# Patient Record
Sex: MALE | Race: OTHER | HISPANIC OR LATINO | URBAN - METROPOLITAN AREA
[De-identification: names, ages, dates, MRNs, and addresses within clinical notes are randomized per-mention and may not be internally consistent; named-entity substitution may affect disease eponyms.]

---

## 2020-01-01 ENCOUNTER — INPATIENT (INPATIENT)
Facility: HOSPITAL | Age: 75
LOS: 3 days | Discharge: HOME | DRG: 871 | End: 2020-04-01
Attending: INTERNAL MEDICINE | Admitting: INTERNAL MEDICINE
Payer: COMMERCIAL

## 2020-01-01 ENCOUNTER — INPATIENT (INPATIENT)
Facility: HOSPITAL | Age: 75
LOS: 43 days | DRG: 4 | End: 2020-05-15
Attending: INTERNAL MEDICINE | Admitting: STUDENT IN AN ORGANIZED HEALTH CARE EDUCATION/TRAINING PROGRAM
Payer: COMMERCIAL

## 2020-01-01 ENCOUNTER — EMERGENCY (EMERGENCY)
Facility: HOSPITAL | Age: 75
LOS: 1 days | Discharge: HOME | End: 2020-01-01
Attending: STUDENT IN AN ORGANIZED HEALTH CARE EDUCATION/TRAINING PROGRAM
Payer: COMMERCIAL

## 2020-01-01 VITALS
OXYGEN SATURATION: 96 % | HEIGHT: 65 IN | SYSTOLIC BLOOD PRESSURE: 114 MMHG | RESPIRATION RATE: 18 BRPM | HEART RATE: 76 BPM | WEIGHT: 149.91 LBS | DIASTOLIC BLOOD PRESSURE: 74 MMHG | TEMPERATURE: 100 F

## 2020-01-01 VITALS — HEART RATE: 79 BPM | SYSTOLIC BLOOD PRESSURE: 107 MMHG | DIASTOLIC BLOOD PRESSURE: 55 MMHG | TEMPERATURE: 99 F

## 2020-01-01 VITALS
WEIGHT: 143.96 LBS | OXYGEN SATURATION: 60 % | RESPIRATION RATE: 24 BRPM | HEART RATE: 112 BPM | DIASTOLIC BLOOD PRESSURE: 59 MMHG | HEIGHT: 65 IN | SYSTOLIC BLOOD PRESSURE: 105 MMHG

## 2020-01-01 VITALS — TEMPERATURE: 98 F

## 2020-01-01 DIAGNOSIS — I82.403 ACUTE EMBOLISM AND THROMBOSIS OF UNSPECIFIED DEEP VEINS OF LOWER EXTREMITY, BILATERAL: ICD-10-CM

## 2020-01-01 DIAGNOSIS — Z22.322 CARRIER OR SUSPECTED CARRIER OF METHICILLIN RESISTANT STAPHYLOCOCCUS AUREUS: ICD-10-CM

## 2020-01-01 DIAGNOSIS — D63.8 ANEMIA IN OTHER CHRONIC DISEASES CLASSIFIED ELSEWHERE: ICD-10-CM

## 2020-01-01 DIAGNOSIS — I82.409 ACUTE EMBOLISM AND THROMBOSIS OF UNSPECIFIED DEEP VEINS OF UNSPECIFIED LOWER EXTREMITY: ICD-10-CM

## 2020-01-01 DIAGNOSIS — J93.9 PNEUMOTHORAX, UNSPECIFIED: ICD-10-CM

## 2020-01-01 DIAGNOSIS — U07.1 COVID-19: ICD-10-CM

## 2020-01-01 DIAGNOSIS — I80.229 PHLEBITIS AND THROMBOPHLEBITIS OF UNSPECIFIED POPLITEAL VEIN: ICD-10-CM

## 2020-01-01 DIAGNOSIS — R53.2 FUNCTIONAL QUADRIPLEGIA: ICD-10-CM

## 2020-01-01 DIAGNOSIS — B97.21 SARS-ASSOCIATED CORONAVIRUS AS THE CAUSE OF DISEASES CLASSIFIED ELSEWHERE: ICD-10-CM

## 2020-01-01 DIAGNOSIS — N17.9 ACUTE KIDNEY FAILURE, UNSPECIFIED: ICD-10-CM

## 2020-01-01 DIAGNOSIS — J18.9 PNEUMONIA, UNSPECIFIED ORGANISM: ICD-10-CM

## 2020-01-01 DIAGNOSIS — J96.21 ACUTE AND CHRONIC RESPIRATORY FAILURE WITH HYPOXIA: ICD-10-CM

## 2020-01-01 DIAGNOSIS — T79.7XXA TRAUMATIC SUBCUTANEOUS EMPHYSEMA, INITIAL ENCOUNTER: ICD-10-CM

## 2020-01-01 DIAGNOSIS — Z96.89 PRESENCE OF OTHER SPECIFIED FUNCTIONAL IMPLANTS: ICD-10-CM

## 2020-01-01 DIAGNOSIS — J96.01 ACUTE RESPIRATORY FAILURE WITH HYPOXIA: ICD-10-CM

## 2020-01-01 DIAGNOSIS — E87.6 HYPOKALEMIA: ICD-10-CM

## 2020-01-01 DIAGNOSIS — E43 UNSPECIFIED SEVERE PROTEIN-CALORIE MALNUTRITION: ICD-10-CM

## 2020-01-01 DIAGNOSIS — Z29.9 ENCOUNTER FOR PROPHYLACTIC MEASURES, UNSPECIFIED: ICD-10-CM

## 2020-01-01 DIAGNOSIS — J12.9 VIRAL PNEUMONIA, UNSPECIFIED: ICD-10-CM

## 2020-01-01 LAB
-  AMIKACIN: SIGNIFICANT CHANGE UP
-  AMOXICILLIN/CLAVULANIC ACID: SIGNIFICANT CHANGE UP
-  AMPICILLIN/SULBACTAM: SIGNIFICANT CHANGE UP
-  AMPICILLIN/SULBACTAM: SIGNIFICANT CHANGE UP
-  AMPICILLIN: SIGNIFICANT CHANGE UP
-  AZTREONAM: SIGNIFICANT CHANGE UP
-  CEFAZOLIN: SIGNIFICANT CHANGE UP
-  CEFAZOLIN: SIGNIFICANT CHANGE UP
-  CEFEPIME: SIGNIFICANT CHANGE UP
-  CEFOXITIN: SIGNIFICANT CHANGE UP
-  CEFTRIAXONE: SIGNIFICANT CHANGE UP
-  CEFTRIAXONE: SIGNIFICANT CHANGE UP
-  CIPROFLOXACIN: SIGNIFICANT CHANGE UP
-  CLINDAMYCIN: SIGNIFICANT CHANGE UP
-  CLINDAMYCIN: SIGNIFICANT CHANGE UP
-  ERTAPENEM: SIGNIFICANT CHANGE UP
-  ERYTHROMYCIN: SIGNIFICANT CHANGE UP
-  ERYTHROMYCIN: SIGNIFICANT CHANGE UP
-  GENTAMICIN: SIGNIFICANT CHANGE UP
-  GENTAMICIN: SIGNIFICANT CHANGE UP
-  LEVOFLOXACIN: SIGNIFICANT CHANGE UP
-  LEVOFLOXACIN: SIGNIFICANT CHANGE UP
-  LINEZOLID: SIGNIFICANT CHANGE UP
-  MEROPENEM: SIGNIFICANT CHANGE UP
-  OXACILLIN: SIGNIFICANT CHANGE UP
-  PENICILLIN: SIGNIFICANT CHANGE UP
-  PENICILLIN: SIGNIFICANT CHANGE UP
-  PIPERACILLIN/TAZOBACTAM: SIGNIFICANT CHANGE UP
-  RIFAMPIN: SIGNIFICANT CHANGE UP
-  TETRACYCLINE: SIGNIFICANT CHANGE UP
-  TOBRAMYCIN: SIGNIFICANT CHANGE UP
-  TRIMETHOPRIM/SULFAMETHOXAZOLE: SIGNIFICANT CHANGE UP
-  VANCOMYCIN: SIGNIFICANT CHANGE UP
-  VANCOMYCIN: SIGNIFICANT CHANGE UP
4/8 RATIO: 0.25 RATIO — LOW (ref 0.86–4.14)
4/8 RATIO: 0.33 RATIO — LOW (ref 0.86–4.14)
4/8 RATIO: 0.43 RATIO — LOW (ref 0.86–4.14)
ABS CD8: 156 /UL — SIGNIFICANT CHANGE UP (ref 90–775)
ABS CD8: 177 /UL — SIGNIFICANT CHANGE UP (ref 90–775)
ABS CD8: 227 /UL — SIGNIFICANT CHANGE UP (ref 90–775)
ADD ON TEST-SPECIMEN IN LAB: SIGNIFICANT CHANGE UP
ALBUMIN SERPL ELPH-MCNC: 1 G/DL — LOW (ref 3.3–5)
ALBUMIN SERPL ELPH-MCNC: 1.1 G/DL — LOW (ref 3.3–5)
ALBUMIN SERPL ELPH-MCNC: 1.2 G/DL — LOW (ref 3.3–5)
ALBUMIN SERPL ELPH-MCNC: 1.3 G/DL — LOW (ref 3.3–5)
ALBUMIN SERPL ELPH-MCNC: 1.4 G/DL — LOW (ref 3.3–5)
ALBUMIN SERPL ELPH-MCNC: 1.4 G/DL — LOW (ref 3.3–5)
ALBUMIN SERPL ELPH-MCNC: 1.4 G/DL — LOW (ref 3.5–5)
ALBUMIN SERPL ELPH-MCNC: 1.5 G/DL — LOW (ref 3.3–5)
ALBUMIN SERPL ELPH-MCNC: 1.7 G/DL — LOW (ref 3.5–5)
ALBUMIN SERPL ELPH-MCNC: 2.1 G/DL — LOW (ref 3.5–5)
ALP SERPL-CCNC: 110 U/L — SIGNIFICANT CHANGE UP (ref 40–120)
ALP SERPL-CCNC: 122 U/L — HIGH (ref 40–120)
ALP SERPL-CCNC: 127 U/L — HIGH (ref 40–120)
ALP SERPL-CCNC: 130 U/L — HIGH (ref 40–120)
ALP SERPL-CCNC: 136 U/L — HIGH (ref 40–120)
ALP SERPL-CCNC: 141 U/L — HIGH (ref 40–120)
ALP SERPL-CCNC: 155 U/L — HIGH (ref 40–120)
ALP SERPL-CCNC: 156 U/L — HIGH (ref 40–120)
ALP SERPL-CCNC: 157 U/L — HIGH (ref 40–120)
ALP SERPL-CCNC: 160 U/L — HIGH (ref 40–120)
ALP SERPL-CCNC: 161 U/L — HIGH (ref 40–120)
ALP SERPL-CCNC: 162 U/L — HIGH (ref 40–120)
ALP SERPL-CCNC: 164 U/L — HIGH (ref 40–120)
ALP SERPL-CCNC: 180 U/L — HIGH (ref 40–120)
ALP SERPL-CCNC: 184 U/L — HIGH (ref 40–120)
ALP SERPL-CCNC: 197 U/L — HIGH (ref 40–120)
ALP SERPL-CCNC: 224 U/L — HIGH (ref 40–120)
ALP SERPL-CCNC: 77 U/L — SIGNIFICANT CHANGE UP (ref 40–120)
ALP SERPL-CCNC: 86 U/L — SIGNIFICANT CHANGE UP (ref 40–120)
ALP SERPL-CCNC: 89 U/L — SIGNIFICANT CHANGE UP (ref 40–120)
ALP SERPL-CCNC: 89 U/L — SIGNIFICANT CHANGE UP (ref 40–120)
ALP SERPL-CCNC: 96 U/L — SIGNIFICANT CHANGE UP (ref 40–120)
ALT FLD-CCNC: 14 U/L — SIGNIFICANT CHANGE UP (ref 12–78)
ALT FLD-CCNC: 14 U/L — SIGNIFICANT CHANGE UP (ref 12–78)
ALT FLD-CCNC: 15 U/L — SIGNIFICANT CHANGE UP (ref 12–78)
ALT FLD-CCNC: 16 U/L — SIGNIFICANT CHANGE UP (ref 12–78)
ALT FLD-CCNC: 18 U/L — SIGNIFICANT CHANGE UP (ref 12–78)
ALT FLD-CCNC: 18 U/L — SIGNIFICANT CHANGE UP (ref 12–78)
ALT FLD-CCNC: 19 U/L — SIGNIFICANT CHANGE UP (ref 12–78)
ALT FLD-CCNC: 20 U/L — SIGNIFICANT CHANGE UP (ref 12–78)
ALT FLD-CCNC: 20 U/L — SIGNIFICANT CHANGE UP (ref 12–78)
ALT FLD-CCNC: 21 U/L — SIGNIFICANT CHANGE UP (ref 12–78)
ALT FLD-CCNC: 23 U/L — SIGNIFICANT CHANGE UP (ref 12–78)
ALT FLD-CCNC: 24 U/L DA — SIGNIFICANT CHANGE UP (ref 10–60)
ALT FLD-CCNC: 24 U/L — SIGNIFICANT CHANGE UP (ref 12–78)
ALT FLD-CCNC: 26 U/L — SIGNIFICANT CHANGE UP (ref 12–78)
ALT FLD-CCNC: 27 U/L — SIGNIFICANT CHANGE UP (ref 12–78)
ALT FLD-CCNC: 27 U/L — SIGNIFICANT CHANGE UP (ref 12–78)
ALT FLD-CCNC: 29 U/L — SIGNIFICANT CHANGE UP (ref 12–78)
ALT FLD-CCNC: 29 U/L — SIGNIFICANT CHANGE UP (ref 12–78)
ALT FLD-CCNC: 30 U/L — SIGNIFICANT CHANGE UP (ref 12–78)
ALT FLD-CCNC: 31 U/L DA — SIGNIFICANT CHANGE UP (ref 10–60)
ALT FLD-CCNC: 32 U/L — SIGNIFICANT CHANGE UP (ref 12–78)
ALT FLD-CCNC: 38 U/L DA — SIGNIFICANT CHANGE UP (ref 10–60)
ANION GAP SERPL CALC-SCNC: -1 MMOL/L — LOW (ref 5–17)
ANION GAP SERPL CALC-SCNC: -2 MMOL/L — LOW (ref 5–17)
ANION GAP SERPL CALC-SCNC: 0 MMOL/L — LOW (ref 5–17)
ANION GAP SERPL CALC-SCNC: 1 MMOL/L — LOW (ref 5–17)
ANION GAP SERPL CALC-SCNC: 2 MMOL/L — LOW (ref 5–17)
ANION GAP SERPL CALC-SCNC: 3 MMOL/L — LOW (ref 5–17)
ANION GAP SERPL CALC-SCNC: 4 MMOL/L — LOW (ref 5–17)
ANION GAP SERPL CALC-SCNC: 5 MMOL/L — SIGNIFICANT CHANGE UP (ref 5–17)
ANION GAP SERPL CALC-SCNC: 7 MMOL/L — SIGNIFICANT CHANGE UP (ref 5–17)
ANION GAP SERPL CALC-SCNC: 8 MMOL/L — SIGNIFICANT CHANGE UP (ref 5–17)
ANION GAP SERPL CALC-SCNC: 8 MMOL/L — SIGNIFICANT CHANGE UP (ref 5–17)
ANION GAP SERPL CALC-SCNC: <-1 MMOL/L — LOW (ref 5–17)
ANION GAP SERPL CALC-SCNC: <0 MMOL/L — LOW (ref 5–17)
ANION GAP SERPL CALC-SCNC: <2 MMOL/L — LOW (ref 5–17)
APPEARANCE UR: CLEAR — SIGNIFICANT CHANGE UP
APTT BLD: 111.7 SEC — HIGH (ref 27.5–36.3)
APTT BLD: 115.5 SEC — HIGH (ref 27.5–36.3)
APTT BLD: 127.7 SEC — CRITICAL HIGH (ref 27.5–36.3)
APTT BLD: 178.4 SEC — CRITICAL HIGH (ref 27.5–36.3)
APTT BLD: 39.1 SEC — HIGH (ref 27.5–36.3)
APTT BLD: 41.5 SEC — HIGH (ref 27.5–36.3)
APTT BLD: 41.6 SEC — HIGH (ref 27.5–36.3)
APTT BLD: 46.1 SEC — HIGH (ref 27.5–36.3)
APTT BLD: 46.8 SEC — HIGH (ref 27.5–36.3)
APTT BLD: 47 SEC — HIGH (ref 27.5–36.3)
APTT BLD: 47.5 SEC — HIGH (ref 27.5–36.3)
APTT BLD: 65 SEC — HIGH (ref 27.5–36.3)
APTT BLD: 65.1 SEC — HIGH (ref 27.5–36.3)
APTT BLD: 69.6 SEC — HIGH (ref 27.5–36.3)
APTT BLD: 84.4 SEC — HIGH (ref 27.5–36.3)
APTT BLD: >200 SEC — CRITICAL HIGH (ref 27.5–36.3)
AST SERPL-CCNC: 26 U/L — SIGNIFICANT CHANGE UP (ref 15–37)
AST SERPL-CCNC: 26 U/L — SIGNIFICANT CHANGE UP (ref 15–37)
AST SERPL-CCNC: 29 U/L — SIGNIFICANT CHANGE UP (ref 15–37)
AST SERPL-CCNC: 32 U/L — SIGNIFICANT CHANGE UP (ref 15–37)
AST SERPL-CCNC: 33 U/L — SIGNIFICANT CHANGE UP (ref 15–37)
AST SERPL-CCNC: 34 U/L — SIGNIFICANT CHANGE UP (ref 15–37)
AST SERPL-CCNC: 36 U/L — SIGNIFICANT CHANGE UP (ref 15–37)
AST SERPL-CCNC: 36 U/L — SIGNIFICANT CHANGE UP (ref 15–37)
AST SERPL-CCNC: 38 U/L — SIGNIFICANT CHANGE UP (ref 10–40)
AST SERPL-CCNC: 39 U/L — HIGH (ref 15–37)
AST SERPL-CCNC: 45 U/L — HIGH (ref 10–40)
AST SERPL-CCNC: 45 U/L — HIGH (ref 15–37)
AST SERPL-CCNC: 46 U/L — HIGH (ref 15–37)
AST SERPL-CCNC: 48 U/L — HIGH (ref 15–37)
AST SERPL-CCNC: 50 U/L — HIGH (ref 15–37)
AST SERPL-CCNC: 51 U/L — HIGH (ref 15–37)
AST SERPL-CCNC: 52 U/L — HIGH (ref 15–37)
AST SERPL-CCNC: 53 U/L — HIGH (ref 15–37)
AST SERPL-CCNC: 53 U/L — HIGH (ref 15–37)
AST SERPL-CCNC: 54 U/L — HIGH (ref 15–37)
AST SERPL-CCNC: 57 U/L — HIGH (ref 15–37)
AST SERPL-CCNC: 58 U/L — HIGH (ref 15–37)
AST SERPL-CCNC: 62 U/L — HIGH (ref 15–37)
AST SERPL-CCNC: 65 U/L — HIGH (ref 10–40)
BASE EXCESS BLDA CALC-SCNC: -2.5 MMOL/L — LOW (ref -2–2)
BASE EXCESS BLDA CALC-SCNC: -3.5 MMOL/L — LOW (ref -2–2)
BASE EXCESS BLDA CALC-SCNC: -4.1 MMOL/L — LOW (ref -2–2)
BASE EXCESS BLDA CALC-SCNC: -5.1 MMOL/L — LOW (ref -2–2)
BASE EXCESS BLDA CALC-SCNC: -5.4 MMOL/L — LOW (ref -2–2)
BASE EXCESS BLDA CALC-SCNC: -5.9 MMOL/L — LOW (ref -2–2)
BASE EXCESS BLDA CALC-SCNC: -6.5 MMOL/L — LOW (ref -2–2)
BASE EXCESS BLDA CALC-SCNC: -6.9 MMOL/L — LOW (ref -2–2)
BASE EXCESS BLDA CALC-SCNC: -7 MMOL/L — LOW (ref -2–2)
BASE EXCESS BLDA CALC-SCNC: -9.6 MMOL/L — LOW (ref -2–2)
BASE EXCESS BLDA CALC-SCNC: 1 MMOL/L — SIGNIFICANT CHANGE UP (ref -2–2)
BASE EXCESS BLDA CALC-SCNC: 1.4 MMOL/L — SIGNIFICANT CHANGE UP (ref -2–2)
BASE EXCESS BLDA CALC-SCNC: 1.7 MMOL/L — SIGNIFICANT CHANGE UP (ref -2–2)
BASE EXCESS BLDA CALC-SCNC: 10.1 MMOL/L — HIGH (ref -2–2)
BASE EXCESS BLDA CALC-SCNC: 10.2 MMOL/L — HIGH (ref -2–2)
BASE EXCESS BLDA CALC-SCNC: 10.2 MMOL/L — HIGH (ref -2–2)
BASE EXCESS BLDA CALC-SCNC: 10.5 MMOL/L — HIGH (ref -2–2)
BASE EXCESS BLDA CALC-SCNC: 10.5 MMOL/L — HIGH (ref -2–2)
BASE EXCESS BLDA CALC-SCNC: 10.7 MMOL/L — HIGH (ref -2–2)
BASE EXCESS BLDA CALC-SCNC: 10.9 MMOL/L — HIGH (ref -2–2)
BASE EXCESS BLDA CALC-SCNC: 11 MMOL/L — HIGH (ref -2–2)
BASE EXCESS BLDA CALC-SCNC: 11.2 MMOL/L — HIGH (ref -2–2)
BASE EXCESS BLDA CALC-SCNC: 11.5 MMOL/L — HIGH (ref -2–2)
BASE EXCESS BLDA CALC-SCNC: 11.6 MMOL/L — HIGH (ref -2–2)
BASE EXCESS BLDA CALC-SCNC: 12.2 MMOL/L — HIGH (ref -2–2)
BASE EXCESS BLDA CALC-SCNC: 14.3 MMOL/L — HIGH (ref -2–2)
BASE EXCESS BLDA CALC-SCNC: 14.6 MMOL/L — HIGH (ref -2–2)
BASE EXCESS BLDA CALC-SCNC: 15.1 MMOL/L — HIGH (ref -2–2)
BASE EXCESS BLDA CALC-SCNC: 15.3 MMOL/L — HIGH (ref -2–2)
BASE EXCESS BLDA CALC-SCNC: 15.3 MMOL/L — HIGH (ref -2–2)
BASE EXCESS BLDA CALC-SCNC: 16 MMOL/L — HIGH (ref -2–2)
BASE EXCESS BLDA CALC-SCNC: 16.9 MMOL/L — HIGH (ref -2–2)
BASE EXCESS BLDA CALC-SCNC: 2.2 MMOL/L — HIGH (ref -2–2)
BASE EXCESS BLDA CALC-SCNC: 2.4 MMOL/L — HIGH (ref -2–2)
BASE EXCESS BLDA CALC-SCNC: 2.8 MMOL/L — HIGH (ref -2–2)
BASE EXCESS BLDA CALC-SCNC: 3.7 MMOL/L — HIGH (ref -2–2)
BASE EXCESS BLDA CALC-SCNC: 3.9 MMOL/L — HIGH (ref -2–2)
BASE EXCESS BLDA CALC-SCNC: 4.5 MMOL/L — HIGH (ref -2–2)
BASE EXCESS BLDA CALC-SCNC: 4.8 MMOL/L — HIGH (ref -2–2)
BASE EXCESS BLDA CALC-SCNC: 5.4 MMOL/L — HIGH (ref -2–2)
BASE EXCESS BLDA CALC-SCNC: 5.5 MMOL/L — HIGH (ref -2–2)
BASE EXCESS BLDA CALC-SCNC: 5.7 MMOL/L — HIGH (ref -2–2)
BASE EXCESS BLDA CALC-SCNC: 5.9 MMOL/L — HIGH (ref -2–2)
BASE EXCESS BLDA CALC-SCNC: 6.7 MMOL/L — HIGH (ref -2–2)
BASE EXCESS BLDA CALC-SCNC: 7 MMOL/L — HIGH (ref -2–2)
BASE EXCESS BLDA CALC-SCNC: 7.1 MMOL/L — HIGH (ref -2–2)
BASE EXCESS BLDA CALC-SCNC: 7.2 MMOL/L — HIGH (ref -2–2)
BASE EXCESS BLDA CALC-SCNC: 7.3 MMOL/L — HIGH (ref -2–2)
BASE EXCESS BLDA CALC-SCNC: 7.3 MMOL/L — HIGH (ref -2–2)
BASE EXCESS BLDA CALC-SCNC: 7.4 MMOL/L — HIGH (ref -2–2)
BASE EXCESS BLDA CALC-SCNC: 7.7 MMOL/L — HIGH (ref -2–2)
BASE EXCESS BLDA CALC-SCNC: 7.9 MMOL/L — HIGH (ref -2–2)
BASE EXCESS BLDA CALC-SCNC: 8.2 MMOL/L — HIGH (ref -2–2)
BASE EXCESS BLDA CALC-SCNC: 8.2 MMOL/L — HIGH (ref -2–2)
BASE EXCESS BLDA CALC-SCNC: 8.7 MMOL/L — HIGH (ref -2–2)
BASE EXCESS BLDA CALC-SCNC: 8.9 MMOL/L — HIGH (ref -2–2)
BASE EXCESS BLDA CALC-SCNC: 9.1 MMOL/L — HIGH (ref -2–2)
BASE EXCESS BLDA CALC-SCNC: 9.2 MMOL/L — HIGH (ref -2–2)
BASE EXCESS BLDA CALC-SCNC: 9.4 MMOL/L — HIGH (ref -2–2)
BASE EXCESS BLDA CALC-SCNC: 9.5 MMOL/L — HIGH (ref -2–2)
BASE EXCESS BLDA CALC-SCNC: 9.9 MMOL/L — HIGH (ref -2–2)
BASOPHILS # BLD AUTO: 0 K/UL — SIGNIFICANT CHANGE UP (ref 0–0.2)
BASOPHILS # BLD AUTO: 0 K/UL — SIGNIFICANT CHANGE UP (ref 0–0.2)
BASOPHILS # BLD AUTO: 0.01 K/UL — SIGNIFICANT CHANGE UP (ref 0–0.2)
BASOPHILS # BLD AUTO: 0.02 K/UL — SIGNIFICANT CHANGE UP (ref 0–0.2)
BASOPHILS # BLD AUTO: 0.03 K/UL — SIGNIFICANT CHANGE UP (ref 0–0.2)
BASOPHILS # BLD AUTO: 0.04 K/UL — SIGNIFICANT CHANGE UP (ref 0–0.2)
BASOPHILS # BLD AUTO: 0.05 K/UL — SIGNIFICANT CHANGE UP (ref 0–0.2)
BASOPHILS # BLD AUTO: 0.06 K/UL — SIGNIFICANT CHANGE UP (ref 0–0.2)
BASOPHILS # BLD AUTO: 0.08 K/UL — SIGNIFICANT CHANGE UP (ref 0–0.2)
BASOPHILS # BLD AUTO: 0.08 K/UL — SIGNIFICANT CHANGE UP (ref 0–0.2)
BASOPHILS NFR BLD AUTO: 0 % — SIGNIFICANT CHANGE UP (ref 0–2)
BASOPHILS NFR BLD AUTO: 0 % — SIGNIFICANT CHANGE UP (ref 0–2)
BASOPHILS NFR BLD AUTO: 0.1 % — SIGNIFICANT CHANGE UP (ref 0–2)
BASOPHILS NFR BLD AUTO: 0.2 % — SIGNIFICANT CHANGE UP (ref 0–2)
BASOPHILS NFR BLD AUTO: 0.3 % — SIGNIFICANT CHANGE UP (ref 0–2)
BASOPHILS NFR BLD AUTO: 0.4 % — SIGNIFICANT CHANGE UP (ref 0–2)
BASOPHILS NFR BLD AUTO: 0.5 % — SIGNIFICANT CHANGE UP (ref 0–2)
BASOPHILS NFR BLD AUTO: 0.6 % — SIGNIFICANT CHANGE UP (ref 0–2)
BILIRUB SERPL-MCNC: 0.3 MG/DL — SIGNIFICANT CHANGE UP (ref 0.2–1.2)
BILIRUB SERPL-MCNC: 0.3 MG/DL — SIGNIFICANT CHANGE UP (ref 0.2–1.2)
BILIRUB SERPL-MCNC: 0.4 MG/DL — SIGNIFICANT CHANGE UP (ref 0.2–1.2)
BILIRUB SERPL-MCNC: 0.5 MG/DL — SIGNIFICANT CHANGE UP (ref 0.2–1.2)
BILIRUB SERPL-MCNC: 0.6 MG/DL — SIGNIFICANT CHANGE UP (ref 0.2–1.2)
BILIRUB SERPL-MCNC: 0.7 MG/DL — SIGNIFICANT CHANGE UP (ref 0.2–1.2)
BILIRUB SERPL-MCNC: 0.8 MG/DL — SIGNIFICANT CHANGE UP (ref 0.2–1.2)
BILIRUB UR-MCNC: NEGATIVE — SIGNIFICANT CHANGE UP
BLOOD GAS COMMENTS ARTERIAL: 50 — SIGNIFICANT CHANGE UP
BLOOD GAS COMMENTS ARTERIAL: SIGNIFICANT CHANGE UP
BUN SERPL-MCNC: 11 MG/DL — SIGNIFICANT CHANGE UP (ref 7–23)
BUN SERPL-MCNC: 12 MG/DL — SIGNIFICANT CHANGE UP (ref 7–23)
BUN SERPL-MCNC: 12 MG/DL — SIGNIFICANT CHANGE UP (ref 7–23)
BUN SERPL-MCNC: 13 MG/DL — SIGNIFICANT CHANGE UP (ref 7–23)
BUN SERPL-MCNC: 13 MG/DL — SIGNIFICANT CHANGE UP (ref 7–23)
BUN SERPL-MCNC: 14 MG/DL — SIGNIFICANT CHANGE UP (ref 7–23)
BUN SERPL-MCNC: 14 MG/DL — SIGNIFICANT CHANGE UP (ref 7–23)
BUN SERPL-MCNC: 15 MG/DL — SIGNIFICANT CHANGE UP (ref 7–23)
BUN SERPL-MCNC: 16 MG/DL — SIGNIFICANT CHANGE UP (ref 7–23)
BUN SERPL-MCNC: 16 MG/DL — SIGNIFICANT CHANGE UP (ref 7–23)
BUN SERPL-MCNC: 17 MG/DL — SIGNIFICANT CHANGE UP (ref 7–23)
BUN SERPL-MCNC: 17 MG/DL — SIGNIFICANT CHANGE UP (ref 7–23)
BUN SERPL-MCNC: 18 MG/DL — SIGNIFICANT CHANGE UP (ref 7–23)
BUN SERPL-MCNC: 19 MG/DL — SIGNIFICANT CHANGE UP (ref 7–23)
BUN SERPL-MCNC: 20 MG/DL — SIGNIFICANT CHANGE UP (ref 7–23)
BUN SERPL-MCNC: 21 MG/DL — SIGNIFICANT CHANGE UP (ref 7–23)
BUN SERPL-MCNC: 22 MG/DL — SIGNIFICANT CHANGE UP (ref 7–23)
BUN SERPL-MCNC: 23 MG/DL — SIGNIFICANT CHANGE UP (ref 7–23)
BUN SERPL-MCNC: 24 MG/DL — HIGH (ref 7–23)
BUN SERPL-MCNC: 25 MG/DL — HIGH (ref 7–23)
BUN SERPL-MCNC: 27 MG/DL — HIGH (ref 7–23)
BUN SERPL-MCNC: 27 MG/DL — HIGH (ref 7–23)
BUN SERPL-MCNC: 29 MG/DL — HIGH (ref 7–23)
BUN SERPL-MCNC: 30 MG/DL — HIGH (ref 7–18)
BUN SERPL-MCNC: 30 MG/DL — HIGH (ref 7–23)
BUN SERPL-MCNC: 32 MG/DL — HIGH (ref 7–18)
BUN SERPL-MCNC: 32 MG/DL — HIGH (ref 7–23)
BUN SERPL-MCNC: 35 MG/DL — HIGH (ref 7–23)
BUN SERPL-MCNC: 44 MG/DL — HIGH (ref 7–23)
BUN SERPL-MCNC: 45 MG/DL — HIGH (ref 7–23)
BUN SERPL-MCNC: 46 MG/DL — HIGH (ref 7–23)
BUN SERPL-MCNC: 60 MG/DL — HIGH (ref 7–18)
BUN SERPL-MCNC: 71 MG/DL — HIGH (ref 7–23)
BUN SERPL-MCNC: 72 MG/DL — HIGH (ref 7–23)
BUN SERPL-MCNC: 88 MG/DL — HIGH (ref 7–23)
CALCIUM SERPL-MCNC: 6.7 MG/DL — LOW (ref 8.5–10.1)
CALCIUM SERPL-MCNC: 6.8 MG/DL — LOW (ref 8.5–10.1)
CALCIUM SERPL-MCNC: 7 MG/DL — LOW (ref 8.5–10.1)
CALCIUM SERPL-MCNC: 7.4 MG/DL — LOW (ref 8.5–10.1)
CALCIUM SERPL-MCNC: 7.5 MG/DL — LOW (ref 8.5–10.1)
CALCIUM SERPL-MCNC: 7.6 MG/DL — LOW (ref 8.5–10.1)
CALCIUM SERPL-MCNC: 7.7 MG/DL — LOW (ref 8.5–10.1)
CALCIUM SERPL-MCNC: 7.8 MG/DL — LOW (ref 8.5–10.1)
CALCIUM SERPL-MCNC: 7.9 MG/DL — LOW (ref 8.5–10.1)
CALCIUM SERPL-MCNC: 8 MG/DL — LOW (ref 8.5–10.1)
CALCIUM SERPL-MCNC: 8 MG/DL — LOW (ref 8.5–10.1)
CALCIUM SERPL-MCNC: 8.1 MG/DL — LOW (ref 8.4–10.5)
CALCIUM SERPL-MCNC: 8.2 MG/DL — LOW (ref 8.4–10.5)
CALCIUM SERPL-MCNC: 8.2 MG/DL — LOW (ref 8.5–10.1)
CALCIUM SERPL-MCNC: 8.3 MG/DL — LOW (ref 8.5–10.1)
CALCIUM SERPL-MCNC: 8.4 MG/DL — SIGNIFICANT CHANGE UP (ref 8.4–10.5)
CD3 BLASTS SPEC-ACNC: 203 /UL — LOW (ref 396–2024)
CD3 BLASTS SPEC-ACNC: 260 /UL — LOW (ref 396–2024)
CD3 BLASTS SPEC-ACNC: 316 /UL — LOW (ref 396–2024)
CD3 BLASTS SPEC-ACNC: 58 % — SIGNIFICANT CHANGE UP (ref 58–84)
CD3 BLASTS SPEC-ACNC: 71 % — SIGNIFICANT CHANGE UP (ref 58–84)
CD3 BLASTS SPEC-ACNC: 75 % — SIGNIFICANT CHANGE UP (ref 58–84)
CD4 %: 14 % — LOW (ref 30–56)
CD4 %: 17 % — LOW (ref 30–56)
CD4 %: 18 % — LOW (ref 30–56)
CD8 %: 40 % — SIGNIFICANT CHANGE UP (ref 11–43)
CD8 %: 53 % — HIGH (ref 11–43)
CD8 %: 55 % — HIGH (ref 11–43)
CHLORIDE SERPL-SCNC: 100 MMOL/L — SIGNIFICANT CHANGE UP (ref 96–108)
CHLORIDE SERPL-SCNC: 101 MMOL/L — SIGNIFICANT CHANGE UP (ref 96–108)
CHLORIDE SERPL-SCNC: 101 MMOL/L — SIGNIFICANT CHANGE UP (ref 96–108)
CHLORIDE SERPL-SCNC: 102 MMOL/L — SIGNIFICANT CHANGE UP (ref 96–108)
CHLORIDE SERPL-SCNC: 103 MMOL/L — SIGNIFICANT CHANGE UP (ref 96–108)
CHLORIDE SERPL-SCNC: 103 MMOL/L — SIGNIFICANT CHANGE UP (ref 96–108)
CHLORIDE SERPL-SCNC: 104 MMOL/L — SIGNIFICANT CHANGE UP (ref 96–108)
CHLORIDE SERPL-SCNC: 105 MMOL/L — SIGNIFICANT CHANGE UP (ref 96–108)
CHLORIDE SERPL-SCNC: 106 MMOL/L — SIGNIFICANT CHANGE UP (ref 96–108)
CHLORIDE SERPL-SCNC: 107 MMOL/L — SIGNIFICANT CHANGE UP (ref 96–108)
CHLORIDE SERPL-SCNC: 108 MMOL/L — SIGNIFICANT CHANGE UP (ref 96–108)
CHLORIDE SERPL-SCNC: 108 MMOL/L — SIGNIFICANT CHANGE UP (ref 96–108)
CHLORIDE SERPL-SCNC: 110 MMOL/L — HIGH (ref 96–108)
CHLORIDE SERPL-SCNC: 111 MMOL/L — HIGH (ref 96–108)
CHLORIDE SERPL-SCNC: 112 MMOL/L — HIGH (ref 96–108)
CHLORIDE SERPL-SCNC: 113 MMOL/L — HIGH (ref 96–108)
CHLORIDE SERPL-SCNC: 113 MMOL/L — HIGH (ref 96–108)
CHLORIDE SERPL-SCNC: 114 MMOL/L — HIGH (ref 96–108)
CHLORIDE SERPL-SCNC: 114 MMOL/L — HIGH (ref 96–108)
CHLORIDE SERPL-SCNC: 115 MMOL/L — HIGH (ref 96–108)
CHLORIDE SERPL-SCNC: 116 MMOL/L — HIGH (ref 96–108)
CHLORIDE SERPL-SCNC: 121 MMOL/L — HIGH (ref 96–108)
CHLORIDE SERPL-SCNC: 99 MMOL/L — SIGNIFICANT CHANGE UP (ref 96–108)
CK MB BLD-MCNC: 1.8 % — SIGNIFICANT CHANGE UP (ref 0–3.5)
CK MB BLD-MCNC: <1.4 % — SIGNIFICANT CHANGE UP (ref 0–3.5)
CK MB CFR SERPL CALC: 1.2 NG/ML — SIGNIFICANT CHANGE UP (ref 0–3.6)
CK MB CFR SERPL CALC: <1 NG/ML — SIGNIFICANT CHANGE UP (ref 0–3.6)
CK SERPL-CCNC: 68 U/L — SIGNIFICANT CHANGE UP (ref 35–232)
CK SERPL-CCNC: 74 U/L — SIGNIFICANT CHANGE UP (ref 35–232)
CO2 SERPL-SCNC: 22 MMOL/L — SIGNIFICANT CHANGE UP (ref 22–31)
CO2 SERPL-SCNC: 23 MMOL/L — SIGNIFICANT CHANGE UP (ref 22–31)
CO2 SERPL-SCNC: 25 MMOL/L — SIGNIFICANT CHANGE UP (ref 22–31)
CO2 SERPL-SCNC: 27 MMOL/L — SIGNIFICANT CHANGE UP (ref 22–31)
CO2 SERPL-SCNC: 28 MMOL/L — SIGNIFICANT CHANGE UP (ref 22–31)
CO2 SERPL-SCNC: 30 MMOL/L — SIGNIFICANT CHANGE UP (ref 22–31)
CO2 SERPL-SCNC: 31 MMOL/L — SIGNIFICANT CHANGE UP (ref 22–31)
CO2 SERPL-SCNC: 31 MMOL/L — SIGNIFICANT CHANGE UP (ref 22–31)
CO2 SERPL-SCNC: 32 MMOL/L — HIGH (ref 22–31)
CO2 SERPL-SCNC: 33 MMOL/L — HIGH (ref 22–31)
CO2 SERPL-SCNC: 34 MMOL/L — HIGH (ref 22–31)
CO2 SERPL-SCNC: 35 MMOL/L — HIGH (ref 22–31)
CO2 SERPL-SCNC: 36 MMOL/L — HIGH (ref 22–31)
CO2 SERPL-SCNC: 37 MMOL/L — HIGH (ref 22–31)
CO2 SERPL-SCNC: 38 MMOL/L — HIGH (ref 22–31)
CO2 SERPL-SCNC: 40 MMOL/L — HIGH (ref 22–31)
CO2 SERPL-SCNC: 42 MMOL/L — HIGH (ref 22–31)
CO2 SERPL-SCNC: 42 MMOL/L — HIGH (ref 22–31)
CO2 SERPL-SCNC: 43 MMOL/L — HIGH (ref 22–31)
CO2 SERPL-SCNC: 44 MMOL/L — HIGH (ref 22–31)
CO2 SERPL-SCNC: >45 MMOL/L — CRITICAL HIGH (ref 22–31)
COLOR SPEC: YELLOW — SIGNIFICANT CHANGE UP
CREAT SERPL-MCNC: 0.17 MG/DL — LOW (ref 0.5–1.3)
CREAT SERPL-MCNC: 0.18 MG/DL — LOW (ref 0.5–1.3)
CREAT SERPL-MCNC: 0.19 MG/DL — LOW (ref 0.5–1.3)
CREAT SERPL-MCNC: 0.2 MG/DL — LOW (ref 0.5–1.3)
CREAT SERPL-MCNC: 0.21 MG/DL — LOW (ref 0.5–1.3)
CREAT SERPL-MCNC: 0.23 MG/DL — LOW (ref 0.5–1.3)
CREAT SERPL-MCNC: 0.23 MG/DL — LOW (ref 0.5–1.3)
CREAT SERPL-MCNC: 0.24 MG/DL — LOW (ref 0.5–1.3)
CREAT SERPL-MCNC: 0.24 MG/DL — LOW (ref 0.5–1.3)
CREAT SERPL-MCNC: 0.25 MG/DL — LOW (ref 0.5–1.3)
CREAT SERPL-MCNC: 0.25 MG/DL — LOW (ref 0.5–1.3)
CREAT SERPL-MCNC: 0.26 MG/DL — LOW (ref 0.5–1.3)
CREAT SERPL-MCNC: 0.27 MG/DL — LOW (ref 0.5–1.3)
CREAT SERPL-MCNC: 0.27 MG/DL — LOW (ref 0.5–1.3)
CREAT SERPL-MCNC: 0.28 MG/DL — LOW (ref 0.5–1.3)
CREAT SERPL-MCNC: 0.29 MG/DL — LOW (ref 0.5–1.3)
CREAT SERPL-MCNC: 0.3 MG/DL — LOW (ref 0.5–1.3)
CREAT SERPL-MCNC: 0.3 MG/DL — LOW (ref 0.5–1.3)
CREAT SERPL-MCNC: 0.31 MG/DL — LOW (ref 0.5–1.3)
CREAT SERPL-MCNC: 0.31 MG/DL — LOW (ref 0.5–1.3)
CREAT SERPL-MCNC: 0.33 MG/DL — LOW (ref 0.5–1.3)
CREAT SERPL-MCNC: 0.33 MG/DL — LOW (ref 0.5–1.3)
CREAT SERPL-MCNC: 0.35 MG/DL — LOW (ref 0.5–1.3)
CREAT SERPL-MCNC: 0.37 MG/DL — LOW (ref 0.5–1.3)
CREAT SERPL-MCNC: 0.38 MG/DL — LOW (ref 0.5–1.3)
CREAT SERPL-MCNC: 0.39 MG/DL — LOW (ref 0.5–1.3)
CREAT SERPL-MCNC: 0.4 MG/DL — LOW (ref 0.5–1.3)
CREAT SERPL-MCNC: 0.4 MG/DL — LOW (ref 0.5–1.3)
CREAT SERPL-MCNC: 0.41 MG/DL — LOW (ref 0.5–1.3)
CREAT SERPL-MCNC: 0.44 MG/DL — LOW (ref 0.5–1.3)
CREAT SERPL-MCNC: 0.48 MG/DL — LOW (ref 0.5–1.3)
CREAT SERPL-MCNC: 0.52 MG/DL — SIGNIFICANT CHANGE UP (ref 0.5–1.3)
CREAT SERPL-MCNC: 0.63 MG/DL — SIGNIFICANT CHANGE UP (ref 0.5–1.3)
CREAT SERPL-MCNC: 0.76 MG/DL — SIGNIFICANT CHANGE UP (ref 0.5–1.3)
CREAT SERPL-MCNC: 0.81 MG/DL — SIGNIFICANT CHANGE UP (ref 0.5–1.3)
CREAT SERPL-MCNC: 1.49 MG/DL — HIGH (ref 0.5–1.3)
CREAT SERPL-MCNC: <0.15 MG/DL — LOW (ref 0.5–1.3)
CRP SERPL-MCNC: 10.59 MG/DL — HIGH (ref 0–0.4)
CRP SERPL-MCNC: 10.78 MG/DL — HIGH (ref 0–0.4)
CRP SERPL-MCNC: 11.96 MG/DL — HIGH (ref 0–0.4)
CRP SERPL-MCNC: 17.29 MG/DL — HIGH (ref 0–0.4)
CRP SERPL-MCNC: 18.1 MG/DL — HIGH (ref 0–0.4)
CRP SERPL-MCNC: 25.59 MG/DL — HIGH (ref 0–0.4)
CRP SERPL-MCNC: 34.78 MG/DL — HIGH (ref 0–0.4)
CRP SERPL-MCNC: 37.09 MG/DL — HIGH (ref 0–0.4)
CULTURE RESULTS: NO GROWTH — SIGNIFICANT CHANGE UP
CULTURE RESULTS: SIGNIFICANT CHANGE UP
D DIMER BLD IA.RAPID-MCNC: 2104 NG/ML DDU — HIGH
D DIMER BLD IA.RAPID-MCNC: ABNORMAL NG/ML DDU
D DIMER BLD IA.RAPID-MCNC: ABNORMAL NG/ML DDU
DIFF PNL FLD: ABNORMAL
EOSINOPHIL # BLD AUTO: 0.01 K/UL — SIGNIFICANT CHANGE UP (ref 0–0.5)
EOSINOPHIL # BLD AUTO: 0.01 K/UL — SIGNIFICANT CHANGE UP (ref 0–0.5)
EOSINOPHIL # BLD AUTO: 0.17 K/UL — SIGNIFICANT CHANGE UP (ref 0–0.5)
EOSINOPHIL # BLD AUTO: 0.18 K/UL — SIGNIFICANT CHANGE UP (ref 0–0.5)
EOSINOPHIL # BLD AUTO: 0.19 K/UL — SIGNIFICANT CHANGE UP (ref 0–0.5)
EOSINOPHIL # BLD AUTO: 0.2 K/UL — SIGNIFICANT CHANGE UP (ref 0–0.5)
EOSINOPHIL # BLD AUTO: 0.22 K/UL — SIGNIFICANT CHANGE UP (ref 0–0.5)
EOSINOPHIL # BLD AUTO: 0.25 K/UL — SIGNIFICANT CHANGE UP (ref 0–0.5)
EOSINOPHIL # BLD AUTO: 0.3 K/UL — SIGNIFICANT CHANGE UP (ref 0–0.5)
EOSINOPHIL # BLD AUTO: 0.35 K/UL — SIGNIFICANT CHANGE UP (ref 0–0.5)
EOSINOPHIL # BLD AUTO: 0.36 K/UL — SIGNIFICANT CHANGE UP (ref 0–0.5)
EOSINOPHIL # BLD AUTO: 0.63 K/UL — HIGH (ref 0–0.5)
EOSINOPHIL # BLD AUTO: 0.72 K/UL — HIGH (ref 0–0.5)
EOSINOPHIL # BLD AUTO: 0.75 K/UL — HIGH (ref 0–0.5)
EOSINOPHIL # BLD AUTO: 0.81 K/UL — HIGH (ref 0–0.5)
EOSINOPHIL # BLD AUTO: 0.89 K/UL — HIGH (ref 0–0.5)
EOSINOPHIL # BLD AUTO: 0.91 K/UL — HIGH (ref 0–0.5)
EOSINOPHIL # BLD AUTO: 0.95 K/UL — HIGH (ref 0–0.5)
EOSINOPHIL # BLD AUTO: 1.38 K/UL — HIGH (ref 0–0.5)
EOSINOPHIL # BLD AUTO: 1.51 K/UL — HIGH (ref 0–0.5)
EOSINOPHIL # BLD AUTO: 1.63 K/UL — HIGH (ref 0–0.5)
EOSINOPHIL # BLD AUTO: 1.8 K/UL — HIGH (ref 0–0.5)
EOSINOPHIL # BLD AUTO: 1.81 K/UL — HIGH (ref 0–0.5)
EOSINOPHIL NFR BLD AUTO: 0.1 % — SIGNIFICANT CHANGE UP (ref 0–6)
EOSINOPHIL NFR BLD AUTO: 0.1 % — SIGNIFICANT CHANGE UP (ref 0–6)
EOSINOPHIL NFR BLD AUTO: 1.5 % — SIGNIFICANT CHANGE UP (ref 0–6)
EOSINOPHIL NFR BLD AUTO: 1.8 % — SIGNIFICANT CHANGE UP (ref 0–6)
EOSINOPHIL NFR BLD AUTO: 1.9 % — SIGNIFICANT CHANGE UP (ref 0–6)
EOSINOPHIL NFR BLD AUTO: 10.4 % — HIGH (ref 0–6)
EOSINOPHIL NFR BLD AUTO: 13 % — HIGH (ref 0–6)
EOSINOPHIL NFR BLD AUTO: 13.9 % — HIGH (ref 0–6)
EOSINOPHIL NFR BLD AUTO: 16.3 % — HIGH (ref 0–6)
EOSINOPHIL NFR BLD AUTO: 2 % — SIGNIFICANT CHANGE UP (ref 0–6)
EOSINOPHIL NFR BLD AUTO: 2 % — SIGNIFICANT CHANGE UP (ref 0–6)
EOSINOPHIL NFR BLD AUTO: 2.8 % — SIGNIFICANT CHANGE UP (ref 0–6)
EOSINOPHIL NFR BLD AUTO: 3.4 % — SIGNIFICANT CHANGE UP (ref 0–6)
EOSINOPHIL NFR BLD AUTO: 3.6 % — SIGNIFICANT CHANGE UP (ref 0–6)
EOSINOPHIL NFR BLD AUTO: 3.8 % — SIGNIFICANT CHANGE UP (ref 0–6)
EOSINOPHIL NFR BLD AUTO: 5 % — SIGNIFICANT CHANGE UP (ref 0–6)
EOSINOPHIL NFR BLD AUTO: 5.1 % — SIGNIFICANT CHANGE UP (ref 0–6)
EOSINOPHIL NFR BLD AUTO: 5.2 % — SIGNIFICANT CHANGE UP (ref 0–6)
EOSINOPHIL NFR BLD AUTO: 7.9 % — HIGH (ref 0–6)
EOSINOPHIL NFR BLD AUTO: 8.1 % — HIGH (ref 0–6)
EOSINOPHIL NFR BLD AUTO: 8.2 % — HIGH (ref 0–6)
EOSINOPHIL NFR BLD AUTO: 9.7 % — HIGH (ref 0–6)
EOSINOPHIL NFR BLD AUTO: 9.9 % — HIGH (ref 0–6)
ERYTHROCYTE [SEDIMENTATION RATE] IN BLOOD: 106 MM/HR — HIGH (ref 0–20)
ERYTHROCYTE [SEDIMENTATION RATE] IN BLOOD: 67 MM/HR — HIGH (ref 0–20)
ERYTHROCYTE [SEDIMENTATION RATE] IN BLOOD: 93 MM/HR — HIGH (ref 0–20)
FERRITIN SERPL-MCNC: 460 NG/ML — HIGH (ref 30–400)
FERRITIN SERPL-MCNC: 595 NG/ML — HIGH (ref 30–400)
FERRITIN SERPL-MCNC: 682 NG/ML — HIGH (ref 30–400)
FERRITIN SERPL-MCNC: 709 NG/ML — HIGH (ref 30–400)
FERRITIN SERPL-MCNC: 842 NG/ML — HIGH (ref 30–400)
FERRITIN SERPL-MCNC: 857 NG/ML — HIGH (ref 30–400)
FERRITIN SERPL-MCNC: 896 NG/ML — HIGH (ref 30–400)
FLU A RESULT: SIGNIFICANT CHANGE UP
FLU A RESULT: SIGNIFICANT CHANGE UP
FLUAV AG NPH QL: SIGNIFICANT CHANGE UP
FLUBV AG NPH QL: SIGNIFICANT CHANGE UP
GAS PNL BLDA: SIGNIFICANT CHANGE UP
GLUCOSE BLDC GLUCOMTR-MCNC: 103 MG/DL — HIGH (ref 70–99)
GLUCOSE BLDC GLUCOMTR-MCNC: 103 MG/DL — HIGH (ref 70–99)
GLUCOSE BLDC GLUCOMTR-MCNC: 104 MG/DL — HIGH (ref 70–99)
GLUCOSE BLDC GLUCOMTR-MCNC: 106 MG/DL — HIGH (ref 70–99)
GLUCOSE BLDC GLUCOMTR-MCNC: 111 MG/DL — HIGH (ref 70–99)
GLUCOSE BLDC GLUCOMTR-MCNC: 120 MG/DL — HIGH (ref 70–99)
GLUCOSE BLDC GLUCOMTR-MCNC: 122 MG/DL — HIGH (ref 70–99)
GLUCOSE BLDC GLUCOMTR-MCNC: 132 MG/DL — HIGH (ref 70–99)
GLUCOSE BLDC GLUCOMTR-MCNC: 138 MG/DL — HIGH (ref 70–99)
GLUCOSE BLDC GLUCOMTR-MCNC: 143 MG/DL — HIGH (ref 70–99)
GLUCOSE BLDC GLUCOMTR-MCNC: 165 MG/DL — HIGH (ref 70–99)
GLUCOSE SERPL-MCNC: 105 MG/DL — HIGH (ref 70–99)
GLUCOSE SERPL-MCNC: 108 MG/DL — HIGH (ref 70–99)
GLUCOSE SERPL-MCNC: 109 MG/DL — HIGH (ref 70–99)
GLUCOSE SERPL-MCNC: 110 MG/DL — HIGH (ref 70–99)
GLUCOSE SERPL-MCNC: 112 MG/DL — HIGH (ref 70–99)
GLUCOSE SERPL-MCNC: 112 MG/DL — HIGH (ref 70–99)
GLUCOSE SERPL-MCNC: 114 MG/DL — HIGH (ref 70–99)
GLUCOSE SERPL-MCNC: 115 MG/DL — HIGH (ref 70–99)
GLUCOSE SERPL-MCNC: 121 MG/DL — HIGH (ref 70–99)
GLUCOSE SERPL-MCNC: 122 MG/DL — HIGH (ref 70–99)
GLUCOSE SERPL-MCNC: 123 MG/DL — HIGH (ref 70–99)
GLUCOSE SERPL-MCNC: 133 MG/DL — HIGH (ref 70–99)
GLUCOSE SERPL-MCNC: 135 MG/DL — HIGH (ref 70–99)
GLUCOSE SERPL-MCNC: 135 MG/DL — HIGH (ref 70–99)
GLUCOSE SERPL-MCNC: 138 MG/DL — HIGH (ref 70–99)
GLUCOSE SERPL-MCNC: 140 MG/DL — HIGH (ref 70–99)
GLUCOSE SERPL-MCNC: 141 MG/DL — HIGH (ref 70–99)
GLUCOSE SERPL-MCNC: 144 MG/DL — HIGH (ref 70–99)
GLUCOSE SERPL-MCNC: 144 MG/DL — HIGH (ref 70–99)
GLUCOSE SERPL-MCNC: 145 MG/DL — HIGH (ref 70–99)
GLUCOSE SERPL-MCNC: 146 MG/DL — HIGH (ref 70–99)
GLUCOSE SERPL-MCNC: 146 MG/DL — HIGH (ref 70–99)
GLUCOSE SERPL-MCNC: 154 MG/DL — HIGH (ref 70–99)
GLUCOSE SERPL-MCNC: 154 MG/DL — HIGH (ref 70–99)
GLUCOSE SERPL-MCNC: 155 MG/DL — HIGH (ref 70–99)
GLUCOSE SERPL-MCNC: 157 MG/DL — HIGH (ref 70–99)
GLUCOSE SERPL-MCNC: 158 MG/DL — HIGH (ref 70–99)
GLUCOSE SERPL-MCNC: 163 MG/DL — HIGH (ref 70–99)
GLUCOSE SERPL-MCNC: 168 MG/DL — HIGH (ref 70–99)
GLUCOSE SERPL-MCNC: 169 MG/DL — HIGH (ref 70–99)
GLUCOSE SERPL-MCNC: 181 MG/DL — HIGH (ref 70–99)
GLUCOSE SERPL-MCNC: 183 MG/DL — HIGH (ref 70–99)
GLUCOSE SERPL-MCNC: 185 MG/DL — HIGH (ref 70–99)
GLUCOSE SERPL-MCNC: 187 MG/DL — HIGH (ref 70–99)
GLUCOSE SERPL-MCNC: 188 MG/DL — HIGH (ref 70–99)
GLUCOSE SERPL-MCNC: 190 MG/DL — HIGH (ref 70–99)
GLUCOSE SERPL-MCNC: 196 MG/DL — HIGH (ref 70–99)
GLUCOSE SERPL-MCNC: 201 MG/DL — HIGH (ref 70–99)
GLUCOSE SERPL-MCNC: 204 MG/DL — HIGH (ref 70–99)
GLUCOSE SERPL-MCNC: 208 MG/DL — HIGH (ref 70–99)
GLUCOSE UR QL: NEGATIVE MG/DL — SIGNIFICANT CHANGE UP
GRAM STN FLD: SIGNIFICANT CHANGE UP
GRAM STN FLD: SIGNIFICANT CHANGE UP
HCO3 BLDA-SCNC: 16 MMOL/L — LOW (ref 21–29)
HCO3 BLDA-SCNC: 20 MMOL/L — LOW (ref 21–29)
HCO3 BLDA-SCNC: 20 MMOL/L — LOW (ref 23–27)
HCO3 BLDA-SCNC: 20 MMOL/L — LOW (ref 23–27)
HCO3 BLDA-SCNC: 21 MMOL/L — SIGNIFICANT CHANGE UP (ref 21–29)
HCO3 BLDA-SCNC: 22 MMOL/L — SIGNIFICANT CHANGE UP (ref 21–29)
HCO3 BLDA-SCNC: 23 MMOL/L — SIGNIFICANT CHANGE UP (ref 23–27)
HCO3 BLDA-SCNC: 23 MMOL/L — SIGNIFICANT CHANGE UP (ref 23–27)
HCO3 BLDA-SCNC: 24 MMOL/L — SIGNIFICANT CHANGE UP (ref 23–27)
HCO3 BLDA-SCNC: 24 MMOL/L — SIGNIFICANT CHANGE UP (ref 23–27)
HCO3 BLDA-SCNC: 25 MMOL/L — SIGNIFICANT CHANGE UP (ref 21–29)
HCO3 BLDA-SCNC: 25 MMOL/L — SIGNIFICANT CHANGE UP (ref 21–29)
HCO3 BLDA-SCNC: 26 MMOL/L — SIGNIFICANT CHANGE UP (ref 21–29)
HCO3 BLDA-SCNC: 26 MMOL/L — SIGNIFICANT CHANGE UP (ref 21–29)
HCO3 BLDA-SCNC: 28 MMOL/L — SIGNIFICANT CHANGE UP (ref 21–29)
HCO3 BLDA-SCNC: 29 MMOL/L — SIGNIFICANT CHANGE UP (ref 21–29)
HCO3 BLDA-SCNC: 30 MMOL/L — HIGH (ref 21–29)
HCO3 BLDA-SCNC: 30 MMOL/L — HIGH (ref 21–29)
HCO3 BLDA-SCNC: 31 MMOL/L — HIGH (ref 21–29)
HCO3 BLDA-SCNC: 32 MMOL/L — HIGH (ref 21–29)
HCO3 BLDA-SCNC: 33 MMOL/L — HIGH (ref 21–29)
HCO3 BLDA-SCNC: 34 MMOL/L — HIGH (ref 21–29)
HCO3 BLDA-SCNC: 35 MMOL/L — HIGH (ref 21–29)
HCO3 BLDA-SCNC: 36 MMOL/L — HIGH (ref 21–29)
HCO3 BLDA-SCNC: 37 MMOL/L — HIGH (ref 21–29)
HCO3 BLDA-SCNC: 38 MMOL/L — HIGH (ref 21–29)
HCO3 BLDA-SCNC: 38 MMOL/L — HIGH (ref 21–29)
HCO3 BLDA-SCNC: 39 MMOL/L — HIGH (ref 21–29)
HCO3 BLDA-SCNC: 39 MMOL/L — HIGH (ref 21–29)
HCO3 BLDA-SCNC: 42 MMOL/L — HIGH (ref 21–29)
HCO3 BLDA-SCNC: 43 MMOL/L — HIGH (ref 21–29)
HCO3 BLDA-SCNC: 45 MMOL/L — HIGH (ref 21–29)
HCO3 BLDA-SCNC: 46 MMOL/L — HIGH (ref 21–29)
HCO3 BLDA-SCNC: 47 MMOL/L — HIGH (ref 21–29)
HCT VFR BLD CALC: 17.2 % — CRITICAL LOW (ref 39–50)
HCT VFR BLD CALC: 18.3 % — CRITICAL LOW (ref 39–50)
HCT VFR BLD CALC: 18.6 % — CRITICAL LOW (ref 39–50)
HCT VFR BLD CALC: 20.1 % — CRITICAL LOW (ref 39–50)
HCT VFR BLD CALC: 21 % — CRITICAL LOW (ref 39–50)
HCT VFR BLD CALC: 21.3 % — LOW (ref 39–50)
HCT VFR BLD CALC: 21.8 % — LOW (ref 39–50)
HCT VFR BLD CALC: 22.4 % — LOW (ref 39–50)
HCT VFR BLD CALC: 22.6 % — LOW (ref 39–50)
HCT VFR BLD CALC: 23.3 % — LOW (ref 39–50)
HCT VFR BLD CALC: 23.4 % — LOW (ref 39–50)
HCT VFR BLD CALC: 24.5 % — LOW (ref 39–50)
HCT VFR BLD CALC: 25.1 % — LOW (ref 39–50)
HCT VFR BLD CALC: 25.6 % — LOW (ref 39–50)
HCT VFR BLD CALC: 25.7 % — LOW (ref 39–50)
HCT VFR BLD CALC: 26.3 % — LOW (ref 39–50)
HCT VFR BLD CALC: 26.7 % — LOW (ref 39–50)
HCT VFR BLD CALC: 26.7 % — LOW (ref 39–50)
HCT VFR BLD CALC: 27.1 % — LOW (ref 39–50)
HCT VFR BLD CALC: 27.2 % — LOW (ref 39–50)
HCT VFR BLD CALC: 27.3 % — LOW (ref 39–50)
HCT VFR BLD CALC: 27.5 % — LOW (ref 39–50)
HCT VFR BLD CALC: 27.5 % — LOW (ref 39–50)
HCT VFR BLD CALC: 27.7 % — LOW (ref 39–50)
HCT VFR BLD CALC: 27.8 % — LOW (ref 39–50)
HCT VFR BLD CALC: 28.1 % — LOW (ref 39–50)
HCT VFR BLD CALC: 28.2 % — LOW (ref 39–50)
HCT VFR BLD CALC: 28.3 % — LOW (ref 39–50)
HCT VFR BLD CALC: 28.6 % — LOW (ref 39–50)
HCT VFR BLD CALC: 28.7 % — LOW (ref 39–50)
HCT VFR BLD CALC: 28.8 % — LOW (ref 39–50)
HCT VFR BLD CALC: 29 % — LOW (ref 39–50)
HCT VFR BLD CALC: 29 % — LOW (ref 39–50)
HCT VFR BLD CALC: 29.3 % — LOW (ref 39–50)
HCT VFR BLD CALC: 29.3 % — LOW (ref 39–50)
HCT VFR BLD CALC: 29.4 % — LOW (ref 39–50)
HCT VFR BLD CALC: 29.8 % — LOW (ref 39–50)
HCT VFR BLD CALC: 30.3 % — LOW (ref 39–50)
HCT VFR BLD CALC: 30.4 % — LOW (ref 39–50)
HCT VFR BLD CALC: 30.5 % — LOW (ref 39–50)
HCT VFR BLD CALC: 31.1 % — LOW (ref 39–50)
HCT VFR BLD CALC: 31.1 % — LOW (ref 39–50)
HCT VFR BLD CALC: 35.1 % — LOW (ref 39–50)
HCT VFR BLD CALC: 35.2 % — LOW (ref 39–50)
HCT VFR BLD CALC: 36.2 % — LOW (ref 39–50)
HCT VFR BLD CALC: 37.3 % — LOW (ref 39–50)
HCT VFR BLD CALC: 42.3 % — SIGNIFICANT CHANGE UP (ref 39–50)
HCV AB S/CO SERPL IA: 0.37 S/CO — SIGNIFICANT CHANGE UP (ref 0–0.99)
HCV AB SERPL-IMP: SIGNIFICANT CHANGE UP
HGB BLD-MCNC: 10.9 G/DL — LOW (ref 13–17)
HGB BLD-MCNC: 11.1 G/DL — LOW (ref 13–17)
HGB BLD-MCNC: 11.3 G/DL — LOW (ref 13–17)
HGB BLD-MCNC: 12.8 G/DL — LOW (ref 13–17)
HGB BLD-MCNC: 14.1 G/DL — SIGNIFICANT CHANGE UP (ref 13–17)
HGB BLD-MCNC: 5.6 G/DL — CRITICAL LOW (ref 13–17)
HGB BLD-MCNC: 5.8 G/DL — CRITICAL LOW (ref 13–17)
HGB BLD-MCNC: 6 G/DL — CRITICAL LOW (ref 13–17)
HGB BLD-MCNC: 6.2 G/DL — CRITICAL LOW (ref 13–17)
HGB BLD-MCNC: 6.3 G/DL — CRITICAL LOW (ref 13–17)
HGB BLD-MCNC: 6.3 G/DL — CRITICAL LOW (ref 13–17)
HGB BLD-MCNC: 6.4 G/DL — CRITICAL LOW (ref 13–17)
HGB BLD-MCNC: 6.6 G/DL — CRITICAL LOW (ref 13–17)
HGB BLD-MCNC: 6.6 G/DL — CRITICAL LOW (ref 13–17)
HGB BLD-MCNC: 7 G/DL — CRITICAL LOW (ref 13–17)
HGB BLD-MCNC: 7.1 G/DL — LOW (ref 13–17)
HGB BLD-MCNC: 7.4 G/DL — LOW (ref 13–17)
HGB BLD-MCNC: 7.5 G/DL — LOW (ref 13–17)
HGB BLD-MCNC: 8 G/DL — LOW (ref 13–17)
HGB BLD-MCNC: 8.1 G/DL — LOW (ref 13–17)
HGB BLD-MCNC: 8.3 G/DL — LOW (ref 13–17)
HGB BLD-MCNC: 8.6 G/DL — LOW (ref 13–17)
HGB BLD-MCNC: 8.7 G/DL — LOW (ref 13–17)
HGB BLD-MCNC: 8.8 G/DL — LOW (ref 13–17)
HGB BLD-MCNC: 8.9 G/DL — LOW (ref 13–17)
HGB BLD-MCNC: 9 G/DL — LOW (ref 13–17)
HGB BLD-MCNC: 9 G/DL — LOW (ref 13–17)
HGB BLD-MCNC: 9.1 G/DL — LOW (ref 13–17)
HGB BLD-MCNC: 9.1 G/DL — LOW (ref 13–17)
HGB BLD-MCNC: 9.2 G/DL — LOW (ref 13–17)
HGB BLD-MCNC: 9.3 G/DL — LOW (ref 13–17)
HGB BLD-MCNC: 9.4 G/DL — LOW (ref 13–17)
HGB BLD-MCNC: 9.5 G/DL — LOW (ref 13–17)
HGB BLD-MCNC: 9.5 G/DL — LOW (ref 13–17)
HGB BLD-MCNC: 9.6 G/DL — LOW (ref 13–17)
HGB BLD-MCNC: 9.7 G/DL — LOW (ref 13–17)
HGB BLD-MCNC: 9.9 G/DL — LOW (ref 13–17)
HIV 1+2 AB+HIV1 P24 AG SERPL QL IA: SIGNIFICANT CHANGE UP
HOROWITZ INDEX BLDA+IHG-RTO: 100 — SIGNIFICANT CHANGE UP
HOROWITZ INDEX BLDA+IHG-RTO: 100 — SIGNIFICANT CHANGE UP
HOROWITZ INDEX BLDA+IHG-RTO: 21 — SIGNIFICANT CHANGE UP
HOROWITZ INDEX BLDA+IHG-RTO: 40 — SIGNIFICANT CHANGE UP
HOROWITZ INDEX BLDA+IHG-RTO: 60 — SIGNIFICANT CHANGE UP
HOROWITZ INDEX BLDA+IHG-RTO: 60 — SIGNIFICANT CHANGE UP
HOROWITZ INDEX BLDA+IHG-RTO: 70 — SIGNIFICANT CHANGE UP
IMM GRANULOCYTES NFR BLD AUTO: 0.4 % — SIGNIFICANT CHANGE UP (ref 0–1.5)
IMM GRANULOCYTES NFR BLD AUTO: 0.6 % — SIGNIFICANT CHANGE UP (ref 0–1.5)
IMM GRANULOCYTES NFR BLD AUTO: 0.7 % — SIGNIFICANT CHANGE UP (ref 0–1.5)
IMM GRANULOCYTES NFR BLD AUTO: 0.8 % — SIGNIFICANT CHANGE UP (ref 0–1.5)
IMM GRANULOCYTES NFR BLD AUTO: 0.9 % — SIGNIFICANT CHANGE UP (ref 0–1.5)
IMM GRANULOCYTES NFR BLD AUTO: 1 % — SIGNIFICANT CHANGE UP (ref 0–1.5)
IMM GRANULOCYTES NFR BLD AUTO: 1.1 % — SIGNIFICANT CHANGE UP (ref 0–1.5)
IMM GRANULOCYTES NFR BLD AUTO: 1.1 % — SIGNIFICANT CHANGE UP (ref 0–1.5)
IMM GRANULOCYTES NFR BLD AUTO: 1.3 % — SIGNIFICANT CHANGE UP (ref 0–1.5)
IMM GRANULOCYTES NFR BLD AUTO: 1.3 % — SIGNIFICANT CHANGE UP (ref 0–1.5)
IMM GRANULOCYTES NFR BLD AUTO: 1.7 % — HIGH (ref 0–1.5)
IMM GRANULOCYTES NFR BLD AUTO: 1.9 % — HIGH (ref 0–1.5)
IMM GRANULOCYTES NFR BLD AUTO: 2.9 % — HIGH (ref 0–1.5)
INR BLD: 1.32 RATIO — HIGH (ref 0.88–1.16)
INR BLD: 1.42 RATIO — HIGH (ref 0.88–1.16)
INR BLD: 1.43 RATIO — HIGH (ref 0.88–1.16)
KETONES UR-MCNC: NEGATIVE — SIGNIFICANT CHANGE UP
LDH SERPL L TO P-CCNC: 303 U/L — HIGH (ref 84–241)
LDH SERPL L TO P-CCNC: 326 U/L — HIGH (ref 84–241)
LDH SERPL L TO P-CCNC: 364 U/L — HIGH (ref 84–241)
LDH SERPL L TO P-CCNC: 456 U/L — HIGH (ref 120–225)
LEUKOCYTE ESTERASE UR-ACNC: NEGATIVE — SIGNIFICANT CHANGE UP
LYMPHOCYTES # BLD AUTO: 0.34 K/UL — LOW (ref 1–3.3)
LYMPHOCYTES # BLD AUTO: 0.45 K/UL — LOW (ref 1–3.3)
LYMPHOCYTES # BLD AUTO: 0.53 K/UL — LOW (ref 1–3.3)
LYMPHOCYTES # BLD AUTO: 0.55 K/UL — LOW (ref 1–3.3)
LYMPHOCYTES # BLD AUTO: 0.55 K/UL — LOW (ref 1–3.3)
LYMPHOCYTES # BLD AUTO: 0.58 K/UL — LOW (ref 1–3.3)
LYMPHOCYTES # BLD AUTO: 0.63 K/UL — LOW (ref 1–3.3)
LYMPHOCYTES # BLD AUTO: 0.93 K/UL — LOW (ref 1–3.3)
LYMPHOCYTES # BLD AUTO: 1 K/UL — SIGNIFICANT CHANGE UP (ref 1–3.3)
LYMPHOCYTES # BLD AUTO: 1.02 K/UL — SIGNIFICANT CHANGE UP (ref 1–3.3)
LYMPHOCYTES # BLD AUTO: 1.03 K/UL — SIGNIFICANT CHANGE UP (ref 1–3.3)
LYMPHOCYTES # BLD AUTO: 1.07 K/UL — SIGNIFICANT CHANGE UP (ref 1–3.3)
LYMPHOCYTES # BLD AUTO: 1.13 K/UL — SIGNIFICANT CHANGE UP (ref 1–3.3)
LYMPHOCYTES # BLD AUTO: 1.21 K/UL — SIGNIFICANT CHANGE UP (ref 1–3.3)
LYMPHOCYTES # BLD AUTO: 1.25 K/UL — SIGNIFICANT CHANGE UP (ref 1–3.3)
LYMPHOCYTES # BLD AUTO: 1.28 K/UL — SIGNIFICANT CHANGE UP (ref 1–3.3)
LYMPHOCYTES # BLD AUTO: 1.5 K/UL — SIGNIFICANT CHANGE UP (ref 1–3.3)
LYMPHOCYTES # BLD AUTO: 1.52 K/UL — SIGNIFICANT CHANGE UP (ref 1–3.3)
LYMPHOCYTES # BLD AUTO: 1.67 K/UL — SIGNIFICANT CHANGE UP (ref 1–3.3)
LYMPHOCYTES # BLD AUTO: 1.73 K/UL — SIGNIFICANT CHANGE UP (ref 1–3.3)
LYMPHOCYTES # BLD AUTO: 1.78 K/UL — SIGNIFICANT CHANGE UP (ref 1–3.3)
LYMPHOCYTES # BLD AUTO: 1.82 K/UL — SIGNIFICANT CHANGE UP (ref 1–3.3)
LYMPHOCYTES # BLD AUTO: 1.89 K/UL — SIGNIFICANT CHANGE UP (ref 1–3.3)
LYMPHOCYTES # BLD AUTO: 10 % — LOW (ref 13–44)
LYMPHOCYTES # BLD AUTO: 10.4 % — LOW (ref 13–44)
LYMPHOCYTES # BLD AUTO: 10.4 % — LOW (ref 13–44)
LYMPHOCYTES # BLD AUTO: 11.9 % — LOW (ref 13–44)
LYMPHOCYTES # BLD AUTO: 12.2 % — LOW (ref 13–44)
LYMPHOCYTES # BLD AUTO: 12.5 % — LOW (ref 13–44)
LYMPHOCYTES # BLD AUTO: 13.3 % — SIGNIFICANT CHANGE UP (ref 13–44)
LYMPHOCYTES # BLD AUTO: 13.7 % — SIGNIFICANT CHANGE UP (ref 13–44)
LYMPHOCYTES # BLD AUTO: 13.7 % — SIGNIFICANT CHANGE UP (ref 13–44)
LYMPHOCYTES # BLD AUTO: 13.9 % — SIGNIFICANT CHANGE UP (ref 13–44)
LYMPHOCYTES # BLD AUTO: 14.8 % — SIGNIFICANT CHANGE UP (ref 13–44)
LYMPHOCYTES # BLD AUTO: 15.2 % — SIGNIFICANT CHANGE UP (ref 13–44)
LYMPHOCYTES # BLD AUTO: 3 % — LOW (ref 13–44)
LYMPHOCYTES # BLD AUTO: 3.6 % — LOW (ref 13–44)
LYMPHOCYTES # BLD AUTO: 4 % — LOW (ref 13–44)
LYMPHOCYTES # BLD AUTO: 5.1 % — LOW (ref 13–44)
LYMPHOCYTES # BLD AUTO: 5.3 % — LOW (ref 13–44)
LYMPHOCYTES # BLD AUTO: 6.1 % — LOW (ref 13–44)
LYMPHOCYTES # BLD AUTO: 7.5 % — LOW (ref 13–44)
LYMPHOCYTES # BLD AUTO: 8.5 % — LOW (ref 13–44)
LYMPHOCYTES # BLD AUTO: 8.6 % — LOW (ref 13–44)
LYMPHOCYTES # BLD AUTO: 8.8 % — LOW (ref 13–44)
LYMPHOCYTES # BLD AUTO: 9.3 % — LOW (ref 13–44)
MAGNESIUM SERPL-MCNC: 1.6 MG/DL — SIGNIFICANT CHANGE UP (ref 1.6–2.6)
MAGNESIUM SERPL-MCNC: 1.7 MG/DL — SIGNIFICANT CHANGE UP (ref 1.6–2.6)
MAGNESIUM SERPL-MCNC: 1.8 MG/DL — SIGNIFICANT CHANGE UP (ref 1.6–2.6)
MAGNESIUM SERPL-MCNC: 1.9 MG/DL — SIGNIFICANT CHANGE UP (ref 1.6–2.6)
MAGNESIUM SERPL-MCNC: 1.9 MG/DL — SIGNIFICANT CHANGE UP (ref 1.6–2.6)
MAGNESIUM SERPL-MCNC: 2 MG/DL — SIGNIFICANT CHANGE UP (ref 1.6–2.6)
MAGNESIUM SERPL-MCNC: 2 MG/DL — SIGNIFICANT CHANGE UP (ref 1.6–2.6)
MAGNESIUM SERPL-MCNC: 2.1 MG/DL — SIGNIFICANT CHANGE UP (ref 1.6–2.6)
MAGNESIUM SERPL-MCNC: 2.2 MG/DL — SIGNIFICANT CHANGE UP (ref 1.6–2.6)
MAGNESIUM SERPL-MCNC: 2.3 MG/DL — SIGNIFICANT CHANGE UP (ref 1.6–2.6)
MAGNESIUM SERPL-MCNC: 2.4 MG/DL — SIGNIFICANT CHANGE UP (ref 1.6–2.6)
MAGNESIUM SERPL-MCNC: 2.5 MG/DL — SIGNIFICANT CHANGE UP (ref 1.6–2.6)
MAGNESIUM SERPL-MCNC: 2.7 MG/DL — HIGH (ref 1.6–2.6)
MAGNESIUM SERPL-MCNC: 2.9 MG/DL — HIGH (ref 1.6–2.6)
MAGNESIUM SERPL-MCNC: 3.3 MG/DL — HIGH (ref 1.6–2.6)
MCHC RBC-ENTMCNC: 27.6 GM/DL — LOW (ref 32–36)
MCHC RBC-ENTMCNC: 28.3 GM/DL — LOW (ref 32–36)
MCHC RBC-ENTMCNC: 29 GM/DL — LOW (ref 32–36)
MCHC RBC-ENTMCNC: 29.2 GM/DL — LOW (ref 32–36)
MCHC RBC-ENTMCNC: 29.2 GM/DL — LOW (ref 32–36)
MCHC RBC-ENTMCNC: 29.4 GM/DL — LOW (ref 32–36)
MCHC RBC-ENTMCNC: 29.4 GM/DL — LOW (ref 32–36)
MCHC RBC-ENTMCNC: 29.5 GM/DL — LOW (ref 32–36)
MCHC RBC-ENTMCNC: 29.6 GM/DL — LOW (ref 32–36)
MCHC RBC-ENTMCNC: 29.7 PG — SIGNIFICANT CHANGE UP (ref 27–34)
MCHC RBC-ENTMCNC: 29.8 PG — SIGNIFICANT CHANGE UP (ref 27–34)
MCHC RBC-ENTMCNC: 29.9 GM/DL — LOW (ref 32–36)
MCHC RBC-ENTMCNC: 29.9 PG — SIGNIFICANT CHANGE UP (ref 27–34)
MCHC RBC-ENTMCNC: 30.1 PG — SIGNIFICANT CHANGE UP (ref 27–34)
MCHC RBC-ENTMCNC: 30.2 PG — SIGNIFICANT CHANGE UP (ref 27–34)
MCHC RBC-ENTMCNC: 30.3 GM/DL — LOW (ref 32–36)
MCHC RBC-ENTMCNC: 30.3 GM/DL — LOW (ref 32–36)
MCHC RBC-ENTMCNC: 30.3 PG — SIGNIFICANT CHANGE UP (ref 27–34)
MCHC RBC-ENTMCNC: 30.3 PG — SIGNIFICANT CHANGE UP (ref 27–34)
MCHC RBC-ENTMCNC: 30.6 GM/DL — LOW (ref 32–36)
MCHC RBC-ENTMCNC: 30.7 GM/DL — LOW (ref 32–36)
MCHC RBC-ENTMCNC: 30.7 GM/DL — LOW (ref 32–36)
MCHC RBC-ENTMCNC: 30.7 PG — SIGNIFICANT CHANGE UP (ref 27–34)
MCHC RBC-ENTMCNC: 30.8 GM/DL — LOW (ref 32–36)
MCHC RBC-ENTMCNC: 30.8 PG — SIGNIFICANT CHANGE UP (ref 27–34)
MCHC RBC-ENTMCNC: 30.8 PG — SIGNIFICANT CHANGE UP (ref 27–34)
MCHC RBC-ENTMCNC: 30.9 GM/DL — LOW (ref 32–36)
MCHC RBC-ENTMCNC: 30.9 PG — SIGNIFICANT CHANGE UP (ref 27–34)
MCHC RBC-ENTMCNC: 30.9 PG — SIGNIFICANT CHANGE UP (ref 27–34)
MCHC RBC-ENTMCNC: 31 GM/DL — LOW (ref 32–36)
MCHC RBC-ENTMCNC: 31 GM/DL — LOW (ref 32–36)
MCHC RBC-ENTMCNC: 31 PG — SIGNIFICANT CHANGE UP (ref 27–34)
MCHC RBC-ENTMCNC: 31 PG — SIGNIFICANT CHANGE UP (ref 27–34)
MCHC RBC-ENTMCNC: 31.1 PG — SIGNIFICANT CHANGE UP (ref 27–34)
MCHC RBC-ENTMCNC: 31.2 GM/DL — LOW (ref 32–36)
MCHC RBC-ENTMCNC: 31.2 PG — SIGNIFICANT CHANGE UP (ref 27–34)
MCHC RBC-ENTMCNC: 31.3 GM/DL — LOW (ref 32–36)
MCHC RBC-ENTMCNC: 31.3 PG — SIGNIFICANT CHANGE UP (ref 27–34)
MCHC RBC-ENTMCNC: 31.4 GM/DL — LOW (ref 32–36)
MCHC RBC-ENTMCNC: 31.4 PG — SIGNIFICANT CHANGE UP (ref 27–34)
MCHC RBC-ENTMCNC: 31.5 GM/DL — LOW (ref 32–36)
MCHC RBC-ENTMCNC: 31.6 GM/DL — LOW (ref 32–36)
MCHC RBC-ENTMCNC: 31.6 GM/DL — LOW (ref 32–36)
MCHC RBC-ENTMCNC: 31.6 PG — SIGNIFICANT CHANGE UP (ref 27–34)
MCHC RBC-ENTMCNC: 31.7 GM/DL — LOW (ref 32–36)
MCHC RBC-ENTMCNC: 31.7 GM/DL — LOW (ref 32–36)
MCHC RBC-ENTMCNC: 31.7 PG — SIGNIFICANT CHANGE UP (ref 27–34)
MCHC RBC-ENTMCNC: 32.1 GM/DL — SIGNIFICANT CHANGE UP (ref 32–36)
MCHC RBC-ENTMCNC: 32.1 PG — SIGNIFICANT CHANGE UP (ref 27–34)
MCHC RBC-ENTMCNC: 32.2 PG — SIGNIFICANT CHANGE UP (ref 27–34)
MCHC RBC-ENTMCNC: 32.3 GM/DL — SIGNIFICANT CHANGE UP (ref 32–36)
MCHC RBC-ENTMCNC: 32.3 GM/DL — SIGNIFICANT CHANGE UP (ref 32–36)
MCHC RBC-ENTMCNC: 32.4 GM/DL — SIGNIFICANT CHANGE UP (ref 32–36)
MCHC RBC-ENTMCNC: 32.4 PG — SIGNIFICANT CHANGE UP (ref 27–34)
MCHC RBC-ENTMCNC: 32.5 GM/DL — SIGNIFICANT CHANGE UP (ref 32–36)
MCHC RBC-ENTMCNC: 32.5 PG — SIGNIFICANT CHANGE UP (ref 27–34)
MCHC RBC-ENTMCNC: 32.5 PG — SIGNIFICANT CHANGE UP (ref 27–34)
MCHC RBC-ENTMCNC: 33 PG — SIGNIFICANT CHANGE UP (ref 27–34)
MCHC RBC-ENTMCNC: 33.1 GM/DL — SIGNIFICANT CHANGE UP (ref 32–36)
MCHC RBC-ENTMCNC: 33.1 PG — SIGNIFICANT CHANGE UP (ref 27–34)
MCHC RBC-ENTMCNC: 33.1 PG — SIGNIFICANT CHANGE UP (ref 27–34)
MCHC RBC-ENTMCNC: 33.2 PG — SIGNIFICANT CHANGE UP (ref 27–34)
MCHC RBC-ENTMCNC: 33.2 PG — SIGNIFICANT CHANGE UP (ref 27–34)
MCHC RBC-ENTMCNC: 33.3 GM/DL — SIGNIFICANT CHANGE UP (ref 32–36)
MCHC RBC-ENTMCNC: 33.3 PG — SIGNIFICANT CHANGE UP (ref 27–34)
MCHC RBC-ENTMCNC: 33.7 PG — SIGNIFICANT CHANGE UP (ref 27–34)
MCHC RBC-ENTMCNC: 33.8 PG — SIGNIFICANT CHANGE UP (ref 27–34)
MCHC RBC-ENTMCNC: 33.9 PG — SIGNIFICANT CHANGE UP (ref 27–34)
MCHC RBC-ENTMCNC: 34 GM/DL — SIGNIFICANT CHANGE UP (ref 32–36)
MCHC RBC-ENTMCNC: 34.6 GM/DL — SIGNIFICANT CHANGE UP (ref 32–36)
MCHC RBC-ENTMCNC: 34.9 GM/DL — SIGNIFICANT CHANGE UP (ref 32–36)
MCHC RBC-ENTMCNC: 34.9 PG — HIGH (ref 27–34)
MCHC RBC-ENTMCNC: 35.1 GM/DL — SIGNIFICANT CHANGE UP (ref 32–36)
MCHC RBC-ENTMCNC: 35.3 PG — HIGH (ref 27–34)
MCHC RBC-ENTMCNC: 35.6 PG — HIGH (ref 27–34)
MCHC RBC-ENTMCNC: 35.9 GM/DL — SIGNIFICANT CHANGE UP (ref 32–36)
MCHC RBC-ENTMCNC: 36 GM/DL — SIGNIFICANT CHANGE UP (ref 32–36)
MCHC RBC-ENTMCNC: 36.1 PG — HIGH (ref 27–34)
MCHC RBC-ENTMCNC: 36.3 GM/DL — HIGH (ref 32–36)
MCHC RBC-ENTMCNC: 36.3 PG — HIGH (ref 27–34)
MCHC RBC-ENTMCNC: 36.5 GM/DL — HIGH (ref 32–36)
MCHC RBC-ENTMCNC: 36.6 GM/DL — HIGH (ref 32–36)
MCHC RBC-ENTMCNC: 36.6 PG — HIGH (ref 27–34)
MCHC RBC-ENTMCNC: 37.6 PG — HIGH (ref 27–34)
MCHC RBC-ENTMCNC: 37.9 PG — HIGH (ref 27–34)
MCHC RBC-ENTMCNC: 39.4 PG — HIGH (ref 27–34)
MCV RBC AUTO: 100 FL — SIGNIFICANT CHANGE UP (ref 80–100)
MCV RBC AUTO: 100.3 FL — HIGH (ref 80–100)
MCV RBC AUTO: 100.6 FL — HIGH (ref 80–100)
MCV RBC AUTO: 100.7 FL — HIGH (ref 80–100)
MCV RBC AUTO: 100.7 FL — HIGH (ref 80–100)
MCV RBC AUTO: 101 FL — HIGH (ref 80–100)
MCV RBC AUTO: 101 FL — HIGH (ref 80–100)
MCV RBC AUTO: 101.1 FL — HIGH (ref 80–100)
MCV RBC AUTO: 101.1 FL — HIGH (ref 80–100)
MCV RBC AUTO: 101.4 FL — HIGH (ref 80–100)
MCV RBC AUTO: 101.9 FL — HIGH (ref 80–100)
MCV RBC AUTO: 102 FL — HIGH (ref 80–100)
MCV RBC AUTO: 102 FL — HIGH (ref 80–100)
MCV RBC AUTO: 102.2 FL — HIGH (ref 80–100)
MCV RBC AUTO: 102.2 FL — HIGH (ref 80–100)
MCV RBC AUTO: 102.3 FL — HIGH (ref 80–100)
MCV RBC AUTO: 102.9 FL — HIGH (ref 80–100)
MCV RBC AUTO: 103 FL — HIGH (ref 80–100)
MCV RBC AUTO: 103.6 FL — HIGH (ref 80–100)
MCV RBC AUTO: 103.8 FL — HIGH (ref 80–100)
MCV RBC AUTO: 104.1 FL — HIGH (ref 80–100)
MCV RBC AUTO: 104.5 FL — HIGH (ref 80–100)
MCV RBC AUTO: 104.7 FL — HIGH (ref 80–100)
MCV RBC AUTO: 104.7 FL — HIGH (ref 80–100)
MCV RBC AUTO: 105.3 FL — HIGH (ref 80–100)
MCV RBC AUTO: 105.6 FL — HIGH (ref 80–100)
MCV RBC AUTO: 105.8 FL — HIGH (ref 80–100)
MCV RBC AUTO: 105.8 FL — HIGH (ref 80–100)
MCV RBC AUTO: 107 FL — HIGH (ref 80–100)
MCV RBC AUTO: 107.3 FL — HIGH (ref 80–100)
MCV RBC AUTO: 107.5 FL — HIGH (ref 80–100)
MCV RBC AUTO: 108.1 FL — HIGH (ref 80–100)
MCV RBC AUTO: 111 FL — HIGH (ref 80–100)
MCV RBC AUTO: 112 FL — HIGH (ref 80–100)
MCV RBC AUTO: 112.4 FL — HIGH (ref 80–100)
MCV RBC AUTO: 94.2 FL — SIGNIFICANT CHANGE UP (ref 80–100)
MCV RBC AUTO: 96.2 FL — SIGNIFICANT CHANGE UP (ref 80–100)
MCV RBC AUTO: 97 FL — SIGNIFICANT CHANGE UP (ref 80–100)
MCV RBC AUTO: 97.6 FL — SIGNIFICANT CHANGE UP (ref 80–100)
MCV RBC AUTO: 97.6 FL — SIGNIFICANT CHANGE UP (ref 80–100)
MCV RBC AUTO: 98.1 FL — SIGNIFICANT CHANGE UP (ref 80–100)
MCV RBC AUTO: 98.6 FL — SIGNIFICANT CHANGE UP (ref 80–100)
MCV RBC AUTO: 99.3 FL — SIGNIFICANT CHANGE UP (ref 80–100)
MCV RBC AUTO: 99.4 FL — SIGNIFICANT CHANGE UP (ref 80–100)
MCV RBC AUTO: 99.5 FL — SIGNIFICANT CHANGE UP (ref 80–100)
MCV RBC AUTO: 99.7 FL — SIGNIFICANT CHANGE UP (ref 80–100)
METHOD TYPE: SIGNIFICANT CHANGE UP
MONOCYTES # BLD AUTO: 0.18 K/UL — SIGNIFICANT CHANGE UP (ref 0–0.9)
MONOCYTES # BLD AUTO: 0.19 K/UL — SIGNIFICANT CHANGE UP (ref 0–0.9)
MONOCYTES # BLD AUTO: 0.2 K/UL — SIGNIFICANT CHANGE UP (ref 0–0.9)
MONOCYTES # BLD AUTO: 0.2 K/UL — SIGNIFICANT CHANGE UP (ref 0–0.9)
MONOCYTES # BLD AUTO: 0.24 K/UL — SIGNIFICANT CHANGE UP (ref 0–0.9)
MONOCYTES # BLD AUTO: 0.25 K/UL — SIGNIFICANT CHANGE UP (ref 0–0.9)
MONOCYTES # BLD AUTO: 0.26 K/UL — SIGNIFICANT CHANGE UP (ref 0–0.9)
MONOCYTES # BLD AUTO: 0.26 K/UL — SIGNIFICANT CHANGE UP (ref 0–0.9)
MONOCYTES # BLD AUTO: 0.27 K/UL — SIGNIFICANT CHANGE UP (ref 0–0.9)
MONOCYTES # BLD AUTO: 0.3 K/UL — SIGNIFICANT CHANGE UP (ref 0–0.9)
MONOCYTES # BLD AUTO: 0.31 K/UL — SIGNIFICANT CHANGE UP (ref 0–0.9)
MONOCYTES # BLD AUTO: 0.36 K/UL — SIGNIFICANT CHANGE UP (ref 0–0.9)
MONOCYTES # BLD AUTO: 0.36 K/UL — SIGNIFICANT CHANGE UP (ref 0–0.9)
MONOCYTES # BLD AUTO: 0.38 K/UL — SIGNIFICANT CHANGE UP (ref 0–0.9)
MONOCYTES # BLD AUTO: 0.39 K/UL — SIGNIFICANT CHANGE UP (ref 0–0.9)
MONOCYTES # BLD AUTO: 0.4 K/UL — SIGNIFICANT CHANGE UP (ref 0–0.9)
MONOCYTES # BLD AUTO: 0.42 K/UL — SIGNIFICANT CHANGE UP (ref 0–0.9)
MONOCYTES # BLD AUTO: 0.42 K/UL — SIGNIFICANT CHANGE UP (ref 0–0.9)
MONOCYTES # BLD AUTO: 0.45 K/UL — SIGNIFICANT CHANGE UP (ref 0–0.9)
MONOCYTES # BLD AUTO: 0.46 K/UL — SIGNIFICANT CHANGE UP (ref 0–0.9)
MONOCYTES # BLD AUTO: 0.46 K/UL — SIGNIFICANT CHANGE UP (ref 0–0.9)
MONOCYTES # BLD AUTO: 0.51 K/UL — SIGNIFICANT CHANGE UP (ref 0–0.9)
MONOCYTES # BLD AUTO: 0.53 K/UL — SIGNIFICANT CHANGE UP (ref 0–0.9)
MONOCYTES NFR BLD AUTO: 1.8 % — LOW (ref 2–14)
MONOCYTES NFR BLD AUTO: 1.8 % — LOW (ref 2–14)
MONOCYTES NFR BLD AUTO: 2 % — SIGNIFICANT CHANGE UP (ref 2–14)
MONOCYTES NFR BLD AUTO: 2.2 % — SIGNIFICANT CHANGE UP (ref 2–14)
MONOCYTES NFR BLD AUTO: 2.3 % — SIGNIFICANT CHANGE UP (ref 2–14)
MONOCYTES NFR BLD AUTO: 2.5 % — SIGNIFICANT CHANGE UP (ref 2–14)
MONOCYTES NFR BLD AUTO: 2.6 % — SIGNIFICANT CHANGE UP (ref 2–14)
MONOCYTES NFR BLD AUTO: 2.6 % — SIGNIFICANT CHANGE UP (ref 2–14)
MONOCYTES NFR BLD AUTO: 2.9 % — SIGNIFICANT CHANGE UP (ref 2–14)
MONOCYTES NFR BLD AUTO: 2.9 % — SIGNIFICANT CHANGE UP (ref 2–14)
MONOCYTES NFR BLD AUTO: 3 % — SIGNIFICANT CHANGE UP (ref 2–14)
MONOCYTES NFR BLD AUTO: 3 % — SIGNIFICANT CHANGE UP (ref 2–14)
MONOCYTES NFR BLD AUTO: 3.2 % — SIGNIFICANT CHANGE UP (ref 2–14)
MONOCYTES NFR BLD AUTO: 3.5 % — SIGNIFICANT CHANGE UP (ref 2–14)
MONOCYTES NFR BLD AUTO: 3.5 % — SIGNIFICANT CHANGE UP (ref 2–14)
MONOCYTES NFR BLD AUTO: 3.6 % — SIGNIFICANT CHANGE UP (ref 2–14)
MONOCYTES NFR BLD AUTO: 3.8 % — SIGNIFICANT CHANGE UP (ref 2–14)
MONOCYTES NFR BLD AUTO: 3.8 % — SIGNIFICANT CHANGE UP (ref 2–14)
MONOCYTES NFR BLD AUTO: 3.9 % — SIGNIFICANT CHANGE UP (ref 2–14)
MONOCYTES NFR BLD AUTO: 4 % — SIGNIFICANT CHANGE UP (ref 2–14)
MONOCYTES NFR BLD AUTO: 4.7 % — SIGNIFICANT CHANGE UP (ref 2–14)
NEUTROPHILS # BLD AUTO: 10.11 K/UL — HIGH (ref 1.8–7.4)
NEUTROPHILS # BLD AUTO: 11.16 K/UL — HIGH (ref 1.8–7.4)
NEUTROPHILS # BLD AUTO: 11.59 K/UL — HIGH (ref 1.8–7.4)
NEUTROPHILS # BLD AUTO: 11.79 K/UL — HIGH (ref 1.8–7.4)
NEUTROPHILS # BLD AUTO: 12.97 K/UL — HIGH (ref 1.8–7.4)
NEUTROPHILS # BLD AUTO: 14.1 K/UL — HIGH (ref 1.8–7.4)
NEUTROPHILS # BLD AUTO: 5.6 K/UL — SIGNIFICANT CHANGE UP (ref 1.8–7.4)
NEUTROPHILS # BLD AUTO: 6.36 K/UL — SIGNIFICANT CHANGE UP (ref 1.8–7.4)
NEUTROPHILS # BLD AUTO: 6.82 K/UL — SIGNIFICANT CHANGE UP (ref 1.8–7.4)
NEUTROPHILS # BLD AUTO: 7.6 K/UL — HIGH (ref 1.8–7.4)
NEUTROPHILS # BLD AUTO: 7.69 K/UL — HIGH (ref 1.8–7.4)
NEUTROPHILS # BLD AUTO: 7.86 K/UL — HIGH (ref 1.8–7.4)
NEUTROPHILS # BLD AUTO: 7.91 K/UL — HIGH (ref 1.8–7.4)
NEUTROPHILS # BLD AUTO: 8.01 K/UL — HIGH (ref 1.8–7.4)
NEUTROPHILS # BLD AUTO: 8.58 K/UL — HIGH (ref 1.8–7.4)
NEUTROPHILS # BLD AUTO: 8.65 K/UL — HIGH (ref 1.8–7.4)
NEUTROPHILS # BLD AUTO: 8.8 K/UL — HIGH (ref 1.8–7.4)
NEUTROPHILS # BLD AUTO: 8.95 K/UL — HIGH (ref 1.8–7.4)
NEUTROPHILS # BLD AUTO: 9.25 K/UL — HIGH (ref 1.8–7.4)
NEUTROPHILS # BLD AUTO: 9.39 K/UL — HIGH (ref 1.8–7.4)
NEUTROPHILS # BLD AUTO: 9.59 K/UL — HIGH (ref 1.8–7.4)
NEUTROPHILS # BLD AUTO: 9.67 K/UL — HIGH (ref 1.8–7.4)
NEUTROPHILS # BLD AUTO: 9.8 K/UL — HIGH (ref 1.8–7.4)
NEUTROPHILS NFR BLD AUTO: 63.8 % — SIGNIFICANT CHANGE UP (ref 43–77)
NEUTROPHILS NFR BLD AUTO: 67.8 % — SIGNIFICANT CHANGE UP (ref 43–77)
NEUTROPHILS NFR BLD AUTO: 70.6 % — SIGNIFICANT CHANGE UP (ref 43–77)
NEUTROPHILS NFR BLD AUTO: 70.9 % — SIGNIFICANT CHANGE UP (ref 43–77)
NEUTROPHILS NFR BLD AUTO: 71.1 % — SIGNIFICANT CHANGE UP (ref 43–77)
NEUTROPHILS NFR BLD AUTO: 71.3 % — SIGNIFICANT CHANGE UP (ref 43–77)
NEUTROPHILS NFR BLD AUTO: 72.6 % — SIGNIFICANT CHANGE UP (ref 43–77)
NEUTROPHILS NFR BLD AUTO: 74.4 % — SIGNIFICANT CHANGE UP (ref 43–77)
NEUTROPHILS NFR BLD AUTO: 74.9 % — SIGNIFICANT CHANGE UP (ref 43–77)
NEUTROPHILS NFR BLD AUTO: 76 % — SIGNIFICANT CHANGE UP (ref 43–77)
NEUTROPHILS NFR BLD AUTO: 79.7 % — HIGH (ref 43–77)
NEUTROPHILS NFR BLD AUTO: 81.8 % — HIGH (ref 43–77)
NEUTROPHILS NFR BLD AUTO: 82.6 % — HIGH (ref 43–77)
NEUTROPHILS NFR BLD AUTO: 82.8 % — HIGH (ref 43–77)
NEUTROPHILS NFR BLD AUTO: 83.7 % — HIGH (ref 43–77)
NEUTROPHILS NFR BLD AUTO: 85.4 % — HIGH (ref 43–77)
NEUTROPHILS NFR BLD AUTO: 85.6 % — HIGH (ref 43–77)
NEUTROPHILS NFR BLD AUTO: 86.6 % — HIGH (ref 43–77)
NEUTROPHILS NFR BLD AUTO: 87.1 % — HIGH (ref 43–77)
NEUTROPHILS NFR BLD AUTO: 89.2 % — HIGH (ref 43–77)
NEUTROPHILS NFR BLD AUTO: 90 % — HIGH (ref 43–77)
NEUTROPHILS NFR BLD AUTO: 91.7 % — HIGH (ref 43–77)
NEUTROPHILS NFR BLD AUTO: 92.9 % — HIGH (ref 43–77)
NITRITE UR-MCNC: NEGATIVE — SIGNIFICANT CHANGE UP
NRBC # BLD: 0 /100 WBCS — SIGNIFICANT CHANGE UP (ref 0–0)
NRBC # BLD: 1 /100 WBCS — HIGH (ref 0–0)
NRBC # BLD: 1 /100 WBCS — HIGH (ref 0–0)
NRBC # BLD: SIGNIFICANT CHANGE UP /100 WBCS (ref 0–0)
NRBC # BLD: SIGNIFICANT CHANGE UP /100 WBCS (ref 0–0)
ORGANISM # SPEC MICROSCOPIC CNT: SIGNIFICANT CHANGE UP
PCO2 BLDA: 102 MMHG — CRITICAL HIGH (ref 32–46)
PCO2 BLDA: 105 MMHG — CRITICAL HIGH (ref 32–46)
PCO2 BLDA: 109 MMHG — CRITICAL HIGH (ref 32–46)
PCO2 BLDA: 114 MMHG — CRITICAL HIGH (ref 32–46)
PCO2 BLDA: 34 MMHG — SIGNIFICANT CHANGE UP (ref 32–46)
PCO2 BLDA: 39 MMHG — SIGNIFICANT CHANGE UP (ref 32–46)
PCO2 BLDA: 39 MMHG — SIGNIFICANT CHANGE UP (ref 32–46)
PCO2 BLDA: 40 MMHG — SIGNIFICANT CHANGE UP (ref 32–46)
PCO2 BLDA: 41 MMHG — SIGNIFICANT CHANGE UP (ref 32–46)
PCO2 BLDA: 43 MMHG — SIGNIFICANT CHANGE UP (ref 32–46)
PCO2 BLDA: 44 MMHG — SIGNIFICANT CHANGE UP (ref 32–46)
PCO2 BLDA: 45 MMHG — SIGNIFICANT CHANGE UP (ref 32–46)
PCO2 BLDA: 45 MMHG — SIGNIFICANT CHANGE UP (ref 32–46)
PCO2 BLDA: 46 MMHG — SIGNIFICANT CHANGE UP (ref 32–46)
PCO2 BLDA: 47 MMHG — HIGH (ref 32–46)
PCO2 BLDA: 47 MMHG — HIGH (ref 32–46)
PCO2 BLDA: 48 MMHG — HIGH (ref 32–46)
PCO2 BLDA: 49 MMHG — HIGH (ref 32–46)
PCO2 BLDA: 50 MMHG — HIGH (ref 32–46)
PCO2 BLDA: 51 MMHG — HIGH (ref 32–46)
PCO2 BLDA: 52 MMHG — HIGH (ref 32–46)
PCO2 BLDA: 54 MMHG — HIGH (ref 32–46)
PCO2 BLDA: 55 MMHG — HIGH (ref 32–46)
PCO2 BLDA: 56 MMHG — HIGH (ref 32–46)
PCO2 BLDA: 56 MMHG — HIGH (ref 32–46)
PCO2 BLDA: 57 MMHG — HIGH (ref 32–46)
PCO2 BLDA: 57 MMHG — HIGH (ref 32–46)
PCO2 BLDA: 58 MMHG — HIGH (ref 32–46)
PCO2 BLDA: 59 MMHG — HIGH (ref 32–46)
PCO2 BLDA: 60 MMHG — HIGH (ref 32–46)
PCO2 BLDA: 60 MMHG — HIGH (ref 32–46)
PCO2 BLDA: 62 MMHG — HIGH (ref 32–46)
PCO2 BLDA: 62 MMHG — HIGH (ref 32–46)
PCO2 BLDA: 66 MMHG — HIGH (ref 32–46)
PCO2 BLDA: 66 MMHG — HIGH (ref 32–46)
PCO2 BLDA: 67 MMHG — HIGH (ref 32–46)
PCO2 BLDA: 67 MMHG — HIGH (ref 32–46)
PCO2 BLDA: 69 MMHG — HIGH (ref 32–46)
PCO2 BLDA: 69 MMHG — HIGH (ref 32–46)
PCO2 BLDA: 70 MMHG — CRITICAL HIGH (ref 32–46)
PCO2 BLDA: 71 MMHG — CRITICAL HIGH (ref 32–46)
PCO2 BLDA: 73 MMHG — CRITICAL HIGH (ref 32–46)
PCO2 BLDA: 74 MMHG — CRITICAL HIGH (ref 32–46)
PCO2 BLDA: 76 MMHG — CRITICAL HIGH (ref 32–46)
PCO2 BLDA: 76 MMHG — CRITICAL HIGH (ref 32–46)
PCO2 BLDA: 77 MMHG — CRITICAL HIGH (ref 32–46)
PCO2 BLDA: 83 MMHG — CRITICAL HIGH (ref 32–46)
PCO2 BLDA: 84 MMHG — CRITICAL HIGH (ref 32–46)
PCO2 BLDA: 85 MMHG — CRITICAL HIGH (ref 32–46)
PCO2 BLDA: 85 MMHG — CRITICAL HIGH (ref 32–46)
PCO2 BLDA: 88 MMHG — CRITICAL HIGH (ref 32–46)
PH BLDA: 7.2 — CRITICAL LOW (ref 7.35–7.45)
PH BLDA: 7.2 — CRITICAL LOW (ref 7.35–7.45)
PH BLDA: 7.21 — LOW (ref 7.35–7.45)
PH BLDA: 7.22 — LOW (ref 7.35–7.45)
PH BLDA: 7.23 — LOW (ref 7.35–7.45)
PH BLDA: 7.24 — LOW (ref 7.35–7.45)
PH BLDA: 7.25 — LOW (ref 7.35–7.45)
PH BLDA: 7.26 — LOW (ref 7.35–7.45)
PH BLDA: 7.26 — LOW (ref 7.35–7.45)
PH BLDA: 7.27 — LOW (ref 7.35–7.45)
PH BLDA: 7.28 — LOW (ref 7.35–7.45)
PH BLDA: 7.29 — LOW (ref 7.35–7.45)
PH BLDA: 7.29 — LOW (ref 7.35–7.45)
PH BLDA: 7.31 — LOW (ref 7.35–7.45)
PH BLDA: 7.32 — LOW (ref 7.35–7.45)
PH BLDA: 7.33 — LOW (ref 7.35–7.45)
PH BLDA: 7.35 — SIGNIFICANT CHANGE UP (ref 7.35–7.45)
PH BLDA: 7.35 — SIGNIFICANT CHANGE UP (ref 7.35–7.45)
PH BLDA: 7.36 — SIGNIFICANT CHANGE UP (ref 7.35–7.45)
PH BLDA: 7.37 — SIGNIFICANT CHANGE UP (ref 7.35–7.45)
PH BLDA: 7.37 — SIGNIFICANT CHANGE UP (ref 7.35–7.45)
PH BLDA: 7.38 — SIGNIFICANT CHANGE UP (ref 7.35–7.45)
PH BLDA: 7.39 — SIGNIFICANT CHANGE UP (ref 7.35–7.45)
PH BLDA: 7.39 — SIGNIFICANT CHANGE UP (ref 7.35–7.45)
PH BLDA: 7.4 — SIGNIFICANT CHANGE UP (ref 7.35–7.45)
PH BLDA: 7.41 — SIGNIFICANT CHANGE UP (ref 7.35–7.45)
PH BLDA: 7.42 — SIGNIFICANT CHANGE UP (ref 7.35–7.45)
PH BLDA: 7.43 — SIGNIFICANT CHANGE UP (ref 7.35–7.45)
PH BLDA: 7.44 — SIGNIFICANT CHANGE UP (ref 7.35–7.45)
PH BLDA: 7.45 — SIGNIFICANT CHANGE UP (ref 7.35–7.45)
PH BLDA: 7.46 — HIGH (ref 7.35–7.45)
PH BLDA: 7.46 — HIGH (ref 7.35–7.45)
PH BLDA: 7.48 — HIGH (ref 7.35–7.45)
PH BLDA: 7.49 — HIGH (ref 7.35–7.45)
PH BLDA: 7.49 — HIGH (ref 7.35–7.45)
PH UR: 6 — SIGNIFICANT CHANGE UP (ref 5–8)
PHOSPHATE SERPL-MCNC: 1 MG/DL — CRITICAL LOW (ref 2.5–4.5)
PHOSPHATE SERPL-MCNC: 1.4 MG/DL — LOW (ref 2.5–4.5)
PHOSPHATE SERPL-MCNC: 1.5 MG/DL — LOW (ref 2.5–4.5)
PHOSPHATE SERPL-MCNC: 1.5 MG/DL — LOW (ref 2.5–4.5)
PHOSPHATE SERPL-MCNC: 1.7 MG/DL — LOW (ref 2.5–4.5)
PHOSPHATE SERPL-MCNC: 1.8 MG/DL — LOW (ref 2.5–4.5)
PHOSPHATE SERPL-MCNC: 2 MG/DL — LOW (ref 2.5–4.5)
PHOSPHATE SERPL-MCNC: 2.1 MG/DL — LOW (ref 2.5–4.5)
PHOSPHATE SERPL-MCNC: 2.2 MG/DL — LOW (ref 2.5–4.5)
PHOSPHATE SERPL-MCNC: 2.2 MG/DL — LOW (ref 2.5–4.5)
PHOSPHATE SERPL-MCNC: 2.3 MG/DL — LOW (ref 2.5–4.5)
PHOSPHATE SERPL-MCNC: 2.4 MG/DL — LOW (ref 2.5–4.5)
PHOSPHATE SERPL-MCNC: 2.4 MG/DL — LOW (ref 2.5–4.5)
PHOSPHATE SERPL-MCNC: 2.6 MG/DL — SIGNIFICANT CHANGE UP (ref 2.5–4.5)
PHOSPHATE SERPL-MCNC: 2.6 MG/DL — SIGNIFICANT CHANGE UP (ref 2.5–4.5)
PHOSPHATE SERPL-MCNC: 2.8 MG/DL — SIGNIFICANT CHANGE UP (ref 2.5–4.5)
PHOSPHATE SERPL-MCNC: 2.9 MG/DL — SIGNIFICANT CHANGE UP (ref 2.5–4.5)
PHOSPHATE SERPL-MCNC: 3.1 MG/DL — SIGNIFICANT CHANGE UP (ref 2.5–4.5)
PHOSPHATE SERPL-MCNC: 3.2 MG/DL — SIGNIFICANT CHANGE UP (ref 2.5–4.5)
PHOSPHATE SERPL-MCNC: 3.3 MG/DL — SIGNIFICANT CHANGE UP (ref 2.5–4.5)
PHOSPHATE SERPL-MCNC: 3.3 MG/DL — SIGNIFICANT CHANGE UP (ref 2.5–4.5)
PHOSPHATE SERPL-MCNC: 3.5 MG/DL — SIGNIFICANT CHANGE UP (ref 2.5–4.5)
PHOSPHATE SERPL-MCNC: 3.9 MG/DL — SIGNIFICANT CHANGE UP (ref 2.5–4.5)
PHOSPHATE SERPL-MCNC: 3.9 MG/DL — SIGNIFICANT CHANGE UP (ref 2.5–4.5)
PLATELET # BLD AUTO: 171 K/UL — SIGNIFICANT CHANGE UP (ref 150–400)
PLATELET # BLD AUTO: 173 K/UL — SIGNIFICANT CHANGE UP (ref 150–400)
PLATELET # BLD AUTO: 173 K/UL — SIGNIFICANT CHANGE UP (ref 150–400)
PLATELET # BLD AUTO: 177 K/UL — SIGNIFICANT CHANGE UP (ref 150–400)
PLATELET # BLD AUTO: 182 K/UL — SIGNIFICANT CHANGE UP (ref 150–400)
PLATELET # BLD AUTO: 183 K/UL — SIGNIFICANT CHANGE UP (ref 150–400)
PLATELET # BLD AUTO: 188 K/UL — SIGNIFICANT CHANGE UP (ref 150–400)
PLATELET # BLD AUTO: 189 K/UL — SIGNIFICANT CHANGE UP (ref 150–400)
PLATELET # BLD AUTO: 192 K/UL — SIGNIFICANT CHANGE UP (ref 150–400)
PLATELET # BLD AUTO: 192 K/UL — SIGNIFICANT CHANGE UP (ref 150–400)
PLATELET # BLD AUTO: 194 K/UL — SIGNIFICANT CHANGE UP (ref 150–400)
PLATELET # BLD AUTO: 198 K/UL — SIGNIFICANT CHANGE UP (ref 150–400)
PLATELET # BLD AUTO: 203 K/UL — SIGNIFICANT CHANGE UP (ref 150–400)
PLATELET # BLD AUTO: 205 K/UL — SIGNIFICANT CHANGE UP (ref 150–400)
PLATELET # BLD AUTO: 220 K/UL — SIGNIFICANT CHANGE UP (ref 150–400)
PLATELET # BLD AUTO: 222 K/UL — SIGNIFICANT CHANGE UP (ref 150–400)
PLATELET # BLD AUTO: 227 K/UL — SIGNIFICANT CHANGE UP (ref 150–400)
PLATELET # BLD AUTO: 227 K/UL — SIGNIFICANT CHANGE UP (ref 150–400)
PLATELET # BLD AUTO: 230 K/UL — SIGNIFICANT CHANGE UP (ref 150–400)
PLATELET # BLD AUTO: 233 K/UL — SIGNIFICANT CHANGE UP (ref 150–400)
PLATELET # BLD AUTO: 234 K/UL — SIGNIFICANT CHANGE UP (ref 150–400)
PLATELET # BLD AUTO: 240 K/UL — SIGNIFICANT CHANGE UP (ref 150–400)
PLATELET # BLD AUTO: 247 K/UL — SIGNIFICANT CHANGE UP (ref 150–400)
PLATELET # BLD AUTO: 259 K/UL — SIGNIFICANT CHANGE UP (ref 150–400)
PLATELET # BLD AUTO: 264 K/UL — SIGNIFICANT CHANGE UP (ref 150–400)
PLATELET # BLD AUTO: 267 K/UL — SIGNIFICANT CHANGE UP (ref 150–400)
PLATELET # BLD AUTO: 273 K/UL — SIGNIFICANT CHANGE UP (ref 150–400)
PLATELET # BLD AUTO: 299 K/UL — SIGNIFICANT CHANGE UP (ref 150–400)
PLATELET # BLD AUTO: 307 K/UL — SIGNIFICANT CHANGE UP (ref 150–400)
PLATELET # BLD AUTO: 315 K/UL — SIGNIFICANT CHANGE UP (ref 150–400)
PLATELET # BLD AUTO: 327 K/UL — SIGNIFICANT CHANGE UP (ref 150–400)
PLATELET # BLD AUTO: 346 K/UL — SIGNIFICANT CHANGE UP (ref 150–400)
PLATELET # BLD AUTO: 359 K/UL — SIGNIFICANT CHANGE UP (ref 150–400)
PLATELET # BLD AUTO: 368 K/UL — SIGNIFICANT CHANGE UP (ref 150–400)
PLATELET # BLD AUTO: 372 K/UL — SIGNIFICANT CHANGE UP (ref 150–400)
PLATELET # BLD AUTO: 377 K/UL — SIGNIFICANT CHANGE UP (ref 150–400)
PLATELET # BLD AUTO: 409 K/UL — HIGH (ref 150–400)
PLATELET # BLD AUTO: 421 K/UL — HIGH (ref 150–400)
PLATELET # BLD AUTO: 434 K/UL — HIGH (ref 150–400)
PLATELET # BLD AUTO: 444 K/UL — HIGH (ref 150–400)
PLATELET # BLD AUTO: 451 K/UL — HIGH (ref 150–400)
PLATELET # BLD AUTO: 452 K/UL — HIGH (ref 150–400)
PLATELET # BLD AUTO: 453 K/UL — HIGH (ref 150–400)
PLATELET # BLD AUTO: 462 K/UL — HIGH (ref 150–400)
PLATELET # BLD AUTO: 467 K/UL — HIGH (ref 150–400)
PLATELET # BLD AUTO: 487 K/UL — HIGH (ref 150–400)
PLATELET # BLD AUTO: 493 K/UL — HIGH (ref 150–400)
PO2 BLDA: 100 MMHG — SIGNIFICANT CHANGE UP (ref 74–108)
PO2 BLDA: 101 MMHG — SIGNIFICANT CHANGE UP (ref 74–108)
PO2 BLDA: 104 MMHG — SIGNIFICANT CHANGE UP (ref 74–108)
PO2 BLDA: 105 MMHG — SIGNIFICANT CHANGE UP (ref 74–108)
PO2 BLDA: 107 MMHG — SIGNIFICANT CHANGE UP (ref 74–108)
PO2 BLDA: 110 MMHG — HIGH (ref 74–108)
PO2 BLDA: 112 MMHG — HIGH (ref 74–108)
PO2 BLDA: 119 MMHG — HIGH (ref 74–108)
PO2 BLDA: 121 MMHG — HIGH (ref 74–108)
PO2 BLDA: 124 MMHG — HIGH (ref 74–108)
PO2 BLDA: 128 MMHG — HIGH (ref 74–108)
PO2 BLDA: 132 MMHG — HIGH (ref 74–108)
PO2 BLDA: 145 MMHG — HIGH (ref 74–108)
PO2 BLDA: 155 MMHG — HIGH (ref 74–108)
PO2 BLDA: 157 MMHG — HIGH (ref 74–108)
PO2 BLDA: 167 MMHG — HIGH (ref 74–108)
PO2 BLDA: 176 MMHG — HIGH (ref 74–108)
PO2 BLDA: 199 MMHG — HIGH (ref 74–108)
PO2 BLDA: 53 MMHG — LOW (ref 74–108)
PO2 BLDA: 61 MMHG — LOW (ref 74–108)
PO2 BLDA: 62 MMHG — LOW (ref 74–108)
PO2 BLDA: 64 MMHG — LOW (ref 74–108)
PO2 BLDA: 64 MMHG — LOW (ref 74–108)
PO2 BLDA: 65 MMHG — LOW (ref 74–108)
PO2 BLDA: 67 MMHG — LOW (ref 74–108)
PO2 BLDA: 68 MMHG — LOW (ref 74–108)
PO2 BLDA: 70 MMHG — LOW (ref 74–108)
PO2 BLDA: 70 MMHG — LOW (ref 74–108)
PO2 BLDA: 71 MMHG — LOW (ref 74–108)
PO2 BLDA: 72 MMHG — LOW (ref 74–108)
PO2 BLDA: 74 MMHG — SIGNIFICANT CHANGE UP (ref 74–108)
PO2 BLDA: 75 MMHG — SIGNIFICANT CHANGE UP (ref 74–108)
PO2 BLDA: 76 MMHG — SIGNIFICANT CHANGE UP (ref 74–108)
PO2 BLDA: 78 MMHG — SIGNIFICANT CHANGE UP (ref 74–108)
PO2 BLDA: 79 MMHG — SIGNIFICANT CHANGE UP (ref 74–108)
PO2 BLDA: 80 MMHG — SIGNIFICANT CHANGE UP (ref 74–108)
PO2 BLDA: 81 MMHG — SIGNIFICANT CHANGE UP (ref 74–108)
PO2 BLDA: 82 MMHG — SIGNIFICANT CHANGE UP (ref 74–108)
PO2 BLDA: 82 MMHG — SIGNIFICANT CHANGE UP (ref 74–108)
PO2 BLDA: 83 MMHG — SIGNIFICANT CHANGE UP (ref 74–108)
PO2 BLDA: 83 MMHG — SIGNIFICANT CHANGE UP (ref 74–108)
PO2 BLDA: 84 MMHG — SIGNIFICANT CHANGE UP (ref 74–108)
PO2 BLDA: 85 MMHG — SIGNIFICANT CHANGE UP (ref 74–108)
PO2 BLDA: 85 MMHG — SIGNIFICANT CHANGE UP (ref 74–108)
PO2 BLDA: 86 MMHG — SIGNIFICANT CHANGE UP (ref 74–108)
PO2 BLDA: 87 MMHG — SIGNIFICANT CHANGE UP (ref 74–108)
PO2 BLDA: 90 MMHG — SIGNIFICANT CHANGE UP (ref 74–108)
PO2 BLDA: 92 MMHG — SIGNIFICANT CHANGE UP (ref 74–108)
PO2 BLDA: 92 MMHG — SIGNIFICANT CHANGE UP (ref 74–108)
PO2 BLDA: 93 MMHG — SIGNIFICANT CHANGE UP (ref 74–108)
PO2 BLDA: 94 MMHG — SIGNIFICANT CHANGE UP (ref 74–108)
PO2 BLDA: 94 MMHG — SIGNIFICANT CHANGE UP (ref 74–108)
POTASSIUM SERPL-MCNC: 2.6 MMOL/L — CRITICAL LOW (ref 3.5–5.3)
POTASSIUM SERPL-MCNC: 2.6 MMOL/L — CRITICAL LOW (ref 3.5–5.3)
POTASSIUM SERPL-MCNC: 2.8 MMOL/L — CRITICAL LOW (ref 3.5–5.3)
POTASSIUM SERPL-MCNC: 3.1 MMOL/L — LOW (ref 3.5–5.3)
POTASSIUM SERPL-MCNC: 3.2 MMOL/L — LOW (ref 3.5–5.3)
POTASSIUM SERPL-MCNC: 3.3 MMOL/L — LOW (ref 3.5–5.3)
POTASSIUM SERPL-MCNC: 3.4 MMOL/L — LOW (ref 3.5–5.3)
POTASSIUM SERPL-MCNC: 3.5 MMOL/L — SIGNIFICANT CHANGE UP (ref 3.5–5.3)
POTASSIUM SERPL-MCNC: 3.6 MMOL/L — SIGNIFICANT CHANGE UP (ref 3.5–5.3)
POTASSIUM SERPL-MCNC: 3.6 MMOL/L — SIGNIFICANT CHANGE UP (ref 3.5–5.3)
POTASSIUM SERPL-MCNC: 3.7 MMOL/L — SIGNIFICANT CHANGE UP (ref 3.5–5.3)
POTASSIUM SERPL-MCNC: 3.8 MMOL/L — SIGNIFICANT CHANGE UP (ref 3.5–5.3)
POTASSIUM SERPL-MCNC: 3.9 MMOL/L — SIGNIFICANT CHANGE UP (ref 3.5–5.3)
POTASSIUM SERPL-MCNC: 4 MMOL/L — SIGNIFICANT CHANGE UP (ref 3.5–5.3)
POTASSIUM SERPL-MCNC: 4.2 MMOL/L — SIGNIFICANT CHANGE UP (ref 3.5–5.3)
POTASSIUM SERPL-MCNC: 4.3 MMOL/L — SIGNIFICANT CHANGE UP (ref 3.5–5.3)
POTASSIUM SERPL-MCNC: 4.4 MMOL/L — SIGNIFICANT CHANGE UP (ref 3.5–5.3)
POTASSIUM SERPL-MCNC: 4.5 MMOL/L — SIGNIFICANT CHANGE UP (ref 3.5–5.3)
POTASSIUM SERPL-MCNC: 4.6 MMOL/L — SIGNIFICANT CHANGE UP (ref 3.5–5.3)
POTASSIUM SERPL-MCNC: 4.6 MMOL/L — SIGNIFICANT CHANGE UP (ref 3.5–5.3)
POTASSIUM SERPL-MCNC: 5.8 MMOL/L — HIGH (ref 3.5–5.3)
POTASSIUM SERPL-SCNC: 2.6 MMOL/L — CRITICAL LOW (ref 3.5–5.3)
POTASSIUM SERPL-SCNC: 2.6 MMOL/L — CRITICAL LOW (ref 3.5–5.3)
POTASSIUM SERPL-SCNC: 2.8 MMOL/L — CRITICAL LOW (ref 3.5–5.3)
POTASSIUM SERPL-SCNC: 3.1 MMOL/L — LOW (ref 3.5–5.3)
POTASSIUM SERPL-SCNC: 3.2 MMOL/L — LOW (ref 3.5–5.3)
POTASSIUM SERPL-SCNC: 3.3 MMOL/L — LOW (ref 3.5–5.3)
POTASSIUM SERPL-SCNC: 3.4 MMOL/L — LOW (ref 3.5–5.3)
POTASSIUM SERPL-SCNC: 3.5 MMOL/L — SIGNIFICANT CHANGE UP (ref 3.5–5.3)
POTASSIUM SERPL-SCNC: 3.6 MMOL/L — SIGNIFICANT CHANGE UP (ref 3.5–5.3)
POTASSIUM SERPL-SCNC: 3.6 MMOL/L — SIGNIFICANT CHANGE UP (ref 3.5–5.3)
POTASSIUM SERPL-SCNC: 3.7 MMOL/L — SIGNIFICANT CHANGE UP (ref 3.5–5.3)
POTASSIUM SERPL-SCNC: 3.8 MMOL/L — SIGNIFICANT CHANGE UP (ref 3.5–5.3)
POTASSIUM SERPL-SCNC: 3.9 MMOL/L — SIGNIFICANT CHANGE UP (ref 3.5–5.3)
POTASSIUM SERPL-SCNC: 4 MMOL/L — SIGNIFICANT CHANGE UP (ref 3.5–5.3)
POTASSIUM SERPL-SCNC: 4.2 MMOL/L — SIGNIFICANT CHANGE UP (ref 3.5–5.3)
POTASSIUM SERPL-SCNC: 4.3 MMOL/L — SIGNIFICANT CHANGE UP (ref 3.5–5.3)
POTASSIUM SERPL-SCNC: 4.4 MMOL/L — SIGNIFICANT CHANGE UP (ref 3.5–5.3)
POTASSIUM SERPL-SCNC: 4.5 MMOL/L — SIGNIFICANT CHANGE UP (ref 3.5–5.3)
POTASSIUM SERPL-SCNC: 4.6 MMOL/L — SIGNIFICANT CHANGE UP (ref 3.5–5.3)
POTASSIUM SERPL-SCNC: 4.6 MMOL/L — SIGNIFICANT CHANGE UP (ref 3.5–5.3)
POTASSIUM SERPL-SCNC: 5.8 MMOL/L — HIGH (ref 3.5–5.3)
PROCALCITONIN SERPL-MCNC: 0.28 NG/ML — HIGH (ref 0.02–0.1)
PROCALCITONIN SERPL-MCNC: 0.29 NG/ML — HIGH (ref 0.02–0.1)
PROCALCITONIN SERPL-MCNC: 0.36 NG/ML — HIGH (ref 0.02–0.1)
PROCALCITONIN SERPL-MCNC: 0.47 NG/ML — HIGH (ref 0.02–0.1)
PROT SERPL-MCNC: 5.3 GM/DL — LOW (ref 6–8.3)
PROT SERPL-MCNC: 5.5 GM/DL — LOW (ref 6–8.3)
PROT SERPL-MCNC: 5.9 G/DL — LOW (ref 6–8.3)
PROT SERPL-MCNC: 5.9 GM/DL — LOW (ref 6–8.3)
PROT SERPL-MCNC: 6.1 GM/DL — SIGNIFICANT CHANGE UP (ref 6–8.3)
PROT SERPL-MCNC: 6.1 GM/DL — SIGNIFICANT CHANGE UP (ref 6–8.3)
PROT SERPL-MCNC: 6.2 GM/DL — SIGNIFICANT CHANGE UP (ref 6–8.3)
PROT SERPL-MCNC: 6.2 GM/DL — SIGNIFICANT CHANGE UP (ref 6–8.3)
PROT SERPL-MCNC: 6.3 GM/DL — SIGNIFICANT CHANGE UP (ref 6–8.3)
PROT SERPL-MCNC: 6.4 GM/DL — SIGNIFICANT CHANGE UP (ref 6–8.3)
PROT SERPL-MCNC: 6.5 GM/DL — SIGNIFICANT CHANGE UP (ref 6–8.3)
PROT SERPL-MCNC: 6.6 GM/DL — SIGNIFICANT CHANGE UP (ref 6–8.3)
PROT SERPL-MCNC: 6.7 G/DL — SIGNIFICANT CHANGE UP (ref 6–8.3)
PROT SERPL-MCNC: 6.7 GM/DL — SIGNIFICANT CHANGE UP (ref 6–8.3)
PROT SERPL-MCNC: 6.9 GM/DL — SIGNIFICANT CHANGE UP (ref 6–8.3)
PROT SERPL-MCNC: 7 GM/DL — SIGNIFICANT CHANGE UP (ref 6–8.3)
PROT SERPL-MCNC: 7.1 GM/DL — SIGNIFICANT CHANGE UP (ref 6–8.3)
PROT SERPL-MCNC: 7.6 G/DL — SIGNIFICANT CHANGE UP (ref 6–8.3)
PROT UR-MCNC: 30 MG/DL
PROTHROM AB SERPL-ACNC: 14.8 SEC — HIGH (ref 10–12.9)
PROTHROM AB SERPL-ACNC: 16 SEC — HIGH (ref 10–12.9)
PROTHROM AB SERPL-ACNC: 16.2 SEC — HIGH (ref 10–12.9)
RAPID RVP RESULT: SIGNIFICANT CHANGE UP
RBC # BLD: 1.6 M/UL — LOW (ref 4.2–5.8)
RBC # BLD: 1.66 M/UL — LOW (ref 4.2–5.8)
RBC # BLD: 1.73 M/UL — LOW (ref 4.2–5.8)
RBC # BLD: 1.94 M/UL — LOW (ref 4.2–5.8)
RBC # BLD: 1.99 M/UL — LOW (ref 4.2–5.8)
RBC # BLD: 2.02 M/UL — LOW (ref 4.2–5.8)
RBC # BLD: 2.08 M/UL — LOW (ref 4.2–5.8)
RBC # BLD: 2.1 M/UL — LOW (ref 4.2–5.8)
RBC # BLD: 2.14 M/UL — LOW (ref 4.2–5.8)
RBC # BLD: 2.18 M/UL — LOW (ref 4.2–5.8)
RBC # BLD: 2.21 M/UL — LOW (ref 4.2–5.8)
RBC # BLD: 2.34 M/UL — LOW (ref 4.2–5.8)
RBC # BLD: 2.42 M/UL — LOW (ref 4.2–5.8)
RBC # BLD: 2.44 M/UL — LOW (ref 4.2–5.8)
RBC # BLD: 2.46 M/UL — LOW (ref 4.2–5.8)
RBC # BLD: 2.55 M/UL — LOW (ref 4.2–5.8)
RBC # BLD: 2.58 M/UL — LOW (ref 4.2–5.8)
RBC # BLD: 2.63 M/UL — LOW (ref 4.2–5.8)
RBC # BLD: 2.66 M/UL — LOW (ref 4.2–5.8)
RBC # BLD: 2.67 M/UL — LOW (ref 4.2–5.8)
RBC # BLD: 2.7 M/UL — LOW (ref 4.2–5.8)
RBC # BLD: 2.71 M/UL — LOW (ref 4.2–5.8)
RBC # BLD: 2.72 M/UL — LOW (ref 4.2–5.8)
RBC # BLD: 2.74 M/UL — LOW (ref 4.2–5.8)
RBC # BLD: 2.75 M/UL — LOW (ref 4.2–5.8)
RBC # BLD: 2.77 M/UL — LOW (ref 4.2–5.8)
RBC # BLD: 2.77 M/UL — LOW (ref 4.2–5.8)
RBC # BLD: 2.78 M/UL — LOW (ref 4.2–5.8)
RBC # BLD: 2.82 M/UL — LOW (ref 4.2–5.8)
RBC # BLD: 2.86 M/UL — LOW (ref 4.2–5.8)
RBC # BLD: 2.86 M/UL — LOW (ref 4.2–5.8)
RBC # BLD: 2.88 M/UL — LOW (ref 4.2–5.8)
RBC # BLD: 2.89 M/UL — LOW (ref 4.2–5.8)
RBC # BLD: 2.9 M/UL — LOW (ref 4.2–5.8)
RBC # BLD: 2.9 M/UL — LOW (ref 4.2–5.8)
RBC # BLD: 2.97 M/UL — LOW (ref 4.2–5.8)
RBC # BLD: 2.97 M/UL — LOW (ref 4.2–5.8)
RBC # BLD: 3.03 M/UL — LOW (ref 4.2–5.8)
RBC # BLD: 3.04 M/UL — LOW (ref 4.2–5.8)
RBC # BLD: 3.11 M/UL — LOW (ref 4.2–5.8)
RBC # BLD: 3.12 M/UL — LOW (ref 4.2–5.8)
RBC # BLD: 3.13 M/UL — LOW (ref 4.2–5.8)
RBC # BLD: 3.38 M/UL — LOW (ref 4.2–5.8)
RBC # BLD: 3.5 M/UL — LOW (ref 4.2–5.8)
RBC # BLD: 3.55 M/UL — LOW (ref 4.2–5.8)
RBC # BLD: 3.65 M/UL — LOW (ref 4.2–5.8)
RBC # BLD: 4.49 M/UL — SIGNIFICANT CHANGE UP (ref 4.2–5.8)
RBC # FLD: 14.3 % — SIGNIFICANT CHANGE UP (ref 10.3–14.5)
RBC # FLD: 14.6 % — HIGH (ref 10.3–14.5)
RBC # FLD: 14.8 % — HIGH (ref 10.3–14.5)
RBC # FLD: 14.9 % — HIGH (ref 10.3–14.5)
RBC # FLD: 15 % — HIGH (ref 10.3–14.5)
RBC # FLD: 15.1 % — HIGH (ref 10.3–14.5)
RBC # FLD: 15.2 % — HIGH (ref 10.3–14.5)
RBC # FLD: 15.2 % — HIGH (ref 10.3–14.5)
RBC # FLD: 15.3 % — HIGH (ref 10.3–14.5)
RBC # FLD: 15.4 % — HIGH (ref 10.3–14.5)
RBC # FLD: 15.5 % — HIGH (ref 10.3–14.5)
RBC # FLD: 15.5 % — HIGH (ref 10.3–14.5)
RBC # FLD: 15.6 % — HIGH (ref 10.3–14.5)
RBC # FLD: 15.6 % — HIGH (ref 10.3–14.5)
RBC # FLD: 15.8 % — HIGH (ref 10.3–14.5)
RBC # FLD: 15.9 % — HIGH (ref 10.3–14.5)
RBC # FLD: 16.3 % — HIGH (ref 10.3–14.5)
RBC # FLD: 17.2 % — HIGH (ref 10.3–14.5)
RBC # FLD: 17.2 % — HIGH (ref 10.3–14.5)
RBC # FLD: 17.3 % — HIGH (ref 10.3–14.5)
RBC # FLD: 17.4 % — HIGH (ref 10.3–14.5)
RBC # FLD: 17.5 % — HIGH (ref 10.3–14.5)
RBC # FLD: 17.6 % — HIGH (ref 10.3–14.5)
RBC # FLD: 17.7 % — HIGH (ref 10.3–14.5)
RBC # FLD: 17.7 % — HIGH (ref 10.3–14.5)
RBC # FLD: 17.8 % — HIGH (ref 10.3–14.5)
RBC # FLD: 17.8 % — HIGH (ref 10.3–14.5)
RBC # FLD: 17.9 % — HIGH (ref 10.3–14.5)
RBC # FLD: 17.9 % — HIGH (ref 10.3–14.5)
RBC # FLD: 18 % — HIGH (ref 10.3–14.5)
RBC # FLD: 18.1 % — HIGH (ref 10.3–14.5)
RBC # FLD: 18.2 % — HIGH (ref 10.3–14.5)
RBC # FLD: 18.4 % — HIGH (ref 10.3–14.5)
RBC # FLD: 18.6 % — HIGH (ref 10.3–14.5)
RBC # FLD: 18.8 % — HIGH (ref 10.3–14.5)
RBC # FLD: 19.7 % — HIGH (ref 10.3–14.5)
RBC # FLD: 19.8 % — HIGH (ref 10.3–14.5)
RBC # FLD: 21.9 % — HIGH (ref 10.3–14.5)
RSV RESULT: SIGNIFICANT CHANGE UP
RSV RNA RESP QL NAA+PROBE: SIGNIFICANT CHANGE UP
SAO2 % BLDA: 82 % — LOW (ref 92–96)
SAO2 % BLDA: 88 % — LOW (ref 92–96)
SAO2 % BLDA: 89 % — LOW (ref 92–96)
SAO2 % BLDA: 90 % — LOW (ref 92–96)
SAO2 % BLDA: 91 % — LOW (ref 92–96)
SAO2 % BLDA: 91 % — LOW (ref 92–96)
SAO2 % BLDA: 92 % — SIGNIFICANT CHANGE UP (ref 92–96)
SAO2 % BLDA: 92 % — SIGNIFICANT CHANGE UP (ref 92–96)
SAO2 % BLDA: 93 % — SIGNIFICANT CHANGE UP (ref 92–96)
SAO2 % BLDA: 94 % — SIGNIFICANT CHANGE UP (ref 92–96)
SAO2 % BLDA: 95 % — SIGNIFICANT CHANGE UP (ref 92–96)
SAO2 % BLDA: 96 % — SIGNIFICANT CHANGE UP (ref 92–96)
SAO2 % BLDA: 97 % — HIGH (ref 92–96)
SAO2 % BLDA: 98 % — HIGH (ref 92–96)
SAO2 % BLDA: 99 % — HIGH (ref 92–96)
SARS-COV-2 RNA SPEC QL NAA+PROBE: DETECTED
SARS-COV-2 RNA SPEC QL NAA+PROBE: SIGNIFICANT CHANGE UP
SARS-COV-2 RNA SPEC QL NAA+PROBE: SIGNIFICANT CHANGE UP
SODIUM SERPL-SCNC: 135 MMOL/L — SIGNIFICANT CHANGE UP (ref 135–145)
SODIUM SERPL-SCNC: 136 MMOL/L — SIGNIFICANT CHANGE UP (ref 135–145)
SODIUM SERPL-SCNC: 137 MMOL/L — SIGNIFICANT CHANGE UP (ref 135–145)
SODIUM SERPL-SCNC: 138 MMOL/L — SIGNIFICANT CHANGE UP (ref 135–145)
SODIUM SERPL-SCNC: 139 MMOL/L — SIGNIFICANT CHANGE UP (ref 135–145)
SODIUM SERPL-SCNC: 139 MMOL/L — SIGNIFICANT CHANGE UP (ref 135–145)
SODIUM SERPL-SCNC: 140 MMOL/L — SIGNIFICANT CHANGE UP (ref 135–145)
SODIUM SERPL-SCNC: 141 MMOL/L — SIGNIFICANT CHANGE UP (ref 135–145)
SODIUM SERPL-SCNC: 142 MMOL/L — SIGNIFICANT CHANGE UP (ref 135–145)
SODIUM SERPL-SCNC: 142 MMOL/L — SIGNIFICANT CHANGE UP (ref 135–145)
SODIUM SERPL-SCNC: 143 MMOL/L — SIGNIFICANT CHANGE UP (ref 135–145)
SODIUM SERPL-SCNC: 144 MMOL/L — SIGNIFICANT CHANGE UP (ref 135–145)
SODIUM SERPL-SCNC: 144 MMOL/L — SIGNIFICANT CHANGE UP (ref 135–145)
SODIUM SERPL-SCNC: 145 MMOL/L — SIGNIFICANT CHANGE UP (ref 135–145)
SODIUM SERPL-SCNC: 146 MMOL/L — HIGH (ref 135–145)
SODIUM SERPL-SCNC: 147 MMOL/L — HIGH (ref 135–145)
SODIUM SERPL-SCNC: 148 MMOL/L — HIGH (ref 135–145)
SODIUM SERPL-SCNC: 149 MMOL/L — HIGH (ref 135–145)
SODIUM SERPL-SCNC: 150 MMOL/L — HIGH (ref 135–145)
SODIUM SERPL-SCNC: 151 MMOL/L — HIGH (ref 135–145)
SODIUM SERPL-SCNC: 152 MMOL/L — HIGH (ref 135–145)
SODIUM SERPL-SCNC: 155 MMOL/L — HIGH (ref 135–145)
SODIUM SERPL-SCNC: 156 MMOL/L — HIGH (ref 135–145)
SODIUM SERPL-SCNC: 158 MMOL/L — HIGH (ref 135–145)
SODIUM SERPL-SCNC: 159 MMOL/L — HIGH (ref 135–145)
SP GR SPEC: 1.01 — SIGNIFICANT CHANGE UP (ref 1.01–1.02)
SPECIMEN SOURCE: SIGNIFICANT CHANGE UP
T-CELL CD4 SUBSET PNL BLD: 39 /UL — LOW (ref 325–1251)
T-CELL CD4 SUBSET PNL BLD: 75 /UL — LOW (ref 325–1251)
T-CELL CD4 SUBSET PNL BLD: 76 /UL — LOW (ref 325–1251)
TRIGL SERPL-MCNC: 166 MG/DL — HIGH (ref 10–149)
TRIGL SERPL-MCNC: 175 MG/DL — HIGH (ref 10–149)
TRIGL SERPL-MCNC: 191 MG/DL — HIGH (ref 10–149)
TROPONIN I SERPL-MCNC: 0.07 NG/ML — HIGH (ref 0.01–0.04)
TROPONIN I SERPL-MCNC: 0.12 NG/ML — HIGH (ref 0.01–0.04)
TROPONIN I SERPL-MCNC: 0.21 NG/ML — HIGH (ref 0–0.04)
TROPONIN I SERPL-MCNC: 0.39 NG/ML — HIGH (ref 0–0.04)
TROPONIN I SERPL-MCNC: 0.41 NG/ML — HIGH (ref 0–0.04)
TROPONIN I SERPL-MCNC: <0.015 NG/ML — SIGNIFICANT CHANGE UP (ref 0.01–0.04)
TROPONIN I SERPL-MCNC: <0.015 NG/ML — SIGNIFICANT CHANGE UP (ref 0.01–0.04)
TROPONIN I SERPL-MCNC: <0.015 NG/ML — SIGNIFICANT CHANGE UP (ref 0–0.04)
UROBILINOGEN FLD QL: 8 MG/DL
VANCOMYCIN TROUGH SERPL-MCNC: 21.2 UG/ML — HIGH (ref 10–20)
WBC # BLD: 10.02 K/UL — SIGNIFICANT CHANGE UP (ref 3.8–10.5)
WBC # BLD: 10.08 K/UL — SIGNIFICANT CHANGE UP (ref 3.8–10.5)
WBC # BLD: 10.21 K/UL — SIGNIFICANT CHANGE UP (ref 3.8–10.5)
WBC # BLD: 10.28 K/UL — SIGNIFICANT CHANGE UP (ref 3.8–10.5)
WBC # BLD: 10.33 K/UL — SIGNIFICANT CHANGE UP (ref 3.8–10.5)
WBC # BLD: 10.82 K/UL — HIGH (ref 3.8–10.5)
WBC # BLD: 10.83 K/UL — HIGH (ref 3.8–10.5)
WBC # BLD: 11.25 K/UL — HIGH (ref 3.8–10.5)
WBC # BLD: 11.83 K/UL — HIGH (ref 3.8–10.5)
WBC # BLD: 11.84 K/UL — HIGH (ref 3.8–10.5)
WBC # BLD: 12.03 K/UL — HIGH (ref 3.8–10.5)
WBC # BLD: 12.42 K/UL — HIGH (ref 3.8–10.5)
WBC # BLD: 12.88 K/UL — HIGH (ref 3.8–10.5)
WBC # BLD: 12.98 K/UL — HIGH (ref 3.8–10.5)
WBC # BLD: 13.25 K/UL — HIGH (ref 3.8–10.5)
WBC # BLD: 13.88 K/UL — HIGH (ref 3.8–10.5)
WBC # BLD: 14.23 K/UL — HIGH (ref 3.8–10.5)
WBC # BLD: 14.91 K/UL — HIGH (ref 3.8–10.5)
WBC # BLD: 15.01 K/UL — HIGH (ref 3.8–10.5)
WBC # BLD: 15.18 K/UL — HIGH (ref 3.8–10.5)
WBC # BLD: 15.49 K/UL — HIGH (ref 3.8–10.5)
WBC # BLD: 16.6 K/UL — HIGH (ref 3.8–10.5)
WBC # BLD: 17.62 K/UL — HIGH (ref 3.8–10.5)
WBC # BLD: 18.62 K/UL — HIGH (ref 3.8–10.5)
WBC # BLD: 18.67 K/UL — HIGH (ref 3.8–10.5)
WBC # BLD: 20.71 K/UL — HIGH (ref 3.8–10.5)
WBC # BLD: 21.48 K/UL — HIGH (ref 3.8–10.5)
WBC # BLD: 22.64 K/UL — HIGH (ref 3.8–10.5)
WBC # BLD: 22.81 K/UL — HIGH (ref 3.8–10.5)
WBC # BLD: 24.53 K/UL — HIGH (ref 3.8–10.5)
WBC # BLD: 25 K/UL — HIGH (ref 3.8–10.5)
WBC # BLD: 25.83 K/UL — HIGH (ref 3.8–10.5)
WBC # BLD: 5.97 K/UL — SIGNIFICANT CHANGE UP (ref 3.8–10.5)
WBC # BLD: 6.76 K/UL — SIGNIFICANT CHANGE UP (ref 3.8–10.5)
WBC # BLD: 6.85 K/UL — SIGNIFICANT CHANGE UP (ref 3.8–10.5)
WBC # BLD: 6.95 K/UL — SIGNIFICANT CHANGE UP (ref 3.8–10.5)
WBC # BLD: 7.74 K/UL — SIGNIFICANT CHANGE UP (ref 3.8–10.5)
WBC # BLD: 7.82 K/UL — SIGNIFICANT CHANGE UP (ref 3.8–10.5)
WBC # BLD: 8.21 K/UL — SIGNIFICANT CHANGE UP (ref 3.8–10.5)
WBC # BLD: 8.51 K/UL — SIGNIFICANT CHANGE UP (ref 3.8–10.5)
WBC # BLD: 8.54 K/UL — SIGNIFICANT CHANGE UP (ref 3.8–10.5)
WBC # BLD: 8.55 K/UL — SIGNIFICANT CHANGE UP (ref 3.8–10.5)
WBC # BLD: 8.73 K/UL — SIGNIFICANT CHANGE UP (ref 3.8–10.5)
WBC # BLD: 9.21 K/UL — SIGNIFICANT CHANGE UP (ref 3.8–10.5)
WBC # BLD: 9.36 K/UL — SIGNIFICANT CHANGE UP (ref 3.8–10.5)
WBC # BLD: 9.52 K/UL — SIGNIFICANT CHANGE UP (ref 3.8–10.5)
WBC # BLD: 9.58 K/UL — SIGNIFICANT CHANGE UP (ref 3.8–10.5)
WBC # BLD: 9.59 K/UL — SIGNIFICANT CHANGE UP (ref 3.8–10.5)
WBC # BLD: 9.98 K/UL — SIGNIFICANT CHANGE UP (ref 3.8–10.5)
WBC # FLD AUTO: 10.02 K/UL — SIGNIFICANT CHANGE UP (ref 3.8–10.5)
WBC # FLD AUTO: 10.08 K/UL — SIGNIFICANT CHANGE UP (ref 3.8–10.5)
WBC # FLD AUTO: 10.21 K/UL — SIGNIFICANT CHANGE UP (ref 3.8–10.5)
WBC # FLD AUTO: 10.28 K/UL — SIGNIFICANT CHANGE UP (ref 3.8–10.5)
WBC # FLD AUTO: 10.33 K/UL — SIGNIFICANT CHANGE UP (ref 3.8–10.5)
WBC # FLD AUTO: 10.82 K/UL — HIGH (ref 3.8–10.5)
WBC # FLD AUTO: 10.83 K/UL — HIGH (ref 3.8–10.5)
WBC # FLD AUTO: 11.25 K/UL — HIGH (ref 3.8–10.5)
WBC # FLD AUTO: 11.83 K/UL — HIGH (ref 3.8–10.5)
WBC # FLD AUTO: 11.84 K/UL — HIGH (ref 3.8–10.5)
WBC # FLD AUTO: 12.03 K/UL — HIGH (ref 3.8–10.5)
WBC # FLD AUTO: 12.42 K/UL — HIGH (ref 3.8–10.5)
WBC # FLD AUTO: 12.88 K/UL — HIGH (ref 3.8–10.5)
WBC # FLD AUTO: 12.98 K/UL — HIGH (ref 3.8–10.5)
WBC # FLD AUTO: 13.25 K/UL — HIGH (ref 3.8–10.5)
WBC # FLD AUTO: 13.88 K/UL — HIGH (ref 3.8–10.5)
WBC # FLD AUTO: 14.23 K/UL — HIGH (ref 3.8–10.5)
WBC # FLD AUTO: 14.91 K/UL — HIGH (ref 3.8–10.5)
WBC # FLD AUTO: 15.01 K/UL — HIGH (ref 3.8–10.5)
WBC # FLD AUTO: 15.18 K/UL — HIGH (ref 3.8–10.5)
WBC # FLD AUTO: 15.49 K/UL — HIGH (ref 3.8–10.5)
WBC # FLD AUTO: 16.6 K/UL — HIGH (ref 3.8–10.5)
WBC # FLD AUTO: 17.62 K/UL — HIGH (ref 3.8–10.5)
WBC # FLD AUTO: 18.62 K/UL — HIGH (ref 3.8–10.5)
WBC # FLD AUTO: 18.67 K/UL — HIGH (ref 3.8–10.5)
WBC # FLD AUTO: 20.71 K/UL — HIGH (ref 3.8–10.5)
WBC # FLD AUTO: 21.48 K/UL — HIGH (ref 3.8–10.5)
WBC # FLD AUTO: 22.64 K/UL — HIGH (ref 3.8–10.5)
WBC # FLD AUTO: 22.81 K/UL — HIGH (ref 3.8–10.5)
WBC # FLD AUTO: 24.53 K/UL — HIGH (ref 3.8–10.5)
WBC # FLD AUTO: 25 K/UL — HIGH (ref 3.8–10.5)
WBC # FLD AUTO: 25.83 K/UL — HIGH (ref 3.8–10.5)
WBC # FLD AUTO: 5.97 K/UL — SIGNIFICANT CHANGE UP (ref 3.8–10.5)
WBC # FLD AUTO: 6.76 K/UL — SIGNIFICANT CHANGE UP (ref 3.8–10.5)
WBC # FLD AUTO: 6.85 K/UL — SIGNIFICANT CHANGE UP (ref 3.8–10.5)
WBC # FLD AUTO: 6.95 K/UL — SIGNIFICANT CHANGE UP (ref 3.8–10.5)
WBC # FLD AUTO: 7.74 K/UL — SIGNIFICANT CHANGE UP (ref 3.8–10.5)
WBC # FLD AUTO: 7.82 K/UL — SIGNIFICANT CHANGE UP (ref 3.8–10.5)
WBC # FLD AUTO: 8.21 K/UL — SIGNIFICANT CHANGE UP (ref 3.8–10.5)
WBC # FLD AUTO: 8.51 K/UL — SIGNIFICANT CHANGE UP (ref 3.8–10.5)
WBC # FLD AUTO: 8.54 K/UL — SIGNIFICANT CHANGE UP (ref 3.8–10.5)
WBC # FLD AUTO: 8.55 K/UL — SIGNIFICANT CHANGE UP (ref 3.8–10.5)
WBC # FLD AUTO: 8.73 K/UL — SIGNIFICANT CHANGE UP (ref 3.8–10.5)
WBC # FLD AUTO: 9.21 K/UL — SIGNIFICANT CHANGE UP (ref 3.8–10.5)
WBC # FLD AUTO: 9.36 K/UL — SIGNIFICANT CHANGE UP (ref 3.8–10.5)
WBC # FLD AUTO: 9.52 K/UL — SIGNIFICANT CHANGE UP (ref 3.8–10.5)
WBC # FLD AUTO: 9.58 K/UL — SIGNIFICANT CHANGE UP (ref 3.8–10.5)
WBC # FLD AUTO: 9.59 K/UL — SIGNIFICANT CHANGE UP (ref 3.8–10.5)
WBC # FLD AUTO: 9.98 K/UL — SIGNIFICANT CHANGE UP (ref 3.8–10.5)

## 2020-01-01 PROCEDURE — 71045 X-RAY EXAM CHEST 1 VIEW: CPT | Mod: 26

## 2020-01-01 PROCEDURE — 84100 ASSAY OF PHOSPHORUS: CPT

## 2020-01-01 PROCEDURE — P9016: CPT

## 2020-01-01 PROCEDURE — 94760 N-INVAS EAR/PLS OXIMETRY 1: CPT

## 2020-01-01 PROCEDURE — 99233 SBSQ HOSP IP/OBS HIGH 50: CPT

## 2020-01-01 PROCEDURE — 99291 CRITICAL CARE FIRST HOUR: CPT

## 2020-01-01 PROCEDURE — 31500 INSERT EMERGENCY AIRWAY: CPT

## 2020-01-01 PROCEDURE — 85730 THROMBOPLASTIN TIME PARTIAL: CPT

## 2020-01-01 PROCEDURE — 36430 TRANSFUSION BLD/BLD COMPNT: CPT

## 2020-01-01 PROCEDURE — 99497 ADVNCD CARE PLAN 30 MIN: CPT | Mod: 25

## 2020-01-01 PROCEDURE — 99283 EMERGENCY DEPT VISIT LOW MDM: CPT

## 2020-01-01 PROCEDURE — 99232 SBSQ HOSP IP/OBS MODERATE 35: CPT

## 2020-01-01 PROCEDURE — 86360 T CELL ABSOLUTE COUNT/RATIO: CPT

## 2020-01-01 PROCEDURE — 82803 BLOOD GASES ANY COMBINATION: CPT

## 2020-01-01 PROCEDURE — 99053 MED SERV 10PM-8AM 24 HR FAC: CPT

## 2020-01-01 PROCEDURE — 87631 RESP VIRUS 3-5 TARGETS: CPT

## 2020-01-01 PROCEDURE — 71045 X-RAY EXAM CHEST 1 VIEW: CPT | Mod: 26,77

## 2020-01-01 PROCEDURE — 84484 ASSAY OF TROPONIN QUANT: CPT

## 2020-01-01 PROCEDURE — 84478 ASSAY OF TRIGLYCERIDES: CPT

## 2020-01-01 PROCEDURE — 94002 VENT MGMT INPAT INIT DAY: CPT

## 2020-01-01 PROCEDURE — 80048 BASIC METABOLIC PNL TOTAL CA: CPT

## 2020-01-01 PROCEDURE — 87040 BLOOD CULTURE FOR BACTERIA: CPT

## 2020-01-01 PROCEDURE — 86923 COMPATIBILITY TEST ELECTRIC: CPT

## 2020-01-01 PROCEDURE — 84145 PROCALCITONIN (PCT): CPT

## 2020-01-01 PROCEDURE — 85379 FIBRIN DEGRADATION QUANT: CPT

## 2020-01-01 PROCEDURE — 81001 URINALYSIS AUTO W/SCOPE: CPT

## 2020-01-01 PROCEDURE — 85652 RBC SED RATE AUTOMATED: CPT

## 2020-01-01 PROCEDURE — 85027 COMPLETE CBC AUTOMATED: CPT

## 2020-01-01 PROCEDURE — 31600 PLANNED TRACHEOSTOMY: CPT

## 2020-01-01 PROCEDURE — 93970 EXTREMITY STUDY: CPT

## 2020-01-01 PROCEDURE — 83735 ASSAY OF MAGNESIUM: CPT

## 2020-01-01 PROCEDURE — 99253 IP/OBS CNSLTJ NEW/EST LOW 45: CPT

## 2020-01-01 PROCEDURE — 93010 ELECTROCARDIOGRAM REPORT: CPT

## 2020-01-01 PROCEDURE — 93308 TTE F-UP OR LMTD: CPT | Mod: 26

## 2020-01-01 PROCEDURE — 71045 X-RAY EXAM CHEST 1 VIEW: CPT

## 2020-01-01 PROCEDURE — 80053 COMPREHEN METABOLIC PANEL: CPT

## 2020-01-01 PROCEDURE — 82040 ASSAY OF SERUM ALBUMIN: CPT

## 2020-01-01 PROCEDURE — 87581 M.PNEUMON DNA AMP PROBE: CPT

## 2020-01-01 PROCEDURE — 74018 RADEX ABDOMEN 1 VIEW: CPT | Mod: 26

## 2020-01-01 PROCEDURE — 94003 VENT MGMT INPAT SUBQ DAY: CPT

## 2020-01-01 PROCEDURE — 87798 DETECT AGENT NOS DNA AMP: CPT

## 2020-01-01 PROCEDURE — 36415 COLL VENOUS BLD VENIPUNCTURE: CPT

## 2020-01-01 PROCEDURE — 86140 C-REACTIVE PROTEIN: CPT

## 2020-01-01 PROCEDURE — L8699: CPT

## 2020-01-01 PROCEDURE — 83615 LACTATE (LD) (LDH) ENZYME: CPT

## 2020-01-01 PROCEDURE — 76937 US GUIDE VASCULAR ACCESS: CPT | Mod: 26

## 2020-01-01 PROCEDURE — 32551 INSERTION OF CHEST TUBE: CPT

## 2020-01-01 PROCEDURE — 86803 HEPATITIS C AB TEST: CPT

## 2020-01-01 PROCEDURE — 93308 TTE F-UP OR LMTD: CPT

## 2020-01-01 PROCEDURE — 82962 GLUCOSE BLOOD TEST: CPT

## 2020-01-01 PROCEDURE — 36620 INSERTION CATHETER ARTERY: CPT

## 2020-01-01 PROCEDURE — 87086 URINE CULTURE/COLONY COUNT: CPT

## 2020-01-01 PROCEDURE — 36556 INSERT NON-TUNNEL CV CATH: CPT

## 2020-01-01 PROCEDURE — 86850 RBC ANTIBODY SCREEN: CPT

## 2020-01-01 PROCEDURE — 87070 CULTURE OTHR SPECIMN AEROBIC: CPT

## 2020-01-01 PROCEDURE — 99291 CRITICAL CARE FIRST HOUR: CPT | Mod: 25

## 2020-01-01 PROCEDURE — 82728 ASSAY OF FERRITIN: CPT

## 2020-01-01 PROCEDURE — 82553 CREATINE MB FRACTION: CPT

## 2020-01-01 PROCEDURE — 87389 HIV-1 AG W/HIV-1&-2 AB AG IA: CPT

## 2020-01-01 PROCEDURE — 99285 EMERGENCY DEPT VISIT HI MDM: CPT | Mod: 25

## 2020-01-01 PROCEDURE — 80202 ASSAY OF VANCOMYCIN: CPT

## 2020-01-01 PROCEDURE — 86900 BLOOD TYPING SEROLOGIC ABO: CPT

## 2020-01-01 PROCEDURE — 85610 PROTHROMBIN TIME: CPT

## 2020-01-01 PROCEDURE — 87486 CHLMYD PNEUM DNA AMP PROBE: CPT

## 2020-01-01 PROCEDURE — 74018 RADEX ABDOMEN 1 VIEW: CPT

## 2020-01-01 PROCEDURE — 99285 EMERGENCY DEPT VISIT HI MDM: CPT

## 2020-01-01 PROCEDURE — 86901 BLOOD TYPING SEROLOGIC RH(D): CPT

## 2020-01-01 PROCEDURE — 36600 WITHDRAWAL OF ARTERIAL BLOOD: CPT

## 2020-01-01 PROCEDURE — 85025 COMPLETE CBC W/AUTO DIFF WBC: CPT

## 2020-01-01 PROCEDURE — 87633 RESP VIRUS 12-25 TARGETS: CPT

## 2020-01-01 PROCEDURE — U0001: CPT

## 2020-01-01 PROCEDURE — C9113: CPT

## 2020-01-01 PROCEDURE — 87184 SC STD DISK METHOD PER PLATE: CPT

## 2020-01-01 PROCEDURE — 93005 ELECTROCARDIOGRAM TRACING: CPT

## 2020-01-01 PROCEDURE — 71045 X-RAY EXAM CHEST 1 VIEW: CPT | Mod: 26,76

## 2020-01-01 PROCEDURE — 87186 SC STD MICRODIL/AGAR DIL: CPT

## 2020-01-01 PROCEDURE — 87635 SARS-COV-2 COVID-19 AMP PRB: CPT

## 2020-01-01 PROCEDURE — 93970 EXTREMITY STUDY: CPT | Mod: 26

## 2020-01-01 PROCEDURE — 99255 IP/OBS CONSLTJ NEW/EST HI 80: CPT

## 2020-01-01 PROCEDURE — 82550 ASSAY OF CK (CPK): CPT

## 2020-01-01 RX ORDER — FENTANYL CITRATE 50 UG/ML
0.5 INJECTION INTRAVENOUS
Qty: 2500 | Refills: 0 | Status: DISCONTINUED | OUTPATIENT
Start: 2020-01-01 | End: 2020-01-01

## 2020-01-01 RX ORDER — ASCORBIC ACID 60 MG
1500 TABLET,CHEWABLE ORAL EVERY 6 HOURS
Refills: 0 | Status: DISCONTINUED | OUTPATIENT
Start: 2020-01-01 | End: 2020-01-01

## 2020-01-01 RX ORDER — AZITHROMYCIN 500 MG/1
500 TABLET, FILM COATED ORAL EVERY 24 HOURS
Refills: 0 | Status: DISCONTINUED | OUTPATIENT
Start: 2020-01-01 | End: 2020-01-01

## 2020-01-01 RX ORDER — MIDAZOLAM HYDROCHLORIDE 1 MG/ML
4 INJECTION, SOLUTION INTRAMUSCULAR; INTRAVENOUS ONCE
Refills: 0 | Status: DISCONTINUED | OUTPATIENT
Start: 2020-01-01 | End: 2020-01-01

## 2020-01-01 RX ORDER — POTASSIUM PHOSPHATE, MONOBASIC POTASSIUM PHOSPHATE, DIBASIC 236; 224 MG/ML; MG/ML
15 INJECTION, SOLUTION INTRAVENOUS
Refills: 0 | Status: DISCONTINUED | OUTPATIENT
Start: 2020-01-01 | End: 2020-01-01

## 2020-01-01 RX ORDER — NOREPINEPHRINE BITARTRATE/D5W 8 MG/250ML
0.05 PLASTIC BAG, INJECTION (ML) INTRAVENOUS
Qty: 16 | Refills: 0 | Status: DISCONTINUED | OUTPATIENT
Start: 2020-01-01 | End: 2020-01-01

## 2020-01-01 RX ORDER — DEXTROSE 50 % IN WATER 50 %
25 SYRINGE (ML) INTRAVENOUS ONCE
Refills: 0 | Status: DISCONTINUED | OUTPATIENT
Start: 2020-01-01 | End: 2020-01-01

## 2020-01-01 RX ORDER — CHLORHEXIDINE GLUCONATE 213 G/1000ML
15 SOLUTION TOPICAL EVERY 12 HOURS
Refills: 0 | Status: DISCONTINUED | OUTPATIENT
Start: 2020-01-01 | End: 2020-01-01

## 2020-01-01 RX ORDER — FENTANYL CITRATE 50 UG/ML
50 INJECTION INTRAVENOUS EVERY 4 HOURS
Refills: 0 | Status: DISCONTINUED | OUTPATIENT
Start: 2020-01-01 | End: 2020-01-01

## 2020-01-01 RX ORDER — FAMOTIDINE 10 MG/ML
20 INJECTION INTRAVENOUS
Refills: 0 | Status: DISCONTINUED | OUTPATIENT
Start: 2020-01-01 | End: 2020-01-01

## 2020-01-01 RX ORDER — PROPOFOL 10 MG/ML
16.19 INJECTION, EMULSION INTRAVENOUS
Qty: 1000 | Refills: 0 | Status: DISCONTINUED | OUTPATIENT
Start: 2020-01-01 | End: 2020-01-01

## 2020-01-01 RX ORDER — HEPARIN SODIUM 5000 [USP'U]/ML
INJECTION INTRAVENOUS; SUBCUTANEOUS
Qty: 25000 | Refills: 0 | Status: DISCONTINUED | OUTPATIENT
Start: 2020-01-01 | End: 2020-01-01

## 2020-01-01 RX ORDER — ACETAMINOPHEN 500 MG
650 TABLET ORAL EVERY 6 HOURS
Refills: 0 | Status: DISCONTINUED | OUTPATIENT
Start: 2020-01-01 | End: 2020-01-01

## 2020-01-01 RX ORDER — SODIUM CHLORIDE 9 MG/ML
1000 INJECTION, SOLUTION INTRAVENOUS
Refills: 0 | Status: DISCONTINUED | OUTPATIENT
Start: 2020-01-01 | End: 2020-01-01

## 2020-01-01 RX ORDER — POTASSIUM CHLORIDE 20 MEQ
40 PACKET (EA) ORAL
Refills: 0 | Status: COMPLETED | OUTPATIENT
Start: 2020-01-01 | End: 2020-01-01

## 2020-01-01 RX ORDER — DEXTROSE 50 % IN WATER 50 %
15 SYRINGE (ML) INTRAVENOUS ONCE
Refills: 0 | Status: DISCONTINUED | OUTPATIENT
Start: 2020-01-01 | End: 2020-01-01

## 2020-01-01 RX ORDER — SODIUM CHLORIDE 9 MG/ML
500 INJECTION, SOLUTION INTRAVENOUS ONCE
Refills: 0 | Status: COMPLETED | OUTPATIENT
Start: 2020-01-01 | End: 2020-01-01

## 2020-01-01 RX ORDER — POTASSIUM CHLORIDE 20 MEQ
40 PACKET (EA) ORAL ONCE
Refills: 0 | Status: COMPLETED | OUTPATIENT
Start: 2020-01-01 | End: 2020-01-01

## 2020-01-01 RX ORDER — LEVOTHYROXINE SODIUM 125 MCG
75 TABLET ORAL DAILY
Refills: 0 | Status: DISCONTINUED | OUTPATIENT
Start: 2020-01-01 | End: 2020-01-01

## 2020-01-01 RX ORDER — POTASSIUM PHOSPHATE, MONOBASIC POTASSIUM PHOSPHATE, DIBASIC 236; 224 MG/ML; MG/ML
15 INJECTION, SOLUTION INTRAVENOUS ONCE
Refills: 0 | Status: COMPLETED | OUTPATIENT
Start: 2020-01-01 | End: 2020-01-01

## 2020-01-01 RX ORDER — PHENYLEPHRINE HYDROCHLORIDE 10 MG/ML
0.4 INJECTION INTRAVENOUS
Qty: 40 | Refills: 0 | Status: DISCONTINUED | OUTPATIENT
Start: 2020-01-01 | End: 2020-01-01

## 2020-01-01 RX ORDER — CISATRACURIUM BESYLATE 2 MG/ML
3 INJECTION INTRAVENOUS
Qty: 200 | Refills: 0 | Status: DISCONTINUED | OUTPATIENT
Start: 2020-01-01 | End: 2020-01-01

## 2020-01-01 RX ORDER — FENTANYL CITRATE 50 UG/ML
50 INJECTION INTRAVENOUS ONCE
Refills: 0 | Status: DISCONTINUED | OUTPATIENT
Start: 2020-01-01 | End: 2020-01-01

## 2020-01-01 RX ORDER — HEPARIN SODIUM 5000 [USP'U]/ML
6500 INJECTION INTRAVENOUS; SUBCUTANEOUS EVERY 6 HOURS
Refills: 0 | Status: DISCONTINUED | OUTPATIENT
Start: 2020-01-01 | End: 2020-01-01

## 2020-01-01 RX ORDER — ATORVASTATIN CALCIUM 80 MG/1
20 TABLET, FILM COATED ORAL AT BEDTIME
Refills: 0 | Status: DISCONTINUED | OUTPATIENT
Start: 2020-01-01 | End: 2020-01-01

## 2020-01-01 RX ORDER — FENTANYL CITRATE 50 UG/ML
1 INJECTION INTRAVENOUS
Qty: 5000 | Refills: 0 | Status: DISCONTINUED | OUTPATIENT
Start: 2020-01-01 | End: 2020-01-01

## 2020-01-01 RX ORDER — MIDAZOLAM HYDROCHLORIDE 1 MG/ML
0.02 INJECTION, SOLUTION INTRAMUSCULAR; INTRAVENOUS
Qty: 100 | Refills: 0 | Status: DISCONTINUED | OUTPATIENT
Start: 2020-01-01 | End: 2020-01-01

## 2020-01-01 RX ORDER — FENTANYL CITRATE 50 UG/ML
0.5 INJECTION INTRAVENOUS
Qty: 5000 | Refills: 0 | Status: DISCONTINUED | OUTPATIENT
Start: 2020-01-01 | End: 2020-01-01

## 2020-01-01 RX ORDER — VANCOMYCIN HCL 1 G
500 VIAL (EA) INTRAVENOUS EVERY 12 HOURS
Refills: 0 | Status: DISCONTINUED | OUTPATIENT
Start: 2020-01-01 | End: 2020-01-01

## 2020-01-01 RX ORDER — CHLORHEXIDINE GLUCONATE 213 G/1000ML
1 SOLUTION TOPICAL
Refills: 0 | Status: DISCONTINUED | OUTPATIENT
Start: 2020-01-01 | End: 2020-01-01

## 2020-01-01 RX ORDER — NOREPINEPHRINE BITARTRATE/D5W 8 MG/250ML
0.05 PLASTIC BAG, INJECTION (ML) INTRAVENOUS
Qty: 8 | Refills: 0 | Status: DISCONTINUED | OUTPATIENT
Start: 2020-01-01 | End: 2020-01-01

## 2020-01-01 RX ORDER — FAMOTIDINE 10 MG/ML
20 INJECTION INTRAVENOUS DAILY
Refills: 0 | Status: DISCONTINUED | OUTPATIENT
Start: 2020-01-01 | End: 2020-01-01

## 2020-01-01 RX ORDER — DEXTROSE 50 % IN WATER 50 %
12.5 SYRINGE (ML) INTRAVENOUS ONCE
Refills: 0 | Status: DISCONTINUED | OUTPATIENT
Start: 2020-01-01 | End: 2020-01-01

## 2020-01-01 RX ORDER — SIMVASTATIN 20 MG/1
1 TABLET, FILM COATED ORAL
Qty: 0 | Refills: 0 | DISCHARGE

## 2020-01-01 RX ORDER — SENNA PLUS 8.6 MG/1
2 TABLET ORAL AT BEDTIME
Refills: 0 | Status: DISCONTINUED | OUTPATIENT
Start: 2020-01-01 | End: 2020-01-01

## 2020-01-01 RX ORDER — BACLOFEN 100 %
1 POWDER (GRAM) MISCELLANEOUS
Qty: 0 | Refills: 0 | DISCHARGE

## 2020-01-01 RX ORDER — HEPARIN SODIUM 5000 [USP'U]/ML
6500 INJECTION INTRAVENOUS; SUBCUTANEOUS ONCE
Refills: 0 | Status: DISCONTINUED | OUTPATIENT
Start: 2020-01-01 | End: 2020-01-01

## 2020-01-01 RX ORDER — PHENYLEPHRINE HYDROCHLORIDE 10 MG/ML
1 INJECTION INTRAVENOUS
Qty: 40 | Refills: 0 | Status: DISCONTINUED | OUTPATIENT
Start: 2020-01-01 | End: 2020-01-01

## 2020-01-01 RX ORDER — BACLOFEN 100 %
10 POWDER (GRAM) MISCELLANEOUS DAILY
Refills: 0 | Status: DISCONTINUED | OUTPATIENT
Start: 2020-01-01 | End: 2020-01-01

## 2020-01-01 RX ORDER — MAGNESIUM SULFATE 500 MG/ML
1 VIAL (ML) INJECTION ONCE
Refills: 0 | Status: COMPLETED | OUTPATIENT
Start: 2020-01-01 | End: 2020-01-01

## 2020-01-01 RX ORDER — POLYETHYLENE GLYCOL 3350 17 G/17G
17 POWDER, FOR SOLUTION ORAL DAILY
Refills: 0 | Status: DISCONTINUED | OUTPATIENT
Start: 2020-01-01 | End: 2020-01-01

## 2020-01-01 RX ORDER — HYDROXYCHLOROQUINE SULFATE 200 MG
200 TABLET ORAL EVERY 12 HOURS
Refills: 0 | Status: DISCONTINUED | OUTPATIENT
Start: 2020-01-01 | End: 2020-01-01

## 2020-01-01 RX ORDER — SODIUM CHLORIDE 9 MG/ML
1000 INJECTION, SOLUTION INTRAVENOUS
Refills: 0 | Status: COMPLETED | OUTPATIENT
Start: 2020-01-01 | End: 2020-01-01

## 2020-01-01 RX ORDER — POLYMYXIN B SULF/TRIMETHOPRIM 10000-1/ML
1 DROPS OPHTHALMIC (EYE)
Refills: 0 | Status: DISCONTINUED | OUTPATIENT
Start: 2020-01-01 | End: 2020-01-01

## 2020-01-01 RX ORDER — HEPARIN SODIUM 5000 [USP'U]/ML
6000 INJECTION INTRAVENOUS; SUBCUTANEOUS ONCE
Refills: 0 | Status: COMPLETED | OUTPATIENT
Start: 2020-01-01 | End: 2020-01-01

## 2020-01-01 RX ORDER — RANITIDINE HYDROCHLORIDE 150 MG/1
1 TABLET, FILM COATED ORAL
Qty: 0 | Refills: 0 | DISCHARGE

## 2020-01-01 RX ORDER — PROPOFOL 10 MG/ML
30 INJECTION, EMULSION INTRAVENOUS
Qty: 1000 | Refills: 0 | Status: DISCONTINUED | OUTPATIENT
Start: 2020-01-01 | End: 2020-01-01

## 2020-01-01 RX ORDER — HYDROMORPHONE HYDROCHLORIDE 2 MG/ML
1 INJECTION INTRAMUSCULAR; INTRAVENOUS; SUBCUTANEOUS EVERY 4 HOURS
Refills: 0 | Status: DISCONTINUED | OUTPATIENT
Start: 2020-01-01 | End: 2020-01-01

## 2020-01-01 RX ORDER — HEPARIN SODIUM 5000 [USP'U]/ML
6000 INJECTION INTRAVENOUS; SUBCUTANEOUS EVERY 6 HOURS
Refills: 0 | Status: DISCONTINUED | OUTPATIENT
Start: 2020-01-01 | End: 2020-01-01

## 2020-01-01 RX ORDER — HYDROXYCHLOROQUINE SULFATE 200 MG
200 TABLET ORAL
Refills: 0 | Status: DISCONTINUED | OUTPATIENT
Start: 2020-01-01 | End: 2020-01-01

## 2020-01-01 RX ORDER — POTASSIUM CHLORIDE 20 MEQ
10 PACKET (EA) ORAL
Refills: 0 | Status: DISCONTINUED | OUTPATIENT
Start: 2020-01-01 | End: 2020-01-01

## 2020-01-01 RX ORDER — THIAMINE MONONITRATE (VIT B1) 100 MG
500 TABLET ORAL DAILY
Refills: 0 | Status: DISCONTINUED | OUTPATIENT
Start: 2020-01-01 | End: 2020-01-01

## 2020-01-01 RX ORDER — POTASSIUM CHLORIDE 20 MEQ
10 PACKET (EA) ORAL
Refills: 0 | Status: COMPLETED | OUTPATIENT
Start: 2020-01-01 | End: 2020-01-01

## 2020-01-01 RX ORDER — POTASSIUM PHOSPHATE, MONOBASIC POTASSIUM PHOSPHATE, DIBASIC 236; 224 MG/ML; MG/ML
30 INJECTION, SOLUTION INTRAVENOUS ONCE
Refills: 0 | Status: DISCONTINUED | OUTPATIENT
Start: 2020-01-01 | End: 2020-01-01

## 2020-01-01 RX ORDER — PROPOFOL 10 MG/ML
15 INJECTION, EMULSION INTRAVENOUS
Qty: 1000 | Refills: 0 | Status: DISCONTINUED | OUTPATIENT
Start: 2020-01-01 | End: 2020-01-01

## 2020-01-01 RX ORDER — FENTANYL CITRATE 50 UG/ML
50 INJECTION INTRAVENOUS EVERY 6 HOURS
Refills: 0 | Status: DISCONTINUED | OUTPATIENT
Start: 2020-01-01 | End: 2020-01-01

## 2020-01-01 RX ORDER — PHENYLEPHRINE HYDROCHLORIDE 10 MG/ML
0.1 INJECTION INTRAVENOUS
Qty: 160 | Refills: 0 | Status: DISCONTINUED | OUTPATIENT
Start: 2020-01-01 | End: 2020-01-01

## 2020-01-01 RX ORDER — HYDROXYCHLOROQUINE SULFATE 200 MG
TABLET ORAL
Refills: 0 | Status: DISCONTINUED | OUTPATIENT
Start: 2020-01-01 | End: 2020-01-01

## 2020-01-01 RX ORDER — HEPARIN SODIUM 5000 [USP'U]/ML
3000 INJECTION INTRAVENOUS; SUBCUTANEOUS EVERY 6 HOURS
Refills: 0 | Status: DISCONTINUED | OUTPATIENT
Start: 2020-01-01 | End: 2020-01-01

## 2020-01-01 RX ORDER — FUROSEMIDE 40 MG
20 TABLET ORAL ONCE
Refills: 0 | Status: COMPLETED | OUTPATIENT
Start: 2020-01-01 | End: 2020-01-01

## 2020-01-01 RX ORDER — PROPOFOL 10 MG/ML
30 INJECTION, EMULSION INTRAVENOUS
Qty: 500 | Refills: 0 | Status: DISCONTINUED | OUTPATIENT
Start: 2020-01-01 | End: 2020-01-01

## 2020-01-01 RX ORDER — HYDROXYCHLOROQUINE SULFATE 200 MG
400 TABLET ORAL EVERY 12 HOURS
Refills: 0 | Status: COMPLETED | OUTPATIENT
Start: 2020-01-01 | End: 2020-01-01

## 2020-01-01 RX ORDER — PHENYLEPHRINE HYDROCHLORIDE 10 MG/ML
4 INJECTION INTRAVENOUS
Qty: 160 | Refills: 0 | Status: DISCONTINUED | OUTPATIENT
Start: 2020-01-01 | End: 2020-01-01

## 2020-01-01 RX ORDER — PROPOFOL 10 MG/ML
5 INJECTION, EMULSION INTRAVENOUS
Qty: 1000 | Refills: 0 | Status: DISCONTINUED | OUTPATIENT
Start: 2020-01-01 | End: 2020-01-01

## 2020-01-01 RX ORDER — PROPOFOL 10 MG/ML
50 INJECTION, EMULSION INTRAVENOUS ONCE
Refills: 0 | Status: COMPLETED | OUTPATIENT
Start: 2020-01-01 | End: 2020-01-01

## 2020-01-01 RX ORDER — POTASSIUM CHLORIDE 20 MEQ
40 PACKET (EA) ORAL EVERY 4 HOURS
Refills: 0 | Status: COMPLETED | OUTPATIENT
Start: 2020-01-01 | End: 2020-01-01

## 2020-01-01 RX ORDER — METOPROLOL TARTRATE 50 MG
1 TABLET ORAL
Qty: 0 | Refills: 0 | DISCHARGE

## 2020-01-01 RX ORDER — LISINOPRIL 2.5 MG/1
1 TABLET ORAL
Qty: 0 | Refills: 0 | DISCHARGE

## 2020-01-01 RX ORDER — ENOXAPARIN SODIUM 100 MG/ML
70 INJECTION SUBCUTANEOUS
Refills: 0 | Status: DISCONTINUED | OUTPATIENT
Start: 2020-01-01 | End: 2020-01-01

## 2020-01-01 RX ORDER — SODIUM CHLORIDE 9 MG/ML
250 INJECTION INTRAMUSCULAR; INTRAVENOUS; SUBCUTANEOUS ONCE
Refills: 0 | Status: COMPLETED | OUTPATIENT
Start: 2020-01-01 | End: 2020-01-01

## 2020-01-01 RX ORDER — POTASSIUM CHLORIDE 20 MEQ
40 PACKET (EA) ORAL ONCE
Refills: 0 | Status: DISCONTINUED | OUTPATIENT
Start: 2020-01-01 | End: 2020-01-01

## 2020-01-01 RX ORDER — PANTOPRAZOLE SODIUM 20 MG/1
40 TABLET, DELAYED RELEASE ORAL DAILY
Refills: 0 | Status: DISCONTINUED | OUTPATIENT
Start: 2020-01-01 | End: 2020-01-01

## 2020-01-01 RX ORDER — CEFTRIAXONE 500 MG/1
1000 INJECTION, POWDER, FOR SOLUTION INTRAMUSCULAR; INTRAVENOUS EVERY 24 HOURS
Refills: 0 | Status: DISCONTINUED | OUTPATIENT
Start: 2020-01-01 | End: 2020-01-01

## 2020-01-01 RX ORDER — POTASSIUM CHLORIDE 20 MEQ
10 PACKET (EA) ORAL ONCE
Refills: 0 | Status: COMPLETED | OUTPATIENT
Start: 2020-01-01 | End: 2020-01-01

## 2020-01-01 RX ORDER — FENTANYL CITRATE 50 UG/ML
3 INJECTION INTRAVENOUS
Qty: 2500 | Refills: 0 | Status: DISCONTINUED | OUTPATIENT
Start: 2020-01-01 | End: 2020-01-01

## 2020-01-01 RX ORDER — GLUCAGON INJECTION, SOLUTION 0.5 MG/.1ML
1 INJECTION, SOLUTION SUBCUTANEOUS ONCE
Refills: 0 | Status: DISCONTINUED | OUTPATIENT
Start: 2020-01-01 | End: 2020-01-01

## 2020-01-01 RX ORDER — ENOXAPARIN SODIUM 100 MG/ML
70 INJECTION SUBCUTANEOUS EVERY 12 HOURS
Refills: 0 | Status: DISCONTINUED | OUTPATIENT
Start: 2020-01-01 | End: 2020-01-01

## 2020-01-01 RX ORDER — ASPIRIN/CALCIUM CARB/MAGNESIUM 324 MG
81 TABLET ORAL DAILY
Refills: 0 | Status: DISCONTINUED | OUTPATIENT
Start: 2020-01-01 | End: 2020-01-01

## 2020-01-01 RX ORDER — VANCOMYCIN HCL 1 G
750 VIAL (EA) INTRAVENOUS EVERY 12 HOURS
Refills: 0 | Status: DISCONTINUED | OUTPATIENT
Start: 2020-01-01 | End: 2020-01-01

## 2020-01-01 RX ORDER — THIAMINE MONONITRATE (VIT B1) 100 MG
200 TABLET ORAL DAILY
Refills: 0 | Status: DISCONTINUED | OUTPATIENT
Start: 2020-01-01 | End: 2020-01-01

## 2020-01-01 RX ORDER — FUROSEMIDE 40 MG
40 TABLET ORAL ONCE
Refills: 0 | Status: COMPLETED | OUTPATIENT
Start: 2020-01-01 | End: 2020-01-01

## 2020-01-01 RX ORDER — PHENYLEPHRINE HYDROCHLORIDE 10 MG/ML
5 INJECTION INTRAVENOUS
Qty: 40 | Refills: 0 | Status: DISCONTINUED | OUTPATIENT
Start: 2020-01-01 | End: 2020-01-01

## 2020-01-01 RX ORDER — AZITHROMYCIN 500 MG/1
250 TABLET, FILM COATED ORAL DAILY
Refills: 0 | Status: DISCONTINUED | OUTPATIENT
Start: 2020-01-01 | End: 2020-01-01

## 2020-01-01 RX ORDER — TAMSULOSIN HYDROCHLORIDE 0.4 MG/1
0.4 CAPSULE ORAL AT BEDTIME
Refills: 0 | Status: DISCONTINUED | OUTPATIENT
Start: 2020-01-01 | End: 2020-01-01

## 2020-01-01 RX ORDER — PROPOFOL 10 MG/ML
50 INJECTION, EMULSION INTRAVENOUS
Qty: 1000 | Refills: 0 | Status: DISCONTINUED | OUTPATIENT
Start: 2020-01-01 | End: 2020-01-01

## 2020-01-01 RX ORDER — CHLORHEXIDINE GLUCONATE 213 G/1000ML
15 SOLUTION TOPICAL
Refills: 0 | Status: DISCONTINUED | OUTPATIENT
Start: 2020-01-01 | End: 2020-01-01

## 2020-01-01 RX ORDER — PROPOFOL 10 MG/ML
20 INJECTION, EMULSION INTRAVENOUS
Qty: 500 | Refills: 0 | Status: DISCONTINUED | OUTPATIENT
Start: 2020-01-01 | End: 2020-01-01

## 2020-01-01 RX ORDER — MEROPENEM 1 G/30ML
1000 INJECTION INTRAVENOUS EVERY 8 HOURS
Refills: 0 | Status: DISCONTINUED | OUTPATIENT
Start: 2020-01-01 | End: 2020-01-01

## 2020-01-01 RX ORDER — PHENYLEPHRINE HYDROCHLORIDE 10 MG/ML
3 INJECTION INTRAVENOUS
Qty: 160 | Refills: 0 | Status: DISCONTINUED | OUTPATIENT
Start: 2020-01-01 | End: 2020-01-01

## 2020-01-01 RX ORDER — AZITHROMYCIN 500 MG/1
500 TABLET, FILM COATED ORAL EVERY 24 HOURS
Refills: 0 | Status: COMPLETED | OUTPATIENT
Start: 2020-01-01 | End: 2020-01-01

## 2020-01-01 RX ORDER — ASPIRIN/CALCIUM CARB/MAGNESIUM 324 MG
1 TABLET ORAL
Qty: 0 | Refills: 0 | DISCHARGE

## 2020-01-01 RX ORDER — FENTANYL CITRATE 50 UG/ML
2 INJECTION INTRAVENOUS
Qty: 2500 | Refills: 0 | Status: DISCONTINUED | OUTPATIENT
Start: 2020-01-01 | End: 2020-01-01

## 2020-01-01 RX ORDER — HYDROMORPHONE HYDROCHLORIDE 2 MG/ML
2 INJECTION INTRAMUSCULAR; INTRAVENOUS; SUBCUTANEOUS EVERY 6 HOURS
Refills: 0 | Status: DISCONTINUED | OUTPATIENT
Start: 2020-01-01 | End: 2020-01-01

## 2020-01-01 RX ORDER — SODIUM,POTASSIUM PHOSPHATES 278-250MG
1 POWDER IN PACKET (EA) ORAL ONCE
Refills: 0 | Status: DISCONTINUED | OUTPATIENT
Start: 2020-01-01 | End: 2020-01-01

## 2020-01-01 RX ORDER — ENOXAPARIN SODIUM 100 MG/ML
75 INJECTION SUBCUTANEOUS EVERY 12 HOURS
Refills: 0 | Status: DISCONTINUED | OUTPATIENT
Start: 2020-01-01 | End: 2020-01-01

## 2020-01-01 RX ORDER — QUETIAPINE FUMARATE 200 MG/1
50 TABLET, FILM COATED ORAL
Refills: 0 | Status: DISCONTINUED | OUTPATIENT
Start: 2020-01-01 | End: 2020-01-01

## 2020-01-01 RX ORDER — POTASSIUM PHOSPHATE, MONOBASIC POTASSIUM PHOSPHATE, DIBASIC 236; 224 MG/ML; MG/ML
15 INJECTION, SOLUTION INTRAVENOUS ONCE
Refills: 0 | Status: DISCONTINUED | OUTPATIENT
Start: 2020-01-01 | End: 2020-01-01

## 2020-01-01 RX ORDER — FINASTERIDE 5 MG/1
1 TABLET, FILM COATED ORAL
Qty: 0 | Refills: 0 | DISCHARGE

## 2020-01-01 RX ORDER — INSULIN LISPRO 100/ML
VIAL (ML) SUBCUTANEOUS EVERY 6 HOURS
Refills: 0 | Status: DISCONTINUED | OUTPATIENT
Start: 2020-01-01 | End: 2020-01-01

## 2020-01-01 RX ORDER — VASOPRESSIN 20 [USP'U]/ML
0.04 INJECTION INTRAVENOUS
Qty: 50 | Refills: 0 | Status: DISCONTINUED | OUTPATIENT
Start: 2020-01-01 | End: 2020-01-01

## 2020-01-01 RX ORDER — ENOXAPARIN SODIUM 100 MG/ML
80 INJECTION SUBCUTANEOUS EVERY 12 HOURS
Refills: 0 | Status: DISCONTINUED | OUTPATIENT
Start: 2020-01-01 | End: 2020-01-01

## 2020-01-01 RX ORDER — ACETAMINOPHEN 500 MG
1000 TABLET ORAL ONCE
Refills: 0 | Status: COMPLETED | OUTPATIENT
Start: 2020-01-01 | End: 2020-01-01

## 2020-01-01 RX ORDER — DEXMEDETOMIDINE HYDROCHLORIDE IN 0.9% SODIUM CHLORIDE 4 UG/ML
0.4 INJECTION INTRAVENOUS
Qty: 200 | Refills: 0 | Status: DISCONTINUED | OUTPATIENT
Start: 2020-01-01 | End: 2020-01-01

## 2020-01-01 RX ORDER — INSULIN LISPRO 100/ML
VIAL (ML) SUBCUTANEOUS
Refills: 0 | Status: DISCONTINUED | OUTPATIENT
Start: 2020-01-01 | End: 2020-01-01

## 2020-01-01 RX ORDER — ENOXAPARIN SODIUM 100 MG/ML
40 INJECTION SUBCUTANEOUS DAILY
Refills: 0 | Status: DISCONTINUED | OUTPATIENT
Start: 2020-01-01 | End: 2020-01-01

## 2020-01-01 RX ORDER — HYDROXYCHLOROQUINE SULFATE 200 MG
TABLET ORAL
Refills: 0 | Status: COMPLETED | OUTPATIENT
Start: 2020-01-01 | End: 2020-01-01

## 2020-01-01 RX ORDER — MIDODRINE HYDROCHLORIDE 2.5 MG/1
15 TABLET ORAL EVERY 8 HOURS
Refills: 0 | Status: DISCONTINUED | OUTPATIENT
Start: 2020-01-01 | End: 2020-01-01

## 2020-01-01 RX ORDER — HEPARIN SODIUM 5000 [USP'U]/ML
6500 INJECTION INTRAVENOUS; SUBCUTANEOUS ONCE
Refills: 0 | Status: COMPLETED | OUTPATIENT
Start: 2020-01-01 | End: 2020-01-01

## 2020-01-01 RX ORDER — COLLAGENASE CLOSTRIDIUM HIST. 250 UNIT/G
1 OINTMENT (GRAM) TOPICAL
Refills: 0 | Status: DISCONTINUED | OUTPATIENT
Start: 2020-01-01 | End: 2020-01-01

## 2020-01-01 RX ORDER — LEVOTHYROXINE SODIUM 125 MCG
1 TABLET ORAL
Qty: 0 | Refills: 0 | DISCHARGE

## 2020-01-01 RX ORDER — TAMSULOSIN HYDROCHLORIDE 0.4 MG/1
1 CAPSULE ORAL
Qty: 0 | Refills: 0 | DISCHARGE

## 2020-01-01 RX ORDER — MIDAZOLAM HYDROCHLORIDE 1 MG/ML
0.07 INJECTION, SOLUTION INTRAMUSCULAR; INTRAVENOUS
Qty: 100 | Refills: 0 | Status: DISCONTINUED | OUTPATIENT
Start: 2020-01-01 | End: 2020-01-01

## 2020-01-01 RX ORDER — INFLUENZA VIRUS VACCINE 15; 15; 15; 15 UG/.5ML; UG/.5ML; UG/.5ML; UG/.5ML
0.5 SUSPENSION INTRAMUSCULAR ONCE
Refills: 0 | Status: DISCONTINUED | OUTPATIENT
Start: 2020-01-01 | End: 2020-01-01

## 2020-01-01 RX ORDER — ASCORBIC ACID 60 MG
1500 TABLET,CHEWABLE ORAL
Refills: 0 | Status: DISCONTINUED | OUTPATIENT
Start: 2020-01-01 | End: 2020-01-01

## 2020-01-01 RX ORDER — SODIUM,POTASSIUM PHOSPHATES 278-250MG
1 POWDER IN PACKET (EA) ORAL
Refills: 0 | Status: COMPLETED | OUTPATIENT
Start: 2020-01-01 | End: 2020-01-01

## 2020-01-01 RX ORDER — FUROSEMIDE 40 MG
20 TABLET ORAL ONCE
Refills: 0 | Status: DISCONTINUED | OUTPATIENT
Start: 2020-01-01 | End: 2020-01-01

## 2020-01-01 RX ADMIN — MEROPENEM 100 MILLIGRAM(S): 1 INJECTION INTRAVENOUS at 06:19

## 2020-01-01 RX ADMIN — ENOXAPARIN SODIUM 80 MILLIGRAM(S): 100 INJECTION SUBCUTANEOUS at 05:34

## 2020-01-01 RX ADMIN — PHENYLEPHRINE HYDROCHLORIDE 39.7 MICROGRAM(S)/KG/MIN: 10 INJECTION INTRAVENOUS at 17:41

## 2020-01-01 RX ADMIN — PROPOFOL 11.8 MICROGRAM(S)/KG/MIN: 10 INJECTION, EMULSION INTRAVENOUS at 03:44

## 2020-01-01 RX ADMIN — DEXMEDETOMIDINE HYDROCHLORIDE IN 0.9% SODIUM CHLORIDE 7.61 MICROGRAM(S)/KG/HR: 4 INJECTION INTRAVENOUS at 23:00

## 2020-01-01 RX ADMIN — Medication 1000 MILLIGRAM(S): at 13:02

## 2020-01-01 RX ADMIN — HYDROMORPHONE HYDROCHLORIDE 2 MILLIGRAM(S): 2 INJECTION INTRAMUSCULAR; INTRAVENOUS; SUBCUTANEOUS at 23:15

## 2020-01-01 RX ADMIN — Medication 10 MILLIGRAM(S): at 11:59

## 2020-01-01 RX ADMIN — PHENYLEPHRINE HYDROCHLORIDE 1.43 MICROGRAM(S)/KG/MIN: 10 INJECTION INTRAVENOUS at 23:59

## 2020-01-01 RX ADMIN — CHLORHEXIDINE GLUCONATE 1 APPLICATION(S): 213 SOLUTION TOPICAL at 04:01

## 2020-01-01 RX ADMIN — Medication 650 MILLIGRAM(S): at 08:00

## 2020-01-01 RX ADMIN — Medication 1 APPLICATION(S): at 16:54

## 2020-01-01 RX ADMIN — VASOPRESSIN 2.4 UNIT(S)/MIN: 20 INJECTION INTRAVENOUS at 22:35

## 2020-01-01 RX ADMIN — POTASSIUM PHOSPHATE, MONOBASIC POTASSIUM PHOSPHATE, DIBASIC 63.75 MILLIMOLE(S): 236; 224 INJECTION, SOLUTION INTRAVENOUS at 11:28

## 2020-01-01 RX ADMIN — MIDODRINE HYDROCHLORIDE 15 MILLIGRAM(S): 2.5 TABLET ORAL at 07:05

## 2020-01-01 RX ADMIN — Medication 1: at 17:40

## 2020-01-01 RX ADMIN — MEROPENEM 100 MILLIGRAM(S): 1 INJECTION INTRAVENOUS at 22:38

## 2020-01-01 RX ADMIN — DEXMEDETOMIDINE HYDROCHLORIDE IN 0.9% SODIUM CHLORIDE 7.61 MICROGRAM(S)/KG/HR: 4 INJECTION INTRAVENOUS at 14:00

## 2020-01-01 RX ADMIN — MIDODRINE HYDROCHLORIDE 15 MILLIGRAM(S): 2.5 TABLET ORAL at 13:57

## 2020-01-01 RX ADMIN — Medication 2: at 12:46

## 2020-01-01 RX ADMIN — Medication 1: at 11:59

## 2020-01-01 RX ADMIN — Medication 81 MILLIGRAM(S): at 12:50

## 2020-01-01 RX ADMIN — ENOXAPARIN SODIUM 70 MILLIGRAM(S): 100 INJECTION SUBCUTANEOUS at 06:22

## 2020-01-01 RX ADMIN — CHLORHEXIDINE GLUCONATE 1 APPLICATION(S): 213 SOLUTION TOPICAL at 07:32

## 2020-01-01 RX ADMIN — CHLORHEXIDINE GLUCONATE 1 APPLICATION(S): 213 SOLUTION TOPICAL at 07:56

## 2020-01-01 RX ADMIN — CHLORHEXIDINE GLUCONATE 1 APPLICATION(S): 213 SOLUTION TOPICAL at 05:18

## 2020-01-01 RX ADMIN — Medication 650 MILLIGRAM(S): at 17:23

## 2020-01-01 RX ADMIN — PROPOFOL 6.85 MICROGRAM(S)/KG/MIN: 10 INJECTION, EMULSION INTRAVENOUS at 10:14

## 2020-01-01 RX ADMIN — PROPOFOL 6.85 MICROGRAM(S)/KG/MIN: 10 INJECTION, EMULSION INTRAVENOUS at 23:55

## 2020-01-01 RX ADMIN — Medication 1 APPLICATION(S): at 05:30

## 2020-01-01 RX ADMIN — CHLORHEXIDINE GLUCONATE 1 APPLICATION(S): 213 SOLUTION TOPICAL at 05:00

## 2020-01-01 RX ADMIN — PHENYLEPHRINE HYDROCHLORIDE 39.7 MICROGRAM(S)/KG/MIN: 10 INJECTION INTRAVENOUS at 16:11

## 2020-01-01 RX ADMIN — CHLORHEXIDINE GLUCONATE 1 APPLICATION(S): 213 SOLUTION TOPICAL at 17:00

## 2020-01-01 RX ADMIN — Medication 81 MILLIGRAM(S): at 15:13

## 2020-01-01 RX ADMIN — MIDODRINE HYDROCHLORIDE 15 MILLIGRAM(S): 2.5 TABLET ORAL at 11:55

## 2020-01-01 RX ADMIN — Medication 1 APPLICATION(S): at 18:46

## 2020-01-01 RX ADMIN — HEPARIN SODIUM 900 UNIT(S)/HR: 5000 INJECTION INTRAVENOUS; SUBCUTANEOUS at 11:59

## 2020-01-01 RX ADMIN — CHLORHEXIDINE GLUCONATE 15 MILLILITER(S): 213 SOLUTION TOPICAL at 18:40

## 2020-01-01 RX ADMIN — ENOXAPARIN SODIUM 80 MILLIGRAM(S): 100 INJECTION SUBCUTANEOUS at 17:24

## 2020-01-01 RX ADMIN — Medication 400 MILLIGRAM(S): at 20:59

## 2020-01-01 RX ADMIN — FAMOTIDINE 20 MILLIGRAM(S): 10 INJECTION INTRAVENOUS at 05:47

## 2020-01-01 RX ADMIN — FAMOTIDINE 20 MILLIGRAM(S): 10 INJECTION INTRAVENOUS at 17:08

## 2020-01-01 RX ADMIN — CHLORHEXIDINE GLUCONATE 1 APPLICATION(S): 213 SOLUTION TOPICAL at 05:29

## 2020-01-01 RX ADMIN — HEPARIN SODIUM 1200 UNIT(S)/HR: 5000 INJECTION INTRAVENOUS; SUBCUTANEOUS at 00:20

## 2020-01-01 RX ADMIN — FENTANYL CITRATE 1.9 MICROGRAM(S)/KG/HR: 50 INJECTION INTRAVENOUS at 23:56

## 2020-01-01 RX ADMIN — Medication 1 APPLICATION(S): at 05:00

## 2020-01-01 RX ADMIN — ATORVASTATIN CALCIUM 20 MILLIGRAM(S): 80 TABLET, FILM COATED ORAL at 22:28

## 2020-01-01 RX ADMIN — ENOXAPARIN SODIUM 80 MILLIGRAM(S): 100 INJECTION SUBCUTANEOUS at 06:30

## 2020-01-01 RX ADMIN — DEXMEDETOMIDINE HYDROCHLORIDE IN 0.9% SODIUM CHLORIDE 7.61 MICROGRAM(S)/KG/HR: 4 INJECTION INTRAVENOUS at 19:52

## 2020-01-01 RX ADMIN — FAMOTIDINE 20 MILLIGRAM(S): 10 INJECTION INTRAVENOUS at 17:32

## 2020-01-01 RX ADMIN — CHLORHEXIDINE GLUCONATE 15 MILLILITER(S): 213 SOLUTION TOPICAL at 06:43

## 2020-01-01 RX ADMIN — CHLORHEXIDINE GLUCONATE 1 APPLICATION(S): 213 SOLUTION TOPICAL at 10:24

## 2020-01-01 RX ADMIN — Medication 102 MILLIGRAM(S): at 13:22

## 2020-01-01 RX ADMIN — FAMOTIDINE 20 MILLIGRAM(S): 10 INJECTION INTRAVENOUS at 05:42

## 2020-01-01 RX ADMIN — MIDODRINE HYDROCHLORIDE 15 MILLIGRAM(S): 2.5 TABLET ORAL at 00:08

## 2020-01-01 RX ADMIN — QUETIAPINE FUMARATE 50 MILLIGRAM(S): 200 TABLET, FILM COATED ORAL at 17:41

## 2020-01-01 RX ADMIN — FAMOTIDINE 20 MILLIGRAM(S): 10 INJECTION INTRAVENOUS at 17:59

## 2020-01-01 RX ADMIN — Medication 75 MICROGRAM(S): at 05:47

## 2020-01-01 RX ADMIN — Medication 1 APPLICATION(S): at 05:18

## 2020-01-01 RX ADMIN — Medication 81 MILLIGRAM(S): at 11:52

## 2020-01-01 RX ADMIN — Medication 1 APPLICATION(S): at 18:15

## 2020-01-01 RX ADMIN — Medication 75 MICROGRAM(S): at 05:27

## 2020-01-01 RX ADMIN — Medication 1 DROP(S): at 18:22

## 2020-01-01 RX ADMIN — CHLORHEXIDINE GLUCONATE 15 MILLILITER(S): 213 SOLUTION TOPICAL at 05:33

## 2020-01-01 RX ADMIN — CHLORHEXIDINE GLUCONATE 1 APPLICATION(S): 213 SOLUTION TOPICAL at 06:03

## 2020-01-01 RX ADMIN — PHENYLEPHRINE HYDROCHLORIDE 28.5 MICROGRAM(S)/KG/MIN: 10 INJECTION INTRAVENOUS at 14:00

## 2020-01-01 RX ADMIN — Medication 75 MICROGRAM(S): at 05:28

## 2020-01-01 RX ADMIN — Medication 1 APPLICATION(S): at 17:38

## 2020-01-01 RX ADMIN — Medication 1 DROP(S): at 11:58

## 2020-01-01 RX ADMIN — FENTANYL CITRATE 3.81 MICROGRAM(S)/KG/HR: 50 INJECTION INTRAVENOUS at 06:32

## 2020-01-01 RX ADMIN — CHLORHEXIDINE GLUCONATE 1 APPLICATION(S): 213 SOLUTION TOPICAL at 06:14

## 2020-01-01 RX ADMIN — HEPARIN SODIUM 6500 UNIT(S): 5000 INJECTION INTRAVENOUS; SUBCUTANEOUS at 15:30

## 2020-01-01 RX ADMIN — FAMOTIDINE 20 MILLIGRAM(S): 10 INJECTION INTRAVENOUS at 07:04

## 2020-01-01 RX ADMIN — CHLORHEXIDINE GLUCONATE 15 MILLILITER(S): 213 SOLUTION TOPICAL at 16:56

## 2020-01-01 RX ADMIN — Medication 1 APPLICATION(S): at 19:20

## 2020-01-01 RX ADMIN — Medication 1 DROP(S): at 06:31

## 2020-01-01 RX ADMIN — MIDODRINE HYDROCHLORIDE 15 MILLIGRAM(S): 2.5 TABLET ORAL at 20:51

## 2020-01-01 RX ADMIN — FENTANYL CITRATE 3.81 MICROGRAM(S)/KG/HR: 50 INJECTION INTRAVENOUS at 01:07

## 2020-01-01 RX ADMIN — Medication 75 MICROGRAM(S): at 05:50

## 2020-01-01 RX ADMIN — CHLORHEXIDINE GLUCONATE 15 MILLILITER(S): 213 SOLUTION TOPICAL at 05:18

## 2020-01-01 RX ADMIN — Medication 1 DROP(S): at 18:10

## 2020-01-01 RX ADMIN — Medication 7.13 MICROGRAM(S)/KG/MIN: at 16:00

## 2020-01-01 RX ADMIN — Medication 103 MILLIGRAM(S): at 23:41

## 2020-01-01 RX ADMIN — CHLORHEXIDINE GLUCONATE 1 APPLICATION(S): 213 SOLUTION TOPICAL at 06:36

## 2020-01-01 RX ADMIN — Medication 650 MILLIGRAM(S): at 07:20

## 2020-01-01 RX ADMIN — Medication 1 DROP(S): at 17:19

## 2020-01-01 RX ADMIN — FAMOTIDINE 20 MILLIGRAM(S): 10 INJECTION INTRAVENOUS at 17:09

## 2020-01-01 RX ADMIN — CHLORHEXIDINE GLUCONATE 15 MILLILITER(S): 213 SOLUTION TOPICAL at 07:04

## 2020-01-01 RX ADMIN — FENTANYL CITRATE 22.8 MICROGRAM(S)/KG/HR: 50 INJECTION INTRAVENOUS at 19:29

## 2020-01-01 RX ADMIN — MEROPENEM 100 MILLIGRAM(S): 1 INJECTION INTRAVENOUS at 22:26

## 2020-01-01 RX ADMIN — AZITHROMYCIN 255 MILLIGRAM(S): 500 TABLET, FILM COATED ORAL at 03:28

## 2020-01-01 RX ADMIN — Medication 1 APPLICATION(S): at 06:51

## 2020-01-01 RX ADMIN — ATORVASTATIN CALCIUM 20 MILLIGRAM(S): 80 TABLET, FILM COATED ORAL at 23:23

## 2020-01-01 RX ADMIN — FAMOTIDINE 20 MILLIGRAM(S): 10 INJECTION INTRAVENOUS at 06:04

## 2020-01-01 RX ADMIN — CHLORHEXIDINE GLUCONATE 1 APPLICATION(S): 213 SOLUTION TOPICAL at 06:22

## 2020-01-01 RX ADMIN — Medication 7.13 MICROGRAM(S)/KG/MIN: at 22:51

## 2020-01-01 RX ADMIN — PROPOFOL 6.85 MICROGRAM(S)/KG/MIN: 10 INJECTION, EMULSION INTRAVENOUS at 17:32

## 2020-01-01 RX ADMIN — Medication 1 APPLICATION(S): at 13:04

## 2020-01-01 RX ADMIN — Medication 1 DROP(S): at 17:30

## 2020-01-01 RX ADMIN — FAMOTIDINE 20 MILLIGRAM(S): 10 INJECTION INTRAVENOUS at 05:52

## 2020-01-01 RX ADMIN — PROPOFOL 22.8 MICROGRAM(S)/KG/MIN: 10 INJECTION, EMULSION INTRAVENOUS at 06:27

## 2020-01-01 RX ADMIN — Medication 100 MILLIEQUIVALENT(S): at 22:42

## 2020-01-01 RX ADMIN — Medication 10 MILLIGRAM(S): at 13:18

## 2020-01-01 RX ADMIN — Medication 10 MILLIGRAM(S): at 11:09

## 2020-01-01 RX ADMIN — HYDROMORPHONE HYDROCHLORIDE 2 MILLIGRAM(S): 2 INJECTION INTRAMUSCULAR; INTRAVENOUS; SUBCUTANEOUS at 05:20

## 2020-01-01 RX ADMIN — Medication 75 MICROGRAM(S): at 06:39

## 2020-01-01 RX ADMIN — Medication 650 MILLIGRAM(S): at 13:00

## 2020-01-01 RX ADMIN — Medication 10 MILLIGRAM(S): at 16:58

## 2020-01-01 RX ADMIN — Medication 1 DROP(S): at 22:48

## 2020-01-01 RX ADMIN — ENOXAPARIN SODIUM 80 MILLIGRAM(S): 100 INJECTION SUBCUTANEOUS at 06:16

## 2020-01-01 RX ADMIN — DEXMEDETOMIDINE HYDROCHLORIDE IN 0.9% SODIUM CHLORIDE 7.61 MICROGRAM(S)/KG/HR: 4 INJECTION INTRAVENOUS at 03:18

## 2020-01-01 RX ADMIN — FAMOTIDINE 20 MILLIGRAM(S): 10 INJECTION INTRAVENOUS at 05:27

## 2020-01-01 RX ADMIN — CHLORHEXIDINE GLUCONATE 15 MILLILITER(S): 213 SOLUTION TOPICAL at 04:48

## 2020-01-01 RX ADMIN — FAMOTIDINE 20 MILLIGRAM(S): 10 INJECTION INTRAVENOUS at 17:19

## 2020-01-01 RX ADMIN — FAMOTIDINE 20 MILLIGRAM(S): 10 INJECTION INTRAVENOUS at 17:42

## 2020-01-01 RX ADMIN — Medication 2 MILLIGRAM(S): at 15:21

## 2020-01-01 RX ADMIN — ENOXAPARIN SODIUM 80 MILLIGRAM(S): 100 INJECTION SUBCUTANEOUS at 05:54

## 2020-01-01 RX ADMIN — Medication 650 MILLIGRAM(S): at 23:15

## 2020-01-01 RX ADMIN — FAMOTIDINE 20 MILLIGRAM(S): 10 INJECTION INTRAVENOUS at 18:00

## 2020-01-01 RX ADMIN — FAMOTIDINE 20 MILLIGRAM(S): 10 INJECTION INTRAVENOUS at 16:14

## 2020-01-01 RX ADMIN — HEPARIN SODIUM 0 UNIT(S)/HR: 5000 INJECTION INTRAVENOUS; SUBCUTANEOUS at 10:36

## 2020-01-01 RX ADMIN — HEPARIN SODIUM 1200 UNIT(S)/HR: 5000 INJECTION INTRAVENOUS; SUBCUTANEOUS at 14:37

## 2020-01-01 RX ADMIN — DEXMEDETOMIDINE HYDROCHLORIDE IN 0.9% SODIUM CHLORIDE 7.61 MICROGRAM(S)/KG/HR: 4 INJECTION INTRAVENOUS at 07:51

## 2020-01-01 RX ADMIN — ENOXAPARIN SODIUM 80 MILLIGRAM(S): 100 INJECTION SUBCUTANEOUS at 05:02

## 2020-01-01 RX ADMIN — Medication 10 MILLIGRAM(S): at 11:52

## 2020-01-01 RX ADMIN — CHLORHEXIDINE GLUCONATE 15 MILLILITER(S): 213 SOLUTION TOPICAL at 04:51

## 2020-01-01 RX ADMIN — CHLORHEXIDINE GLUCONATE 1 APPLICATION(S): 213 SOLUTION TOPICAL at 04:46

## 2020-01-01 RX ADMIN — MEROPENEM 100 MILLIGRAM(S): 1 INJECTION INTRAVENOUS at 22:39

## 2020-01-01 RX ADMIN — Medication 1 APPLICATION(S): at 05:25

## 2020-01-01 RX ADMIN — SODIUM CHLORIDE 75 MILLILITER(S): 9 INJECTION, SOLUTION INTRAVENOUS at 10:30

## 2020-01-01 RX ADMIN — SODIUM CHLORIDE 500 MILLILITER(S): 9 INJECTION, SOLUTION INTRAVENOUS at 19:05

## 2020-01-01 RX ADMIN — ATORVASTATIN CALCIUM 20 MILLIGRAM(S): 80 TABLET, FILM COATED ORAL at 22:06

## 2020-01-01 RX ADMIN — CHLORHEXIDINE GLUCONATE 1 APPLICATION(S): 213 SOLUTION TOPICAL at 10:32

## 2020-01-01 RX ADMIN — Medication 81 MILLIGRAM(S): at 12:22

## 2020-01-01 RX ADMIN — ATORVASTATIN CALCIUM 20 MILLIGRAM(S): 80 TABLET, FILM COATED ORAL at 22:31

## 2020-01-01 RX ADMIN — MIDODRINE HYDROCHLORIDE 15 MILLIGRAM(S): 2.5 TABLET ORAL at 06:52

## 2020-01-01 RX ADMIN — CHLORHEXIDINE GLUCONATE 1 APPLICATION(S): 213 SOLUTION TOPICAL at 09:29

## 2020-01-01 RX ADMIN — ENOXAPARIN SODIUM 70 MILLIGRAM(S): 100 INJECTION SUBCUTANEOUS at 19:21

## 2020-01-01 RX ADMIN — FAMOTIDINE 20 MILLIGRAM(S): 10 INJECTION INTRAVENOUS at 17:25

## 2020-01-01 RX ADMIN — POLYETHYLENE GLYCOL 3350 17 GRAM(S): 17 POWDER, FOR SOLUTION ORAL at 12:35

## 2020-01-01 RX ADMIN — CEFTRIAXONE 100 MILLIGRAM(S): 500 INJECTION, POWDER, FOR SOLUTION INTRAMUSCULAR; INTRAVENOUS at 03:38

## 2020-01-01 RX ADMIN — Medication 1 APPLICATION(S): at 18:47

## 2020-01-01 RX ADMIN — Medication 1 APPLICATION(S): at 05:42

## 2020-01-01 RX ADMIN — CHLORHEXIDINE GLUCONATE 1 APPLICATION(S): 213 SOLUTION TOPICAL at 05:45

## 2020-01-01 RX ADMIN — ENOXAPARIN SODIUM 80 MILLIGRAM(S): 100 INJECTION SUBCUTANEOUS at 06:43

## 2020-01-01 RX ADMIN — Medication 1 APPLICATION(S): at 14:42

## 2020-01-01 RX ADMIN — MIDODRINE HYDROCHLORIDE 15 MILLIGRAM(S): 2.5 TABLET ORAL at 21:04

## 2020-01-01 RX ADMIN — Medication 81 MILLIGRAM(S): at 12:43

## 2020-01-01 RX ADMIN — ENOXAPARIN SODIUM 70 MILLIGRAM(S): 100 INJECTION SUBCUTANEOUS at 17:34

## 2020-01-01 RX ADMIN — CHLORHEXIDINE GLUCONATE 15 MILLILITER(S): 213 SOLUTION TOPICAL at 17:24

## 2020-01-01 RX ADMIN — QUETIAPINE FUMARATE 50 MILLIGRAM(S): 200 TABLET, FILM COATED ORAL at 05:29

## 2020-01-01 RX ADMIN — Medication 650 MILLIGRAM(S): at 13:30

## 2020-01-01 RX ADMIN — FAMOTIDINE 20 MILLIGRAM(S): 10 INJECTION INTRAVENOUS at 04:54

## 2020-01-01 RX ADMIN — PROPOFOL 22.8 MICROGRAM(S)/KG/MIN: 10 INJECTION, EMULSION INTRAVENOUS at 23:31

## 2020-01-01 RX ADMIN — Medication 650 MILLIGRAM(S): at 16:33

## 2020-01-01 RX ADMIN — Medication 650 MILLIGRAM(S): at 14:13

## 2020-01-01 RX ADMIN — DEXMEDETOMIDINE HYDROCHLORIDE IN 0.9% SODIUM CHLORIDE 7.61 MICROGRAM(S)/KG/HR: 4 INJECTION INTRAVENOUS at 20:46

## 2020-01-01 RX ADMIN — ENOXAPARIN SODIUM 40 MILLIGRAM(S): 100 INJECTION SUBCUTANEOUS at 03:37

## 2020-01-01 RX ADMIN — MIDAZOLAM HYDROCHLORIDE 1.52 MG/KG/HR: 1 INJECTION, SOLUTION INTRAMUSCULAR; INTRAVENOUS at 16:22

## 2020-01-01 RX ADMIN — ENOXAPARIN SODIUM 80 MILLIGRAM(S): 100 INJECTION SUBCUTANEOUS at 18:13

## 2020-01-01 RX ADMIN — Medication 1 APPLICATION(S): at 05:26

## 2020-01-01 RX ADMIN — CHLORHEXIDINE GLUCONATE 15 MILLILITER(S): 213 SOLUTION TOPICAL at 05:24

## 2020-01-01 RX ADMIN — DEXMEDETOMIDINE HYDROCHLORIDE IN 0.9% SODIUM CHLORIDE 7.61 MICROGRAM(S)/KG/HR: 4 INJECTION INTRAVENOUS at 23:42

## 2020-01-01 RX ADMIN — ENOXAPARIN SODIUM 70 MILLIGRAM(S): 100 INJECTION SUBCUTANEOUS at 06:51

## 2020-01-01 RX ADMIN — ENOXAPARIN SODIUM 70 MILLIGRAM(S): 100 INJECTION SUBCUTANEOUS at 05:28

## 2020-01-01 RX ADMIN — Medication 1 APPLICATION(S): at 05:55

## 2020-01-01 RX ADMIN — PROPOFOL 6.85 MICROGRAM(S)/KG/MIN: 10 INJECTION, EMULSION INTRAVENOUS at 02:25

## 2020-01-01 RX ADMIN — CEFTRIAXONE 1000 MILLIGRAM(S): 500 INJECTION, POWDER, FOR SOLUTION INTRAMUSCULAR; INTRAVENOUS at 13:47

## 2020-01-01 RX ADMIN — Medication 81 MILLIGRAM(S): at 10:20

## 2020-01-01 RX ADMIN — ATORVASTATIN CALCIUM 20 MILLIGRAM(S): 80 TABLET, FILM COATED ORAL at 22:44

## 2020-01-01 RX ADMIN — Medication 103 MILLIGRAM(S): at 00:03

## 2020-01-01 RX ADMIN — Medication 103 MILLIGRAM(S): at 05:21

## 2020-01-01 RX ADMIN — Medication 2 MILLIGRAM(S): at 12:34

## 2020-01-01 RX ADMIN — MIDODRINE HYDROCHLORIDE 15 MILLIGRAM(S): 2.5 TABLET ORAL at 05:39

## 2020-01-01 RX ADMIN — ATORVASTATIN CALCIUM 20 MILLIGRAM(S): 80 TABLET, FILM COATED ORAL at 21:24

## 2020-01-01 RX ADMIN — FAMOTIDINE 20 MILLIGRAM(S): 10 INJECTION INTRAVENOUS at 16:56

## 2020-01-01 RX ADMIN — Medication 100 MILLIGRAM(S): at 16:56

## 2020-01-01 RX ADMIN — CHLORHEXIDINE GLUCONATE 1 APPLICATION(S): 213 SOLUTION TOPICAL at 05:34

## 2020-01-01 RX ADMIN — CHLORHEXIDINE GLUCONATE 15 MILLILITER(S): 213 SOLUTION TOPICAL at 17:48

## 2020-01-01 RX ADMIN — MIDODRINE HYDROCHLORIDE 15 MILLIGRAM(S): 2.5 TABLET ORAL at 22:06

## 2020-01-01 RX ADMIN — ATORVASTATIN CALCIUM 20 MILLIGRAM(S): 80 TABLET, FILM COATED ORAL at 21:04

## 2020-01-01 RX ADMIN — FENTANYL CITRATE 15.2 MICROGRAM(S)/KG/HR: 50 INJECTION INTRAVENOUS at 09:23

## 2020-01-01 RX ADMIN — MIDODRINE HYDROCHLORIDE 15 MILLIGRAM(S): 2.5 TABLET ORAL at 05:29

## 2020-01-01 RX ADMIN — Medication 62.5 MILLIMOLE(S): at 14:01

## 2020-01-01 RX ADMIN — CHLORHEXIDINE GLUCONATE 15 MILLILITER(S): 213 SOLUTION TOPICAL at 04:01

## 2020-01-01 RX ADMIN — HYDROMORPHONE HYDROCHLORIDE 2 MILLIGRAM(S): 2 INJECTION INTRAMUSCULAR; INTRAVENOUS; SUBCUTANEOUS at 11:55

## 2020-01-01 RX ADMIN — ATORVASTATIN CALCIUM 20 MILLIGRAM(S): 80 TABLET, FILM COATED ORAL at 20:53

## 2020-01-01 RX ADMIN — CHLORHEXIDINE GLUCONATE 15 MILLILITER(S): 213 SOLUTION TOPICAL at 05:10

## 2020-01-01 RX ADMIN — POLYETHYLENE GLYCOL 3350 17 GRAM(S): 17 POWDER, FOR SOLUTION ORAL at 10:20

## 2020-01-01 RX ADMIN — MIDODRINE HYDROCHLORIDE 15 MILLIGRAM(S): 2.5 TABLET ORAL at 00:17

## 2020-01-01 RX ADMIN — ENOXAPARIN SODIUM 80 MILLIGRAM(S): 100 INJECTION SUBCUTANEOUS at 05:09

## 2020-01-01 RX ADMIN — Medication 1 DROP(S): at 11:50

## 2020-01-01 RX ADMIN — CHLORHEXIDINE GLUCONATE 15 MILLILITER(S): 213 SOLUTION TOPICAL at 05:45

## 2020-01-01 RX ADMIN — Medication 10 MILLIGRAM(S): at 11:55

## 2020-01-01 RX ADMIN — MEROPENEM 100 MILLIGRAM(S): 1 INJECTION INTRAVENOUS at 05:48

## 2020-01-01 RX ADMIN — Medication 81 MILLIGRAM(S): at 11:45

## 2020-01-01 RX ADMIN — PHENYLEPHRINE HYDROCHLORIDE 39.7 MICROGRAM(S)/KG/MIN: 10 INJECTION INTRAVENOUS at 19:24

## 2020-01-01 RX ADMIN — CHLORHEXIDINE GLUCONATE 15 MILLILITER(S): 213 SOLUTION TOPICAL at 05:58

## 2020-01-01 RX ADMIN — CHLORHEXIDINE GLUCONATE 15 MILLILITER(S): 213 SOLUTION TOPICAL at 06:06

## 2020-01-01 RX ADMIN — FAMOTIDINE 20 MILLIGRAM(S): 10 INJECTION INTRAVENOUS at 17:48

## 2020-01-01 RX ADMIN — ATORVASTATIN CALCIUM 20 MILLIGRAM(S): 80 TABLET, FILM COATED ORAL at 21:26

## 2020-01-01 RX ADMIN — Medication 10 MILLIGRAM(S): at 12:08

## 2020-01-01 RX ADMIN — MIDAZOLAM HYDROCHLORIDE 1.52 MG/KG/HR: 1 INJECTION, SOLUTION INTRAMUSCULAR; INTRAVENOUS at 13:32

## 2020-01-01 RX ADMIN — Medication 1 APPLICATION(S): at 17:52

## 2020-01-01 RX ADMIN — Medication 7.13 MICROGRAM(S)/KG/MIN: at 14:37

## 2020-01-01 RX ADMIN — Medication 75 MICROGRAM(S): at 07:05

## 2020-01-01 RX ADMIN — DEXMEDETOMIDINE HYDROCHLORIDE IN 0.9% SODIUM CHLORIDE 7.61 MICROGRAM(S)/KG/HR: 4 INJECTION INTRAVENOUS at 14:49

## 2020-01-01 RX ADMIN — Medication 75 MICROGRAM(S): at 05:18

## 2020-01-01 RX ADMIN — FAMOTIDINE 20 MILLIGRAM(S): 10 INJECTION INTRAVENOUS at 05:40

## 2020-01-01 RX ADMIN — PROPOFOL 1.96 MICROGRAM(S)/KG/MIN: 10 INJECTION, EMULSION INTRAVENOUS at 02:22

## 2020-01-01 RX ADMIN — Medication 103 MILLIGRAM(S): at 17:09

## 2020-01-01 RX ADMIN — FENTANYL CITRATE 3.81 MICROGRAM(S)/KG/HR: 50 INJECTION INTRAVENOUS at 20:03

## 2020-01-01 RX ADMIN — ENOXAPARIN SODIUM 80 MILLIGRAM(S): 100 INJECTION SUBCUTANEOUS at 04:54

## 2020-01-01 RX ADMIN — Medication 75 MICROGRAM(S): at 05:42

## 2020-01-01 RX ADMIN — CHLORHEXIDINE GLUCONATE 15 MILLILITER(S): 213 SOLUTION TOPICAL at 06:25

## 2020-01-01 RX ADMIN — Medication 81 MILLIGRAM(S): at 13:18

## 2020-01-01 RX ADMIN — PHENYLEPHRINE HYDROCHLORIDE 39.7 MICROGRAM(S)/KG/MIN: 10 INJECTION INTRAVENOUS at 09:00

## 2020-01-01 RX ADMIN — Medication 650 MILLIGRAM(S): at 17:34

## 2020-01-01 RX ADMIN — Medication 1: at 00:08

## 2020-01-01 RX ADMIN — DEXMEDETOMIDINE HYDROCHLORIDE IN 0.9% SODIUM CHLORIDE 7.61 MICROGRAM(S)/KG/HR: 4 INJECTION INTRAVENOUS at 01:41

## 2020-01-01 RX ADMIN — ATORVASTATIN CALCIUM 20 MILLIGRAM(S): 80 TABLET, FILM COATED ORAL at 21:39

## 2020-01-01 RX ADMIN — Medication 1 DROP(S): at 17:31

## 2020-01-01 RX ADMIN — ENOXAPARIN SODIUM 80 MILLIGRAM(S): 100 INJECTION SUBCUTANEOUS at 05:46

## 2020-01-01 RX ADMIN — CHLORHEXIDINE GLUCONATE 15 MILLILITER(S): 213 SOLUTION TOPICAL at 05:40

## 2020-01-01 RX ADMIN — POLYETHYLENE GLYCOL 3350 17 GRAM(S): 17 POWDER, FOR SOLUTION ORAL at 14:57

## 2020-01-01 RX ADMIN — ENOXAPARIN SODIUM 80 MILLIGRAM(S): 100 INJECTION SUBCUTANEOUS at 18:00

## 2020-01-01 RX ADMIN — FENTANYL CITRATE 50 MICROGRAM(S): 50 INJECTION INTRAVENOUS at 02:00

## 2020-01-01 RX ADMIN — Medication 650 MILLIGRAM(S): at 05:17

## 2020-01-01 RX ADMIN — Medication 1 DROP(S): at 12:20

## 2020-01-01 RX ADMIN — FAMOTIDINE 20 MILLIGRAM(S): 10 INJECTION INTRAVENOUS at 05:18

## 2020-01-01 RX ADMIN — Medication 40 MILLIEQUIVALENT(S): at 08:30

## 2020-01-01 RX ADMIN — CHLORHEXIDINE GLUCONATE 15 MILLILITER(S): 213 SOLUTION TOPICAL at 17:29

## 2020-01-01 RX ADMIN — MIDODRINE HYDROCHLORIDE 15 MILLIGRAM(S): 2.5 TABLET ORAL at 15:07

## 2020-01-01 RX ADMIN — PROPOFOL 2.28 MICROGRAM(S)/KG/MIN: 10 INJECTION, EMULSION INTRAVENOUS at 07:24

## 2020-01-01 RX ADMIN — Medication 250 MILLIGRAM(S): at 05:04

## 2020-01-01 RX ADMIN — Medication 1 APPLICATION(S): at 17:16

## 2020-01-01 RX ADMIN — Medication 10 MILLIGRAM(S): at 10:46

## 2020-01-01 RX ADMIN — Medication 100 MILLIGRAM(S): at 17:11

## 2020-01-01 RX ADMIN — Medication 1 DROP(S): at 17:21

## 2020-01-01 RX ADMIN — FENTANYL CITRATE 50 MICROGRAM(S): 50 INJECTION INTRAVENOUS at 03:10

## 2020-01-01 RX ADMIN — POTASSIUM PHOSPHATE, MONOBASIC POTASSIUM PHOSPHATE, DIBASIC 63.75 MILLIMOLE(S): 236; 224 INJECTION, SOLUTION INTRAVENOUS at 12:02

## 2020-01-01 RX ADMIN — ENOXAPARIN SODIUM 80 MILLIGRAM(S): 100 INJECTION SUBCUTANEOUS at 17:02

## 2020-01-01 RX ADMIN — CHLORHEXIDINE GLUCONATE 1 APPLICATION(S): 213 SOLUTION TOPICAL at 13:07

## 2020-01-01 RX ADMIN — ENOXAPARIN SODIUM 80 MILLIGRAM(S): 100 INJECTION SUBCUTANEOUS at 17:58

## 2020-01-01 RX ADMIN — CHLORHEXIDINE GLUCONATE 15 MILLILITER(S): 213 SOLUTION TOPICAL at 16:37

## 2020-01-01 RX ADMIN — FAMOTIDINE 20 MILLIGRAM(S): 10 INJECTION INTRAVENOUS at 05:55

## 2020-01-01 RX ADMIN — PHENYLEPHRINE HYDROCHLORIDE 39.7 MICROGRAM(S)/KG/MIN: 10 INJECTION INTRAVENOUS at 09:51

## 2020-01-01 RX ADMIN — Medication 7.13 MICROGRAM(S)/KG/MIN: at 09:31

## 2020-01-01 RX ADMIN — Medication 81 MILLIGRAM(S): at 11:41

## 2020-01-01 RX ADMIN — CHLORHEXIDINE GLUCONATE 1 APPLICATION(S): 213 SOLUTION TOPICAL at 10:27

## 2020-01-01 RX ADMIN — POLYETHYLENE GLYCOL 3350 17 GRAM(S): 17 POWDER, FOR SOLUTION ORAL at 11:39

## 2020-01-01 RX ADMIN — Medication 75 MICROGRAM(S): at 05:55

## 2020-01-01 RX ADMIN — Medication 2 MILLIGRAM(S): at 22:16

## 2020-01-01 RX ADMIN — POLYETHYLENE GLYCOL 3350 17 GRAM(S): 17 POWDER, FOR SOLUTION ORAL at 12:49

## 2020-01-01 RX ADMIN — Medication 1 DROP(S): at 22:17

## 2020-01-01 RX ADMIN — Medication 40 MILLIEQUIVALENT(S): at 14:45

## 2020-01-01 RX ADMIN — FAMOTIDINE 20 MILLIGRAM(S): 10 INJECTION INTRAVENOUS at 18:30

## 2020-01-01 RX ADMIN — Medication 100 MILLIGRAM(S): at 17:05

## 2020-01-01 RX ADMIN — Medication 75 MICROGRAM(S): at 05:09

## 2020-01-01 RX ADMIN — Medication 103 MILLIGRAM(S): at 03:37

## 2020-01-01 RX ADMIN — Medication 1 DROP(S): at 12:32

## 2020-01-01 RX ADMIN — ATORVASTATIN CALCIUM 20 MILLIGRAM(S): 80 TABLET, FILM COATED ORAL at 21:10

## 2020-01-01 RX ADMIN — PHENYLEPHRINE HYDROCHLORIDE 39.7 MICROGRAM(S)/KG/MIN: 10 INJECTION INTRAVENOUS at 05:30

## 2020-01-01 RX ADMIN — FAMOTIDINE 20 MILLIGRAM(S): 10 INJECTION INTRAVENOUS at 06:44

## 2020-01-01 RX ADMIN — PHENYLEPHRINE HYDROCHLORIDE 39.7 MICROGRAM(S)/KG/MIN: 10 INJECTION INTRAVENOUS at 21:58

## 2020-01-01 RX ADMIN — CISATRACURIUM BESYLATE 13.7 MICROGRAM(S)/KG/MIN: 2 INJECTION INTRAVENOUS at 16:25

## 2020-01-01 RX ADMIN — Medication 10 MILLIGRAM(S): at 13:24

## 2020-01-01 RX ADMIN — Medication 75 MICROGRAM(S): at 05:25

## 2020-01-01 RX ADMIN — Medication 1 DROP(S): at 18:00

## 2020-01-01 RX ADMIN — DEXMEDETOMIDINE HYDROCHLORIDE IN 0.9% SODIUM CHLORIDE 7.61 MICROGRAM(S)/KG/HR: 4 INJECTION INTRAVENOUS at 15:27

## 2020-01-01 RX ADMIN — Medication 75 MICROGRAM(S): at 06:04

## 2020-01-01 RX ADMIN — Medication 2 MILLIGRAM(S): at 16:30

## 2020-01-01 RX ADMIN — AZITHROMYCIN 500 MILLIGRAM(S): 500 TABLET, FILM COATED ORAL at 05:33

## 2020-01-01 RX ADMIN — PHENYLEPHRINE HYDROCHLORIDE 10.6 MICROGRAM(S)/KG/MIN: 10 INJECTION INTRAVENOUS at 08:20

## 2020-01-01 RX ADMIN — MEROPENEM 100 MILLIGRAM(S): 1 INJECTION INTRAVENOUS at 05:24

## 2020-01-01 RX ADMIN — FAMOTIDINE 20 MILLIGRAM(S): 10 INJECTION INTRAVENOUS at 17:56

## 2020-01-01 RX ADMIN — Medication 10 MILLIGRAM(S): at 12:51

## 2020-01-01 RX ADMIN — ENOXAPARIN SODIUM 70 MILLIGRAM(S): 100 INJECTION SUBCUTANEOUS at 17:40

## 2020-01-01 RX ADMIN — CHLORHEXIDINE GLUCONATE 15 MILLILITER(S): 213 SOLUTION TOPICAL at 05:21

## 2020-01-01 RX ADMIN — MIDODRINE HYDROCHLORIDE 15 MILLIGRAM(S): 2.5 TABLET ORAL at 20:59

## 2020-01-01 RX ADMIN — PHENYLEPHRINE HYDROCHLORIDE 39.7 MICROGRAM(S)/KG/MIN: 10 INJECTION INTRAVENOUS at 06:11

## 2020-01-01 RX ADMIN — PROPOFOL 6.85 MICROGRAM(S)/KG/MIN: 10 INJECTION, EMULSION INTRAVENOUS at 00:20

## 2020-01-01 RX ADMIN — ENOXAPARIN SODIUM 80 MILLIGRAM(S): 100 INJECTION SUBCUTANEOUS at 17:07

## 2020-01-01 RX ADMIN — HEPARIN SODIUM 1400 UNIT(S)/HR: 5000 INJECTION INTRAVENOUS; SUBCUTANEOUS at 15:27

## 2020-01-01 RX ADMIN — FENTANYL CITRATE 15.2 MICROGRAM(S)/KG/HR: 50 INJECTION INTRAVENOUS at 12:03

## 2020-01-01 RX ADMIN — Medication 75 MICROGRAM(S): at 05:15

## 2020-01-01 RX ADMIN — PROPOFOL 11.8 MICROGRAM(S)/KG/MIN: 10 INJECTION, EMULSION INTRAVENOUS at 15:28

## 2020-01-01 RX ADMIN — Medication 40 MILLIEQUIVALENT(S): at 17:56

## 2020-01-01 RX ADMIN — MIDODRINE HYDROCHLORIDE 15 MILLIGRAM(S): 2.5 TABLET ORAL at 06:26

## 2020-01-01 RX ADMIN — CHLORHEXIDINE GLUCONATE 15 MILLILITER(S): 213 SOLUTION TOPICAL at 05:09

## 2020-01-01 RX ADMIN — PHENYLEPHRINE HYDROCHLORIDE 39.7 MICROGRAM(S)/KG/MIN: 10 INJECTION INTRAVENOUS at 02:56

## 2020-01-01 RX ADMIN — CHLORHEXIDINE GLUCONATE 15 MILLILITER(S): 213 SOLUTION TOPICAL at 17:01

## 2020-01-01 RX ADMIN — Medication 2 MILLIGRAM(S): at 03:35

## 2020-01-01 RX ADMIN — Medication 1 APPLICATION(S): at 04:01

## 2020-01-01 RX ADMIN — Medication 10 MILLIGRAM(S): at 13:41

## 2020-01-01 RX ADMIN — PROPOFOL 6.85 MICROGRAM(S)/KG/MIN: 10 INJECTION, EMULSION INTRAVENOUS at 05:26

## 2020-01-01 RX ADMIN — ENOXAPARIN SODIUM 70 MILLIGRAM(S): 100 INJECTION SUBCUTANEOUS at 17:25

## 2020-01-01 RX ADMIN — PANTOPRAZOLE SODIUM 40 MILLIGRAM(S): 20 TABLET, DELAYED RELEASE ORAL at 12:09

## 2020-01-01 RX ADMIN — HYDROMORPHONE HYDROCHLORIDE 2 MILLIGRAM(S): 2 INJECTION INTRAMUSCULAR; INTRAVENOUS; SUBCUTANEOUS at 17:15

## 2020-01-01 RX ADMIN — FAMOTIDINE 20 MILLIGRAM(S): 10 INJECTION INTRAVENOUS at 16:42

## 2020-01-01 RX ADMIN — HEPARIN SODIUM 0 UNIT(S)/HR: 5000 INJECTION INTRAVENOUS; SUBCUTANEOUS at 22:36

## 2020-01-01 RX ADMIN — Medication 50 MILLIEQUIVALENT(S): at 11:58

## 2020-01-01 RX ADMIN — PHENYLEPHRINE HYDROCHLORIDE 39.7 MICROGRAM(S)/KG/MIN: 10 INJECTION INTRAVENOUS at 06:52

## 2020-01-01 RX ADMIN — QUETIAPINE FUMARATE 50 MILLIGRAM(S): 200 TABLET, FILM COATED ORAL at 05:47

## 2020-01-01 RX ADMIN — MIDAZOLAM HYDROCHLORIDE 4 MILLIGRAM(S): 1 INJECTION, SOLUTION INTRAMUSCULAR; INTRAVENOUS at 09:48

## 2020-01-01 RX ADMIN — PHENYLEPHRINE HYDROCHLORIDE 39.7 MICROGRAM(S)/KG/MIN: 10 INJECTION INTRAVENOUS at 03:45

## 2020-01-01 RX ADMIN — Medication 1 DROP(S): at 19:21

## 2020-01-01 RX ADMIN — CISATRACURIUM BESYLATE 13.7 MICROGRAM(S)/KG/MIN: 2 INJECTION INTRAVENOUS at 16:42

## 2020-01-01 RX ADMIN — Medication 81 MILLIGRAM(S): at 11:09

## 2020-01-01 RX ADMIN — ENOXAPARIN SODIUM 80 MILLIGRAM(S): 100 INJECTION SUBCUTANEOUS at 16:42

## 2020-01-01 RX ADMIN — Medication 81 MILLIGRAM(S): at 12:07

## 2020-01-01 RX ADMIN — Medication 1 APPLICATION(S): at 05:48

## 2020-01-01 RX ADMIN — Medication 1: at 17:09

## 2020-01-01 RX ADMIN — Medication 100 MILLIGRAM(S): at 17:25

## 2020-01-01 RX ADMIN — ENOXAPARIN SODIUM 70 MILLIGRAM(S): 100 INJECTION SUBCUTANEOUS at 05:40

## 2020-01-01 RX ADMIN — Medication 3.31 MICROGRAM(S)/KG/MIN: at 06:11

## 2020-01-01 RX ADMIN — Medication 1 DROP(S): at 17:25

## 2020-01-01 RX ADMIN — Medication 81 MILLIGRAM(S): at 11:54

## 2020-01-01 RX ADMIN — Medication 650 MILLIGRAM(S): at 12:35

## 2020-01-01 RX ADMIN — Medication 10 MILLIGRAM(S): at 11:54

## 2020-01-01 RX ADMIN — Medication 1 DROP(S): at 17:11

## 2020-01-01 RX ADMIN — Medication 1: at 06:00

## 2020-01-01 RX ADMIN — Medication 650 MILLIGRAM(S): at 03:42

## 2020-01-01 RX ADMIN — DEXMEDETOMIDINE HYDROCHLORIDE IN 0.9% SODIUM CHLORIDE 7.61 MICROGRAM(S)/KG/HR: 4 INJECTION INTRAVENOUS at 17:26

## 2020-01-01 RX ADMIN — Medication 40 MILLIEQUIVALENT(S): at 17:40

## 2020-01-01 RX ADMIN — Medication 75 MICROGRAM(S): at 05:26

## 2020-01-01 RX ADMIN — ATORVASTATIN CALCIUM 20 MILLIGRAM(S): 80 TABLET, FILM COATED ORAL at 20:59

## 2020-01-01 RX ADMIN — ENOXAPARIN SODIUM 80 MILLIGRAM(S): 100 INJECTION SUBCUTANEOUS at 17:20

## 2020-01-01 RX ADMIN — MIDODRINE HYDROCHLORIDE 15 MILLIGRAM(S): 2.5 TABLET ORAL at 14:39

## 2020-01-01 RX ADMIN — Medication 1 DROP(S): at 11:56

## 2020-01-01 RX ADMIN — MIDAZOLAM HYDROCHLORIDE 1.52 MG/KG/HR: 1 INJECTION, SOLUTION INTRAMUSCULAR; INTRAVENOUS at 13:16

## 2020-01-01 RX ADMIN — PROPOFOL 6.85 MICROGRAM(S)/KG/MIN: 10 INJECTION, EMULSION INTRAVENOUS at 22:13

## 2020-01-01 RX ADMIN — ATORVASTATIN CALCIUM 20 MILLIGRAM(S): 80 TABLET, FILM COATED ORAL at 21:32

## 2020-01-01 RX ADMIN — Medication 650 MILLIGRAM(S): at 21:00

## 2020-01-01 RX ADMIN — FENTANYL CITRATE 22.8 MICROGRAM(S)/KG/HR: 50 INJECTION INTRAVENOUS at 06:27

## 2020-01-01 RX ADMIN — MIDODRINE HYDROCHLORIDE 15 MILLIGRAM(S): 2.5 TABLET ORAL at 06:22

## 2020-01-01 RX ADMIN — ATORVASTATIN CALCIUM 20 MILLIGRAM(S): 80 TABLET, FILM COATED ORAL at 23:36

## 2020-01-01 RX ADMIN — FENTANYL CITRATE 50 MICROGRAM(S): 50 INJECTION INTRAVENOUS at 09:42

## 2020-01-01 RX ADMIN — Medication 1 APPLICATION(S): at 06:00

## 2020-01-01 RX ADMIN — ENOXAPARIN SODIUM 80 MILLIGRAM(S): 100 INJECTION SUBCUTANEOUS at 17:39

## 2020-01-01 RX ADMIN — CHLORHEXIDINE GLUCONATE 15 MILLILITER(S): 213 SOLUTION TOPICAL at 05:26

## 2020-01-01 RX ADMIN — Medication 10 MILLIGRAM(S): at 13:33

## 2020-01-01 RX ADMIN — DEXMEDETOMIDINE HYDROCHLORIDE IN 0.9% SODIUM CHLORIDE 7.61 MICROGRAM(S)/KG/HR: 4 INJECTION INTRAVENOUS at 21:18

## 2020-01-01 RX ADMIN — Medication 1: at 13:48

## 2020-01-01 RX ADMIN — Medication 1 DROP(S): at 00:10

## 2020-01-01 RX ADMIN — Medication 10 MILLIGRAM(S): at 11:45

## 2020-01-01 RX ADMIN — Medication 650 MILLIGRAM(S): at 04:02

## 2020-01-01 RX ADMIN — MEROPENEM 100 MILLIGRAM(S): 1 INJECTION INTRAVENOUS at 14:11

## 2020-01-01 RX ADMIN — SODIUM CHLORIDE 500 MILLILITER(S): 9 INJECTION, SOLUTION INTRAVENOUS at 17:40

## 2020-01-01 RX ADMIN — ATORVASTATIN CALCIUM 20 MILLIGRAM(S): 80 TABLET, FILM COATED ORAL at 03:09

## 2020-01-01 RX ADMIN — ATORVASTATIN CALCIUM 20 MILLIGRAM(S): 80 TABLET, FILM COATED ORAL at 23:13

## 2020-01-01 RX ADMIN — MIDODRINE HYDROCHLORIDE 15 MILLIGRAM(S): 2.5 TABLET ORAL at 07:27

## 2020-01-01 RX ADMIN — Medication 1: at 06:29

## 2020-01-01 RX ADMIN — ENOXAPARIN SODIUM 70 MILLIGRAM(S): 100 INJECTION SUBCUTANEOUS at 17:31

## 2020-01-01 RX ADMIN — DEXMEDETOMIDINE HYDROCHLORIDE IN 0.9% SODIUM CHLORIDE 7.61 MICROGRAM(S)/KG/HR: 4 INJECTION INTRAVENOUS at 06:33

## 2020-01-01 RX ADMIN — Medication 1 APPLICATION(S): at 06:36

## 2020-01-01 RX ADMIN — Medication 75 MICROGRAM(S): at 06:07

## 2020-01-01 RX ADMIN — PROPOFOL 6.85 MICROGRAM(S)/KG/MIN: 10 INJECTION, EMULSION INTRAVENOUS at 17:45

## 2020-01-01 RX ADMIN — Medication 1 DROP(S): at 14:57

## 2020-01-01 RX ADMIN — ATORVASTATIN CALCIUM 20 MILLIGRAM(S): 80 TABLET, FILM COATED ORAL at 23:49

## 2020-01-01 RX ADMIN — Medication 200 MILLIGRAM(S): at 08:08

## 2020-01-01 RX ADMIN — Medication 75 MICROGRAM(S): at 05:29

## 2020-01-01 RX ADMIN — Medication 400 MILLIGRAM(S): at 05:20

## 2020-01-01 RX ADMIN — FENTANYL CITRATE 3.81 MICROGRAM(S)/KG/HR: 50 INJECTION INTRAVENOUS at 23:16

## 2020-01-01 RX ADMIN — ENOXAPARIN SODIUM 80 MILLIGRAM(S): 100 INJECTION SUBCUTANEOUS at 19:17

## 2020-01-01 RX ADMIN — DEXMEDETOMIDINE HYDROCHLORIDE IN 0.9% SODIUM CHLORIDE 7.61 MICROGRAM(S)/KG/HR: 4 INJECTION INTRAVENOUS at 06:31

## 2020-01-01 RX ADMIN — Medication 75 MICROGRAM(S): at 05:35

## 2020-01-01 RX ADMIN — Medication 81 MILLIGRAM(S): at 12:34

## 2020-01-01 RX ADMIN — MIDODRINE HYDROCHLORIDE 15 MILLIGRAM(S): 2.5 TABLET ORAL at 22:45

## 2020-01-01 RX ADMIN — MIDODRINE HYDROCHLORIDE 15 MILLIGRAM(S): 2.5 TABLET ORAL at 23:23

## 2020-01-01 RX ADMIN — Medication 1 APPLICATION(S): at 06:43

## 2020-01-01 RX ADMIN — MIDODRINE HYDROCHLORIDE 15 MILLIGRAM(S): 2.5 TABLET ORAL at 21:58

## 2020-01-01 RX ADMIN — Medication 1 DROP(S): at 11:10

## 2020-01-01 RX ADMIN — Medication 650 MILLIGRAM(S): at 03:30

## 2020-01-01 RX ADMIN — FAMOTIDINE 20 MILLIGRAM(S): 10 INJECTION INTRAVENOUS at 18:46

## 2020-01-01 RX ADMIN — Medication 7.13 MICROGRAM(S)/KG/MIN: at 05:26

## 2020-01-01 RX ADMIN — POLYETHYLENE GLYCOL 3350 17 GRAM(S): 17 POWDER, FOR SOLUTION ORAL at 12:52

## 2020-01-01 RX ADMIN — Medication 20 MILLIGRAM(S): at 13:17

## 2020-01-01 RX ADMIN — Medication 81 MILLIGRAM(S): at 11:55

## 2020-01-01 RX ADMIN — Medication 1 DROP(S): at 05:42

## 2020-01-01 RX ADMIN — ATORVASTATIN CALCIUM 20 MILLIGRAM(S): 80 TABLET, FILM COATED ORAL at 22:45

## 2020-01-01 RX ADMIN — ATORVASTATIN CALCIUM 20 MILLIGRAM(S): 80 TABLET, FILM COATED ORAL at 23:10

## 2020-01-01 RX ADMIN — Medication 1 DROP(S): at 17:20

## 2020-01-01 RX ADMIN — DEXMEDETOMIDINE HYDROCHLORIDE IN 0.9% SODIUM CHLORIDE 7.61 MICROGRAM(S)/KG/HR: 4 INJECTION INTRAVENOUS at 02:55

## 2020-01-01 RX ADMIN — PROPOFOL 6.85 MICROGRAM(S)/KG/MIN: 10 INJECTION, EMULSION INTRAVENOUS at 17:12

## 2020-01-01 RX ADMIN — ATORVASTATIN CALCIUM 20 MILLIGRAM(S): 80 TABLET, FILM COATED ORAL at 00:08

## 2020-01-01 RX ADMIN — CHLORHEXIDINE GLUCONATE 15 MILLILITER(S): 213 SOLUTION TOPICAL at 17:30

## 2020-01-01 RX ADMIN — Medication 7.13 MICROGRAM(S)/KG/MIN: at 23:12

## 2020-01-01 RX ADMIN — FAMOTIDINE 20 MILLIGRAM(S): 10 INJECTION INTRAVENOUS at 05:35

## 2020-01-01 RX ADMIN — CHLORHEXIDINE GLUCONATE 15 MILLILITER(S): 213 SOLUTION TOPICAL at 05:34

## 2020-01-01 RX ADMIN — Medication 7.13 MICROGRAM(S)/KG/MIN: at 12:36

## 2020-01-01 RX ADMIN — Medication 650 MILLIGRAM(S): at 05:52

## 2020-01-01 RX ADMIN — Medication 1: at 06:37

## 2020-01-01 RX ADMIN — Medication 75 MICROGRAM(S): at 06:44

## 2020-01-01 RX ADMIN — ENOXAPARIN SODIUM 80 MILLIGRAM(S): 100 INJECTION SUBCUTANEOUS at 05:07

## 2020-01-01 RX ADMIN — Medication 81 MILLIGRAM(S): at 13:26

## 2020-01-01 RX ADMIN — MIDODRINE HYDROCHLORIDE 15 MILLIGRAM(S): 2.5 TABLET ORAL at 13:39

## 2020-01-01 RX ADMIN — Medication 650 MILLIGRAM(S): at 13:52

## 2020-01-01 RX ADMIN — CHLORHEXIDINE GLUCONATE 15 MILLILITER(S): 213 SOLUTION TOPICAL at 06:16

## 2020-01-01 RX ADMIN — MIDODRINE HYDROCHLORIDE 15 MILLIGRAM(S): 2.5 TABLET ORAL at 14:45

## 2020-01-01 RX ADMIN — Medication 400 MILLIGRAM(S): at 11:13

## 2020-01-01 RX ADMIN — Medication 1 DROP(S): at 23:48

## 2020-01-01 RX ADMIN — Medication 400 MILLIGRAM(S): at 12:03

## 2020-01-01 RX ADMIN — Medication 75 MICROGRAM(S): at 07:27

## 2020-01-01 RX ADMIN — Medication 10 MILLIGRAM(S): at 16:08

## 2020-01-01 RX ADMIN — ATORVASTATIN CALCIUM 20 MILLIGRAM(S): 80 TABLET, FILM COATED ORAL at 21:57

## 2020-01-01 RX ADMIN — FAMOTIDINE 20 MILLIGRAM(S): 10 INJECTION INTRAVENOUS at 05:46

## 2020-01-01 RX ADMIN — MIDODRINE HYDROCHLORIDE 15 MILLIGRAM(S): 2.5 TABLET ORAL at 05:17

## 2020-01-01 RX ADMIN — CHLORHEXIDINE GLUCONATE 15 MILLILITER(S): 213 SOLUTION TOPICAL at 17:08

## 2020-01-01 RX ADMIN — ATORVASTATIN CALCIUM 20 MILLIGRAM(S): 80 TABLET, FILM COATED ORAL at 21:46

## 2020-01-01 RX ADMIN — Medication 40 MILLIEQUIVALENT(S): at 09:42

## 2020-01-01 RX ADMIN — FAMOTIDINE 20 MILLIGRAM(S): 10 INJECTION INTRAVENOUS at 16:23

## 2020-01-01 RX ADMIN — Medication 250 MILLIGRAM(S): at 17:32

## 2020-01-01 RX ADMIN — Medication 650 MILLIGRAM(S): at 01:06

## 2020-01-01 RX ADMIN — CHLORHEXIDINE GLUCONATE 1 APPLICATION(S): 213 SOLUTION TOPICAL at 06:51

## 2020-01-01 RX ADMIN — MIDODRINE HYDROCHLORIDE 15 MILLIGRAM(S): 2.5 TABLET ORAL at 05:11

## 2020-01-01 RX ADMIN — FAMOTIDINE 20 MILLIGRAM(S): 10 INJECTION INTRAVENOUS at 18:21

## 2020-01-01 RX ADMIN — Medication 10 MILLIGRAM(S): at 12:34

## 2020-01-01 RX ADMIN — Medication 1 DROP(S): at 13:40

## 2020-01-01 RX ADMIN — CHLORHEXIDINE GLUCONATE 15 MILLILITER(S): 213 SOLUTION TOPICAL at 16:42

## 2020-01-01 RX ADMIN — ENOXAPARIN SODIUM 80 MILLIGRAM(S): 100 INJECTION SUBCUTANEOUS at 05:26

## 2020-01-01 RX ADMIN — CHLORHEXIDINE GLUCONATE 15 MILLILITER(S): 213 SOLUTION TOPICAL at 16:13

## 2020-01-01 RX ADMIN — Medication 3.31 MICROGRAM(S)/KG/MIN: at 03:48

## 2020-01-01 RX ADMIN — Medication 100 MILLIEQUIVALENT(S): at 08:02

## 2020-01-01 RX ADMIN — Medication 1 DROP(S): at 17:01

## 2020-01-01 RX ADMIN — Medication 1 APPLICATION(S): at 05:09

## 2020-01-01 RX ADMIN — Medication 1 DROP(S): at 04:54

## 2020-01-01 RX ADMIN — FAMOTIDINE 20 MILLIGRAM(S): 10 INJECTION INTRAVENOUS at 17:00

## 2020-01-01 RX ADMIN — Medication 400 MILLIGRAM(S): at 08:15

## 2020-01-01 RX ADMIN — Medication 1 DROP(S): at 05:28

## 2020-01-01 RX ADMIN — PHENYLEPHRINE HYDROCHLORIDE 39.7 MICROGRAM(S)/KG/MIN: 10 INJECTION INTRAVENOUS at 17:00

## 2020-01-01 RX ADMIN — CHLORHEXIDINE GLUCONATE 15 MILLILITER(S): 213 SOLUTION TOPICAL at 18:28

## 2020-01-01 RX ADMIN — FAMOTIDINE 20 MILLIGRAM(S): 10 INJECTION INTRAVENOUS at 17:58

## 2020-01-01 RX ADMIN — ENOXAPARIN SODIUM 70 MILLIGRAM(S): 100 INJECTION SUBCUTANEOUS at 05:27

## 2020-01-01 RX ADMIN — ENOXAPARIN SODIUM 80 MILLIGRAM(S): 100 INJECTION SUBCUTANEOUS at 06:20

## 2020-01-01 RX ADMIN — PROPOFOL 6.85 MICROGRAM(S)/KG/MIN: 10 INJECTION, EMULSION INTRAVENOUS at 01:35

## 2020-01-01 RX ADMIN — MIDODRINE HYDROCHLORIDE 15 MILLIGRAM(S): 2.5 TABLET ORAL at 22:25

## 2020-01-01 RX ADMIN — Medication 81 MILLIGRAM(S): at 11:36

## 2020-01-01 RX ADMIN — PHENYLEPHRINE HYDROCHLORIDE 39.7 MICROGRAM(S)/KG/MIN: 10 INJECTION INTRAVENOUS at 17:04

## 2020-01-01 RX ADMIN — FAMOTIDINE 20 MILLIGRAM(S): 10 INJECTION INTRAVENOUS at 06:07

## 2020-01-01 RX ADMIN — MIDODRINE HYDROCHLORIDE 15 MILLIGRAM(S): 2.5 TABLET ORAL at 05:27

## 2020-01-01 RX ADMIN — Medication 10 MILLIGRAM(S): at 12:43

## 2020-01-01 RX ADMIN — Medication 650 MILLIGRAM(S): at 22:10

## 2020-01-01 RX ADMIN — Medication 81 MILLIGRAM(S): at 12:41

## 2020-01-01 RX ADMIN — Medication 75 MICROGRAM(S): at 05:38

## 2020-01-01 RX ADMIN — ATORVASTATIN CALCIUM 20 MILLIGRAM(S): 80 TABLET, FILM COATED ORAL at 21:31

## 2020-01-01 RX ADMIN — Medication 1 APPLICATION(S): at 05:28

## 2020-01-01 RX ADMIN — ATORVASTATIN CALCIUM 20 MILLIGRAM(S): 80 TABLET, FILM COATED ORAL at 22:25

## 2020-01-01 RX ADMIN — PHENYLEPHRINE HYDROCHLORIDE 39.7 MICROGRAM(S)/KG/MIN: 10 INJECTION INTRAVENOUS at 12:30

## 2020-01-01 RX ADMIN — Medication 650 MILLIGRAM(S): at 21:11

## 2020-01-01 RX ADMIN — Medication 103 MILLIGRAM(S): at 11:14

## 2020-01-01 RX ADMIN — POLYETHYLENE GLYCOL 3350 17 GRAM(S): 17 POWDER, FOR SOLUTION ORAL at 11:55

## 2020-01-01 RX ADMIN — MIDODRINE HYDROCHLORIDE 15 MILLIGRAM(S): 2.5 TABLET ORAL at 12:01

## 2020-01-01 RX ADMIN — DEXMEDETOMIDINE HYDROCHLORIDE IN 0.9% SODIUM CHLORIDE 7.61 MICROGRAM(S)/KG/HR: 4 INJECTION INTRAVENOUS at 09:51

## 2020-01-01 RX ADMIN — Medication 1 DROP(S): at 05:49

## 2020-01-01 RX ADMIN — CHLORHEXIDINE GLUCONATE 1 APPLICATION(S): 213 SOLUTION TOPICAL at 05:55

## 2020-01-01 RX ADMIN — Medication 1 TABLET(S): at 07:43

## 2020-01-01 RX ADMIN — PROPOFOL 11.8 MICROGRAM(S)/KG/MIN: 10 INJECTION, EMULSION INTRAVENOUS at 13:33

## 2020-01-01 RX ADMIN — CHLORHEXIDINE GLUCONATE 15 MILLILITER(S): 213 SOLUTION TOPICAL at 17:57

## 2020-01-01 RX ADMIN — Medication 1 APPLICATION(S): at 05:35

## 2020-01-01 RX ADMIN — ATORVASTATIN CALCIUM 20 MILLIGRAM(S): 80 TABLET, FILM COATED ORAL at 22:17

## 2020-01-01 RX ADMIN — Medication 81 MILLIGRAM(S): at 12:00

## 2020-01-01 RX ADMIN — ENOXAPARIN SODIUM 80 MILLIGRAM(S): 100 INJECTION SUBCUTANEOUS at 17:40

## 2020-01-01 RX ADMIN — Medication 10 MILLIGRAM(S): at 11:36

## 2020-01-01 RX ADMIN — Medication 10 MILLIGRAM(S): at 11:41

## 2020-01-01 RX ADMIN — CHLORHEXIDINE GLUCONATE 15 MILLILITER(S): 213 SOLUTION TOPICAL at 05:20

## 2020-01-01 RX ADMIN — PROPOFOL 2.28 MICROGRAM(S)/KG/MIN: 10 INJECTION, EMULSION INTRAVENOUS at 04:57

## 2020-01-01 RX ADMIN — HEPARIN SODIUM 6000 UNIT(S): 5000 INJECTION INTRAVENOUS; SUBCUTANEOUS at 20:01

## 2020-01-01 RX ADMIN — ENOXAPARIN SODIUM 70 MILLIGRAM(S): 100 INJECTION SUBCUTANEOUS at 05:52

## 2020-01-01 RX ADMIN — PROPOFOL 6.85 MICROGRAM(S)/KG/MIN: 10 INJECTION, EMULSION INTRAVENOUS at 10:27

## 2020-01-01 RX ADMIN — CHLORHEXIDINE GLUCONATE 15 MILLILITER(S): 213 SOLUTION TOPICAL at 17:15

## 2020-01-01 RX ADMIN — AZITHROMYCIN 255 MILLIGRAM(S): 500 TABLET, FILM COATED ORAL at 04:38

## 2020-01-01 RX ADMIN — ENOXAPARIN SODIUM 70 MILLIGRAM(S): 100 INJECTION SUBCUTANEOUS at 17:09

## 2020-01-01 RX ADMIN — CHLORHEXIDINE GLUCONATE 15 MILLILITER(S): 213 SOLUTION TOPICAL at 16:23

## 2020-01-01 RX ADMIN — Medication 1 APPLICATION(S): at 17:30

## 2020-01-01 RX ADMIN — CHLORHEXIDINE GLUCONATE 15 MILLILITER(S): 213 SOLUTION TOPICAL at 17:52

## 2020-01-01 RX ADMIN — Medication 10 MILLIGRAM(S): at 11:26

## 2020-01-01 RX ADMIN — POTASSIUM PHOSPHATE, MONOBASIC POTASSIUM PHOSPHATE, DIBASIC 63.75 MILLIMOLE(S): 236; 224 INJECTION, SOLUTION INTRAVENOUS at 23:28

## 2020-01-01 RX ADMIN — MEROPENEM 100 MILLIGRAM(S): 1 INJECTION INTRAVENOUS at 21:26

## 2020-01-01 RX ADMIN — Medication 10 MILLIGRAM(S): at 12:20

## 2020-01-01 RX ADMIN — Medication 103 MILLIGRAM(S): at 13:21

## 2020-01-01 RX ADMIN — POLYETHYLENE GLYCOL 3350 17 GRAM(S): 17 POWDER, FOR SOLUTION ORAL at 12:09

## 2020-01-01 RX ADMIN — PROPOFOL 6.85 MICROGRAM(S)/KG/MIN: 10 INJECTION, EMULSION INTRAVENOUS at 17:08

## 2020-01-01 RX ADMIN — PROPOFOL 6.85 MICROGRAM(S)/KG/MIN: 10 INJECTION, EMULSION INTRAVENOUS at 20:02

## 2020-01-01 RX ADMIN — Medication 1 DROP(S): at 06:45

## 2020-01-01 RX ADMIN — Medication 75 MICROGRAM(S): at 05:11

## 2020-01-01 RX ADMIN — MEROPENEM 100 MILLIGRAM(S): 1 INJECTION INTRAVENOUS at 06:14

## 2020-01-01 RX ADMIN — FAMOTIDINE 20 MILLIGRAM(S): 10 INJECTION INTRAVENOUS at 17:30

## 2020-01-01 RX ADMIN — Medication 1 TABLET(S): at 23:12

## 2020-01-01 RX ADMIN — CHLORHEXIDINE GLUCONATE 15 MILLILITER(S): 213 SOLUTION TOPICAL at 17:38

## 2020-01-01 RX ADMIN — ATORVASTATIN CALCIUM 20 MILLIGRAM(S): 80 TABLET, FILM COATED ORAL at 22:12

## 2020-01-01 RX ADMIN — Medication 81 MILLIGRAM(S): at 13:50

## 2020-01-01 RX ADMIN — CHLORHEXIDINE GLUCONATE 1 APPLICATION(S): 213 SOLUTION TOPICAL at 05:24

## 2020-01-01 RX ADMIN — Medication 650 MILLIGRAM(S): at 06:00

## 2020-01-01 RX ADMIN — FAMOTIDINE 20 MILLIGRAM(S): 10 INJECTION INTRAVENOUS at 06:52

## 2020-01-01 RX ADMIN — MEROPENEM 100 MILLIGRAM(S): 1 INJECTION INTRAVENOUS at 14:56

## 2020-01-01 RX ADMIN — PHENYLEPHRINE HYDROCHLORIDE 143 MICROGRAM(S)/KG/MIN: 10 INJECTION INTRAVENOUS at 10:03

## 2020-01-01 RX ADMIN — SODIUM CHLORIDE 125 MILLILITER(S): 9 INJECTION, SOLUTION INTRAVENOUS at 09:04

## 2020-01-01 RX ADMIN — ENOXAPARIN SODIUM 80 MILLIGRAM(S): 100 INJECTION SUBCUTANEOUS at 05:25

## 2020-01-01 RX ADMIN — PROPOFOL 6.85 MICROGRAM(S)/KG/MIN: 10 INJECTION, EMULSION INTRAVENOUS at 15:00

## 2020-01-01 RX ADMIN — POLYETHYLENE GLYCOL 3350 17 GRAM(S): 17 POWDER, FOR SOLUTION ORAL at 11:44

## 2020-01-01 RX ADMIN — PROPOFOL 6.85 MICROGRAM(S)/KG/MIN: 10 INJECTION, EMULSION INTRAVENOUS at 19:53

## 2020-01-01 RX ADMIN — PANTOPRAZOLE SODIUM 40 MILLIGRAM(S): 20 TABLET, DELAYED RELEASE ORAL at 13:21

## 2020-01-01 RX ADMIN — PHENYLEPHRINE HYDROCHLORIDE 39.7 MICROGRAM(S)/KG/MIN: 10 INJECTION INTRAVENOUS at 07:55

## 2020-01-01 RX ADMIN — DEXMEDETOMIDINE HYDROCHLORIDE IN 0.9% SODIUM CHLORIDE 7.61 MICROGRAM(S)/KG/HR: 4 INJECTION INTRAVENOUS at 04:08

## 2020-01-01 RX ADMIN — FAMOTIDINE 20 MILLIGRAM(S): 10 INJECTION INTRAVENOUS at 18:08

## 2020-01-01 RX ADMIN — Medication 1 DROP(S): at 20:51

## 2020-01-01 RX ADMIN — CHLORHEXIDINE GLUCONATE 15 MILLILITER(S): 213 SOLUTION TOPICAL at 06:03

## 2020-01-01 RX ADMIN — ENOXAPARIN SODIUM 80 MILLIGRAM(S): 100 INJECTION SUBCUTANEOUS at 06:00

## 2020-01-01 RX ADMIN — ATORVASTATIN CALCIUM 20 MILLIGRAM(S): 80 TABLET, FILM COATED ORAL at 21:55

## 2020-01-01 RX ADMIN — Medication 1 DROP(S): at 23:14

## 2020-01-01 RX ADMIN — CHLORHEXIDINE GLUCONATE 15 MILLILITER(S): 213 SOLUTION TOPICAL at 05:55

## 2020-01-01 RX ADMIN — PROPOFOL 6.85 MICROGRAM(S)/KG/MIN: 10 INJECTION, EMULSION INTRAVENOUS at 07:56

## 2020-01-01 RX ADMIN — ENOXAPARIN SODIUM 80 MILLIGRAM(S): 100 INJECTION SUBCUTANEOUS at 06:06

## 2020-01-01 RX ADMIN — ENOXAPARIN SODIUM 70 MILLIGRAM(S): 100 INJECTION SUBCUTANEOUS at 17:41

## 2020-01-01 RX ADMIN — PROPOFOL 6.85 MICROGRAM(S)/KG/MIN: 10 INJECTION, EMULSION INTRAVENOUS at 20:34

## 2020-01-01 RX ADMIN — PROPOFOL 6.85 MICROGRAM(S)/KG/MIN: 10 INJECTION, EMULSION INTRAVENOUS at 05:25

## 2020-01-01 RX ADMIN — FAMOTIDINE 20 MILLIGRAM(S): 10 INJECTION INTRAVENOUS at 05:11

## 2020-01-01 RX ADMIN — PHENYLEPHRINE HYDROCHLORIDE 39.7 MICROGRAM(S)/KG/MIN: 10 INJECTION INTRAVENOUS at 20:52

## 2020-01-01 RX ADMIN — Medication 1: at 23:58

## 2020-01-01 RX ADMIN — Medication 75 MICROGRAM(S): at 06:52

## 2020-01-01 RX ADMIN — Medication 10 MILLIGRAM(S): at 11:58

## 2020-01-01 RX ADMIN — Medication 40 MILLIGRAM(S): at 14:57

## 2020-01-01 RX ADMIN — Medication 1 APPLICATION(S): at 17:10

## 2020-01-01 RX ADMIN — Medication 40 MILLIEQUIVALENT(S): at 10:07

## 2020-01-01 RX ADMIN — Medication 1 DROP(S): at 05:41

## 2020-01-01 RX ADMIN — DEXMEDETOMIDINE HYDROCHLORIDE IN 0.9% SODIUM CHLORIDE 7.61 MICROGRAM(S)/KG/HR: 4 INJECTION INTRAVENOUS at 10:02

## 2020-01-01 RX ADMIN — Medication 650 MILLIGRAM(S): at 16:02

## 2020-01-01 RX ADMIN — PROPOFOL 6.85 MICROGRAM(S)/KG/MIN: 10 INJECTION, EMULSION INTRAVENOUS at 09:37

## 2020-01-01 RX ADMIN — Medication 40 MILLIGRAM(S): at 09:49

## 2020-01-01 RX ADMIN — POLYETHYLENE GLYCOL 3350 17 GRAM(S): 17 POWDER, FOR SOLUTION ORAL at 13:41

## 2020-01-01 RX ADMIN — PROPOFOL 6.85 MICROGRAM(S)/KG/MIN: 10 INJECTION, EMULSION INTRAVENOUS at 19:48

## 2020-01-01 RX ADMIN — Medication 100 MILLIGRAM(S): at 17:41

## 2020-01-01 RX ADMIN — Medication 10 MILLIGRAM(S): at 12:22

## 2020-01-01 RX ADMIN — Medication 1 DROP(S): at 13:26

## 2020-01-01 RX ADMIN — FAMOTIDINE 20 MILLIGRAM(S): 10 INJECTION INTRAVENOUS at 18:18

## 2020-01-01 RX ADMIN — MIDODRINE HYDROCHLORIDE 15 MILLIGRAM(S): 2.5 TABLET ORAL at 21:38

## 2020-01-01 RX ADMIN — Medication 1: at 12:22

## 2020-01-01 RX ADMIN — Medication 1 DROP(S): at 05:57

## 2020-01-01 RX ADMIN — Medication 1: at 05:29

## 2020-01-01 RX ADMIN — Medication 1 APPLICATION(S): at 17:26

## 2020-01-01 RX ADMIN — CHLORHEXIDINE GLUCONATE 15 MILLILITER(S): 213 SOLUTION TOPICAL at 17:06

## 2020-01-01 RX ADMIN — PROPOFOL 6.85 MICROGRAM(S)/KG/MIN: 10 INJECTION, EMULSION INTRAVENOUS at 15:22

## 2020-01-01 RX ADMIN — Medication 1 APPLICATION(S): at 07:04

## 2020-01-01 RX ADMIN — MEROPENEM 100 MILLIGRAM(S): 1 INJECTION INTRAVENOUS at 13:24

## 2020-01-01 RX ADMIN — Medication 1 DROP(S): at 11:39

## 2020-01-01 RX ADMIN — POLYETHYLENE GLYCOL 3350 17 GRAM(S): 17 POWDER, FOR SOLUTION ORAL at 12:00

## 2020-01-01 RX ADMIN — Medication 1 DROP(S): at 11:51

## 2020-01-01 RX ADMIN — Medication 650 MILLIGRAM(S): at 06:40

## 2020-01-01 RX ADMIN — CHLORHEXIDINE GLUCONATE 15 MILLILITER(S): 213 SOLUTION TOPICAL at 07:26

## 2020-01-01 RX ADMIN — Medication 1: at 23:44

## 2020-01-01 RX ADMIN — Medication 1 APPLICATION(S): at 06:04

## 2020-01-01 RX ADMIN — Medication 1 TABLET(S): at 23:02

## 2020-01-01 RX ADMIN — Medication 75 MICROGRAM(S): at 06:31

## 2020-01-01 RX ADMIN — ENOXAPARIN SODIUM 70 MILLIGRAM(S): 100 INJECTION SUBCUTANEOUS at 17:58

## 2020-01-01 RX ADMIN — SODIUM CHLORIDE 75 MILLILITER(S): 9 INJECTION, SOLUTION INTRAVENOUS at 03:36

## 2020-01-01 RX ADMIN — Medication 1 APPLICATION(S): at 05:16

## 2020-01-01 RX ADMIN — Medication 1 APPLICATION(S): at 17:58

## 2020-01-01 RX ADMIN — Medication 81 MILLIGRAM(S): at 12:08

## 2020-01-01 RX ADMIN — ENOXAPARIN SODIUM 80 MILLIGRAM(S): 100 INJECTION SUBCUTANEOUS at 05:15

## 2020-01-01 RX ADMIN — MIDODRINE HYDROCHLORIDE 15 MILLIGRAM(S): 2.5 TABLET ORAL at 20:01

## 2020-01-01 RX ADMIN — SODIUM CHLORIDE 500 MILLILITER(S): 9 INJECTION, SOLUTION INTRAVENOUS at 19:04

## 2020-01-01 RX ADMIN — PROPOFOL 6.85 MICROGRAM(S)/KG/MIN: 10 INJECTION, EMULSION INTRAVENOUS at 06:32

## 2020-01-01 RX ADMIN — CHLORHEXIDINE GLUCONATE 15 MILLILITER(S): 213 SOLUTION TOPICAL at 17:41

## 2020-01-01 RX ADMIN — PROPOFOL 22.8 MICROGRAM(S)/KG/MIN: 10 INJECTION, EMULSION INTRAVENOUS at 17:34

## 2020-01-01 RX ADMIN — CHLORHEXIDINE GLUCONATE 15 MILLILITER(S): 213 SOLUTION TOPICAL at 18:08

## 2020-01-01 RX ADMIN — PROPOFOL 6.85 MICROGRAM(S)/KG/MIN: 10 INJECTION, EMULSION INTRAVENOUS at 01:42

## 2020-01-01 RX ADMIN — PROPOFOL 6.85 MICROGRAM(S)/KG/MIN: 10 INJECTION, EMULSION INTRAVENOUS at 16:00

## 2020-01-01 RX ADMIN — CHLORHEXIDINE GLUCONATE 15 MILLILITER(S): 213 SOLUTION TOPICAL at 05:25

## 2020-01-01 RX ADMIN — Medication 103 MILLIGRAM(S): at 18:28

## 2020-01-01 RX ADMIN — Medication 81 MILLIGRAM(S): at 13:33

## 2020-01-01 RX ADMIN — Medication 100 GRAM(S): at 08:43

## 2020-01-01 RX ADMIN — Medication 81 MILLIGRAM(S): at 16:58

## 2020-01-01 RX ADMIN — Medication 81 MILLIGRAM(S): at 16:08

## 2020-01-01 RX ADMIN — CHLORHEXIDINE GLUCONATE 15 MILLILITER(S): 213 SOLUTION TOPICAL at 16:54

## 2020-01-01 RX ADMIN — Medication 100 MILLIEQUIVALENT(S): at 09:04

## 2020-01-01 RX ADMIN — AZITHROMYCIN 255 MILLIGRAM(S): 500 TABLET, FILM COATED ORAL at 05:04

## 2020-01-01 RX ADMIN — Medication 1 APPLICATION(S): at 16:22

## 2020-01-01 RX ADMIN — Medication 1 DROP(S): at 06:12

## 2020-01-01 RX ADMIN — POLYETHYLENE GLYCOL 3350 17 GRAM(S): 17 POWDER, FOR SOLUTION ORAL at 14:10

## 2020-01-01 RX ADMIN — ENOXAPARIN SODIUM 70 MILLIGRAM(S): 100 INJECTION SUBCUTANEOUS at 05:43

## 2020-01-01 RX ADMIN — FAMOTIDINE 20 MILLIGRAM(S): 10 INJECTION INTRAVENOUS at 17:10

## 2020-01-01 RX ADMIN — FENTANYL CITRATE 3.81 MICROGRAM(S)/KG/HR: 50 INJECTION INTRAVENOUS at 09:40

## 2020-01-01 RX ADMIN — CHLORHEXIDINE GLUCONATE 15 MILLILITER(S): 213 SOLUTION TOPICAL at 19:18

## 2020-01-01 RX ADMIN — Medication 400 MILLIGRAM(S): at 10:02

## 2020-01-01 RX ADMIN — ENOXAPARIN SODIUM 80 MILLIGRAM(S): 100 INJECTION SUBCUTANEOUS at 17:19

## 2020-01-01 RX ADMIN — POLYETHYLENE GLYCOL 3350 17 GRAM(S): 17 POWDER, FOR SOLUTION ORAL at 12:23

## 2020-01-01 RX ADMIN — PROPOFOL 6.85 MICROGRAM(S)/KG/MIN: 10 INJECTION, EMULSION INTRAVENOUS at 03:36

## 2020-01-01 RX ADMIN — FAMOTIDINE 20 MILLIGRAM(S): 10 INJECTION INTRAVENOUS at 17:43

## 2020-01-01 RX ADMIN — FAMOTIDINE 20 MILLIGRAM(S): 10 INJECTION INTRAVENOUS at 05:09

## 2020-01-01 RX ADMIN — FAMOTIDINE 20 MILLIGRAM(S): 10 INJECTION INTRAVENOUS at 05:28

## 2020-01-01 RX ADMIN — Medication 1 DROP(S): at 12:08

## 2020-01-01 RX ADMIN — Medication 1 DROP(S): at 11:41

## 2020-01-01 RX ADMIN — CHLORHEXIDINE GLUCONATE 15 MILLILITER(S): 213 SOLUTION TOPICAL at 19:20

## 2020-01-01 RX ADMIN — Medication 1 APPLICATION(S): at 17:24

## 2020-01-01 RX ADMIN — MIDODRINE HYDROCHLORIDE 15 MILLIGRAM(S): 2.5 TABLET ORAL at 05:55

## 2020-01-01 RX ADMIN — CHLORHEXIDINE GLUCONATE 15 MILLILITER(S): 213 SOLUTION TOPICAL at 05:29

## 2020-01-01 RX ADMIN — Medication 1: at 18:16

## 2020-01-01 RX ADMIN — PROPOFOL 6.85 MICROGRAM(S)/KG/MIN: 10 INJECTION, EMULSION INTRAVENOUS at 01:05

## 2020-01-01 RX ADMIN — POTASSIUM PHOSPHATE, MONOBASIC POTASSIUM PHOSPHATE, DIBASIC 63.75 MILLIMOLE(S): 236; 224 INJECTION, SOLUTION INTRAVENOUS at 13:47

## 2020-01-01 RX ADMIN — Medication 10 MILLIGRAM(S): at 14:10

## 2020-01-01 RX ADMIN — VASOPRESSIN 2.4 UNIT(S)/MIN: 20 INJECTION INTRAVENOUS at 06:52

## 2020-01-01 RX ADMIN — CHLORHEXIDINE GLUCONATE 15 MILLILITER(S): 213 SOLUTION TOPICAL at 17:32

## 2020-01-01 RX ADMIN — VASOPRESSIN 2.4 UNIT(S)/MIN: 20 INJECTION INTRAVENOUS at 03:45

## 2020-01-01 RX ADMIN — Medication 10 MILLIGRAM(S): at 14:55

## 2020-01-01 RX ADMIN — Medication 1 DROP(S): at 23:45

## 2020-01-01 RX ADMIN — CHLORHEXIDINE GLUCONATE 15 MILLILITER(S): 213 SOLUTION TOPICAL at 17:59

## 2020-01-01 RX ADMIN — PHENYLEPHRINE HYDROCHLORIDE 39.7 MICROGRAM(S)/KG/MIN: 10 INJECTION INTRAVENOUS at 02:04

## 2020-01-01 RX ADMIN — ENOXAPARIN SODIUM 40 MILLIGRAM(S): 100 INJECTION SUBCUTANEOUS at 11:14

## 2020-01-01 RX ADMIN — CEFTRIAXONE 1000 MILLIGRAM(S): 500 INJECTION, POWDER, FOR SOLUTION INTRAMUSCULAR; INTRAVENOUS at 14:44

## 2020-01-01 RX ADMIN — Medication 1 APPLICATION(S): at 18:11

## 2020-01-01 RX ADMIN — FAMOTIDINE 20 MILLIGRAM(S): 10 INJECTION INTRAVENOUS at 18:11

## 2020-01-01 RX ADMIN — FAMOTIDINE 20 MILLIGRAM(S): 10 INJECTION INTRAVENOUS at 17:06

## 2020-01-01 RX ADMIN — SODIUM CHLORIDE 125 MILLILITER(S): 9 INJECTION INTRAMUSCULAR; INTRAVENOUS; SUBCUTANEOUS at 13:12

## 2020-01-01 RX ADMIN — Medication 200 MILLIGRAM(S): at 07:44

## 2020-01-01 RX ADMIN — PHENYLEPHRINE HYDROCHLORIDE 1.43 MICROGRAM(S)/KG/MIN: 10 INJECTION INTRAVENOUS at 04:55

## 2020-01-01 RX ADMIN — Medication 1 DROP(S): at 04:44

## 2020-01-01 RX ADMIN — Medication 75 MICROGRAM(S): at 05:23

## 2020-01-01 RX ADMIN — MIDODRINE HYDROCHLORIDE 15 MILLIGRAM(S): 2.5 TABLET ORAL at 22:24

## 2020-01-01 RX ADMIN — POTASSIUM PHOSPHATE, MONOBASIC POTASSIUM PHOSPHATE, DIBASIC 63.75 MILLIMOLE(S): 236; 224 INJECTION, SOLUTION INTRAVENOUS at 10:04

## 2020-01-01 RX ADMIN — FAMOTIDINE 20 MILLIGRAM(S): 10 INJECTION INTRAVENOUS at 05:03

## 2020-01-01 RX ADMIN — PROPOFOL 6.85 MICROGRAM(S)/KG/MIN: 10 INJECTION, EMULSION INTRAVENOUS at 08:29

## 2020-01-01 RX ADMIN — Medication 10 MILLIGRAM(S): at 10:20

## 2020-01-01 RX ADMIN — Medication 100 MILLIEQUIVALENT(S): at 08:43

## 2020-01-01 RX ADMIN — ENOXAPARIN SODIUM 80 MILLIGRAM(S): 100 INJECTION SUBCUTANEOUS at 17:30

## 2020-01-01 RX ADMIN — Medication 1 DROP(S): at 12:36

## 2020-01-01 RX ADMIN — Medication 81 MILLIGRAM(S): at 11:14

## 2020-01-01 RX ADMIN — Medication 40 MILLIEQUIVALENT(S): at 12:08

## 2020-01-01 RX ADMIN — Medication 100 MILLIEQUIVALENT(S): at 23:31

## 2020-01-01 RX ADMIN — PHENYLEPHRINE HYDROCHLORIDE 39.7 MICROGRAM(S)/KG/MIN: 10 INJECTION INTRAVENOUS at 11:16

## 2020-01-01 RX ADMIN — QUETIAPINE FUMARATE 50 MILLIGRAM(S): 200 TABLET, FILM COATED ORAL at 18:46

## 2020-01-01 RX ADMIN — VASOPRESSIN 2.4 UNIT(S)/MIN: 20 INJECTION INTRAVENOUS at 16:36

## 2020-01-01 RX ADMIN — PHENYLEPHRINE HYDROCHLORIDE 39.7 MICROGRAM(S)/KG/MIN: 10 INJECTION INTRAVENOUS at 05:25

## 2020-01-01 RX ADMIN — POLYETHYLENE GLYCOL 3350 17 GRAM(S): 17 POWDER, FOR SOLUTION ORAL at 11:10

## 2020-01-01 RX ADMIN — CISATRACURIUM BESYLATE 13.7 MICROGRAM(S)/KG/MIN: 2 INJECTION INTRAVENOUS at 06:44

## 2020-01-01 RX ADMIN — Medication 75 MICROGRAM(S): at 05:33

## 2020-01-01 RX ADMIN — PROPOFOL 6.85 MICROGRAM(S)/KG/MIN: 10 INJECTION, EMULSION INTRAVENOUS at 15:32

## 2020-01-01 RX ADMIN — Medication 75 MICROGRAM(S): at 06:17

## 2020-01-01 RX ADMIN — Medication 650 MILLIGRAM(S): at 20:30

## 2020-01-01 RX ADMIN — Medication 1 DROP(S): at 22:25

## 2020-01-01 RX ADMIN — PROPOFOL 2.28 MICROGRAM(S)/KG/MIN: 10 INJECTION, EMULSION INTRAVENOUS at 09:23

## 2020-01-01 RX ADMIN — Medication 40 MILLIEQUIVALENT(S): at 06:17

## 2020-01-01 RX ADMIN — Medication 75 MICROGRAM(S): at 06:26

## 2020-01-01 RX ADMIN — Medication 1 APPLICATION(S): at 19:00

## 2020-01-01 RX ADMIN — Medication 7.13 MICROGRAM(S)/KG/MIN: at 02:00

## 2020-01-01 RX ADMIN — CHLORHEXIDINE GLUCONATE 1 APPLICATION(S): 213 SOLUTION TOPICAL at 09:37

## 2020-01-01 RX ADMIN — PHENYLEPHRINE HYDROCHLORIDE 39.7 MICROGRAM(S)/KG/MIN: 10 INJECTION INTRAVENOUS at 03:11

## 2020-01-01 RX ADMIN — FAMOTIDINE 20 MILLIGRAM(S): 10 INJECTION INTRAVENOUS at 05:00

## 2020-01-01 RX ADMIN — HEPARIN SODIUM 1100 UNIT(S)/HR: 5000 INJECTION INTRAVENOUS; SUBCUTANEOUS at 23:42

## 2020-01-01 RX ADMIN — Medication 0: at 23:31

## 2020-01-01 RX ADMIN — SENNA PLUS 2 TABLET(S): 8.6 TABLET ORAL at 21:00

## 2020-01-01 RX ADMIN — POTASSIUM PHOSPHATE, MONOBASIC POTASSIUM PHOSPHATE, DIBASIC 85 MILLIMOLE(S): 236; 224 INJECTION, SOLUTION INTRAVENOUS at 14:49

## 2020-01-01 RX ADMIN — PROPOFOL 50 MILLIGRAM(S): 10 INJECTION, EMULSION INTRAVENOUS at 02:13

## 2020-01-01 RX ADMIN — FAMOTIDINE 20 MILLIGRAM(S): 10 INJECTION INTRAVENOUS at 05:15

## 2020-01-01 RX ADMIN — Medication 1 APPLICATION(S): at 05:43

## 2020-01-01 RX ADMIN — POLYETHYLENE GLYCOL 3350 17 GRAM(S): 17 POWDER, FOR SOLUTION ORAL at 11:25

## 2020-01-01 RX ADMIN — CHLORHEXIDINE GLUCONATE 1 APPLICATION(S): 213 SOLUTION TOPICAL at 05:22

## 2020-01-01 RX ADMIN — DEXMEDETOMIDINE HYDROCHLORIDE IN 0.9% SODIUM CHLORIDE 7.61 MICROGRAM(S)/KG/HR: 4 INJECTION INTRAVENOUS at 23:31

## 2020-01-01 RX ADMIN — Medication 81 MILLIGRAM(S): at 11:59

## 2020-01-01 RX ADMIN — Medication 1 DROP(S): at 05:27

## 2020-01-01 RX ADMIN — MIDODRINE HYDROCHLORIDE 15 MILLIGRAM(S): 2.5 TABLET ORAL at 05:47

## 2020-01-01 RX ADMIN — Medication 650 MILLIGRAM(S): at 22:00

## 2020-01-01 RX ADMIN — Medication 7.13 MICROGRAM(S)/KG/MIN: at 23:16

## 2020-01-01 RX ADMIN — FAMOTIDINE 20 MILLIGRAM(S): 10 INJECTION INTRAVENOUS at 05:38

## 2020-01-01 RX ADMIN — CHLORHEXIDINE GLUCONATE 1 APPLICATION(S): 213 SOLUTION TOPICAL at 15:15

## 2020-01-01 RX ADMIN — POLYETHYLENE GLYCOL 3350 17 GRAM(S): 17 POWDER, FOR SOLUTION ORAL at 11:16

## 2020-01-01 RX ADMIN — QUETIAPINE FUMARATE 50 MILLIGRAM(S): 200 TABLET, FILM COATED ORAL at 17:59

## 2020-01-01 RX ADMIN — POTASSIUM PHOSPHATE, MONOBASIC POTASSIUM PHOSPHATE, DIBASIC 85 MILLIMOLE(S): 236; 224 INJECTION, SOLUTION INTRAVENOUS at 11:10

## 2020-01-01 RX ADMIN — MEROPENEM 100 MILLIGRAM(S): 1 INJECTION INTRAVENOUS at 14:41

## 2020-01-01 RX ADMIN — CHLORHEXIDINE GLUCONATE 1 APPLICATION(S): 213 SOLUTION TOPICAL at 06:55

## 2020-01-01 RX ADMIN — Medication 75 MICROGRAM(S): at 05:04

## 2020-01-01 RX ADMIN — Medication 103 MILLIGRAM(S): at 13:33

## 2020-01-01 RX ADMIN — MIDAZOLAM HYDROCHLORIDE 1.41 MG/KG/HR: 1 INJECTION, SOLUTION INTRAMUSCULAR; INTRAVENOUS at 16:02

## 2020-01-01 RX ADMIN — Medication 1 APPLICATION(S): at 16:13

## 2020-01-01 RX ADMIN — FAMOTIDINE 20 MILLIGRAM(S): 10 INJECTION INTRAVENOUS at 17:34

## 2020-01-01 RX ADMIN — Medication 10 MILLIGRAM(S): at 11:18

## 2020-01-01 RX ADMIN — CHLORHEXIDINE GLUCONATE 1 APPLICATION(S): 213 SOLUTION TOPICAL at 04:00

## 2020-01-01 RX ADMIN — CHLORHEXIDINE GLUCONATE 15 MILLILITER(S): 213 SOLUTION TOPICAL at 18:00

## 2020-01-01 RX ADMIN — FAMOTIDINE 20 MILLIGRAM(S): 10 INJECTION INTRAVENOUS at 12:48

## 2020-01-01 RX ADMIN — CHLORHEXIDINE GLUCONATE 1 APPLICATION(S): 213 SOLUTION TOPICAL at 09:07

## 2020-01-01 RX ADMIN — PROPOFOL 6.85 MICROGRAM(S)/KG/MIN: 10 INJECTION, EMULSION INTRAVENOUS at 12:42

## 2020-01-01 RX ADMIN — PROPOFOL 6.85 MICROGRAM(S)/KG/MIN: 10 INJECTION, EMULSION INTRAVENOUS at 07:57

## 2020-01-01 RX ADMIN — Medication 1 DROP(S): at 17:35

## 2020-01-01 RX ADMIN — ATORVASTATIN CALCIUM 20 MILLIGRAM(S): 80 TABLET, FILM COATED ORAL at 00:17

## 2020-01-01 RX ADMIN — Medication 1 TABLET(S): at 17:25

## 2020-01-01 RX ADMIN — CHLORHEXIDINE GLUCONATE 15 MILLILITER(S): 213 SOLUTION TOPICAL at 06:51

## 2020-01-01 RX ADMIN — Medication 1 DROP(S): at 11:44

## 2020-01-01 RX ADMIN — ENOXAPARIN SODIUM 70 MILLIGRAM(S): 100 INJECTION SUBCUTANEOUS at 18:18

## 2020-01-01 RX ADMIN — VASOPRESSIN 2.4 UNIT(S)/MIN: 20 INJECTION INTRAVENOUS at 19:36

## 2020-01-01 RX ADMIN — CHLORHEXIDINE GLUCONATE 1 APPLICATION(S): 213 SOLUTION TOPICAL at 07:43

## 2020-01-01 RX ADMIN — FAMOTIDINE 20 MILLIGRAM(S): 10 INJECTION INTRAVENOUS at 18:47

## 2020-01-01 RX ADMIN — FENTANYL CITRATE 50 MICROGRAM(S): 50 INJECTION INTRAVENOUS at 09:00

## 2020-01-01 RX ADMIN — Medication 7.13 MICROGRAM(S)/KG/MIN: at 02:04

## 2020-01-01 RX ADMIN — ENOXAPARIN SODIUM 80 MILLIGRAM(S): 100 INJECTION SUBCUTANEOUS at 17:59

## 2020-01-01 RX ADMIN — CHLORHEXIDINE GLUCONATE 15 MILLILITER(S): 213 SOLUTION TOPICAL at 08:20

## 2020-01-01 RX ADMIN — FAMOTIDINE 20 MILLIGRAM(S): 10 INJECTION INTRAVENOUS at 12:01

## 2020-01-01 RX ADMIN — Medication 81 MILLIGRAM(S): at 11:58

## 2020-01-01 RX ADMIN — PROPOFOL 6.85 MICROGRAM(S)/KG/MIN: 10 INJECTION, EMULSION INTRAVENOUS at 00:13

## 2020-01-01 RX ADMIN — CHLORHEXIDINE GLUCONATE 1 APPLICATION(S): 213 SOLUTION TOPICAL at 05:26

## 2020-01-01 RX ADMIN — PHENYLEPHRINE HYDROCHLORIDE 39.7 MICROGRAM(S)/KG/MIN: 10 INJECTION INTRAVENOUS at 13:15

## 2020-01-01 RX ADMIN — Medication 81 MILLIGRAM(S): at 12:51

## 2020-01-01 RX ADMIN — CHLORHEXIDINE GLUCONATE 15 MILLILITER(S): 213 SOLUTION TOPICAL at 04:18

## 2020-01-01 RX ADMIN — Medication 1 APPLICATION(S): at 04:00

## 2020-01-01 RX ADMIN — CHLORHEXIDINE GLUCONATE 15 MILLILITER(S): 213 SOLUTION TOPICAL at 18:06

## 2020-01-01 RX ADMIN — MIDAZOLAM HYDROCHLORIDE 1.52 MG/KG/HR: 1 INJECTION, SOLUTION INTRAMUSCULAR; INTRAVENOUS at 22:07

## 2020-01-01 RX ADMIN — Medication 1 APPLICATION(S): at 22:00

## 2020-01-01 RX ADMIN — Medication 81 MILLIGRAM(S): at 11:53

## 2020-01-01 RX ADMIN — ATORVASTATIN CALCIUM 20 MILLIGRAM(S): 80 TABLET, FILM COATED ORAL at 20:51

## 2020-01-01 RX ADMIN — Medication 1 APPLICATION(S): at 05:51

## 2020-01-01 RX ADMIN — Medication 200 MILLIGRAM(S): at 21:37

## 2020-01-01 RX ADMIN — ENOXAPARIN SODIUM 80 MILLIGRAM(S): 100 INJECTION SUBCUTANEOUS at 18:08

## 2020-01-01 RX ADMIN — ENOXAPARIN SODIUM 80 MILLIGRAM(S): 100 INJECTION SUBCUTANEOUS at 05:00

## 2020-01-01 RX ADMIN — ENOXAPARIN SODIUM 70 MILLIGRAM(S): 100 INJECTION SUBCUTANEOUS at 05:10

## 2020-01-01 RX ADMIN — CHLORHEXIDINE GLUCONATE 15 MILLILITER(S): 213 SOLUTION TOPICAL at 17:20

## 2020-01-01 RX ADMIN — POTASSIUM PHOSPHATE, MONOBASIC POTASSIUM PHOSPHATE, DIBASIC 63.75 MILLIMOLE(S): 236; 224 INJECTION, SOLUTION INTRAVENOUS at 13:30

## 2020-01-01 RX ADMIN — MEROPENEM 100 MILLIGRAM(S): 1 INJECTION INTRAVENOUS at 05:03

## 2020-01-01 RX ADMIN — Medication 1 APPLICATION(S): at 17:20

## 2020-01-01 RX ADMIN — Medication 7.13 MICROGRAM(S)/KG/MIN: at 10:11

## 2020-01-01 RX ADMIN — MIDODRINE HYDROCHLORIDE 15 MILLIGRAM(S): 2.5 TABLET ORAL at 15:01

## 2020-01-01 RX ADMIN — FAMOTIDINE 20 MILLIGRAM(S): 10 INJECTION INTRAVENOUS at 06:17

## 2020-01-01 RX ADMIN — ATORVASTATIN CALCIUM 20 MILLIGRAM(S): 80 TABLET, FILM COATED ORAL at 20:00

## 2020-01-01 RX ADMIN — POLYETHYLENE GLYCOL 3350 17 GRAM(S): 17 POWDER, FOR SOLUTION ORAL at 11:58

## 2020-01-01 RX ADMIN — Medication 1 DROP(S): at 23:11

## 2020-01-01 RX ADMIN — Medication 100 MILLIGRAM(S): at 05:29

## 2020-01-01 RX ADMIN — ENOXAPARIN SODIUM 80 MILLIGRAM(S): 100 INJECTION SUBCUTANEOUS at 16:58

## 2020-01-01 RX ADMIN — ENOXAPARIN SODIUM 70 MILLIGRAM(S): 100 INJECTION SUBCUTANEOUS at 17:52

## 2020-01-01 RX ADMIN — MIDODRINE HYDROCHLORIDE 15 MILLIGRAM(S): 2.5 TABLET ORAL at 21:24

## 2020-01-01 RX ADMIN — Medication 1 APPLICATION(S): at 16:55

## 2020-01-01 RX ADMIN — PROPOFOL 6.85 MICROGRAM(S)/KG/MIN: 10 INJECTION, EMULSION INTRAVENOUS at 15:54

## 2020-01-01 RX ADMIN — Medication 1 APPLICATION(S): at 06:16

## 2020-01-01 RX ADMIN — Medication 103 MILLIGRAM(S): at 05:26

## 2020-01-01 RX ADMIN — FAMOTIDINE 20 MILLIGRAM(S): 10 INJECTION INTRAVENOUS at 17:22

## 2020-01-01 RX ADMIN — Medication 1 DROP(S): at 17:43

## 2020-01-01 RX ADMIN — ATORVASTATIN CALCIUM 20 MILLIGRAM(S): 80 TABLET, FILM COATED ORAL at 21:38

## 2020-01-01 RX ADMIN — Medication 250 MILLIGRAM(S): at 17:07

## 2020-01-01 RX ADMIN — PROPOFOL 50 MILLIGRAM(S): 10 INJECTION, EMULSION INTRAVENOUS at 02:31

## 2020-01-01 RX ADMIN — Medication 40 MILLIEQUIVALENT(S): at 11:58

## 2020-01-01 RX ADMIN — MIDODRINE HYDROCHLORIDE 15 MILLIGRAM(S): 2.5 TABLET ORAL at 23:36

## 2020-01-01 RX ADMIN — Medication 1 DROP(S): at 13:08

## 2020-01-01 RX ADMIN — MIDODRINE HYDROCHLORIDE 15 MILLIGRAM(S): 2.5 TABLET ORAL at 06:39

## 2020-01-01 RX ADMIN — Medication 1 DROP(S): at 17:07

## 2020-01-01 RX ADMIN — CHLORHEXIDINE GLUCONATE 15 MILLILITER(S): 213 SOLUTION TOPICAL at 17:42

## 2020-01-01 RX ADMIN — POTASSIUM PHOSPHATE, MONOBASIC POTASSIUM PHOSPHATE, DIBASIC 63.75 MILLIMOLE(S): 236; 224 INJECTION, SOLUTION INTRAVENOUS at 12:30

## 2020-01-01 RX ADMIN — CHLORHEXIDINE GLUCONATE 1 APPLICATION(S): 213 SOLUTION TOPICAL at 08:03

## 2020-01-01 RX ADMIN — CEFTRIAXONE 1000 MILLIGRAM(S): 500 INJECTION, POWDER, FOR SOLUTION INTRAMUSCULAR; INTRAVENOUS at 13:18

## 2020-01-01 RX ADMIN — MIDODRINE HYDROCHLORIDE 15 MILLIGRAM(S): 2.5 TABLET ORAL at 13:33

## 2020-01-01 RX ADMIN — MIDODRINE HYDROCHLORIDE 15 MILLIGRAM(S): 2.5 TABLET ORAL at 21:10

## 2020-01-01 RX ADMIN — CHLORHEXIDINE GLUCONATE 1 APPLICATION(S): 213 SOLUTION TOPICAL at 08:00

## 2020-01-01 RX ADMIN — CHLORHEXIDINE GLUCONATE 15 MILLILITER(S): 213 SOLUTION TOPICAL at 05:43

## 2020-01-01 RX ADMIN — MEROPENEM 100 MILLIGRAM(S): 1 INJECTION INTRAVENOUS at 22:00

## 2020-01-01 RX ADMIN — PHENYLEPHRINE HYDROCHLORIDE 1.43 MICROGRAM(S)/KG/MIN: 10 INJECTION INTRAVENOUS at 13:52

## 2020-01-01 RX ADMIN — Medication 40 MILLIEQUIVALENT(S): at 10:34

## 2020-01-01 RX ADMIN — PROPOFOL 6.85 MICROGRAM(S)/KG/MIN: 10 INJECTION, EMULSION INTRAVENOUS at 05:37

## 2020-01-01 RX ADMIN — Medication 650 MILLIGRAM(S): at 06:17

## 2020-01-01 RX ADMIN — Medication 7.13 MICROGRAM(S)/KG/MIN: at 19:28

## 2020-01-01 RX ADMIN — ATORVASTATIN CALCIUM 20 MILLIGRAM(S): 80 TABLET, FILM COATED ORAL at 21:00

## 2020-01-01 RX ADMIN — CHLORHEXIDINE GLUCONATE 15 MILLILITER(S): 213 SOLUTION TOPICAL at 05:07

## 2020-01-01 RX ADMIN — Medication 1 APPLICATION(S): at 17:08

## 2020-01-01 RX ADMIN — PROPOFOL 6.85 MICROGRAM(S)/KG/MIN: 10 INJECTION, EMULSION INTRAVENOUS at 11:58

## 2020-01-01 RX ADMIN — SENNA PLUS 2 TABLET(S): 8.6 TABLET ORAL at 21:38

## 2020-01-01 RX ADMIN — HYDROMORPHONE HYDROCHLORIDE 1 MILLIGRAM(S): 2 INJECTION INTRAMUSCULAR; INTRAVENOUS; SUBCUTANEOUS at 08:19

## 2020-01-01 RX ADMIN — PROPOFOL 2.28 MICROGRAM(S)/KG/MIN: 10 INJECTION, EMULSION INTRAVENOUS at 14:40

## 2020-01-01 RX ADMIN — CHLORHEXIDINE GLUCONATE 15 MILLILITER(S): 213 SOLUTION TOPICAL at 05:04

## 2020-01-01 RX ADMIN — Medication 10 MILLIGRAM(S): at 13:51

## 2020-01-01 RX ADMIN — FAMOTIDINE 20 MILLIGRAM(S): 10 INJECTION INTRAVENOUS at 06:22

## 2020-01-01 RX ADMIN — Medication 1 DROP(S): at 05:19

## 2020-01-01 RX ADMIN — ENOXAPARIN SODIUM 80 MILLIGRAM(S): 100 INJECTION SUBCUTANEOUS at 16:37

## 2020-01-01 RX ADMIN — Medication 1 DROP(S): at 11:27

## 2020-01-01 RX ADMIN — PHENYLEPHRINE HYDROCHLORIDE 39.7 MICROGRAM(S)/KG/MIN: 10 INJECTION INTRAVENOUS at 21:32

## 2020-01-01 RX ADMIN — Medication 400 MILLIGRAM(S): at 20:16

## 2020-01-01 RX ADMIN — Medication 75 MICROGRAM(S): at 06:13

## 2020-01-01 RX ADMIN — CHLORHEXIDINE GLUCONATE 15 MILLILITER(S): 213 SOLUTION TOPICAL at 17:22

## 2020-01-01 RX ADMIN — QUETIAPINE FUMARATE 50 MILLIGRAM(S): 200 TABLET, FILM COATED ORAL at 22:24

## 2020-01-01 RX ADMIN — PHENYLEPHRINE HYDROCHLORIDE 1.43 MICROGRAM(S)/KG/MIN: 10 INJECTION INTRAVENOUS at 19:59

## 2020-01-01 RX ADMIN — Medication 105 MILLIGRAM(S): at 13:32

## 2020-01-01 RX ADMIN — Medication 650 MILLIGRAM(S): at 04:03

## 2020-01-01 RX ADMIN — CHLORHEXIDINE GLUCONATE 15 MILLILITER(S): 213 SOLUTION TOPICAL at 16:47

## 2020-01-01 RX ADMIN — PROPOFOL 6.85 MICROGRAM(S)/KG/MIN: 10 INJECTION, EMULSION INTRAVENOUS at 14:24

## 2020-01-01 RX ADMIN — Medication 102 MILLIGRAM(S): at 13:19

## 2020-01-01 RX ADMIN — Medication 10 MILLIGRAM(S): at 12:48

## 2020-01-01 RX ADMIN — POLYETHYLENE GLYCOL 3350 17 GRAM(S): 17 POWDER, FOR SOLUTION ORAL at 18:07

## 2020-01-01 RX ADMIN — Medication 250 MILLIGRAM(S): at 05:27

## 2020-01-01 RX ADMIN — DEXMEDETOMIDINE HYDROCHLORIDE IN 0.9% SODIUM CHLORIDE 7.61 MICROGRAM(S)/KG/HR: 4 INJECTION INTRAVENOUS at 18:43

## 2020-01-01 RX ADMIN — Medication 10 MILLIGRAM(S): at 12:01

## 2020-01-01 RX ADMIN — CHLORHEXIDINE GLUCONATE 1 APPLICATION(S): 213 SOLUTION TOPICAL at 05:09

## 2020-01-01 RX ADMIN — CHLORHEXIDINE GLUCONATE 1 APPLICATION(S): 213 SOLUTION TOPICAL at 05:51

## 2020-01-01 RX ADMIN — Medication 650 MILLIGRAM(S): at 19:21

## 2020-01-01 RX ADMIN — MIDODRINE HYDROCHLORIDE 15 MILLIGRAM(S): 2.5 TABLET ORAL at 13:41

## 2020-01-01 RX ADMIN — Medication 1 APPLICATION(S): at 05:46

## 2020-01-01 RX ADMIN — DEXMEDETOMIDINE HYDROCHLORIDE IN 0.9% SODIUM CHLORIDE 7.61 MICROGRAM(S)/KG/HR: 4 INJECTION INTRAVENOUS at 10:20

## 2020-01-01 RX ADMIN — Medication 1 DROP(S): at 11:00

## 2020-01-01 RX ADMIN — ENOXAPARIN SODIUM 70 MILLIGRAM(S): 100 INJECTION SUBCUTANEOUS at 16:23

## 2020-01-01 RX ADMIN — CHLORHEXIDINE GLUCONATE 1 APPLICATION(S): 213 SOLUTION TOPICAL at 05:43

## 2020-01-01 RX ADMIN — Medication 7.13 MICROGRAM(S)/KG/MIN: at 17:27

## 2020-01-01 RX ADMIN — Medication 1 DROP(S): at 00:44

## 2020-01-01 RX ADMIN — PROPOFOL 6.85 MICROGRAM(S)/KG/MIN: 10 INJECTION, EMULSION INTRAVENOUS at 23:15

## 2020-01-01 RX ADMIN — MIDODRINE HYDROCHLORIDE 15 MILLIGRAM(S): 2.5 TABLET ORAL at 12:50

## 2020-01-01 RX ADMIN — ENOXAPARIN SODIUM 80 MILLIGRAM(S): 100 INJECTION SUBCUTANEOUS at 05:11

## 2020-01-01 RX ADMIN — Medication 1 APPLICATION(S): at 16:23

## 2020-01-01 RX ADMIN — CHLORHEXIDINE GLUCONATE 15 MILLILITER(S): 213 SOLUTION TOPICAL at 18:46

## 2020-01-01 RX ADMIN — ATORVASTATIN CALCIUM 20 MILLIGRAM(S): 80 TABLET, FILM COATED ORAL at 22:24

## 2020-01-01 RX ADMIN — MIDODRINE HYDROCHLORIDE 15 MILLIGRAM(S): 2.5 TABLET ORAL at 13:19

## 2020-01-01 RX ADMIN — VASOPRESSIN 2.4 UNIT(S)/MIN: 20 INJECTION INTRAVENOUS at 01:03

## 2020-01-01 RX ADMIN — Medication 1 APPLICATION(S): at 05:27

## 2020-01-01 RX ADMIN — Medication 81 MILLIGRAM(S): at 10:46

## 2020-01-01 RX ADMIN — MIDODRINE HYDROCHLORIDE 15 MILLIGRAM(S): 2.5 TABLET ORAL at 06:04

## 2020-01-01 RX ADMIN — CISATRACURIUM BESYLATE 13.7 MICROGRAM(S)/KG/MIN: 2 INJECTION INTRAVENOUS at 17:27

## 2020-01-01 RX ADMIN — Medication 81 MILLIGRAM(S): at 14:55

## 2020-01-01 RX ADMIN — Medication 10 MILLIGRAM(S): at 12:07

## 2020-01-01 RX ADMIN — HYDROMORPHONE HYDROCHLORIDE 2 MILLIGRAM(S): 2 INJECTION INTRAMUSCULAR; INTRAVENOUS; SUBCUTANEOUS at 00:15

## 2020-01-01 RX ADMIN — ENOXAPARIN SODIUM 80 MILLIGRAM(S): 100 INJECTION SUBCUTANEOUS at 17:44

## 2020-01-01 RX ADMIN — PANTOPRAZOLE SODIUM 40 MILLIGRAM(S): 20 TABLET, DELAYED RELEASE ORAL at 13:32

## 2020-01-01 RX ADMIN — PROPOFOL 6.85 MICROGRAM(S)/KG/MIN: 10 INJECTION, EMULSION INTRAVENOUS at 00:23

## 2020-01-01 RX ADMIN — PANTOPRAZOLE SODIUM 40 MILLIGRAM(S): 20 TABLET, DELAYED RELEASE ORAL at 11:20

## 2020-01-01 RX ADMIN — ENOXAPARIN SODIUM 70 MILLIGRAM(S): 100 INJECTION SUBCUTANEOUS at 18:46

## 2020-01-01 RX ADMIN — HEPARIN SODIUM 0 UNIT(S)/HR: 5000 INJECTION INTRAVENOUS; SUBCUTANEOUS at 03:44

## 2020-01-01 RX ADMIN — Medication 1 DROP(S): at 23:31

## 2020-01-01 RX ADMIN — MIDODRINE HYDROCHLORIDE 15 MILLIGRAM(S): 2.5 TABLET ORAL at 05:40

## 2020-01-01 RX ADMIN — Medication 81 MILLIGRAM(S): at 13:40

## 2020-01-01 RX ADMIN — CHLORHEXIDINE GLUCONATE 15 MILLILITER(S): 213 SOLUTION TOPICAL at 06:21

## 2020-01-01 RX ADMIN — Medication 1 APPLICATION(S): at 18:21

## 2020-01-01 RX ADMIN — Medication 1 APPLICATION(S): at 06:06

## 2020-01-01 RX ADMIN — Medication 2: at 18:02

## 2020-01-01 RX ADMIN — PHENYLEPHRINE HYDROCHLORIDE 39.7 MICROGRAM(S)/KG/MIN: 10 INJECTION INTRAVENOUS at 11:29

## 2020-01-01 RX ADMIN — FAMOTIDINE 20 MILLIGRAM(S): 10 INJECTION INTRAVENOUS at 06:00

## 2020-01-01 RX ADMIN — FAMOTIDINE 20 MILLIGRAM(S): 10 INJECTION INTRAVENOUS at 05:10

## 2020-01-01 RX ADMIN — Medication 650 MILLIGRAM(S): at 07:57

## 2020-01-01 RX ADMIN — PROPOFOL 6.85 MICROGRAM(S)/KG/MIN: 10 INJECTION, EMULSION INTRAVENOUS at 18:43

## 2020-01-01 RX ADMIN — Medication 81 MILLIGRAM(S): at 13:24

## 2020-01-01 RX ADMIN — ENOXAPARIN SODIUM 70 MILLIGRAM(S): 100 INJECTION SUBCUTANEOUS at 17:48

## 2020-01-01 RX ADMIN — CHLORHEXIDINE GLUCONATE 15 MILLILITER(S): 213 SOLUTION TOPICAL at 18:14

## 2020-01-01 RX ADMIN — DEXMEDETOMIDINE HYDROCHLORIDE IN 0.9% SODIUM CHLORIDE 7.61 MICROGRAM(S)/KG/HR: 4 INJECTION INTRAVENOUS at 05:43

## 2020-01-01 RX ADMIN — CEFTRIAXONE 1000 MILLIGRAM(S): 500 INJECTION, POWDER, FOR SOLUTION INTRAMUSCULAR; INTRAVENOUS at 15:00

## 2020-01-01 RX ADMIN — HEPARIN SODIUM 1300 UNIT(S)/HR: 5000 INJECTION INTRAVENOUS; SUBCUTANEOUS at 20:02

## 2020-01-01 RX ADMIN — POTASSIUM PHOSPHATE, MONOBASIC POTASSIUM PHOSPHATE, DIBASIC 63.75 MILLIMOLE(S): 236; 224 INJECTION, SOLUTION INTRAVENOUS at 09:00

## 2020-01-01 RX ADMIN — Medication 10 MILLIGRAM(S): at 12:30

## 2020-01-01 RX ADMIN — MIDODRINE HYDROCHLORIDE 15 MILLIGRAM(S): 2.5 TABLET ORAL at 12:08

## 2020-01-01 RX ADMIN — Medication 1 APPLICATION(S): at 17:40

## 2020-01-01 RX ADMIN — HEPARIN SODIUM 1400 UNIT(S)/HR: 5000 INJECTION INTRAVENOUS; SUBCUTANEOUS at 13:09

## 2020-01-01 RX ADMIN — FAMOTIDINE 20 MILLIGRAM(S): 10 INJECTION INTRAVENOUS at 18:10

## 2020-01-01 RX ADMIN — CHLORHEXIDINE GLUCONATE 15 MILLILITER(S): 213 SOLUTION TOPICAL at 17:10

## 2020-01-01 RX ADMIN — PROPOFOL 6.85 MICROGRAM(S)/KG/MIN: 10 INJECTION, EMULSION INTRAVENOUS at 10:15

## 2020-01-01 RX ADMIN — CHLORHEXIDINE GLUCONATE 1 APPLICATION(S): 213 SOLUTION TOPICAL at 20:00

## 2020-01-01 RX ADMIN — MIDAZOLAM HYDROCHLORIDE 4 MILLIGRAM(S): 1 INJECTION, SOLUTION INTRAMUSCULAR; INTRAVENOUS at 03:43

## 2020-01-01 RX ADMIN — MIDAZOLAM HYDROCHLORIDE 1.52 MG/KG/HR: 1 INJECTION, SOLUTION INTRAMUSCULAR; INTRAVENOUS at 00:03

## 2020-01-01 RX ADMIN — QUETIAPINE FUMARATE 50 MILLIGRAM(S): 200 TABLET, FILM COATED ORAL at 05:17

## 2020-01-01 RX ADMIN — Medication 1 DROP(S): at 16:21

## 2020-01-01 RX ADMIN — Medication 650 MILLIGRAM(S): at 12:04

## 2020-01-01 RX ADMIN — ATORVASTATIN CALCIUM 20 MILLIGRAM(S): 80 TABLET, FILM COATED ORAL at 21:12

## 2020-01-01 RX ADMIN — Medication 1: at 00:50

## 2020-01-01 RX ADMIN — PROPOFOL 6.85 MICROGRAM(S)/KG/MIN: 10 INJECTION, EMULSION INTRAVENOUS at 10:21

## 2020-01-01 RX ADMIN — FAMOTIDINE 20 MILLIGRAM(S): 10 INJECTION INTRAVENOUS at 18:15

## 2020-01-01 RX ADMIN — Medication 650 MILLIGRAM(S): at 09:00

## 2020-01-01 RX ADMIN — ATORVASTATIN CALCIUM 20 MILLIGRAM(S): 80 TABLET, FILM COATED ORAL at 22:05

## 2020-01-01 RX ADMIN — Medication 3.31 MICROGRAM(S)/KG/MIN: at 12:30

## 2020-01-01 RX ADMIN — Medication 100 MILLIEQUIVALENT(S): at 09:18

## 2020-01-01 RX ADMIN — CHLORHEXIDINE GLUCONATE 15 MILLILITER(S): 213 SOLUTION TOPICAL at 04:56

## 2020-01-01 RX ADMIN — CHLORHEXIDINE GLUCONATE 1 APPLICATION(S): 213 SOLUTION TOPICAL at 07:04

## 2020-01-01 RX ADMIN — Medication 10 MILLIGRAM(S): at 12:50

## 2020-01-01 RX ADMIN — PHENYLEPHRINE HYDROCHLORIDE 39.7 MICROGRAM(S)/KG/MIN: 10 INJECTION INTRAVENOUS at 12:48

## 2020-01-01 RX ADMIN — ENOXAPARIN SODIUM 70 MILLIGRAM(S): 100 INJECTION SUBCUTANEOUS at 05:00

## 2020-01-01 RX ADMIN — CISATRACURIUM BESYLATE 13.7 MICROGRAM(S)/KG/MIN: 2 INJECTION INTRAVENOUS at 21:19

## 2020-01-01 RX ADMIN — FAMOTIDINE 20 MILLIGRAM(S): 10 INJECTION INTRAVENOUS at 06:31

## 2020-01-01 RX ADMIN — ATORVASTATIN CALCIUM 20 MILLIGRAM(S): 80 TABLET, FILM COATED ORAL at 23:03

## 2020-01-01 RX ADMIN — PHENYLEPHRINE HYDROCHLORIDE 1.43 MICROGRAM(S)/KG/MIN: 10 INJECTION INTRAVENOUS at 20:32

## 2020-01-01 RX ADMIN — POLYETHYLENE GLYCOL 3350 17 GRAM(S): 17 POWDER, FOR SOLUTION ORAL at 10:47

## 2020-01-01 RX ADMIN — Medication 100 MILLIGRAM(S): at 05:41

## 2020-01-01 RX ADMIN — FAMOTIDINE 20 MILLIGRAM(S): 10 INJECTION INTRAVENOUS at 06:26

## 2020-01-01 RX ADMIN — Medication 81 MILLIGRAM(S): at 14:10

## 2020-01-01 RX ADMIN — ENOXAPARIN SODIUM 70 MILLIGRAM(S): 100 INJECTION SUBCUTANEOUS at 07:04

## 2020-01-01 RX ADMIN — Medication 1 APPLICATION(S): at 17:48

## 2020-01-01 RX ADMIN — ENOXAPARIN SODIUM 80 MILLIGRAM(S): 100 INJECTION SUBCUTANEOUS at 17:31

## 2020-01-01 RX ADMIN — HEPARIN SODIUM 1200 UNIT(S)/HR: 5000 INJECTION INTRAVENOUS; SUBCUTANEOUS at 20:37

## 2020-01-01 RX ADMIN — Medication 1 APPLICATION(S): at 18:10

## 2020-01-01 RX ADMIN — Medication 1 DROP(S): at 05:08

## 2020-01-01 RX ADMIN — Medication 81 MILLIGRAM(S): at 12:21

## 2020-01-01 RX ADMIN — Medication 10 MILLIGRAM(S): at 13:26

## 2020-01-01 RX ADMIN — FAMOTIDINE 20 MILLIGRAM(S): 10 INJECTION INTRAVENOUS at 05:25

## 2020-01-01 RX ADMIN — Medication 10 MILLIGRAM(S): at 11:15

## 2020-01-01 RX ADMIN — Medication 81 MILLIGRAM(S): at 12:30

## 2020-01-01 RX ADMIN — Medication 75 MICROGRAM(S): at 05:52

## 2020-01-01 RX ADMIN — Medication 1 DROP(S): at 06:07

## 2020-01-01 RX ADMIN — DEXMEDETOMIDINE HYDROCHLORIDE IN 0.9% SODIUM CHLORIDE 7.61 MICROGRAM(S)/KG/HR: 4 INJECTION INTRAVENOUS at 10:46

## 2020-01-01 RX ADMIN — Medication 75 MICROGRAM(S): at 19:20

## 2020-01-01 RX ADMIN — CHLORHEXIDINE GLUCONATE 1 APPLICATION(S): 213 SOLUTION TOPICAL at 06:16

## 2020-01-01 RX ADMIN — VASOPRESSIN 2.4 UNIT(S)/MIN: 20 INJECTION INTRAVENOUS at 16:03

## 2020-01-01 RX ADMIN — DEXMEDETOMIDINE HYDROCHLORIDE IN 0.9% SODIUM CHLORIDE 7.61 MICROGRAM(S)/KG/HR: 4 INJECTION INTRAVENOUS at 17:08

## 2020-01-01 RX ADMIN — PROPOFOL 6.85 MICROGRAM(S)/KG/MIN: 10 INJECTION, EMULSION INTRAVENOUS at 06:46

## 2020-01-01 RX ADMIN — FAMOTIDINE 20 MILLIGRAM(S): 10 INJECTION INTRAVENOUS at 05:20

## 2020-01-01 RX ADMIN — Medication 650 MILLIGRAM(S): at 23:50

## 2020-01-01 RX ADMIN — CHLORHEXIDINE GLUCONATE 15 MILLILITER(S): 213 SOLUTION TOPICAL at 05:54

## 2020-01-01 RX ADMIN — POLYETHYLENE GLYCOL 3350 17 GRAM(S): 17 POWDER, FOR SOLUTION ORAL at 12:30

## 2020-01-01 RX ADMIN — ENOXAPARIN SODIUM 80 MILLIGRAM(S): 100 INJECTION SUBCUTANEOUS at 05:41

## 2020-01-01 RX ADMIN — MEROPENEM 100 MILLIGRAM(S): 1 INJECTION INTRAVENOUS at 05:55

## 2020-01-01 RX ADMIN — Medication 100 MILLIGRAM(S): at 05:51

## 2020-01-01 RX ADMIN — FENTANYL CITRATE 50 MICROGRAM(S): 50 INJECTION INTRAVENOUS at 20:45

## 2020-01-01 RX ADMIN — ENOXAPARIN SODIUM 70 MILLIGRAM(S): 100 INJECTION SUBCUTANEOUS at 18:15

## 2020-01-01 RX ADMIN — ENOXAPARIN SODIUM 80 MILLIGRAM(S): 100 INJECTION SUBCUTANEOUS at 17:00

## 2020-01-01 RX ADMIN — CHLORHEXIDINE GLUCONATE 1 APPLICATION(S): 213 SOLUTION TOPICAL at 05:25

## 2020-01-01 RX ADMIN — CHLORHEXIDINE GLUCONATE 15 MILLILITER(S): 213 SOLUTION TOPICAL at 05:51

## 2020-01-01 RX ADMIN — PROPOFOL 12.7 MICROGRAM(S)/KG/MIN: 10 INJECTION, EMULSION INTRAVENOUS at 13:51

## 2020-01-01 RX ADMIN — Medication 200 MILLIGRAM(S): at 08:11

## 2020-01-01 RX ADMIN — Medication 7.13 MICROGRAM(S)/KG/MIN: at 19:30

## 2020-01-01 RX ADMIN — Medication 200 MILLIGRAM(S): at 21:38

## 2020-01-01 RX ADMIN — PROPOFOL 6.85 MICROGRAM(S)/KG/MIN: 10 INJECTION, EMULSION INTRAVENOUS at 20:45

## 2020-01-01 RX ADMIN — CHLORHEXIDINE GLUCONATE 15 MILLILITER(S): 213 SOLUTION TOPICAL at 04:46

## 2020-01-01 RX ADMIN — FAMOTIDINE 20 MILLIGRAM(S): 10 INJECTION INTRAVENOUS at 17:52

## 2020-01-01 RX ADMIN — Medication 650 MILLIGRAM(S): at 19:46

## 2020-01-01 RX ADMIN — FAMOTIDINE 20 MILLIGRAM(S): 10 INJECTION INTRAVENOUS at 17:40

## 2020-01-01 RX ADMIN — Medication 1 APPLICATION(S): at 18:00

## 2020-01-01 RX ADMIN — POLYETHYLENE GLYCOL 3350 17 GRAM(S): 17 POWDER, FOR SOLUTION ORAL at 11:57

## 2020-01-01 RX ADMIN — CHLORHEXIDINE GLUCONATE 15 MILLILITER(S): 213 SOLUTION TOPICAL at 17:19

## 2020-01-01 RX ADMIN — ENOXAPARIN SODIUM 70 MILLIGRAM(S): 100 INJECTION SUBCUTANEOUS at 06:37

## 2020-01-01 RX ADMIN — ATORVASTATIN CALCIUM 20 MILLIGRAM(S): 80 TABLET, FILM COATED ORAL at 21:59

## 2020-01-01 RX ADMIN — FAMOTIDINE 20 MILLIGRAM(S): 10 INJECTION INTRAVENOUS at 17:39

## 2020-01-01 RX ADMIN — Medication 1 DROP(S): at 11:19

## 2020-01-01 RX ADMIN — FAMOTIDINE 20 MILLIGRAM(S): 10 INJECTION INTRAVENOUS at 05:44

## 2020-01-01 RX ADMIN — Medication 1 DROP(S): at 23:59

## 2020-01-01 RX ADMIN — CHLORHEXIDINE GLUCONATE 15 MILLILITER(S): 213 SOLUTION TOPICAL at 05:16

## 2020-01-01 RX ADMIN — PANTOPRAZOLE SODIUM 40 MILLIGRAM(S): 20 TABLET, DELAYED RELEASE ORAL at 11:15

## 2020-01-01 RX ADMIN — ATORVASTATIN CALCIUM 20 MILLIGRAM(S): 80 TABLET, FILM COATED ORAL at 23:01

## 2020-01-01 RX ADMIN — Medication 1 APPLICATION(S): at 05:47

## 2020-01-01 RX ADMIN — Medication 1 TABLET(S): at 07:20

## 2020-01-01 RX ADMIN — Medication 75 MICROGRAM(S): at 04:49

## 2020-01-01 RX ADMIN — ENOXAPARIN SODIUM 80 MILLIGRAM(S): 100 INJECTION SUBCUTANEOUS at 17:22

## 2020-01-01 RX ADMIN — Medication 200 MILLIGRAM(S): at 13:21

## 2020-01-01 RX ADMIN — Medication 75 MICROGRAM(S): at 06:29

## 2020-01-01 RX ADMIN — Medication 10 MILLIGRAM(S): at 15:13

## 2020-01-01 RX ADMIN — Medication 81 MILLIGRAM(S): at 11:26

## 2020-01-01 RX ADMIN — PROPOFOL 6.85 MICROGRAM(S)/KG/MIN: 10 INJECTION, EMULSION INTRAVENOUS at 14:26

## 2020-01-01 RX ADMIN — Medication 1 APPLICATION(S): at 18:35

## 2020-01-01 RX ADMIN — Medication 100 MILLIEQUIVALENT(S): at 09:41

## 2020-01-01 RX ADMIN — Medication 2 MILLIGRAM(S): at 15:33

## 2020-01-01 RX ADMIN — MIDAZOLAM HYDROCHLORIDE 1.52 MG/KG/HR: 1 INJECTION, SOLUTION INTRAMUSCULAR; INTRAVENOUS at 03:13

## 2020-01-01 RX ADMIN — CHLORHEXIDINE GLUCONATE 15 MILLILITER(S): 213 SOLUTION TOPICAL at 18:21

## 2020-01-01 RX ADMIN — HYDROMORPHONE HYDROCHLORIDE 1 MILLIGRAM(S): 2 INJECTION INTRAMUSCULAR; INTRAVENOUS; SUBCUTANEOUS at 23:30

## 2020-01-01 RX ADMIN — MIDODRINE HYDROCHLORIDE 15 MILLIGRAM(S): 2.5 TABLET ORAL at 23:03

## 2020-01-01 RX ADMIN — Medication 100 MILLIEQUIVALENT(S): at 07:12

## 2020-01-01 RX ADMIN — Medication 20 MILLIGRAM(S): at 09:14

## 2020-01-01 RX ADMIN — ENOXAPARIN SODIUM 80 MILLIGRAM(S): 100 INJECTION SUBCUTANEOUS at 18:21

## 2020-01-01 RX ADMIN — CHLORHEXIDINE GLUCONATE 15 MILLILITER(S): 213 SOLUTION TOPICAL at 04:53

## 2020-01-01 RX ADMIN — MEROPENEM 100 MILLIGRAM(S): 1 INJECTION INTRAVENOUS at 13:06

## 2020-01-01 RX ADMIN — MEROPENEM 100 MILLIGRAM(S): 1 INJECTION INTRAVENOUS at 23:01

## 2020-01-01 RX ADMIN — Medication 1 APPLICATION(S): at 14:58

## 2020-01-01 RX ADMIN — DEXMEDETOMIDINE HYDROCHLORIDE IN 0.9% SODIUM CHLORIDE 7.61 MICROGRAM(S)/KG/HR: 4 INJECTION INTRAVENOUS at 04:18

## 2020-01-01 RX ADMIN — CHLORHEXIDINE GLUCONATE 15 MILLILITER(S): 213 SOLUTION TOPICAL at 16:53

## 2020-01-01 RX ADMIN — CHLORHEXIDINE GLUCONATE 15 MILLILITER(S): 213 SOLUTION TOPICAL at 18:01

## 2020-01-01 RX ADMIN — Medication 10 MILLIGRAM(S): at 11:39

## 2020-01-01 RX ADMIN — Medication 1 APPLICATION(S): at 16:47

## 2020-01-01 RX ADMIN — MEROPENEM 100 MILLIGRAM(S): 1 INJECTION INTRAVENOUS at 05:27

## 2020-01-01 RX ADMIN — Medication 75 MICROGRAM(S): at 05:03

## 2020-01-01 RX ADMIN — Medication 7.13 MICROGRAM(S)/KG/MIN: at 10:30

## 2020-01-01 RX ADMIN — Medication 1 DROP(S): at 22:00

## 2020-01-01 RX ADMIN — Medication 7.13 MICROGRAM(S)/KG/MIN: at 06:32

## 2020-01-01 RX ADMIN — MEROPENEM 100 MILLIGRAM(S): 1 INJECTION INTRAVENOUS at 14:24

## 2020-01-01 RX ADMIN — MEROPENEM 100 MILLIGRAM(S): 1 INJECTION INTRAVENOUS at 13:32

## 2020-01-01 RX ADMIN — FAMOTIDINE 20 MILLIGRAM(S): 10 INJECTION INTRAVENOUS at 06:37

## 2020-01-01 RX ADMIN — Medication 1 DROP(S): at 05:04

## 2020-01-01 RX ADMIN — FAMOTIDINE 20 MILLIGRAM(S): 10 INJECTION INTRAVENOUS at 19:21

## 2020-01-01 RX ADMIN — CHLORHEXIDINE GLUCONATE 1 APPLICATION(S): 213 SOLUTION TOPICAL at 07:20

## 2020-01-01 RX ADMIN — PROPOFOL 6.85 MICROGRAM(S)/KG/MIN: 10 INJECTION, EMULSION INTRAVENOUS at 17:22

## 2020-01-01 RX ADMIN — MIDODRINE HYDROCHLORIDE 15 MILLIGRAM(S): 2.5 TABLET ORAL at 13:47

## 2020-01-01 RX ADMIN — Medication 1 APPLICATION(S): at 05:21

## 2020-01-01 RX ADMIN — Medication 103 MILLIGRAM(S): at 17:34

## 2020-01-01 RX ADMIN — CHLORHEXIDINE GLUCONATE 15 MILLILITER(S): 213 SOLUTION TOPICAL at 17:14

## 2020-01-01 RX ADMIN — CISATRACURIUM BESYLATE 13.7 MICROGRAM(S)/KG/MIN: 2 INJECTION INTRAVENOUS at 01:14

## 2020-01-01 RX ADMIN — Medication 100 MILLIGRAM(S): at 06:03

## 2020-01-01 RX ADMIN — Medication 75 MICROGRAM(S): at 05:08

## 2020-01-01 RX ADMIN — CHLORHEXIDINE GLUCONATE 15 MILLILITER(S): 213 SOLUTION TOPICAL at 18:10

## 2020-01-01 RX ADMIN — Medication 1 TABLET(S): at 11:54

## 2020-01-01 RX ADMIN — PROPOFOL 6.85 MICROGRAM(S)/KG/MIN: 10 INJECTION, EMULSION INTRAVENOUS at 14:00

## 2020-01-01 RX ADMIN — Medication 81 MILLIGRAM(S): at 11:39

## 2020-01-01 RX ADMIN — Medication 1 DROP(S): at 01:10

## 2020-01-01 RX ADMIN — ENOXAPARIN SODIUM 80 MILLIGRAM(S): 100 INJECTION SUBCUTANEOUS at 05:17

## 2020-01-01 RX ADMIN — Medication 81 MILLIGRAM(S): at 11:18

## 2020-01-01 RX ADMIN — Medication 100 MILLIEQUIVALENT(S): at 21:32

## 2020-01-01 RX ADMIN — ENOXAPARIN SODIUM 70 MILLIGRAM(S): 100 INJECTION SUBCUTANEOUS at 05:55

## 2020-01-01 RX ADMIN — Medication 1 DROP(S): at 16:57

## 2020-01-01 RX ADMIN — PROPOFOL 6.85 MICROGRAM(S)/KG/MIN: 10 INJECTION, EMULSION INTRAVENOUS at 17:59

## 2020-01-01 RX ADMIN — Medication 1 APPLICATION(S): at 17:41

## 2020-01-01 RX ADMIN — FAMOTIDINE 20 MILLIGRAM(S): 10 INJECTION INTRAVENOUS at 17:02

## 2020-01-01 RX ADMIN — CHLORHEXIDINE GLUCONATE 15 MILLILITER(S): 213 SOLUTION TOPICAL at 17:40

## 2020-01-01 RX ADMIN — Medication 3.31 MICROGRAM(S)/KG/MIN: at 23:30

## 2020-01-01 RX ADMIN — Medication 3.31 MICROGRAM(S)/KG/MIN: at 10:39

## 2020-01-01 RX ADMIN — DEXMEDETOMIDINE HYDROCHLORIDE IN 0.9% SODIUM CHLORIDE 7.61 MICROGRAM(S)/KG/HR: 4 INJECTION INTRAVENOUS at 13:15

## 2020-01-01 RX ADMIN — POLYETHYLENE GLYCOL 3350 17 GRAM(S): 17 POWDER, FOR SOLUTION ORAL at 13:19

## 2020-01-01 RX ADMIN — MIDODRINE HYDROCHLORIDE 15 MILLIGRAM(S): 2.5 TABLET ORAL at 23:11

## 2020-01-01 RX ADMIN — Medication 650 MILLIGRAM(S): at 00:00

## 2020-01-01 RX ADMIN — Medication 75 MICROGRAM(S): at 06:22

## 2020-01-01 RX ADMIN — ATORVASTATIN CALCIUM 20 MILLIGRAM(S): 80 TABLET, FILM COATED ORAL at 23:00

## 2020-01-01 RX ADMIN — AZITHROMYCIN 500 MILLIGRAM(S): 500 TABLET, FILM COATED ORAL at 04:49

## 2020-01-01 RX ADMIN — Medication 1 DROP(S): at 05:20

## 2020-01-01 RX ADMIN — MEROPENEM 100 MILLIGRAM(S): 1 INJECTION INTRAVENOUS at 23:14

## 2020-01-01 RX ADMIN — CEFTRIAXONE 1000 MILLIGRAM(S): 500 INJECTION, POWDER, FOR SOLUTION INTRAMUSCULAR; INTRAVENOUS at 13:41

## 2020-01-01 RX ADMIN — Medication 3.31 MICROGRAM(S)/KG/MIN: at 20:51

## 2020-01-01 RX ADMIN — CHLORHEXIDINE GLUCONATE 1 APPLICATION(S): 213 SOLUTION TOPICAL at 07:12

## 2020-01-01 RX ADMIN — Medication 1 DROP(S): at 19:00

## 2020-01-01 RX ADMIN — MIDODRINE HYDROCHLORIDE 15 MILLIGRAM(S): 2.5 TABLET ORAL at 13:51

## 2020-01-01 RX ADMIN — Medication 1 DROP(S): at 12:30

## 2020-01-01 RX ADMIN — ENOXAPARIN SODIUM 70 MILLIGRAM(S): 100 INJECTION SUBCUTANEOUS at 05:38

## 2020-01-01 RX ADMIN — DEXMEDETOMIDINE HYDROCHLORIDE IN 0.9% SODIUM CHLORIDE 7.61 MICROGRAM(S)/KG/HR: 4 INJECTION INTRAVENOUS at 14:48

## 2020-01-01 RX ADMIN — FAMOTIDINE 20 MILLIGRAM(S): 10 INJECTION INTRAVENOUS at 17:15

## 2020-01-01 RX ADMIN — Medication 1 APPLICATION(S): at 06:22

## 2020-01-01 RX ADMIN — ENOXAPARIN SODIUM 70 MILLIGRAM(S): 100 INJECTION SUBCUTANEOUS at 05:26

## 2020-01-01 RX ADMIN — FAMOTIDINE 20 MILLIGRAM(S): 10 INJECTION INTRAVENOUS at 17:41

## 2020-01-01 RX ADMIN — PROPOFOL 6.85 MICROGRAM(S)/KG/MIN: 10 INJECTION, EMULSION INTRAVENOUS at 02:49

## 2020-01-01 RX ADMIN — Medication 1 APPLICATION(S): at 04:53

## 2020-01-01 RX ADMIN — Medication 81 MILLIGRAM(S): at 12:29

## 2020-01-01 RX ADMIN — ENOXAPARIN SODIUM 70 MILLIGRAM(S): 100 INJECTION SUBCUTANEOUS at 18:11

## 2020-01-01 RX ADMIN — MIDAZOLAM HYDROCHLORIDE 1.52 MG/KG/HR: 1 INJECTION, SOLUTION INTRAMUSCULAR; INTRAVENOUS at 05:26

## 2020-01-01 RX ADMIN — Medication 100 MILLIGRAM(S): at 05:35

## 2020-01-01 RX ADMIN — POLYETHYLENE GLYCOL 3350 17 GRAM(S): 17 POWDER, FOR SOLUTION ORAL at 11:19

## 2020-01-01 RX ADMIN — Medication 1 APPLICATION(S): at 17:01

## 2020-01-01 RX ADMIN — MIDAZOLAM HYDROCHLORIDE 1.52 MG/KG/HR: 1 INJECTION, SOLUTION INTRAMUSCULAR; INTRAVENOUS at 04:48

## 2020-01-01 RX ADMIN — Medication 75 MICROGRAM(S): at 04:53

## 2020-01-01 RX ADMIN — Medication 1 DROP(S): at 00:02

## 2020-01-01 RX ADMIN — FAMOTIDINE 20 MILLIGRAM(S): 10 INJECTION INTRAVENOUS at 05:58

## 2020-01-01 RX ADMIN — Medication 3.31 MICROGRAM(S)/KG/MIN: at 11:29

## 2020-01-01 RX ADMIN — CHLORHEXIDINE GLUCONATE 1 APPLICATION(S): 213 SOLUTION TOPICAL at 07:06

## 2020-01-01 RX ADMIN — CHLORHEXIDINE GLUCONATE 15 MILLILITER(S): 213 SOLUTION TOPICAL at 16:29

## 2020-01-01 RX ADMIN — POLYETHYLENE GLYCOL 3350 17 GRAM(S): 17 POWDER, FOR SOLUTION ORAL at 13:22

## 2020-01-01 RX ADMIN — PROPOFOL 6.85 MICROGRAM(S)/KG/MIN: 10 INJECTION, EMULSION INTRAVENOUS at 10:52

## 2020-01-01 RX ADMIN — QUETIAPINE FUMARATE 50 MILLIGRAM(S): 200 TABLET, FILM COATED ORAL at 06:39

## 2020-01-01 RX ADMIN — Medication 1 DROP(S): at 23:41

## 2020-01-01 RX ADMIN — MIDODRINE HYDROCHLORIDE 15 MILLIGRAM(S): 2.5 TABLET ORAL at 05:52

## 2020-01-01 RX ADMIN — ENOXAPARIN SODIUM 70 MILLIGRAM(S): 100 INJECTION SUBCUTANEOUS at 17:15

## 2020-03-21 NOTE — ED ADULT NURSE NOTE - CAS ELECT INFOMATION PROVIDED
DC instructions/pt michael and evaluated in intake  w/ nasal swab done pt  dc home wi/ complete dc instruction given to pt.

## 2020-03-21 NOTE — ED PROVIDER NOTE - CPE EDP NEURO NORM
normal... Implemented All Universal Safety Interventions:  [place] to call system. Call bell, personal items and telephone within reach. Instruct patient to call for assistance. Room bathroom lighting operational. Non-slip footwear when patient is off stretcher. Physically safe environment: no spills, clutter or unnecessary equipment. Stretcher in lowest position, wheels locked, appropriate side rails in place.

## 2020-03-21 NOTE — ED PROVIDER NOTE - CLINICAL SUMMARY MEDICAL DECISION MAKING FREE TEXT BOX
73 y/o M patient presents for symptoms of coronavirus.  Patient not tachypneic or hypoxic.  Will test, return precautions provided, will advise to self isolate.

## 2020-03-21 NOTE — ED PROVIDER NOTE - OBJECTIVE STATEMENT
73 y/o M patient presents to ED for testing for corona virus.  Patient presents with flu like symptoms for a few days, requesting testing done. NKDA.

## 2020-03-21 NOTE — ED PROVIDER NOTE - PATIENT PORTAL LINK FT
You can access the FollowMyHealth Patient Portal offered by [facility] by registering at the following website: [email or web address]. By joining [facility]’s FollowMyHealth portal, you will also be able to view your health information using other applications (apps) compatible with our system.

## 2020-03-28 NOTE — ED PROVIDER NOTE - PROGRESS NOTE DETAILS
Pt comfortable with no labored breathing on 100% NRB mask.  Pulse ox about 86-87%.  Dr. Oneal informed and accepts admission. Subsequent to admit to Dr. Oneal, Pt's condition worsened with pulse ox going down to 84% and Pt more labored and tachypneic.  Decision made to intubate.  Informed Pt with [employer] interpretor of the plan.  Pt intubated successfully.  Attempting to reach ICU attending repeatedly with no answer.  Subsequently able to reach ICU intern and she says she will attempt to reach attending. Dr. Camacho, ICU attending, informed for ICU admission. Spoke with Pt's son, Ignacio, emergency contact, and his phone number is [phone].  He has not been in close contact with him in about 2 weeks.  He was informed to tell Pt's wife who lives with him to be in self-isolation and that she likely contracted COVID-19 virus, and precautions to go to ED.

## 2020-03-28 NOTE — ED PROVIDER NOTE - CONSTITUTIONAL, MLM
normal... Well appearing, awake, alert, oriented to person, place, time/situation and in no apparent distress. Speaking full sentences.

## 2020-03-28 NOTE — ED PROVIDER NOTE - CLINICAL SUMMARY MEDICAL DECISION MAKING FREE TEXT BOX
75 y/o M with known COVID-19 presents with shortness of breath and hypoxia, rapidly improved to 90% on nonrebreather. Will monitor closely for consideration of intubation. Will get labs, CXR, EKG and admit.

## 2020-03-28 NOTE — ED PROVIDER NOTE - OBJECTIVE STATEMENT
75 y/o M with a PMHx of prostate problems presents to the ED with complaints of shortness of breath x 6 days. Patient presented here on 3/21/20 and had COVID-19 testing done that has now resulted positive. Denies smoking, chest pain, cough, fever, diarrhea, vomiting, abdominal pain, known sick contacts, recent travel or any other acute complaints.

## 2020-03-29 PROBLEM — Z78.9 OTHER SPECIFIED HEALTH STATUS: Chronic | Status: ACTIVE | Noted: 2020-01-01

## 2020-03-29 NOTE — H&P ADULT - ATTENDING COMMENTS
MICU ATTENDING. I have personally seen and examined the pt. I was physically present for the key elements service provided. I agree with above history, physical and plan which I edited where appropriate. I total spent 35 min critical care time.   73 y/o from home presented wth SOB. Intubated in ER for hypoxemia   A/P hypoxic resp failure   BL pneumonia   COVID(+)  MICU ARDS net protocol, serial ABG, CXR  Taper FIO2 as tolerated  Panculture   Empiric antibiotics   Keep negative   DVT/Gu prophylaxis

## 2020-03-29 NOTE — ED PROCEDURE NOTE - CPROC ED INFORMED CONSENT1
emergency procedure/Benefits, risks, and possible complications of procedure explained to patient/caregiver who verbalized understanding and gave verbal consent.

## 2020-03-29 NOTE — PROGRESS NOTE ADULT - SUBJECTIVE AND OBJECTIVE BOX
DNR [ ]   DNI  [  ]    INTERVAL HPI/OVERNIGHT EVENTS: ***Admitted for respiratory failure    PRESSORS: [ ] YES [ ] NO  WHICH: Phenylephrine    ANTIBIOTICS:                  DATE STARTED:  ANTIBIOTICS:                  DATE STARTED:  ANTIBIOTICS:                  DATE STARTED:    azithromycin  IVPB 500 milliGRAM(s) IV Intermittent every 24 hours  cefTRIAXone   IVPB 1000 milliGRAM(s) IV Intermittent every 24 hours  hydroxychloroquine 400 milliGRAM(s) Oral every 12 hours  hydroxychloroquine   Oral     Cardiovascular:  Heart Failure  Acute   Acute on Chronic  Chronic         Pulmonary:    Hematalogic:  enoxaparin Injectable 40 milliGRAM(s) SubCutaneous daily    Other:  ascorbic acid IVPB 1500 milliGRAM(s) IV Intermittent every 6 hours  chlorhexidine 4% Liquid 1 Application(s) Topical <User Schedule>  fentaNYL   Infusion 2 MICROgram(s)/kG/Hr IV Continuous <Continuous>  influenza   Vaccine 0.5 milliLiter(s) IntraMuscular once  midazolam Infusion 0.02 mG/kG/Hr IV Continuous <Continuous>  pantoprazole  Injectable 40 milliGRAM(s) IV Push daily  propofol Infusion 30 MICROgram(s)/kG/Min IV Continuous <Continuous>  thiamine IVPB 500 milliGRAM(s) IV Intermittent daily    ascorbic acid IVPB 1500 milliGRAM(s) IV Intermittent every 6 hours  azithromycin  IVPB 500 milliGRAM(s) IV Intermittent every 24 hours  cefTRIAXone   IVPB 1000 milliGRAM(s) IV Intermittent every 24 hours  chlorhexidine 4% Liquid 1 Application(s) Topical <User Schedule>  enoxaparin Injectable 40 milliGRAM(s) SubCutaneous daily  fentaNYL   Infusion 2 MICROgram(s)/kG/Hr IV Continuous <Continuous>  hydroxychloroquine 400 milliGRAM(s) Oral every 12 hours  hydroxychloroquine   Oral   influenza   Vaccine 0.5 milliLiter(s) IntraMuscular once  midazolam Infusion 0.02 mG/kG/Hr IV Continuous <Continuous>  pantoprazole  Injectable 40 milliGRAM(s) IV Push daily  propofol Infusion 30 MICROgram(s)/kG/Min IV Continuous <Continuous>  thiamine IVPB 500 milliGRAM(s) IV Intermittent daily    Drug Dosing Weight  Height (cm): 170.2 (29 Mar 2020 11:52)  Weight (kg): 76.1 (29 Mar 2020 11:52)  BMI (kg/m2): 26.3 (29 Mar 2020 11:52)  BSA (m2): 1.88 (29 Mar 2020 11:52)    CENTRAL LINE: [x ] YES [ ] NO  LOCATION:  Right IJ DATE INSERTED:3/29  REMOVE: [ ] YES [x ] NO  EXPLAIN:  Critically ill    JANE: [x ] YES [ ] NO    DATE INSERTED:  REMOVE:  [ ] YES [x ] NO  EXPLAIN: Critically ill    A-LINE:  [x ] YES [ ] NO  LOCATION:  R radial art DATE INSERTED: 3/29  REMOVE:  [ ] YES [ ] NO  EXPLAIN:    PAST MEDICAL & SURGICAL HISTORY:  BPH (benign prostatic hyperplasia)  No pertinent past medical history  No significant past surgical history        ABG - ( 29 Mar 2020 02:42 )  pH, Arterial: 7.23  pH, Blood: x     /  pCO2: 60    /  pO2: 75    / HCO3: 24    / Base Excess: -4.1  /  SaO2: 88                      Mode: AC/ CMV (Assist Control/ Continuous Mandatory Ventilation)  RR (machine): 18  TV (machine): 500  FiO2: 100  PEEP: 10  ITime: 1  MAP: 16  PIP: 49      PHYSICAL EXAM:    GENERAL: NAD  HEAD:  Atraumatic, Normocephalic  EYES: EOMI, PERRLA, conjunctiva and sclera clear  ENMT: No tonsillar erythema, exudates. Nasal feeding tube.   NECK: Supple, No JVD, RIJ  NERVOUS SYSTEM:  Sedated.  CHEST/LUNG: Diminished breath sounds bilaterally  HEART: Regular rate and rhythm; No murmurs, rubs, or gallops  ABDOMEN: Soft, Nontender, Nondistended; Bowel sounds present  EXTREMITIES:  2+ Peripheral Pulses, No clubbing, cyanosis, or edema  LYMPH: No lymphadenopathy noted  SKIN: No rashes or lesions      LABS:  CBC Full  -  ( 28 Mar 2020 21:36 )  WBC Count : 7.74 K/uL  RBC Count : 4.49 M/uL  Hemoglobin : 14.1 g/dL  Hematocrit : 42.3 %  Platelet Count - Automated : 359 K/uL  Mean Cell Volume : 94.2 fl  Mean Cell Hemoglobin : 31.4 pg  Mean Cell Hemoglobin Concentration : 33.3 gm/dL  Auto Neutrophil # : x  Auto Lymphocyte # : x  Auto Monocyte # : x  Auto Eosinophil # : x  Auto Basophil # : x  Auto Neutrophil % : x  Auto Lymphocyte % : x  Auto Monocyte % : x  Auto Eosinophil % : x  Auto Basophil % : x    03-28    138  |  107  |  30<H>  ----------------------------<  138<H>  3.8   |  23  |  0.81    Ca    8.1<L>      28 Mar 2020 21:36    TPro  7.6  /  Alb  2.1<L>  /  TBili  0.8  /  DBili  x   /  AST  65<H>  /  ALT  38  /  AlkPhos  127<H>  03-28              [  ]  DVT Prophylaxis  [  ]  Nutrition, Brand, Rate         Goal Rate         Abdominal Nutritional Status -  Malnutrition   Cachexia      Morbid Obesity BMI >/=40    RADIOLOGY & ADDITIONAL STUDIES:  ***  < from: Xray Chest 1 View AP/PA (03.29.20 @ 03:18) >  The tip of the ET tube is in the mid trachea area    AP view of the chest demonstrates diffuse bilateral interstitial and airspace infiltrates, unchanged. There is no pleural effusion. There is no pneumothorax.    The heart is mildly enlarged. There is no mediastinal or hilar mass.     The pulmonary vasculature is normal.     Mild thoracic degenerative changes are present.    IMPRESSION:    Diffuse bilateral interstitial and airspace infiltrates.    ET tube in satisfactory position.      < end of copied text >    [  ] Goals of Care Discussion with Family/Proxy/Other           Elements of Conversation Discussed: Patient/Family understanding of current illness   Advanced Directives                                                                       Prognosis  Treatment Options  Care Aligned with patient's wishes                                             TIME SPENT: 35 minutes

## 2020-03-29 NOTE — ED ADULT NURSE REASSESSMENT NOTE - NS ED NURSE REASSESS COMMENT FT1
PT saturation at 86% , MD made aware.  MD assessed pt. Pt intubated at 210 am. PT was given 20mg of Etomidate followed by 100mg of Succinylcholine.

## 2020-03-29 NOTE — H&P ADULT - NSHPLABSRESULTS_GEN_ALL_CORE
14.1   7.74  )-----------( 359      ( 28 Mar 2020 21:36 )             42.3       03-28    138  |  107  |  30<H>  ----------------------------<  138<H>  3.8   |  23  |  0.81    Ca    8.1<L>      28 Mar 2020 21:36    TPro  7.6  /  Alb  2.1<L>  /  TBili  0.8  /  DBili  x   /  AST  65<H>  /  ALT  38  /  AlkPhos  127<H>  03-28          ABG - ( 29 Mar 2020 02:42 )  pH, Arterial: 7.23  pH, Blood: x     /  pCO2: 60    /  pO2: 75    / HCO3: 24    / Base Excess: -4.1  /  SaO2: 88            Lactate Trend      CARDIAC MARKERS ( 28 Mar 2020 21:36 )  <0.015 ng/mL / x     / x     / x     / x

## 2020-03-29 NOTE — ED ADULT NURSE NOTE - OBJECTIVE STATEMENT
Pt presented to the ED for SOB, PT states he has been SOB for 8 plus days.  Pt tested positive for COvid-19. PT has hx of prostate issues.

## 2020-03-29 NOTE — H&P ADULT - ASSESSMENT
73 y/o M with a PMHx of prostate problems presents to the ED with complaints of shortness of breath x 6 days. Patient presented here on 3/21/20 and had COVID-19 testing done that has now resulted positive.  Denies smoking, chest pain, cough, fever, diarrhea, vomiting, abdominal pain, known sick contacts, recent travel or any other acute complaints.  In Ed, Patient saturating in 70s on NRB, and with increased work of breathing, hence intubated, ICU consulted.     Will admit to ICU for Acute Hypoxic Respiratory Failure from COVID-19 PNA.     Assessment:  - Acute Hypoxic Respiratory Failure secondary to PNA ; +COVID 19    Plan:  Neuro:  - AAO* at baseline  - now sedated and intubated      CV:  - Hold BP medications  - Will start Vasopresor support if needed      Pulm:  - Acute Hypoxic Resp Failure secondary to PNA; + COVID 19  - c/e MV  - Bilateral Opacities concerning for COVID PNA  - Continue with empiric antibiotics, Rocephin, Zithromax for concern for secondary bacterial infection   - Will start with Plaquenil vitamin C, thiamine   - check D-dimer, Ferritin, LDH, T cell sunsets, Pr-calcitonin, G6pd level, ESR, CRP, HIV, HBV serologies   - check monitor EKG for QT prolongation     ID:  - empiric CAP coverage: Zithromax and Ceftriaxone  - flu negative   - Check Blood Cultures  - Check U/C  - Check COVID Results    Nephro:  - FC 3/29  - monitor urine output   - I&Os  - Monitor electrolytes     GI:  - npo for now   - gi ppx     Heme:  - no indication for transfusion   - monitor cbc daily     Endo:  - No history of DM  - target CBG < 180  - FS q6 while NPO  - Start diet when possible    Prophy:  - C/W Lovenox     Dispo:  - ICU

## 2020-03-29 NOTE — H&P ADULT - NSHPPHYSICALEXAM_GEN_ALL_CORE
PHYSICAL EXAM:  GENERAL: sedated, intubated   HEENT: Normocephalic;  conjunctivae and sclerae clear; dry mucous membranes;   NECK : supple  CHEST/LUNG: reduced air entry b/l   HEART: S1 S2  regular; no murmurs, gallops or rubs  ABDOMEN: Soft, Nontender, Nondistended; Bowel sounds present  EXTREMITIES: no cyanosis; no edema; no calf tenderness  SKIN: warm and dry; no rash  NERVOUS SYSTEM:  sedated, fighting the vent; pupils reactive b/l

## 2020-03-29 NOTE — H&P ADULT - HISTORY OF PRESENT ILLNESS
History obtained from chart as patient intubated     73 y/o M with a PMHx of prostate problems presents to the ED with complaints of shortness of breath x 6 days. Patient presented here on 3/21/20 and had COVID-19 testing done that has now resulted positive.  Denies smoking, chest pain, cough, fever, diarrhea, vomiting, abdominal pain, known sick contacts, recent travel or any other acute complaints.    In Ed, Patient saturating in 70s on NRB, and with increased work of breathing, hence intubated, ICU consulted.

## 2020-03-30 NOTE — CHART NOTE - NSCHARTNOTEFT_GEN_A_CORE
Utilized  ID#[id number].  Spoke with patient's wife/Ruby ([phone]). Updated clinical status. All questions answered; supportive counseling provided.

## 2020-03-30 NOTE — PROGRESS NOTE ADULT - SUBJECTIVE AND OBJECTIVE BOX
DNR [ ]   DNI  [  ]   FULL CODE    INTERVAL HPI/OVERNIGHT EVENTS: ***    PRESSORS: [x ] YES [ ] NO  WHICH: Phenylephrine    ANTIBIOTICS:                  DATE STARTED:  ANTIBIOTICS:                  DATE STARTED:  ANTIBIOTICS:                  DATE STARTED:    azithromycin  IVPB 500 milliGRAM(s) IV Intermittent every 24 hours    Cardiovascular:  Heart Failure  Acute   Acute on Chronic  Chronic       phenylephrine    Infusion 0.1 MICROgram(s)/kG/Min IV Continuous <Continuous>    Pulmonary:    Hematalogic:  enoxaparin Injectable 40 milliGRAM(s) SubCutaneous daily    Other:  acetaminophen  IVPB .. 1000 milliGRAM(s) IV Intermittent once  ascorbic acid IVPB 1500 milliGRAM(s) IV Intermittent every 6 hours  chlorhexidine 0.12% Liquid 15 milliLiter(s) Oral Mucosa every 12 hours  chlorhexidine 4% Liquid 1 Application(s) Topical <User Schedule>  fentaNYL   Infusion 2 MICROgram(s)/kG/Hr IV Continuous <Continuous>  influenza   Vaccine 0.5 milliLiter(s) IntraMuscular once  midazolam Infusion 0.02 mG/kG/Hr IV Continuous <Continuous>  pantoprazole  Injectable 40 milliGRAM(s) IV Push daily  propofol Infusion 5 MICROgram(s)/kG/Min IV Continuous <Continuous>  thiamine IVPB 200 milliGRAM(s) IV Intermittent daily    acetaminophen  IVPB .. 1000 milliGRAM(s) IV Intermittent once  ascorbic acid IVPB 1500 milliGRAM(s) IV Intermittent every 6 hours  azithromycin  IVPB 500 milliGRAM(s) IV Intermittent every 24 hours  chlorhexidine 0.12% Liquid 15 milliLiter(s) Oral Mucosa every 12 hours  chlorhexidine 4% Liquid 1 Application(s) Topical <User Schedule>  enoxaparin Injectable 40 milliGRAM(s) SubCutaneous daily  fentaNYL   Infusion 2 MICROgram(s)/kG/Hr IV Continuous <Continuous>  influenza   Vaccine 0.5 milliLiter(s) IntraMuscular once  midazolam Infusion 0.02 mG/kG/Hr IV Continuous <Continuous>  pantoprazole  Injectable 40 milliGRAM(s) IV Push daily  phenylephrine    Infusion 0.1 MICROgram(s)/kG/Min IV Continuous <Continuous>  propofol Infusion 5 MICROgram(s)/kG/Min IV Continuous <Continuous>  thiamine IVPB 200 milliGRAM(s) IV Intermittent daily    Drug Dosing Weight  Height (cm): 170.2 (29 Mar 2020 11:52)  Weight (kg): 76.1 (29 Mar 2020 11:52)  BMI (kg/m2): 26.3 (29 Mar 2020 11:52)  BSA (m2): 1.88 (29 Mar 2020 11:52)    CENTRAL LINE: [x ] YES [ ] NO  LOCATION:  [facility]   DATE INSERTED: 3/29  REMOVE: [ ] YES [x ] NO  EXPLAIN:  Critically ill    JANE: [x ] YES [ ] NO    DATE INSERTED:  REMOVE:  [ ] YES [x ] NO  EXPLAIN:  Critically ill    A-LINE:  [x] YES [ ] NO  LOCATION:  [facility] DATE INSERTED: 3/29  REMOVE:  [ ] YES [x ] NO  EXPLAIN: Critically ill    PAST MEDICAL & SURGICAL HISTORY:  BPH (benign prostatic hyperplasia)  No pertinent past medical history  No significant past surgical history        ABG - ( 30 Mar 2020 04:56 )  pH, Arterial: 7.20  pH, Blood: x     /  pCO2: 62    /  pO2: 199   / HCO3: 23    / Base Excess: -5.4  /  SaO2: 99                    03-29 @ 07:01  -  03-30 @ 07:00  --------------------------------------------------------  IN: 1154.5 mL / OUT: 1400 mL / NET: -245.5 mL        Mode: AC/ CMV (Assist Control/ Continuous Mandatory Ventilation)  RR (machine): 30  TV (machine): 450  FiO2: 60  PEEP: 18  ITime: 1  MAP: 25  PIP: 37      PHYSICAL EXAM:    GENERAL: NAD, sedated  HEAD:  Atraumatic, Normocephalic  EYES: EOMI, PERRLA, conjunctiva and sclera clear  ENMT: No tonsillar erythema, exudates. NGT, Orally intubated  NECK: Supple, No JVD, [facility]  NERVOUS SYSTEM:  Sedated  CHEST/LUNG: Diminished breath sounds throughout  HEART: Regular rate and rhythm; No murmurs, rubs, or gallops  ABDOMEN: Soft, Nontender, Nondistended; Bowel sounds present  EXTREMITIES:  +1 edema BL lower extremities. 2+ Peripheral Pulses, No clubbing, cyanosis  LYMPH: No lymphadenopathy noted  SKIN: No rashes or lesions      LABS:  CBC Full  -  ( 30 Mar 2020 06:50 )  WBC Count : 10.08 K/uL  RBC Count : 3.38 M/uL  Hemoglobin : 12.8 g/dL  Hematocrit : 35.1 %  Platelet Count - Automated : x  Mean Cell Volume : 103.8 fl  Mean Cell Hemoglobin : 37.9 pg  Mean Cell Hemoglobin Concentration : 36.5 gm/dL  Auto Neutrophil # : 9.25 K/uL  Auto Lymphocyte # : 0.53 K/uL  Auto Monocyte # : 0.20 K/uL  Auto Eosinophil # : 0.01 K/uL  Auto Basophil # : 0.02 K/uL  Auto Neutrophil % : 91.7 %  Auto Lymphocyte % : 5.3 %  Auto Monocyte % : 2.0 %  Auto Eosinophil % : 0.1 %  Auto Basophil % : 0.2 %    03-30    142  |  112<H>  |  32<H>  ----------------------------<  123<H>  4.6   |  22  |  0.76    Ca    8.4      30 Mar 2020 06:50  Phos  3.5     03-30  Mg     2.9     03-30    TPro  6.7  /  Alb  1.7<L>  /  TBili  0.5  /  DBili  x   /  AST  45<H>  /  ALT  31  /  AlkPhos  110  03-30              [  ]  DVT Prophylaxis  [  ]  Nutrition, Brand, Rate         Goal Rate         Abdominal Nutritional Status -  Malnutrition   Cachexia      Morbid Obesity BMI >/=40    RADIOLOGY & ADDITIONAL STUDIES:  ***  < from: Xray Chest 1 View-PORTABLE IMMEDIATE (03.29.20 @ 22:27) >  Heart is magnified by technique. Endotracheal tube remains.    Advanced bilateral infiltrates right greater than left again noted.    Chest is similar to March 29, earlier study.    IMPRESSION: Persistent advanced infiltrates right greater than left.    < end of copied text >    [  ] Goals of Care Discussion with Family/Proxy/Other           Elements of Conversation Discussed: Patient/Family understanding of current illness   Advanced Directives                                                                       Prognosis  Treatment Options  Care Aligned with patient's wishes                                             TIME SPENT: 35 minutes DNR [ ]   DNI  [  ]   FULL CODE    INTERVAL HPI/OVERNIGHT EVENTS: remains in hypoxic respiratory failure     PRESSORS: [x ] YES [ ] NO  WHICH: Phenylephrine    ANTIBIOTICS:                  DATE STARTED:  ANTIBIOTICS:                  DATE STARTED:  ANTIBIOTICS:                  DATE STARTED:    azithromycin  IVPB 500 milliGRAM(s) IV Intermittent every 24 hours    Cardiovascular:  Heart Failure  Acute   Acute on Chronic  Chronic       phenylephrine    Infusion 0.1 MICROgram(s)/kG/Min IV Continuous <Continuous>    Pulmonary:    Hematalogic:  enoxaparin Injectable 40 milliGRAM(s) SubCutaneous daily    Other:  acetaminophen  IVPB .. 1000 milliGRAM(s) IV Intermittent once  ascorbic acid IVPB 1500 milliGRAM(s) IV Intermittent every 6 hours  chlorhexidine 0.12% Liquid 15 milliLiter(s) Oral Mucosa every 12 hours  chlorhexidine 4% Liquid 1 Application(s) Topical <User Schedule>  fentaNYL   Infusion 2 MICROgram(s)/kG/Hr IV Continuous <Continuous>  influenza   Vaccine 0.5 milliLiter(s) IntraMuscular once  midazolam Infusion 0.02 mG/kG/Hr IV Continuous <Continuous>  pantoprazole  Injectable 40 milliGRAM(s) IV Push daily  propofol Infusion 5 MICROgram(s)/kG/Min IV Continuous <Continuous>  thiamine IVPB 200 milliGRAM(s) IV Intermittent daily    acetaminophen  IVPB .. 1000 milliGRAM(s) IV Intermittent once  ascorbic acid IVPB 1500 milliGRAM(s) IV Intermittent every 6 hours  azithromycin  IVPB 500 milliGRAM(s) IV Intermittent every 24 hours  chlorhexidine 0.12% Liquid 15 milliLiter(s) Oral Mucosa every 12 hours  chlorhexidine 4% Liquid 1 Application(s) Topical <User Schedule>  enoxaparin Injectable 40 milliGRAM(s) SubCutaneous daily  fentaNYL   Infusion 2 MICROgram(s)/kG/Hr IV Continuous <Continuous>  influenza   Vaccine 0.5 milliLiter(s) IntraMuscular once  midazolam Infusion 0.02 mG/kG/Hr IV Continuous <Continuous>  pantoprazole  Injectable 40 milliGRAM(s) IV Push daily  phenylephrine    Infusion 0.1 MICROgram(s)/kG/Min IV Continuous <Continuous>  propofol Infusion 5 MICROgram(s)/kG/Min IV Continuous <Continuous>  thiamine IVPB 200 milliGRAM(s) IV Intermittent daily    Drug Dosing Weight  Height (cm): 170.2 (29 Mar 2020 11:52)  Weight (kg): 76.1 (29 Mar 2020 11:52)  BMI (kg/m2): 26.3 (29 Mar 2020 11:52)  BSA (m2): 1.88 (29 Mar 2020 11:52)    CENTRAL LINE: [x ] YES [ ] NO  LOCATION:  [facility]   DATE INSERTED: 3/29  REMOVE: [ ] YES [x ] NO  EXPLAIN:  Critically ill    JANE: [x ] YES [ ] NO    DATE INSERTED:  REMOVE:  [ ] YES [x ] NO  EXPLAIN:  Critically ill    A-LINE:  [x] YES [ ] NO  LOCATION:  [facility] DATE INSERTED: 3/29  REMOVE:  [ ] YES [x ] NO  EXPLAIN: Critically ill    PAST MEDICAL & SURGICAL HISTORY:  BPH (benign prostatic hyperplasia)  No pertinent past medical history  No significant past surgical history        ABG - ( 30 Mar 2020 04:56 )  pH, Arterial: 7.20  pH, Blood: x     /  pCO2: 62    /  pO2: 199   / HCO3: 23    / Base Excess: -5.4  /  SaO2: 99                    03-29 @ 07:01  -  03-30 @ 07:00  --------------------------------------------------------  IN: 1154.5 mL / OUT: 1400 mL / NET: -245.5 mL        Mode: AC/ CMV (Assist Control/ Continuous Mandatory Ventilation)  RR (machine): 30  TV (machine): 450  FiO2: 60  PEEP: 18  ITime: 1  MAP: 25  PIP: 37      PHYSICAL EXAM:    GENERAL: NAD, sedated  HEAD:  Atraumatic, Normocephalic  EYES: EOMI, PERRLA, conjunctiva and sclera clear  ENMT: No tonsillar erythema, exudates. NGT, Orally intubated  NECK: Supple, No JVD, [facility]  NERVOUS SYSTEM:  Sedated  CHEST/LUNG: Diminished breath sounds throughout  HEART: Regular rate and rhythm; No murmurs, rubs, or gallops  ABDOMEN: Soft, Nontender, Nondistended; Bowel sounds present  EXTREMITIES:  +1 edema BL lower extremities. 2+ Peripheral Pulses, No clubbing, cyanosis  LYMPH: No lymphadenopathy noted  SKIN: No rashes or lesions      LABS:  CBC Full  -  ( 30 Mar 2020 06:50 )  WBC Count : 10.08 K/uL  RBC Count : 3.38 M/uL  Hemoglobin : 12.8 g/dL  Hematocrit : 35.1 %  Platelet Count - Automated : x  Mean Cell Volume : 103.8 fl  Mean Cell Hemoglobin : 37.9 pg  Mean Cell Hemoglobin Concentration : 36.5 gm/dL  Auto Neutrophil # : 9.25 K/uL  Auto Lymphocyte # : 0.53 K/uL  Auto Monocyte # : 0.20 K/uL  Auto Eosinophil # : 0.01 K/uL  Auto Basophil # : 0.02 K/uL  Auto Neutrophil % : 91.7 %  Auto Lymphocyte % : 5.3 %  Auto Monocyte % : 2.0 %  Auto Eosinophil % : 0.1 %  Auto Basophil % : 0.2 %    03-30    142  |  112<H>  |  32<H>  ----------------------------<  123<H>  4.6   |  22  |  0.76    Ca    8.4      30 Mar 2020 06:50  Phos  3.5     03-30  Mg     2.9     03-30    TPro  6.7  /  Alb  1.7<L>  /  TBili  0.5  /  DBili  x   /  AST  45<H>  /  ALT  31  /  AlkPhos  110  03-30              [  ]  DVT Prophylaxis  [  ]  Nutrition, Brand, Rate         Goal Rate         Abdominal Nutritional Status -  Malnutrition   Cachexia      Morbid Obesity BMI >/=40    RADIOLOGY & ADDITIONAL STUDIES:  ***  < from: Xray Chest 1 View-PORTABLE IMMEDIATE (03.29.20 @ 22:27) >  Heart is magnified by technique. Endotracheal tube remains.    Advanced bilateral infiltrates right greater than left again noted.    Chest is similar to March 29, earlier study.    IMPRESSION: Persistent advanced infiltrates right greater than left.    < end of copied text >    [  ] Goals of Care Discussion with Family/Proxy/Other           Elements of Conversation Discussed: Patient/Family understanding of current illness   Advanced Directives                                                                       Prognosis  Treatment Options  Care Aligned with patient's wishes                                             TIME SPENT: 35 minutes

## 2020-03-31 PROBLEM — N40.0 BENIGN PROSTATIC HYPERPLASIA WITHOUT LOWER URINARY TRACT SYMPTOMS: Chronic | Status: ACTIVE | Noted: 2020-01-01

## 2020-03-31 NOTE — PROGRESS NOTE ADULT - SUBJECTIVE AND OBJECTIVE BOX
DNR [ ]   DNI  [  ]   FULL CODE    INTERVAL HPI/OVERNIGHT EVENTS: ***Bilateral DVTs.    PRESSORS: [x ] YES [ ] NO  WHICH:  Phenylephrine    ANTIBIOTICS:                  DATE STARTED:  ANTIBIOTICS:                  DATE STARTED:  ANTIBIOTICS:                  DATE STARTED:    azithromycin  IVPB 250 milliGRAM(s) IV Intermittent daily  hydroxychloroquine   Oral   hydroxychloroquine 200 milliGRAM(s) Oral every 12 hours    Cardiovascular:  Heart Failure  Acute   Acute on Chronic  Chronic       phenylephrine    Infusion 0.1 MICROgram(s)/kG/Min IV Continuous <Continuous>    Pulmonary:    Hematalogic:  enoxaparin Injectable 80 milliGRAM(s) SubCutaneous every 12 hours    Other:  acetaminophen    Suspension .. 650 milliGRAM(s) Oral every 6 hours PRN  ascorbic acid IVPB 1500 milliGRAM(s) IV Intermittent every 6 hours  chlorhexidine 0.12% Liquid 15 milliLiter(s) Oral Mucosa every 12 hours  chlorhexidine 4% Liquid 1 Application(s) Topical <User Schedule>  fentaNYL   Infusion 3 MICROgram(s)/kG/Hr IV Continuous <Continuous>  influenza   Vaccine 0.5 milliLiter(s) IntraMuscular once  pantoprazole  Injectable 40 milliGRAM(s) IV Push daily  polyethylene glycol 3350 17 Gram(s) Oral daily  propofol Infusion 50 MICROgram(s)/kG/Min IV Continuous <Continuous>  senna 2 Tablet(s) Oral at bedtime  thiamine IVPB 200 milliGRAM(s) IV Intermittent daily    acetaminophen    Suspension .. 650 milliGRAM(s) Oral every 6 hours PRN  ascorbic acid IVPB 1500 milliGRAM(s) IV Intermittent every 6 hours  azithromycin  IVPB 250 milliGRAM(s) IV Intermittent daily  chlorhexidine 0.12% Liquid 15 milliLiter(s) Oral Mucosa every 12 hours  chlorhexidine 4% Liquid 1 Application(s) Topical <User Schedule>  enoxaparin Injectable 80 milliGRAM(s) SubCutaneous every 12 hours  fentaNYL   Infusion 3 MICROgram(s)/kG/Hr IV Continuous <Continuous>  hydroxychloroquine   Oral   hydroxychloroquine 200 milliGRAM(s) Oral every 12 hours  influenza   Vaccine 0.5 milliLiter(s) IntraMuscular once  pantoprazole  Injectable 40 milliGRAM(s) IV Push daily  phenylephrine    Infusion 0.1 MICROgram(s)/kG/Min IV Continuous <Continuous>  polyethylene glycol 3350 17 Gram(s) Oral daily  propofol Infusion 50 MICROgram(s)/kG/Min IV Continuous <Continuous>  senna 2 Tablet(s) Oral at bedtime  thiamine IVPB 200 milliGRAM(s) IV Intermittent daily    Drug Dosing Weight  Height (cm): 170.2 (29 Mar 2020 11:52)  Weight (kg): 76.1 (29 Mar 2020 11:52)  BMI (kg/m2): 26.3 (29 Mar 2020 11:52)  BSA (m2): 1.88 (29 Mar 2020 11:52)    CENTRAL LINE: [ ] YES [ ] NO  LOCATION:   DATE INSERTED:  REMOVE: [ ] YES [ ] NO  EXPLAIN:    JANE: [ ] YES [ ] NO    DATE INSERTED:  REMOVE:  [ ] YES [ ] NO  EXPLAIN:    A-LINE:  [ ] YES [ ] NO  LOCATION:   DATE INSERTED:  REMOVE:  [ ] YES [ ] NO  EXPLAIN:    PAST MEDICAL & SURGICAL HISTORY:  BPH (benign prostatic hyperplasia)  No pertinent past medical history  No significant past surgical history        ABG - ( 30 Mar 2020 11:33 )  pH, Arterial: 7.26  pH, Blood: x     /  pCO2: 55    /  pO2: 157   / HCO3: 24    / Base Excess: -3.5  /  SaO2: 99                    03-30 @ 07:01  -  03-31 @ 07:00  --------------------------------------------------------  IN: 1805.5 mL / OUT: 1250 mL / NET: 555.5 mL        Mode: AC/ CMV (Assist Control/ Continuous Mandatory Ventilation)  RR (machine): 30  TV (machine): 450  FiO2: 40  PEEP: 16  ITime: 1  MAP: 25  PIP: 36      PHYSICAL EXAM:    GENERAL: NAD, well-groomed, well-developed  HEAD:  Atraumatic, Normocephalic  EYES: EOMI, PERRLA, conjunctiva and sclera clear  ENMT: No tonsillar erythema, exudates, or enlargement; Moist mucous membranes, Good dentition, No lesions  NECK: Supple, No JVD, Normal thyroid  NERVOUS SYSTEM:  Alert & Oriented X3, Good concentration; Motor Strength 5/5 B/L upper and lower extremities; DTRs 2+ intact and symmetric  CHEST/LUNG: Clear to percussion bilaterally; No rales, rhonchi, wheezing, or rubs  HEART: Regular rate and rhythm; No murmurs, rubs, or gallops  ABDOMEN: Soft, Nontender, Nondistended; Bowel sounds present  EXTREMITIES:  2+ Peripheral Pulses, No clubbing, cyanosis, or edema  LYMPH: No lymphadenopathy noted  SKIN: No rashes or lesions      LABS:  CBC Full  -  ( 31 Mar 2020 10:39 )  WBC Count : 8.21 K/uL  RBC Count : 3.65 M/uL  Hemoglobin : 11.3 g/dL  Hematocrit : 37.3 %  Platelet Count - Automated : x  Mean Cell Volume : 102.2 fl  Mean Cell Hemoglobin : 31.0 pg  Mean Cell Hemoglobin Concentration : 30.3 gm/dL  Auto Neutrophil # : x  Auto Lymphocyte # : x  Auto Monocyte # : x  Auto Eosinophil # : x  Auto Basophil # : x  Auto Neutrophil % : x  Auto Lymphocyte % : x  Auto Monocyte % : x  Auto Eosinophil % : x  Auto Basophil % : x    03-31    145  |  116<H>  |  60<H>  ----------------------------<  105<H>  4.6   |  25  |  x     Ca    8.2<L>      31 Mar 2020 10:39  Phos  3.1     03-31  Mg     3.3     03-31    TPro  x   /  Alb  1.4<L>  /  TBili  x   /  DBili  x   /  AST  x   /  ALT  x   /  AlkPhos  x   03-31              [  ]  DVT Prophylaxis  [  ]  Nutrition, Brand, Rate         Goal Rate         Abdominal Nutritional Status -  Malnutrition   Cachexia      Morbid Obesity BMI >/=40    RADIOLOGY & ADDITIONAL STUDIES:  ***    [  ] Goals of Care Discussion with Family/Proxy/Other           Elements of Conversation Discussed: Patient/Family understanding of current illness   Advanced Directives                                                                       Prognosis  Treatment Options  Care Aligned with patient's wishes                                             TIME SPENT: 35 minutes DNR [ ]   DNI  [  ]   FULL CODE    INTERVAL HPI/OVERNIGHT EVENTS: ***Bilateral DVTs.    PRESSORS: [x ] YES [ ] NO  WHICH:  Phenylephrine    ANTIBIOTICS:                  DATE STARTED:  ANTIBIOTICS:                  DATE STARTED:  ANTIBIOTICS:                  DATE STARTED:    azithromycin  IVPB 250 milliGRAM(s) IV Intermittent daily  hydroxychloroquine   Oral   hydroxychloroquine 200 milliGRAM(s) Oral every 12 hours    Cardiovascular:  Heart Failure  Acute   Acute on Chronic  Chronic       phenylephrine    Infusion 0.1 MICROgram(s)/kG/Min IV Continuous <Continuous>    Pulmonary:    Hematalogic:  enoxaparin Injectable 80 milliGRAM(s) SubCutaneous every 12 hours    Other:  acetaminophen    Suspension .. 650 milliGRAM(s) Oral every 6 hours PRN  ascorbic acid IVPB 1500 milliGRAM(s) IV Intermittent every 6 hours  chlorhexidine 0.12% Liquid 15 milliLiter(s) Oral Mucosa every 12 hours  chlorhexidine 4% Liquid 1 Application(s) Topical <User Schedule>  fentaNYL   Infusion 3 MICROgram(s)/kG/Hr IV Continuous <Continuous>  influenza   Vaccine 0.5 milliLiter(s) IntraMuscular once  pantoprazole  Injectable 40 milliGRAM(s) IV Push daily  polyethylene glycol 3350 17 Gram(s) Oral daily  propofol Infusion 50 MICROgram(s)/kG/Min IV Continuous <Continuous>  senna 2 Tablet(s) Oral at bedtime  thiamine IVPB 200 milliGRAM(s) IV Intermittent daily    acetaminophen    Suspension .. 650 milliGRAM(s) Oral every 6 hours PRN  ascorbic acid IVPB 1500 milliGRAM(s) IV Intermittent every 6 hours  azithromycin  IVPB 250 milliGRAM(s) IV Intermittent daily  chlorhexidine 0.12% Liquid 15 milliLiter(s) Oral Mucosa every 12 hours  chlorhexidine 4% Liquid 1 Application(s) Topical <User Schedule>  enoxaparin Injectable 80 milliGRAM(s) SubCutaneous every 12 hours  fentaNYL   Infusion 3 MICROgram(s)/kG/Hr IV Continuous <Continuous>  hydroxychloroquine   Oral   hydroxychloroquine 200 milliGRAM(s) Oral every 12 hours  influenza   Vaccine 0.5 milliLiter(s) IntraMuscular once  pantoprazole  Injectable 40 milliGRAM(s) IV Push daily  phenylephrine    Infusion 0.1 MICROgram(s)/kG/Min IV Continuous <Continuous>  polyethylene glycol 3350 17 Gram(s) Oral daily  propofol Infusion 50 MICROgram(s)/kG/Min IV Continuous <Continuous>  senna 2 Tablet(s) Oral at bedtime  thiamine IVPB 200 milliGRAM(s) IV Intermittent daily    Drug Dosing Weight  Height (cm): 170.2 (29 Mar 2020 11:52)  Weight (kg): 76.1 (29 Mar 2020 11:52)  BMI (kg/m2): 26.3 (29 Mar 2020 11:52)  BSA (m2): 1.88 (29 Mar 2020 11:52)    CENTRAL LINE: [x ] YES [ ] NO  LOCATION: [facility]  DATE INSERTED: 3/29  REMOVE: [ ] YES [x ] NO  EXPLAIN:  Critically ill    JANE: [x ] YES [ ] NO    DATE INSERTED:  REMOVE:  [ ] YES [x ] NO  EXPLAIN:  Critically ill    A-LINE:  [x ] YES [ ] NO  LOCATION:  art  DATE INSERTED:  REMOVE:  [ ] YES [ ] NO  EXPLAIN:    PAST MEDICAL & SURGICAL HISTORY:  BPH (benign prostatic hyperplasia)  No pertinent past medical history  No significant past surgical history        ABG - ( 30 Mar 2020 11:33 )  pH, Arterial: 7.26  pH, Blood: x     /  pCO2: 55    /  pO2: 157   / HCO3: 24    / Base Excess: -3.5  /  SaO2: 99                    03-30 @ 07:01  -  03-31 @ 07:00  --------------------------------------------------------  IN: 1805.5 mL / OUT: 1250 mL / NET: 555.5 mL        Mode: AC/ CMV (Assist Control/ Continuous Mandatory Ventilation)  RR (machine): 30  TV (machine): 450  FiO2: 40  PEEP: 16  ITime: 1  MAP: 25  PIP: 36      PHYSICAL EXAM:    GENERAL: NAD, well-groomed, well-developed  HEAD:  Atraumatic, Normocephalic  EYES: EOMI, PERRLA, conjunctiva and sclera clear  ENMT: No tonsillar erythema, exudates, or enlargement; Moist mucous membranes, Good dentition, No lesions  NECK: Supple, No JVD, Normal thyroid  NERVOUS SYSTEM:  Alert & Oriented X3, Good concentration; Motor Strength 5/5 B/L upper and lower extremities; DTRs 2+ intact and symmetric  CHEST/LUNG: Clear to percussion bilaterally; No rales, rhonchi, wheezing, or rubs  HEART: Regular rate and rhythm; No murmurs, rubs, or gallops  ABDOMEN: Soft, Nontender, Nondistended; Bowel sounds present  EXTREMITIES:  2+ Peripheral Pulses, No clubbing, cyanosis, or edema  LYMPH: No lymphadenopathy noted  SKIN: No rashes or lesions      LABS:  CBC Full  -  ( 31 Mar 2020 10:39 )  WBC Count : 8.21 K/uL  RBC Count : 3.65 M/uL  Hemoglobin : 11.3 g/dL  Hematocrit : 37.3 %  Platelet Count - Automated : x  Mean Cell Volume : 102.2 fl  Mean Cell Hemoglobin : 31.0 pg  Mean Cell Hemoglobin Concentration : 30.3 gm/dL  Auto Neutrophil # : x  Auto Lymphocyte # : x  Auto Monocyte # : x  Auto Eosinophil # : x  Auto Basophil # : x  Auto Neutrophil % : x  Auto Lymphocyte % : x  Auto Monocyte % : x  Auto Eosinophil % : x  Auto Basophil % : x    03-31    145  |  116<H>  |  60<H>  ----------------------------<  105<H>  4.6   |  25  |  x     Ca    8.2<L>      31 Mar 2020 10:39  Phos  3.1     03-31  Mg     3.3     03-31    TPro  x   /  Alb  1.4<L>  /  TBili  x   /  DBili  x   /  AST  x   /  ALT  x   /  AlkPhos  x   03-31              [  ]  DVT Prophylaxis  [  ]  Nutrition, Brand, Rate         Goal Rate         Abdominal Nutritional Status -  Malnutrition   Cachexia      Morbid Obesity BMI >/=40    RADIOLOGY & ADDITIONAL STUDIES:  ***    [  ] Goals of Care Discussion with Family/Proxy/Other           Elements of Conversation Discussed: Patient/Family understanding of current illness   Advanced Directives                                                                       Prognosis  Treatment Options  Care Aligned with patient's wishes                                             TIME SPENT: 35 minutes DNR [ ]   DNI  [  ]   FULL CODE    INTERVAL HPI/OVERNIGHT EVENTS: ***Bilateral DVTs.    PRESSORS: [x ] YES [ ] NO  WHICH:  Phenylephrine    ANTIBIOTICS:                  DATE STARTED:  ANTIBIOTICS:                  DATE STARTED:  ANTIBIOTICS:                  DATE STARTED:    azithromycin  IVPB 250 milliGRAM(s) IV Intermittent daily  hydroxychloroquine   Oral   hydroxychloroquine 200 milliGRAM(s) Oral every 12 hours    Cardiovascular:  Heart Failure  Acute   Acute on Chronic  Chronic       phenylephrine    Infusion 0.1 MICROgram(s)/kG/Min IV Continuous <Continuous>    Pulmonary:    Hematalogic:  enoxaparin Injectable 80 milliGRAM(s) SubCutaneous every 12 hours    Other:  acetaminophen    Suspension .. 650 milliGRAM(s) Oral every 6 hours PRN  ascorbic acid IVPB 1500 milliGRAM(s) IV Intermittent every 6 hours  chlorhexidine 0.12% Liquid 15 milliLiter(s) Oral Mucosa every 12 hours  chlorhexidine 4% Liquid 1 Application(s) Topical <User Schedule>  fentaNYL   Infusion 3 MICROgram(s)/kG/Hr IV Continuous <Continuous>  influenza   Vaccine 0.5 milliLiter(s) IntraMuscular once  pantoprazole  Injectable 40 milliGRAM(s) IV Push daily  polyethylene glycol 3350 17 Gram(s) Oral daily  propofol Infusion 50 MICROgram(s)/kG/Min IV Continuous <Continuous>  senna 2 Tablet(s) Oral at bedtime  thiamine IVPB 200 milliGRAM(s) IV Intermittent daily    acetaminophen    Suspension .. 650 milliGRAM(s) Oral every 6 hours PRN  ascorbic acid IVPB 1500 milliGRAM(s) IV Intermittent every 6 hours  azithromycin  IVPB 250 milliGRAM(s) IV Intermittent daily  chlorhexidine 0.12% Liquid 15 milliLiter(s) Oral Mucosa every 12 hours  chlorhexidine 4% Liquid 1 Application(s) Topical <User Schedule>  enoxaparin Injectable 80 milliGRAM(s) SubCutaneous every 12 hours  fentaNYL   Infusion 3 MICROgram(s)/kG/Hr IV Continuous <Continuous>  hydroxychloroquine   Oral   hydroxychloroquine 200 milliGRAM(s) Oral every 12 hours  influenza   Vaccine 0.5 milliLiter(s) IntraMuscular once  pantoprazole  Injectable 40 milliGRAM(s) IV Push daily  phenylephrine    Infusion 0.1 MICROgram(s)/kG/Min IV Continuous <Continuous>  polyethylene glycol 3350 17 Gram(s) Oral daily  propofol Infusion 50 MICROgram(s)/kG/Min IV Continuous <Continuous>  senna 2 Tablet(s) Oral at bedtime  thiamine IVPB 200 milliGRAM(s) IV Intermittent daily    Drug Dosing Weight  Height (cm): 170.2 (29 Mar 2020 11:52)  Weight (kg): 76.1 (29 Mar 2020 11:52)  BMI (kg/m2): 26.3 (29 Mar 2020 11:52)  BSA (m2): 1.88 (29 Mar 2020 11:52)    CENTRAL LINE: [x ] YES [ ] NO  LOCATION: [facility]  DATE INSERTED: 3/29  REMOVE: [ ] YES [x ] NO  EXPLAIN:  Critically ill    JANE: [x ] YES [ ] NO    DATE INSERTED:  REMOVE:  [ ] YES [x ] NO  EXPLAIN:  Critically ill    A-LINE:  [x ] YES [ ] NO  LOCATION:  art  DATE INSERTED:  REMOVE:  [ ] YES [x ] NO  EXPLAIN: Critically ill    PAST MEDICAL & SURGICAL HISTORY:  BPH (benign prostatic hyperplasia)  No pertinent past medical history  No significant past surgical history        ABG - ( 30 Mar 2020 11:33 )  pH, Arterial: 7.26  pH, Blood: x     /  pCO2: 55    /  pO2: 157   / HCO3: 24    / Base Excess: -3.5  /  SaO2: 99                    03-30 @ 07:01  -  03-31 @ 07:00  --------------------------------------------------------  IN: 1805.5 mL / OUT: 1250 mL / NET: 555.5 mL        Mode: AC/ CMV (Assist Control/ Continuous Mandatory Ventilation)  RR (machine): 30  TV (machine): 450  FiO2: 40  PEEP: 16  ITime: 1  MAP: 25  PIP: 36      PHYSICAL EXAM:    GENERAL: NAD, sedated  HEAD:  Atraumatic, Normocephalic  EYES: EOMI, PERRLA, conjunctiva and sclera clear  ENMT: No tonsillar erythema, NGT, orally intubated.  NECK: Supple, No JVD, LIJ  NERVOUS SYSTEM:  Sedated  CHEST/LUNG: Diminished breath sounds throughout  HEART: Regular rate and rhythm; No murmurs, rubs, or gallops  ABDOMEN: Soft, Nontender, Nondistended; Bowel sounds present  EXTREMITIES:  +1 edema BL lower extremities. 2+ Peripheral Pulses, No clubbing, cyanosis  LYMPH: No lymphadenopathy noted  SKIN: No rashes or lesions      LABS:  CBC Full  -  ( 31 Mar 2020 10:39 )  WBC Count : 8.21 K/uL  RBC Count : 3.65 M/uL  Hemoglobin : 11.3 g/dL  Hematocrit : 37.3 %  Platelet Count - Automated : x  Mean Cell Volume : 102.2 fl  Mean Cell Hemoglobin : 31.0 pg  Mean Cell Hemoglobin Concentration : 30.3 gm/dL  Auto Neutrophil # : x  Auto Lymphocyte # : x  Auto Monocyte # : x  Auto Eosinophil # : x  Auto Basophil # : x  Auto Neutrophil % : x  Auto Lymphocyte % : x  Auto Monocyte % : x  Auto Eosinophil % : x  Auto Basophil % : x    03-31    145  |  116<H>  |  60<H>  ----------------------------<  105<H>  4.6   |  25  |  x     Ca    8.2<L>      31 Mar 2020 10:39  Phos  3.1     03-31  Mg     3.3     03-31    TPro  x   /  Alb  1.4<L>  /  TBili  x   /  DBili  x   /  AST  x   /  ALT  x   /  AlkPhos  x   03-31              [  ]  DVT Prophylaxis  [  ]  Nutrition, Brand, Rate         Goal Rate         Abdominal Nutritional Status -  Malnutrition   Cachexia      Morbid Obesity BMI >/=40    RADIOLOGY & ADDITIONAL STUDIES:  ***  < from: Xray Chest 1 View- PORTABLE-Routine (03.31.20 @ 12:07) >  COMPARISON: Radiograph 3/30/2020.      FINDINGS:      Lines and Tubes: Endotracheal tube in place. Enteric tube in the stomach. Left-sided central venous catheter in place.      Lungs: The bilateral opacities, right greater than left, are unchanged.      Pleura: No pleural effusion or pneumothorax.      Heart and Mediastinum: The heart is normal in size.      Skeletal: Unremarkable.        IMPRESSION:    1.  No change in the bilateral opacities secondary to infection.      < end of copied text >    [  ] Goals of Care Discussion with Family/Proxy/Other           Elements of Conversation Discussed: Patient/Family understanding of current illness   Advanced Directives                                                                       Prognosis  Treatment Options  Care Aligned with patient's wishes                                             TIME SPENT: 35 minutes

## 2020-03-31 NOTE — DIETITIAN INITIAL EVALUATION ADULT. - OTHER INFO
Pt full code being treated for acute resp failure 2/2 covid19, LITTLE, bilateral DVT, acute on chronic CHF. PMH prostate problems. Pt intubated, sedated. Receiving propofol at 22.8ml/hrx24 hrs to provide 602kcal/d. Per MD pt with severe protein/calorie malnutrition? Limited wt hx, intake PTA available, unable to perform NFPE. Pt with +1 bilateral lower extremity edema. Per flowsheet no GI distress.

## 2020-03-31 NOTE — PROGRESS NOTE ADULT - PROBLEM SELECTOR PLAN 2
Continue Plaquenil, thiamine and Vitamin C.  Continue phenylephrine MAP 65.
Continue Plaquenil, thiamine and vitamin C for 4 days.  Start phenylephrine as needed to maintain MAP 65.
Continue plaquenil, vitamin C and thiamine for total of 5 days.

## 2020-03-31 NOTE — DIETITIAN INITIAL EVALUATION ADULT. - PERTINENT MEDS FT
MEDICATIONS  (STANDING):  ascorbic acid IVPB 1500 milliGRAM(s) IV Intermittent every 6 hours  chlorhexidine 0.12% Liquid 15 milliLiter(s) Oral Mucosa every 12 hours  chlorhexidine 4% Liquid 1 Application(s) Topical <User Schedule>  fentaNYL   Infusion 3 MICROgram(s)/kG/Hr (22.8 mL/Hr) IV Continuous <Continuous>  heparin  Infusion.  Unit(s)/Hr (14 mL/Hr) IV Continuous <Continuous>  hydroxychloroquine   Oral   hydroxychloroquine 200 milliGRAM(s) Oral every 12 hours  influenza   Vaccine 0.5 milliLiter(s) IntraMuscular once  pantoprazole  Injectable 40 milliGRAM(s) IV Push daily  phenylephrine    Infusion 0.1 MICROgram(s)/kG/Min (1.43 mL/Hr) IV Continuous <Continuous>  polyethylene glycol 3350 17 Gram(s) Oral daily  propofol Infusion 50 MICROgram(s)/kG/Min (22.8 mL/Hr) IV Continuous <Continuous>  senna 2 Tablet(s) Oral at bedtime  thiamine IVPB 200 milliGRAM(s) IV Intermittent daily    MEDICATIONS  (PRN):  acetaminophen    Suspension .. 650 milliGRAM(s) Oral every 6 hours PRN Temp greater or equal to 38C (100.4F)  heparin  Injectable 6500 Unit(s) IV Push every 6 hours PRN For aPTT less than 40  heparin  Injectable 3000 Unit(s) IV Push every 6 hours PRN For aPTT between 40 - 57

## 2020-03-31 NOTE — PROGRESS NOTE ADULT - PROBLEM SELECTOR PROBLEM 2
SARS-associated coronavirus infection

## 2020-03-31 NOTE — PROGRESS NOTE ADULT - PROBLEM SELECTOR PLAN 1
Continue mechanical ventilation.  FIO2 decreased to 60%. Decrease PEEP to 16.  Serial ABGs and CXRs
Continue mechanical ventilation.  Serial ABGs and CXRs  Titrate vent settings as needed
Continue mechanical ventilation.  Serial ABGs and CXRs.

## 2020-03-31 NOTE — DIETITIAN INITIAL EVALUATION ADULT. - ENTERAL
Rec Nepro [id number] to provide 312ml of formula; 562kcal, 25g pro, 227ml of free water. Add prosource TID to provide 180kcal, 45g pro.

## 2020-03-31 NOTE — PROGRESS NOTE ADULT - PROBLEM SELECTOR PLAN 4
DVT and GI prophylaxis.  Serial ABGs.  Strict I&Os.  Prognosis guarded.  Family updated.
D/C lovenox.  Monitor kidney function
Passive ROM exercises daily.  Turn every 2 hours.

## 2020-03-31 NOTE — CHART NOTE - NSCHARTNOTEFT_GEN_A_CORE
Utilized  ID#[id number].  Left message for patient's wife/Ruby ([phone]) - provided cell number with request to return call to discuss clinical status.

## 2020-03-31 NOTE — CHART NOTE - NSCHARTNOTEFT_GEN_A_CORE
COVID-19: [ X ] confirmed    /    [  ] suspected    /    [  ] ruled out    [ X ] acute respiratory failure with hypoxemia    /   [ X ] hypercapnia    /    [ X ] ARDS  ----[ X ] mechanical ventilation  ----[ X ] high PEEP  ----[ X ] continuous sedation to synchronize with mechanical ventilator  ----[  ] paralysis  ----[  ] prone positioning    FiO2 decreased - 60% -> 40%  PEEP - +16  SpO2 - 99% -> 95%    [ X ] septic shock secondary to COVID-19 requiring:  ----[ X ] invasive hemodynamic monitoring  ----[ X ] vasopressors    [  ] acute kidney injury secondary to septic shock requiring:  ----[  ] intensive fluid management in the setting of ARDS  ----[  ] renal replacement therapy      The patient remains admitted to the ICU for acute critical care management. This patient is in critical condition at risk for imminent life-threatening decompensation secondary to respiratory and/or other organ failure.  I have personally provided 30 minutes of critical care time concurrently with the resident, excluding time spent on separate procedures and time spent teaching. COVID-19: [ X ] confirmed    /    [  ] suspected    /    [  ] ruled out    [ X ] acute respiratory failure with hypoxemia    /   [ X ] hypercapnia    /    [ X ] ARDS  ----[ X ] mechanical ventilation  ----[ X ] high PEEP  ----[ X ] continuous sedation to synchronize with mechanical ventilator  ----[  ] paralysis  ----[  ] prone positioning    FiO2 decreased - 60% -> 40%  PEEP - +16  SpO2 - 99% -> 95%    [ X ] septic shock secondary to COVID-19 requiring:  ----[ X ] invasive hemodynamic monitoring  ----[ X ] vasopressors    [  ] acute kidney injury secondary to septic shock requiring:  ----[  ] intensive fluid management in the setting of ARDS  ----[  ] renal replacement therapy    started therapeutic LMWH for bilateral tibial DVT    The patient remains admitted to the ICU for acute critical care management. This patient is in critical condition at risk for imminent life-threatening decompensation secondary to respiratory and/or other organ failure.  I have personally provided 40 minutes of critical care time concurrently with the resident, excluding time spent on separate procedures and time spent teaching.

## 2020-03-31 NOTE — PROGRESS NOTE ADULT - PROBLEM SELECTOR PLAN 3
Passive ROM exercises daily.  Turn as tolerated every 2 hours.
D/C lovenox secondary to elevated creatinine.  Start heparin drip according to protocol.  Monitor coags
Will start Tube feeds once NGT position is confirmed.

## 2020-03-31 NOTE — PROGRESS NOTE ADULT - PROBLEM SELECTOR PLAN 5
DVT and GI prophylaxis.  Strict I&Os.  Prognosis guarded.  Family updated.
Start Nepro with prosource 3 packets daily.

## 2020-03-31 NOTE — PROGRESS NOTE ADULT - ATTENDING COMMENTS
critical patient in ARDS from COVID  intubated on high FiO2 and PEEP  still hypercarbic   will increase RR to 18  increase PEEP to 15 as he is on 100% FiO2 and wean FiO2 down   keep plateau< 30 cm water  repeat ABG tonight
EKG:acute hypoxic respiratory distress requiring intubation and sedation. Imaging c/w ARDS. Admitted to MICU for further observation and management.  #Sepsis 2/2 COVID+  #COVID+  - hydroxychloroquine 400mg q12h x2 doses then 200mg q12h   - Azithromycin   - daily CBC/CMP/Mg/Phos/CRP  - q3d labs: ESR, Tcell subset, d-dimer, LDH, ferritin, CK, trop, coags  - COVID isolation protocol  #Hypoxic respiratory failure 2/2 ARDS 2/2 COVID+  - CXR on arrival with BL multilobar interstitial opacities c/w ARDS 2/2 COVID+   intubated and sedated for increased work of breathing now breathing in sync with vent  - continue to trend ABG with vent adjustments  #ARDS  - as above  - current vent settings: VC/AC with FIO2 % 60TV 450(6cc/kg IBW), RR 30, PEEP16 , wean FiO2 to 60, decreased to 50 % and RR is at 30   - low threshold to prone pt in order to improve oxygenation and secretion drainage  - continue to closely monitor pt  - hypotensive on phenyleprhine keep MAP> 65 mmhg   -will get ECG    #Sedation currently on fentanyl gtt and versed gtt   - RASS goal -4  #Sepsis 2/2 COVID+s  E: Replete K<4, Mg<2  N: NPO   G: protonix 40mg IV daily    Code: FULL CODE
acute hypoxic respiratory distress requiring intubation and sedation. Imaging c/w ARDS. Admitted to MICU for management.  #Sepsis 2/2 COVID+  #COVID+  - hydroxychloroquine 400mg q12h x2 doses then 200mg q12h   - d/c Azithromycin   - daily CBC/CMP/Mg/Phos/CRP  - q3d labs: ESR, Tcell subset, d-dimer, LDH, ferritin, CK, trop, coags  - COVID isolation protocol  #Hypoxic respiratory failure 2/2 ARDS 2/2 COVID+  - CXR on arrival with BL multilobar interstitial opacities c/w ARDS 2/2 COVID+   intubated and sedated for increased work of breathing now breathing in sync with vent  - continue to trend ABG with vent adjustments  #ARDS  - as above  - low threshold to prone pt in order to improve oxygenation and secretion drainage  - continue to closely monitor pt  - hypotensive on phenyleprhine keep MAP> 65 mmhg   -will get ECG    #Sedation currently on fentanyl gtt and versed gtt   - RASS goal -4  #Sepsis 2/2 COVID+s  E: Replete K<4, Mg<2  N: TF  DVT  bilaterally, was on lovenox changed to heparin drip given LITTLE   G: protonix 40mg IV daily    Code: FULL CODE

## 2020-03-31 NOTE — PROGRESS NOTE ADULT - ASSESSMENT
73 y/o M with a PMHx of prostate problems presents to the ED with complaints of shortness of breath x 6 days. Patient presented here on 3/21/20 and had COVID-19 testing done that has now resulted positive.  Denies smoking, chest pain, cough, fever, diarrhea, vomiting, abdominal pain, known sick contacts, recent travel or any other acute complaints.    In Ed, Patient saturating in 70s on NRB, and with increased work of breathing, hence intubated, ICU consulted.   RIJ and R art lines started on 3/29
75 y/o M with a PMHx of prostate problems presents to the ED with complaints of shortness of breath x 6 days. Patient presented here on 3/21/20 and had COVID-19 testing done that has now resulted positive.  Denies smoking, chest pain, cough, fever, diarrhea, vomiting, abdominal pain, known sick contacts, recent travel or any other acute complaints.    In Ed, Patient saturating in 70s on NRB, and with increased work of breathing, hence intubated, ICU consulted.   RIJ and R art lines started on 3/29
73 y/o M with a PMHx of prostate problems presents to the ED with complaints of shortness of breath x 6 days. Patient presented here on 3/21/20 and had COVID-19 testing done that has now resulted positive.  Denies smoking, chest pain, cough, fever, diarrhea, vomiting, abdominal pain, known sick contacts, recent travel or any other acute complaints.    In Ed, Patient saturating in 70s on NRB, and with increased work of breathing, hence intubated, ICU consulted.   RIJ and R art lines started on 3/29

## 2020-04-01 NOTE — DIETITIAN INITIAL EVALUATION ADULT. - PERTINENT MEDS FT
MEDICATIONS  (STANDING):  chlorhexidine 0.12% Liquid 15 milliLiter(s) Oral Mucosa every 12 hours  chlorhexidine 4% Liquid 1 Application(s) Topical <User Schedule>  fentaNYL   Infusion 0.5 MICROgram(s)/kG/Hr (3.81 mL/Hr) IV Continuous <Continuous>  heparin  Infusion.  Unit(s)/Hr (14 mL/Hr) IV Continuous <Continuous>  hydroxychloroquine 400 milliGRAM(s) Oral every 12 hours  hydroxychloroquine   Oral   levothyroxine 75 MICROGram(s) Oral daily  pantoprazole  Injectable 40 milliGRAM(s) IV Push daily  phenylephrine    Infusion 5 MICROgram(s)/kG/Min (143 mL/Hr) IV Continuous <Continuous>  propofol Infusion 15 MICROgram(s)/kG/Min (6.85 mL/Hr) IV Continuous <Continuous>  tamsulosin 0.4 milliGRAM(s) Oral at bedtime    MEDICATIONS  (PRN):  heparin  Injectable 6500 Unit(s) IV Push every 6 hours PRN For aPTT less than 40  heparin  Injectable 3000 Unit(s) IV Push every 6 hours PRN For aPTT between 40 - 57

## 2020-04-01 NOTE — DIETITIAN INITIAL EVALUATION ADULT. - ADD RECOMMEND
1. bolus vital 1.5 250 mL TID  (TV daily = 750 mL) w/ 7 packets of prosource daily. 2. monitor hydration status and add free H20 flush PRN 3. monitor GRV q2 hrs; if > 250 mL hold TF and contact MD. 4. consider adding prokinetic agent 5. add miralax and monitor BM 6. monitor labs/renal function 7. daily wt.

## 2020-04-01 NOTE — DIETITIAN INITIAL EVALUATION ADULT. - PERTINENT LABORATORY DATA
04-01 Na146 mmol/L<H> Glu 195 mg/dL<H> K+ 4.3 mmol/L Cr  1.04 mg/dL BUN 64 mg/dL<H> Phos n/a   Alb 1.5 g/dL<L> PAB n/a

## 2020-04-01 NOTE — PHARMACOTHERAPY INTERVENTION NOTE - COMMENTS
discontinued hydroxychloroquine 200mg oral bid for 6 doses. placed order for hydroxychloroquine 400mg oral bid for 2 days, then 200mg oral bid as per policy.

## 2020-04-01 NOTE — PROGRESS NOTE ADULT - SUBJECTIVE AND OBJECTIVE BOX
Subjective:    Intubated, sedated. Events noted.    MEDICATIONS  (STANDING):  atorvastatin 20 milliGRAM(s) Oral at bedtime  chlorhexidine 0.12% Liquid 15 milliLiter(s) Oral Mucosa every 12 hours  chlorhexidine 4% Liquid 1 Application(s) Topical <User Schedule>  fentaNYL   Infusion 0.5 MICROgram(s)/kG/Hr (3.81 mL/Hr) IV Continuous <Continuous>  heparin  Infusion.  Unit(s)/Hr (14 mL/Hr) IV Continuous <Continuous>  hydroxychloroquine 400 milliGRAM(s) Oral every 12 hours  hydroxychloroquine   Oral   levothyroxine 75 MICROGram(s) Oral daily  pantoprazole  Injectable 40 milliGRAM(s) IV Push daily  phenylephrine    Infusion 5 MICROgram(s)/kG/Min (143 mL/Hr) IV Continuous <Continuous>  propofol Infusion 15 MICROgram(s)/kG/Min (6.85 mL/Hr) IV Continuous <Continuous>  tamsulosin 0.4 milliGRAM(s) Oral at bedtime    MEDICATIONS  (PRN):  heparin  Injectable 6500 Unit(s) IV Push every 6 hours PRN For aPTT less than 40  heparin  Injectable 3000 Unit(s) IV Push every 6 hours PRN For aPTT between 40 - 57      Allergies    No Known Allergies    Intolerances        Vital Signs Last 24 Hrs  T(C): 36.1 (01 Apr 2020 07:00), Max: 38.1 (31 Mar 2020 16:40)  T(F): 96.9 (01 Apr 2020 07:00), Max: 100.5 (31 Mar 2020 16:40)  HR: 84 (01 Apr 2020 11:00) (79 - 111)  BP: 107/58 (01 Apr 2020 11:00) (90/65 - 124/75)  BP(mean): 74 (31 Mar 2020 16:40) (74 - 74)  RR: 30 (01 Apr 2020 11:00) (24 - 30)  SpO2: 100% (01 Apr 2020 11:00) (96% - 100%)    PHYSICAL EXAMINATION:    NECK:  Supple. No lymphadenopathy. Jugular venous pressure not elevated.  HEART:   The cardiac impulse has a normal quality. Reg., Nl S1 and S2.    CHEST:  Chest with coarse bilateral BS. Equal. Normal respiratory effort.  ABDOMEN:  Soft and nontender.   EXTREMITIES:  There is no edema.       LABS:                        11.7   8.91  )-----------( 211      ( 01 Apr 2020 05:24 )             34.4     04-01    146<H>  |  118<H>  |  64<H>  ----------------------------<  195<H>  4.3   |  22  |  1.04    Ca    7.8<L>      01 Apr 2020 05:24  Phos  3.1     03-31  Mg     3.3     03-31    TPro  6.3  /  Alb  1.5<L>  /  TBili  0.8  /  DBili  x   /  AST  49<H>  /  ALT  24  /  AlkPhos  89  04-01    PT/INR - ( 31 Mar 2020 14:53 )   PT: 16.2 sec;   INR: 1.42 ratio         PTT - ( 01 Apr 2020 10:45 )  PTT:111.7 sec      RADIOLOGY & ADDITIONAL TESTS:< from: Xray Chest 1 View-PORTABLE IMMEDIATE (04.01.20 @ 03:36) >  EXAM:  XR KUB 1 VIEW                          EXAM:  XR CHEST PORTABLE IMMED 1V                            PROCEDURE DATE:  04/01/2020          INTERPRETATION:  Chest and abdominal radiograph    Indication: ET and orogastric tube placement    Portable views of the chest and abdomen are submitted.    Comparison: Radiographs of March 31, 2020.    Findings:    Redemonstration of support lines with unchanged alignment. The tip of the endotracheal tube lies between the clavicular heads and aortic knob. The orogastric tube traverses down the esophagus and terminates in the proximal stomach. There is a central venous catheter inserted via the left internal jugular vein with its tip in the brachiocephalic vein. The cardiac silhouette is not enlarged. Redemonstration of patchy hazy opacities bilaterally, not significantly changed. The right costophrenic sulcus is not included in the margins of the study.    IMPRESSION:    No significant interval change compared to the previous study of March 31,2020.  No change in alignment of support lines.  No pneumothorax or large pleural effusion.  Bilateral patchy opacities suggest atypical/viral pneumonia.      SUHAIL SCOTT       Assessment and Plan:   Assessment:  · Assessment		  74M history mostly unknown, with Acute Respiratory failure in Covid-19 PNA    Acute Respiratory Failure in Covid-19  - Mechanical Vent 24/450/40/14  - Follow up AM Labs, CMP, CBC, Ferritin, LDH, CRP, Procalcitonin.  - CD4 count low? HIV test ordered for AM  - ABG in AM - last abg 7.24/48/110/20 on 40% P:F 265, no proning necessary. Will obtain repeat ABG at 1700 hrs to assess changes- decreased RR (30 to 24) and decreased PEEP (16 to 14)  - Cont. Plaquenil and Zithromax, doses to be completed 4/3/2020  - gtt: Propofol, Fentanyl, Phenylephrine  - R art. Line, L IJ Central line  - Protonix 40mg IV qd  - Chlorhexidine orders per vent mgmt.    Bilateral DVT  - Hep gtt  - check aptt as per routine-follow protocol    Hypothyroidism  - Outpatient Synthroid dose    Urinary Obstruction with BPH  - cont. Flomax    VTE PPX  - AC'd on Hep gtt    - Nutrition--Dietary evaluation    - Fluids- Double concentrate Phenylephrine. Maintain @ approx 125cc/hr    - Renal Insufficiency--monitor BUN/Cr    - Add statin

## 2020-04-01 NOTE — DIETITIAN INITIAL EVALUATION ADULT. - ENTERAL
Bolus Vital 1.5 250 mL TID (TV daily = 750 mL) w/ 7 packets of prosource daily. Pt only to be feed in the supine position when HD stable w/ MAP > 65

## 2020-04-01 NOTE — H&P ADULT - ATTENDING COMMENTS
Case d/w FP Resident Kayserian  agree with above with exception to/addition of the following:    intubated and requiring mechaincal ventilation due to COVID-19 infection resulting in   ARDS  Acute hypoxic respiratory failure  COVID-19 viral pneumonia  high risk for deterioration, morbidity and mortality.    Plan for low tidal volume lung protective strategy per ARDSnet protocol  calculate PF ratio; parlaytic +/- prone ventilation if <150 with FiO2 60% or greater and PEEP 5 or greater, and appropriate in regard to safety  Sedation  VAP prevention bundle  empiric abx, hydroxychloroquine  GI/DVT prophylaxis as appropriate  TF  Supportive care    CRITICAL CARE TIME SPENT: 45 minutes of critical care time spent providing medical care for patient's acute illness/conditions that impairs at least one vital organ system and/or poses a high risk of imminent or life threatening deterioration in the patient's condition. It includes time spent evaluating and treating the patient's acute illness as well as time spent reviewing labs, radiology, discussing goals of care with patient and/or patient's family, and discussing the case with a multidisciplinary team in an effort to prevent further life threatening deterioration or end organ damage. This time is independent of any procedures performed.

## 2020-04-01 NOTE — H&P ADULT - ASSESSMENT
74M history mostly unknown, with Acute Respiratory failure in Covid-19 PNA    Acute Respiratory Failure in Covid-19  - Mechanical Vent 33/450/40/16  - Follow up AM Labs, CMP, CBC, Ferritin, LDH, CRP, Procalcitonin.  - CD4 count low? Order HIV test for AM  - ABG in AM - last abg 7.24/48/110/20 on 40% P:F 265, no proning necessary.  - Cont. Plaquenil and Zithromax, doses to be completed 4/3/2020  - gtt: Propofol, Fentanyl, Phenylephrine  - R art. Line, L IJ Central line  - Protonix 40mg IV qd  - Chlorhexidine orders per vent mgmt.    Bilateral DVT  - Hep gtt  - check aptt as per routine    Hypothyroidism  - Outpatient Synthroid dose    Urinary Obstruction with BPH  - cont. Flomax    VTE PPX  - AC'd on Hep gtt    *Hold all non-essential home medications at this time

## 2020-04-01 NOTE — DIETITIAN INITIAL EVALUATION ADULT. - PHYSICAL APPEARANCE
other (specify) Unable to perform NFPE at this time 2/2 isolation protocol   no documented violeta at this time.  noted 3+ edema right/left arms  no noted skin breakdown

## 2020-04-01 NOTE — H&P ADULT - HISTORY OF PRESENT ILLNESS
From chart:  75 y/o M with a PMHx of BPH presented to  on 4/1/2020, transfer from [facility] with Positive Covid-19, originally admitted with complaints of shortness of breath x 6 days. Patient presented initially on 3/21/2020 at other facility and d/c'd from ED, COVID-19 testing done that has now resulted positive. Denies smoking, chest pain, cough, fever, diarrhea, vomiting, abdominal pain, known sick contacts, recent travel or any other acute complaints. Patient presented to [place] on Mechanical Vent, RR 30, , PEEP 16, O2 40%. Patient with R arterial line and L IJ Central line in place. Patient found to have bilateral DVTs at [place], started on Heparin gtt. Reason for transfer is unclear at this time.    Called patient home number in chart, wife not answering phone to complete a history at this time. Medications reviewed from [facility].

## 2020-04-01 NOTE — ACUTE INTERFACILITY TRANSFER NOTE - HOSPITAL COURSE
73 y/o M with PMHx of prostate problems presents to the ED with complaints of shortness of breath x 6 days. Patient presented here on 3/21/20 and had COVID-19 testing done that has now resulted positive. Denies smoking, chest pain, cough, fever, diarrhea, vomiting, abdominal pain, known sick contacts, recent travel or any other acute complaints.  In Ed, Patient saturating in 70s on NRB, and with increased work of breathing, hence intubated, ICU consulted. Patient accepted and intubated 3/29 - c/b DVT B/L LE 3/30, started on full AC. Oxygenation showed mild improvement w/ ventilation 30/450/40/12 - decision made to transfer patient for continued medical care.

## 2020-04-01 NOTE — DIETITIAN INITIAL EVALUATION ADULT. - OTHER INFO
75 y/o M with a PMHx of BPH presented to  on 4/1/2020, transfer from [facility] with Positive Covid-19, originally admitted with complaints of shortness of breath x 6 days. Patient presented initially on 3/21/2020 at other facility and d/c'd from ED, COVID-19 testing done that has now resulted positive. Denies smoking, chest pain, cough, fever, diarrhea, vomiting, abdominal pain, known sick contacts, recent travel or any other acute complaints. Patient presented to [place] on Mechanical Vent, RR 30, , PEEP 16, O2 40%. Patient with R arterial line and L IJ Central line in place. Patient found to have bilateral DVTs at [place], started on Heparin gtt. Reason for transfer is unclear at this time.  Called patient home number in chart, wife not answering phone to complete a history at this time. Medications reviewed from [facility].    Unable to visit pt at this time 2/2 isolation protocol. Pt was transferred from [facility] noted to be intubated and vented and as per flow sheet receiving propofol 3.1 mL/hr providing 79kcal daily. Noted no proning necessary. Recommend as medically feasible initiate bolus TV Vital 1.5 250 mL TID  (TV daily = 750 mL) w/ 7 packets of prosource daily. This TF order + prosource + propofol provides 1484kcal and 128 g protein daily meeting 100 % estimated kcal and protein needs. Pt noted w/ hypernatremia. This TF provdies 573 mL free H20, recommend monitoring hydration status and adding free H20 flush PRN. Monitor GRV q2hrs; if > 250 mL hold TF and contact MD. Add prokinetic agent when enteral feeds started to decrease risk of aspiration and emesis. Complete enteral feeds at least 1 hour prior to resuming prone position. If < 1 hour recommend evacuating, stomach to decrease risk of Aspiration.  No noted hx of last BM; recommend Add Miralax for bowel regimen and advance to Lactulose if pt remains constipated. After initial bowel movement reevaluate for bowel regimen prn. If pt to be proned, Complete enteral feeds at least 1 hour prior to resuming prone position. If < 1 hour recommend evacuating stomach to decrease risk of Aspiration.

## 2020-04-01 NOTE — H&P ADULT - NSHPPHYSICALEXAM_GEN_ALL_CORE
ICU Vital Signs Last 24 Hrs  T(C): 37.4 (01 Apr 2020 02:30), Max: 39.1 (31 Mar 2020 12:15)  T(F): 99.4 (01 Apr 2020 02:30), Max: 102.3 (31 Mar 2020 12:15)  HR: 79 (01 Apr 2020 02:30) (79 - 111)  BP: 107/55 (01 Apr 2020 02:30) (90/65 - 107/55)  BP(mean): 74 (31 Mar 2020 16:40) (74 - 78)  ABP: 109/74 (31 Mar 2020 20:00) (90/57 - 109/74)  ABP(mean): 89 (31 Mar 2020 20:00) (70 - 89)  RR: 27 (01 Apr 2020 02:41) (27 - 30)  SpO2: 100% (01 Apr 2020 02:41) (95% - 100%)

## 2020-04-02 NOTE — PROGRESS NOTE ADULT - ASSESSMENT
74 year old male with respiratory failure from Covid-19    Neuro:    - Paralyzed - cisatracurium 3mcg/Kg/min. Started yesterday, ?discontinue  - Sedated. Fentanyl 0.05mcg/kg/hr; propofol 15 mcg/kg/min  - on home Baclofen    Cardiovascular:   - On pressors, norepinephrine 0.05mcg/Kg/min, wean as able  - Daily EKG while on Plaquenil  - History of HTN  - On statin    Respiratory:  Acute respiratory Failure due to Covid 19  - Vent settings AC 24/480/10/40  - ABG  today  pH  7.36  /  pCO2: 41 /  pO2: 79 / HCO3: 22  / Base Excess: -2.5 / SaO2: 95        GI:  - on Tube feeds and tolerating  - free water, may increase with hyponatremia   - Pantoprazole  40 mg IV    :  - Urine output > 0.5ml/kg/hr  - History of BPH    ID  - Covid positive, on  Azithromycin till 4/3 and Plaquenil till 4/5    Heme:  - Bilateral DVT on heparin gtt    Endo:  - No active issues. On levothyroxine    Prophylaxis: On heparin gtt, PPI    Lines/Tubes:  kim, right radial art line, Left IJ - placed at [facility]

## 2020-04-02 NOTE — CHART NOTE - NSCHARTNOTEFT_GEN_A_CORE
Blood Gas Profile - Arterial (04.01.20 @ 21:15)    pH, Arterial: 7.29    pCO2, Arterial: 34 mmHg    pO2, Arterial: 121 mmHg    HCO3, Arterial: 16 mmoL/L    Base Excess, Arterial: -9.6 mmol/L    Oxygen Saturation, Arterial: 98 %    Blood Gas Source Arterial: Arterial    HIV-1/2 Antigen/Antibody Screen by [facility] (04.01.20 @ 08:54)    HIV-1/2 Combo Result: Nonreact:     74M started on Statins and home Baclofen earlier today. Patient on Nimbex started in day shift, also on other infusions - Propofol, Levophed, Heparin (for bilateral DVTs) fentanyl.     Vent at 24/480/40/14, peep was decreased to 10, Levophed decreased from 0.3 to 0.125, as tolerated. Patient A-line is positional and at times unreliable, but used to draw arterial blood, and correlates with axillary pressure checks. ABG not concerning, increased PaO2 and decreased PCO2 in comparison to earlier today, lower Bicarb, possibly met acidosis component.     U/O ~1.8cc/g/hr. Blood Gas Profile - Arterial (04.01.20 @ 21:15)    pH, Arterial: 7.29    pCO2, Arterial: 34 mmHg    pO2, Arterial: 121 mmHg    HCO3, Arterial: 16 mmoL/L    Base Excess, Arterial: -9.6 mmol/L    Oxygen Saturation, Arterial: 98 %    Blood Gas Source Arterial: Arterial    HIV-1/2 Antigen/Antibody Screen by CMIA (04.01.20 @ 08:54)    HIV-1/2 Combo Result: Nonreact:     74M started on Statins and home Baclofen earlier today. Patient on Nimbex started in day shift, also on other infusions - Propofol, Levophed, Heparin (for bilateral DVTs) fentanyl.     Vent at 24/480/40/14, FiO2 decreased to 30, PEEP decreased to 10, Levophed decreased from 0.3 to 0.1, as tolerated. Patient A-line is positional and at times unreliable, but used to draw arterial blood, and correlates with axillary pressure checks. ABG not concerning, increased PaO2 and decreased PCO2 in comparison to earlier today, lower Bicarb, possibly met acidosis component.     U/O ~1.8cc/g/hr. Blood Gas Profile - Arterial (04.01.20 @ 21:15)    pH, Arterial: 7.29    pCO2, Arterial: 34 mmHg    pO2, Arterial: 121 mmHg    HCO3, Arterial: 16 mmoL/L    Base Excess, Arterial: -9.6 mmol/L    Oxygen Saturation, Arterial: 98 %    Blood Gas Source Arterial: Arterial    HIV-1/2 Antigen/Antibody Screen by [facility]IA (04.01.20 @ 08:54)    HIV-1/2 Combo Result: Nonreact:     74M started on Statins and home Baclofen earlier today. Patient on Nimbex started in day shift, also on other infusions - Propofol, Levophed, Heparin (for bilateral DVTs) fentanyl.     Vent at 24/480/40/14, PEEP decreased to 10, Levophed decreased from 0.3 to 0.1, as tolerated. Patient A-line is positional and at times unreliable, but used to draw arterial blood, and correlates with axillary pressure checks. ABG not concerning, increased PaO2 and decreased PCO2 in comparison to earlier today, lower Bicarb, possibly met acidosis component.     U/O ~1.8cc/g/hr.

## 2020-04-02 NOTE — PROGRESS NOTE ADULT - SUBJECTIVE AND OBJECTIVE BOX
Intubated, coming down on PEEP and pressors.    Exam:  ICU Vital Signs Last 24 Hrs  T(C): 37.2 (01 Apr 2020 19:30), Max: 37.7 (01 Apr 2020 14:00)  T(F): 99 (01 Apr 2020 19:30), Max: 99.9 (01 Apr 2020 18:00)  HR: 75 (02 Apr 2020 08:30) (75 - 134)  BP: 122/62 (02 Apr 2020 08:30) (95/49 - 126/68)  ABP: 125/62 (02 Apr 2020 08:30) (125/62 - 126/63)  ABP(mean): 85 (02 Apr 2020 08:30) (85 - 86)  RR: 24 (02 Apr 2020 08:30) (24 - 30)  SpO2: 96% (02 Apr 2020 08:30) (89% - 100%)      General: Intubated and sedated  Respiratory: clear to auscultation bilaterally  Cardiovascular: regular rate and rhythm  Abdomen: soft, non distended  Neuro: sedated                          9.6    5.97  )-----------( 177      ( 02 Apr 2020 05:25 )             26.7   04-02    150<H>  |  121<H>  |  44<H>  ----------------------------<  196<H>  3.8   |  25  |  0.63    Ca    7.6<L>      02 Apr 2020 05:25  Phos  3.1     03-31  Mg     3.3     03-31    TPro  5.5<L>  /  Alb  1.2<L>  /  TBili  0.5  /  DBili  x   /  AST  39<H>  /  ALT  20  /  AlkPhos  77  04-02

## 2020-04-03 NOTE — CHART NOTE - NSCHARTNOTEFT_GEN_A_CORE
Patient vent settings FiO2/PEEP 50/15 at beginning of shift, tolerated ween to 40/12.     gtt: Propofol, Heparin, Fentanyl, Levophed. Attempting to wean of Levophed, titrated down but unsuccessful to d/c.    urine output 90cc/hr for shift.

## 2020-04-03 NOTE — PROGRESS NOTE ADULT - SUBJECTIVE AND OBJECTIVE BOX
Subjective:    Intubated, sedated. Events noted.     MEDICATIONS  (STANDING):  aspirin  chewable 81 milliGRAM(s) Oral daily  atorvastatin 20 milliGRAM(s) Oral at bedtime  baclofen 10 milliGRAM(s) Oral daily  chlorhexidine 0.12% Liquid 15 milliLiter(s) Oral Mucosa every 12 hours  chlorhexidine 4% Liquid 1 Application(s) Topical <User Schedule>  enoxaparin Injectable 80 milliGRAM(s) SubCutaneous every 12 hours  famotidine Injectable 20 milliGRAM(s) IV Push two times a day  fentaNYL   Infusion 0.5 MICROgram(s)/kG/Hr (3.81 mL/Hr) IV Continuous <Continuous>  levothyroxine 75 MICROGram(s) Oral daily  norepinephrine Infusion 0.05 MICROgram(s)/kG/Min (7.13 mL/Hr) IV Continuous <Continuous>  polyethylene glycol 3350 17 Gram(s) Oral daily  potassium phosphate IVPB 15 milliMole(s) IV Intermittent once  propofol Infusion 15 MICROgram(s)/kG/Min (6.85 mL/Hr) IV Continuous <Continuous>    MEDICATIONS  (PRN):  senna 2 Tablet(s) Oral at bedtime PRN Constipation      Allergies    No Known Allergies    Intolerances        Vital Signs Last 24 Hrs  T(C): 37.4 (03 Apr 2020 08:19), Max: 37.6 (02 Apr 2020 19:30)  T(F): 99.3 (03 Apr 2020 08:19), Max: 99.7 (02 Apr 2020 19:30)  HR: 80 (03 Apr 2020 12:25) (78 - 86)  BP: 140/64 (03 Apr 2020 12:25) (107/61 - 140/64)  BP(mean): 90 (03 Apr 2020 12:25) (78 - 90)  RR: 26 (03 Apr 2020 12:25) (23 - 28)  SpO2: 97% (03 Apr 2020 12:25) (92% - 99%)    PHYSICAL EXAMINATION:    NECK:  Supple. No lymphadenopathy. Jugular venous pressure not elevated.   HEART:   The cardiac impulse has a normal quality. Reg., Nl S1 and S2.    CHEST:  Chest with coarse BS bilaterally. Equal. Normal respiratory effort.  ABDOMEN:  Soft and nontender.   EXTREMITIES:  There is no edema.       LABS:                        9.4    8.51  )-----------( 198      ( 03 Apr 2020 05:30 )             28.2     04-03    147<H>  |  115<H>  |  24<H>  ----------------------------<  169<H>  3.3<L>   |  25  |  0.52    Ca    7.5<L>      03 Apr 2020 05:30  Phos  1.0     04-03  Mg     2.1     04-03    TPro  5.5<L>  /  Alb  1.2<L>  /  TBili  0.8  /  DBili  x   /  AST  48<H>  /  ALT  20  /  AlkPhos  96  04-03    PTT - ( 03 Apr 2020 08:00 )  PTT:65.0 sec      RADIOLOGY & ADDITIONAL TESTS:< from: Xray Chest 1 View- PORTABLE-Routine (04.03.20 @ 09:02) >  EXAM:  XR CHEST PORTABLE ROUTINE 1V                            PROCEDURE DATE:  04/03/2020          INTERPRETATION:  INDICATION: Covid positive pneumonia. Follow-up assessment.    PRIORS: 4/2/2020 9:43 AM    VIEWS: Portable AP radiography of the chest performed.    FINDINGS: Since prior evaluation there is no significant interval change in position of the indwelling ETT, left IJ CVC or NGT. Heart size appears within normal limits. No superior mediastinal widening is identified. Bilateral lung parenchymal airspace opacities are identified, without significant change. No pleural effusion or pneumothorax is demonstrated. No mediastinal shift is noted. No acute osseous fractures are identified.    IMPRESSION: No significant interval change.      KIM BORGES     Assessment and Plan:   · Assessment		  74 year old male with respiratory failure from Covid-19    Neuro:    - Paralysis D/C'ed.  - Sedated. Fentanyl 0.05mcg/kg/hr; propofol 15 mcg/kg/min  - on home Baclofen    Cardiovascular:   - On pressors, norepinephrine 0.05mcg/Kg/min, wean as able  - Daily EKG while on Plaquenil-QTc interval 441  - History of HTN  - On statin    Respiratory:  Acute respiratory Failure due to Covid 19  - Vent settings AC 24/480/14/40. Inspiratory time decreased to 0.6 seconds due to dyssynchronous breathing pattern  - ABG  today  pH  7.42  /  pCO2: 39 /  pO2: 67 / HCO3: 25  / Base Excess: -2.5 / SaO2: 95        GI:  - on Tube feeds--increase to 300cc Q8H  - free water, may improve hypernatremia   - Pantoprazole  changed to pepcid    :  - Urine output > 90ml/hr  - History of BPH    ID  - Covid positive, on  Azithromycin till 4/3 and Plaquenil till 4/5    Heme:  - Bilateral DVT switched from heparin gtt to full dose Lovenox    Endo:  - No active issues. On levothyroxine    Prophylaxis: On Lovenox, Pepcid

## 2020-04-04 NOTE — PROGRESS NOTE ADULT - SUBJECTIVE AND OBJECTIVE BOX
Subjective:    Intubated, sedated. Events noted.     MEDICATIONS  (STANDING):  aspirin  chewable 81 milliGRAM(s) Oral daily  atorvastatin 20 milliGRAM(s) Oral at bedtime  baclofen 10 milliGRAM(s) Oral daily  chlorhexidine 0.12% Liquid 15 milliLiter(s) Oral Mucosa every 12 hours  chlorhexidine 4% Liquid 1 Application(s) Topical <User Schedule>  enoxaparin Injectable 80 milliGRAM(s) SubCutaneous every 12 hours  famotidine Injectable 20 milliGRAM(s) IV Push two times a day  fentaNYL   Infusion 0.5 MICROgram(s)/kG/Hr (3.81 mL/Hr) IV Continuous <Continuous>  levothyroxine 75 MICROGram(s) Oral daily  norepinephrine Infusion 0.05 MICROgram(s)/kG/Min (7.13 mL/Hr) IV Continuous <Continuous>  polyethylene glycol 3350 17 Gram(s) Oral daily  propofol Infusion 15 MICROgram(s)/kG/Min (6.85 mL/Hr) IV Continuous <Continuous>  trimethoprim/polymyxin Solution 1 Drop(s) Right EYE four times a day    MEDICATIONS  (PRN):  acetaminophen  Suppository .. 650 milliGRAM(s) Rectal every 6 hours PRN Temp greater or equal to 38C (100.4F)  senna 2 Tablet(s) Oral at bedtime PRN Constipation      Allergies    No Known Allergies    Intolerances        Vital Signs Last 24 Hrs  T(C): 37.8 (2020 07:30), Max: 38.4 (2020 03:30)  T(F): 100 (2020 07:30), Max: 101.2 (2020 03:30)  HR: 71 (2020 11:30) (70 - 83)  BP: 115/64 (2020 11:30) (90/48 - 177/79)  BP(mean): 70 (2020 18:35) (70 - 90)  RR: 24 (2020 11:30) (24 - 28)  SpO2: 94% (2020 11:30) (94% - 98%)    PHYSICAL EXAMINATION:    NECK:  Supple. No lymphadenopathy. Jugular venous pressure not elevated.   HEART:   The cardiac impulse has a normal quality. Reg., Nl S1 and S2.    CHEST:  Chest with scattered rhonchi. Normal respiratory effort.  ABDOMEN:  Soft and nontender.   EXTREMITIES:  There is no edema.       LABS:                        9.5    9.52  )-----------( 192      ( 2020 05:30 )             29.4     04-04    145  |  114<H>  |  20  ----------------------------<  121<H>  3.7   |  27  |  0.48<L>    Ca    7.7<L>      2020 05:30  Phos  2.2     04-04  Mg     2.2     04-04    TPro  5.5<L>  /  Alb  1.2<L>  /  TBili  0.8  /  DBili  x   /  AST  46<H>  /  ALT  18  /  AlkPhos  89  04-04    PTT - ( 2020 13:15 )  PTT:69.6 sec    AB.43/39/87/25 95% on 24/480/40%/P=14      RADIOLOGY & ADDITIONAL TESTS:< from: Xray Chest 1 View- PORTABLE-Routine (20 @ 09:02) >  EXAM:  XR CHEST PORTABLE ROUTINE 1V                            PROCEDURE DATE:  2020          INTERPRETATION:  INDICATION: Covid positive pneumonia. Follow-up assessment.    PRIORS: 2020 9:43 AM    VIEWS: Portable AP radiography of the chest performed.    FINDINGS: Since prior evaluation there is no significant interval change in position of the indwelling ETT, left IJ CVC or NGT. Heart size appears within normal limits. No superior mediastinal widening is identified. Bilateral lung parenchymal airspace opacities are identified, without significant change. No pleural effusion or pneumothorax is demonstrated. No mediastinal shift is noted. No acute osseous fractures are identified.    IMPRESSION: No significant interval change.      KIM BORGES       Assessment and Plan:   · Assessment		  74 year old male with respiratory failure from Covid-19    Neuro:    - Paralysis D/C'ed.  - Sedated. Fentanyl 0.05mcg/kg/hr; propofol 15 mcg/kg/min  - on home Baclofen    Cardiovascular:   - On pressors, norepinephrine 0.05mcg/Kg/min, wean as able  - Daily EKG while on Plaquenil-QTc interval 441  - History of HTN  - On statin    Respiratory:  Acute respiratory Failure due to Covid 19  - Vent settings AC 24/480/14/40. Inspiratory time decreased to 0.6 seconds due to dyssynchronous breathing pattern, Sedation increased-improved coordination w/ vent  - Attempt to decrease PEEP as tolerated-monitor Sats.    GI:  - on Tube feeds--250cc Q8H  - free water, may improve hypernatremia   - Pantoprazole  changed to pepcid    :  - Urine output > 60ml/hr. Consider repeat dose lasix  - History of BPH    ID  - Covid positive, Plaquenil and Zithromax completed    Heme:  - Bilateral DVT switched from heparin gtt to full dose Lovenox    Endo:  - No active issues. On levothyroxine    Prophylaxis: On Lovenox, Pepcid

## 2020-04-05 NOTE — PROGRESS NOTE ADULT - ASSESSMENT
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 Acute hypoxemic respiratory failure/ARDS due to viral PNA secondary to COVID-19 infection   - on vent support DAY#8 - nimbex stopped, still on fentanyl/propofol - start titrating off in am and pressure support trial, P:F 207   - empiric IV abx completed   - plaquenil 5 day course completed   - acetaminophen for fever as needed    2. Renal - improved hypernatremia with free water, + fluid balance    3. DVT BL LE - LMWH    4. GI - TF    5. Hypothyroidism - synthroid

## 2020-04-05 NOTE — PROGRESS NOTE ADULT - SUBJECTIVE AND OBJECTIVE BOX
CC: Transfer [place] - Covid-19 PNA (04 Apr 2020 12:14)    HPI: 75 y/o M with a PMHx of BPH presented to  on 4/1/2020, transfer from [facility] with Positive Covid-19, originally admitted with complaints of shortness of breath x 6 days. Patient presented initially on 3/21/2020 at other facility and d/c'd from ED, COVID-19 testing done that has now resulted positive. Denies smoking, chest pain, cough, fever, diarrhea, vomiting, abdominal pain, known sick contacts, recent travel or any other acute complaints. Patient presented to [place] on Mechanical Vent, RR 30, , PEEP 16, O2 40%. Patient with R arterial line and L IJ Central line in place. Patient found to have bilateral DVTs at [place], started on Heparin gtt. Reason for transfer is unclear at this time.    Called patient home number in chart, wife not answering phone to complete a history at this time. Medications reviewed from TAMARA Crow. (01 Apr 2020 03:10)    INTERVAL HPI/OVERNIGHT EVENTS: off nimbex, still mildly hypotensive  ROS UTO due to sedation    Vital Signs Last 24 Hrs  T(C): 37.1 (05 Apr 2020 08:00), Max: 37.7 (04 Apr 2020 18:32)  T(F): 98.7 (05 Apr 2020 08:00), Max: 99.8 (04 Apr 2020 18:32)  HR: 63 (05 Apr 2020 12:00) (54 - 79)  BP: 105/54 (05 Apr 2020 12:00) (98/59 - 136/72)  RR: 22 (05 Apr 2020 12:00) (18 - 24)  SpO2: 94% (05 Apr 2020 12:00) (94% - 97%)  I&O's Detail    04 Apr 2020 07:01  -  05 Apr 2020 07:00  --------------------------------------------------------  IN:    cisatracurium Infusion: 176 mL    fentaNYL  Infusion: 408.8 mL    Free Water: 600 mL    norepinephrine Infusion: 133.4 mL    norepinephrine Infusion: 100.1 mL    ns in tub fed vital15: 610 mL    propofol Infusion: 595.7 mL  Total IN: 2624.1 mL    OUT:    Indwelling Catheter - Urethral: 1600 mL  Total OUT: 1600 mL    Total NET: 1024.1 mL                         9.1    6.76  )-----------( 192      ( 05 Apr 2020 04:00 )             27.3     05 Apr 2020 04:00    145    |  113    |  19     ----------------------------<  110    3.7     |  28     |  0.35     Ca    7.5        05 Apr 2020 04:00  Phos  2.6       05 Apr 2020 04:00  Mg     2.1       05 Apr 2020 04:00    TPro  5.3    /  Alb  1.0    /  TBili  0.6    /  DBili  x      /  AST  36     /  ALT  16     /  AlkPhos  89     05 Apr 2020 04:00    PTT - ( 05 Apr 2020 10:15 )  PTT:46.1 sec    LIVER FUNCTIONS - ( 05 Apr 2020 04:00 )  Alb: 1.0 g/dL / Pro: 5.3 gm/dL / ALK PHOS: 89 U/L / ALT: 16 U/L / AST: 36 U/L / GGT: x           MEDICATIONS  (STANDING):  aspirin  chewable 81 milliGRAM(s) Oral daily  atorvastatin 20 milliGRAM(s) Oral at bedtime  baclofen 10 milliGRAM(s) Oral daily  chlorhexidine 0.12% Liquid 15 milliLiter(s) Oral Mucosa every 12 hours  chlorhexidine 4% Liquid 1 Application(s) Topical <User Schedule>  enoxaparin Injectable 80 milliGRAM(s) SubCutaneous every 12 hours  famotidine Injectable 20 milliGRAM(s) IV Push two times a day  fentaNYL   Infusion 0.5 MICROgram(s)/kG/Hr (3.81 mL/Hr) IV Continuous <Continuous>  levothyroxine 75 MICROGram(s) Oral daily  norepinephrine Infusion 0.05 MICROgram(s)/kG/Min (7.13 mL/Hr) IV Continuous <Continuous>  polyethylene glycol 3350 17 Gram(s) Oral daily  propofol Infusion 15 MICROgram(s)/kG/Min (6.85 mL/Hr) IV Continuous <Continuous>  trimethoprim/polymyxin Solution 1 Drop(s) Right EYE four times a day    MEDICATIONS  (PRN):  acetaminophen  Suppository .. 650 milliGRAM(s) Rectal every 6 hours PRN Temp greater or equal to 38C (100.4F)  senna 2 Tablet(s) Oral at bedtime PRN Constipation    RADIOLOGY & ADDITIONAL TESTS: personally visualized    PHYSICAL EXAM:    General: male on ventilator  Eyes: conjunctiva and sclera clear  Head: Normocephalic; atraumatic  ENMT: ETT in place, OGT in place  Neck: Supple; no masses  Respiratory: BL crackles  Cardiovascular: S1, S2 reg  Gastrointestinal: Soft abd, + BS  Genitourinary: FQ to gravity  Extremities: No clubbing, cyanosis   Vascular: Peripheral pulses palpable 2+ bilaterally  Neurological: sedated  Skin: Warm and dry.   Musculoskeletal: without deformities

## 2020-04-06 NOTE — PROGRESS NOTE ADULT - SUBJECTIVE AND OBJECTIVE BOX
CC: Transfer [place] - Covid-19 PNA (04 Apr 2020 12:14)    HPI: 73 y/o M with a PMHx of BPH presented to  on 4/1/2020, transfer from [facility] with Positive Covid-19, originally admitted with complaints of shortness of breath x 6 days. Patient presented initially on 3/21/2020 at other facility and d/c'd from ED, COVID-19 testing done that has now resulted positive. Denies smoking, chest pain, cough, fever, diarrhea, vomiting, abdominal pain, known sick contacts, recent travel or any other acute complaints. Patient presented to [place] on Mechanical Vent, RR 30, , PEEP 16, O2 40%. Patient with R arterial line and L IJ Central line in place. Patient found to have bilateral DVTs at [place], started on Heparin gtt. Reason for transfer is unclear at this time.    )    INTERVAL HPI/OVERNIGHT EVENTS: off nimbex, still mildly hypotensive   failed cpap trial this am, spiking fevers this am. requiring increased fio2  still on prop, fent, and levophed- off nimbex    ICU Vital Signs Last 24 Hrs  T(C): 39.3 (06 Apr 2020 11:45), Max: 39.3 (06 Apr 2020 11:45)  T(F): 102.8 (06 Apr 2020 11:45), Max: 102.8 (06 Apr 2020 11:45)  HR: 112 (06 Apr 2020 11:45) (72 - 112)  BP: 150/71 (06 Apr 2020 11:45) (88/56 - 150/71)  BP(mean): --  ABP: 114/61 (06 Apr 2020 11:45) (93/51 - 145/72)  ABP(mean): 81 (06 Apr 2020 04:00) (66 - 88)  RR: 32 (06 Apr 2020 11:45) (24 - 228)  SpO2: 89% (06 Apr 2020 11:45) (89% - 94%)    in and out: 1414 in   1225 out  -11                           9.6    6.95  )-----------( 227      ( 06 Apr 2020 06:00 )             28.2       04-06    141  |  107  |  22  ----------------------------<  122<H>  3.6   |  30  |  0.39<L>    Ca    7.9<L>      06 Apr 2020 06:00  Phos  2.9     04-06  Mg     2.3     04-06    TPro  5.9<L>  /  Alb  1.2<L>  /  TBili  0.6  /  DBili  x   /  AST  62<H>  /  ALT  23  /  AlkPhos  130<H>  04-06    ABG - ( 06 Apr 2020 05:48 )  pH, Arterial: 7.45  pH, Blood: x     /  pCO2: 41    /  pO2: 78    / HCO3: 28    / Base Excess: 3.7   /  SaO2: 95          MEDICATIONS  (STANDING):  aspirin  chewable 81 milliGRAM(s) Oral daily  atorvastatin 20 milliGRAM(s) Oral at bedtime  baclofen 10 milliGRAM(s) Oral daily  chlorhexidine 0.12% Liquid 15 milliLiter(s) Oral Mucosa every 12 hours  chlorhexidine 4% Liquid 1 Application(s) Topical <User Schedule>  enoxaparin Injectable 80 milliGRAM(s) SubCutaneous every 12 hours  famotidine Injectable 20 milliGRAM(s) IV Push two times a day  fentaNYL   Infusion 0.5 MICROgram(s)/kG/Hr (3.81 mL/Hr) IV Continuous <Continuous>  fentaNYL   Infusion. 1 MICROgram(s)/kG/Hr (3.81 mL/Hr) IV Continuous <Continuous>  levothyroxine 75 MICROGram(s) Oral daily  norepinephrine Infusion 0.05 MICROgram(s)/kG/Min (7.13 mL/Hr) IV Continuous <Continuous>  polyethylene glycol 3350 17 Gram(s) Oral daily  propofol Infusion 15 MICROgram(s)/kG/Min (6.85 mL/Hr) IV Continuous <Continuous>  trimethoprim/polymyxin Solution 1 Drop(s) Right EYE four times a day                        PHYSICAL EXAM:    General: male on ventilator  Eyes: conjunctiva and sclera clear  Head: Normocephalic; atraumatic  ENMT: ETT in place, OGT in place  Neck: Supple; no masses  Respiratory: BL crackles  Cardiovascular: S1, S2 reg  Gastrointestinal: Soft abd, + BS  Genitourinary: FQ to gravity  Extremities: No clubbing, cyanosis   Vascular: Peripheral pulses palpable 2+ bilaterally  Neurological: sedated  Skin: Warm and dry.   Musculoskeletal: without deformities

## 2020-04-06 NOTE — PROGRESS NOTE ADULT - ASSESSMENT
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 Acute hypoxemic respiratory failure/ARDS due to viral PNA secondary to COVID-19 infection   - on vent support DAY#9 - with fever and failed cpap trial  - panculture  -replace lines  cxr  restart nimbex  increase fi02 ass needed   2. Renal - improved hypernatremia with free water, + fluid balance    3. DVT BL LE - LMWH    4. GI - TF    5. Hypothyroidism - synthroid

## 2020-04-07 NOTE — PROGRESS NOTE ADULT - SUBJECTIVE AND OBJECTIVE BOX
________________________________________________________________________________  DAILY PROGRESS NOTE:    ================== SUBJECTIVE ==================    ADELITA SANTIAGOERO  /   74y  /  Male  /  MRN#: [id number]  Patient is a 74y old Male who presents with a chief complaint of Transfer [place] - Covid-19 PNA (06 Apr 2020 12:12)  Currently admitted to medicine with the primary diagnosis of     HOSPITAL DAY: 6d     ICU DAY:    SUMMARY OF HOSPITAL COURSE TO DATE     OVERNIGHT EVENTS:     TODAY: Patient was seen this morning at bedside. Currently, the patient reports ....    Review of systems is otherwise negative.    ================= PRESENT TODAY ==================    1-Evans Catheter:  Indication:  2-Central Line:   Location:  Indication:  3-IV Fluids:   Indication:  4-Drips:  4.1-Pressors:   4.2-Sedation:   4.3-Heparin:     5-Intubated:   Oxygen: Sat:   Ventilator Settings: Tidal Volume 480: RR: 24 PEEP: 8 FiO2: I/E: plat 34- will reduce tv    =================== OBJECTIVE ===================    VITAL SIGNS: Last 24 Hours  T(C): 36.3 (07 Apr 2020 05:26), Max: 39.3 (06 Apr 2020 11:45)  T(F): 97.3 (07 Apr 2020 05:26), Max: 102.8 (06 Apr 2020 11:45)  HR: 94 (07 Apr 2020 08:23) (70 - 113)  BP: 102/56 (07 Apr 2020 07:24) (90/54 - 150/71)  BP(mean): --  RR: 24 (07 Apr 2020 07:24) (19 - 228)  SpO2: 94% (07 Apr 2020 07:24) (89% - 99%)    04-06-20 @ 07:01  -  04-07-20 @ 07:00  --------------------------------------------------------  IN: 2711 mL / OUT: 1400 mL / NET: 1311 mL    04-07-20 @ 07:01  -  04-07-20 @ 09:21  --------------------------------------------------------  IN: 2700 mL / OUT: 1400 mL / NET: 1300 mL      PHYSICAL EXAM:  GENERAL: NAD, well-developed  HEAD:  Atraumatic, Normocephalic  EYES: EOMI, PERRLA, conjunctiva and sclera clear  NECK: Supple, No JVD  CHEST/LUNG: Clear to auscultation bilaterally; No wheeze  HEART: Regular rate and rhythm; No murmurs, rubs, or gallops  ABDOMEN: Soft, Nontender, Nondistended; Bowel sounds present  EXTREMITIES:  2+ Peripheral Pulses, No clubbing, cyanosis, or edema  PSYCH: AAOx3  NEUROLOGY: non-focal  SKIN: No rashes or lesions    ===================== LABS =====================                        9.6    6.95  )-----------( 227      ( 06 Apr 2020 06:00 )             28.2     04-06    141  |  107  |  22  ----------------------------<  122<H>  3.6   |  30  |  0.39<L>    Ca    7.9<L>      06 Apr 2020 06:00  Phos  2.9     04-06  Mg     2.3     04-06    TPro  5.9<L>  /  Alb  1.2<L>  /  TBili  0.6  /  DBili  x   /  AST  62<H>  /  ALT  23  /  AlkPhos  130<H>  04-06    PTT - ( 06 Apr 2020 06:00 )  PTT:41.6 sec    ABG - ( 07 Apr 2020 06:00 )  pH, Arterial: 7.38  pH, Blood: x     /  pCO2: 54    /  pO2: 100   / HCO3: 31    / Base Excess: 5.7   /  SaO2: 97                  CARDIAC MARKERS ( 06 Apr 2020 06:00 )  <0.015 ng/mL / x     / x     / x     / x          ================= MICROBIOLOGY ================    Culture - Sputum (collected 06 Apr 2020 14:53)  Source: .Sputum Sputum  Gram Stain (07 Apr 2020 03:51):    Few polymorphonuclear leukocytes per low power field    Few Squamous epithelial cells per low power field    Moderate Gram positive cocci in pairs seen per oil power field    Moderate Gram Negative Rods seen per oil power field      ================== IMAGING TO DATE ==================    ================== OTHER SIGNIFICANT FINDINGS ==================    ================== ALLERGIES ===================  No Known Allergies    ==================== MEDS =====================  aspirin  chewable 81 milliGRAM(s) Oral daily  atorvastatin 20 milliGRAM(s) Oral at bedtime  baclofen 10 milliGRAM(s) Oral daily  chlorhexidine 0.12% Liquid 15 milliLiter(s) Oral Mucosa every 12 hours  chlorhexidine 4% Liquid 1 Application(s) Topical <User Schedule>  cisatracurium Infusion 3 MICROgram(s)/kG/Min IV Continuous <Continuous>  enoxaparin Injectable 80 milliGRAM(s) SubCutaneous every 12 hours  famotidine Injectable 20 milliGRAM(s) IV Push two times a day  fentaNYL   Infusion. 1 MICROgram(s)/kG/Hr IV Continuous <Continuous>  levothyroxine 75 MICROGram(s) Oral daily  norepinephrine Infusion 0.05 MICROgram(s)/kG/Min IV Continuous <Continuous>  petrolatum Ophthalmic Ointment 1 Application(s) Both EYES two times a day  polyethylene glycol 3350 17 Gram(s) Oral daily  propofol Infusion 15 MICROgram(s)/kG/Min IV Continuous <Continuous>  trimethoprim/polymyxin Solution 1 Drop(s) Right EYE four times a day    PRN MEDICATIONS  acetaminophen  Suppository .. 650 milliGRAM(s) Rectal every 6 hours PRN  senna 2 Tablet(s) Oral at bedtime PRN      ================= CONSULTS ==================    ================= ASSESS/PLAN ==================  Patient is a 74y old Male who presents with a chief complaint of Transfer [place] - Covid-19 PNA (06 Apr 2020 12:12)  Currently admitted to medicine with the primary diagnosis of     PLAN  #CV  -  #RESP    changed tv to reduce plateua    repeat abg  -  #GI    tube feeds  -  #ENDO  -  #HEME  -  #NEURO  -  #    GI PPX:   DVT PPX:     DIET:    ACTIVITY:     ================= CODE STATUS =================                  () FULL CODE     |     () DNR     |     () DNI ________________________________________________________________________________  DAILY PROGRESS NOTE:    ================== SUBJECTIVE ==================    ADELITA BROCK  /   74y  /  Male  /  MRN#: [id number]  Patient is a 74y old Male who presents with a chief complaint of Transfer [place] - Covid-19 PNA (06 Apr 2020 12:12)  Currently admitted to medicine with the primary diagnosis of  viral PNA now intubated and sedated    HOSPITAL DAY: 6d     ICU DAY:    SUMMARY OF HOSPITAL COURSE TO DATE     OVERNIGHT EVENTS:     TODAY: Patient was seen this morning at bedside.     Review of systems is otherwise negative.    ================= PRESENT TODAY ==================    1-Evans Catheter:  Indication:  2-Central Line:   Location:  Indication:  3-IV Fluids:   Indication:  4-Drips:  4.1-Pressors: levophed  4.2-Sedation: propofol  4.3-Heparin:     5-Intubated:   Oxygen: Sat:   Ventilator Settings: Tidal Volume 480: RR: 24 PEEP: 8 FiO2: I/E: plat 34- will reduce tv to 420 this morning    =================== OBJECTIVE ===================    VITAL SIGNS: Last 24 Hours  T(C): 36.3 (07 Apr 2020 05:26), Max: 39.3 (06 Apr 2020 11:45)  T(F): 97.3 (07 Apr 2020 05:26), Max: 102.8 (06 Apr 2020 11:45)  HR: 94 (07 Apr 2020 08:23) (70 - 113)  BP: 102/56 (07 Apr 2020 07:24) (90/54 - 150/71)  BP(mean): --  RR: 24 (07 Apr 2020 07:24) (19 - 228)  SpO2: 94% (07 Apr 2020 07:24) (89% - 99%)    04-06-20 @ 07:01  -  04-07-20 @ 07:00  --------------------------------------------------------  IN: 2711 mL / OUT: 1400 mL / NET: 1311 mL    04-07-20 @ 07:01  -  04-07-20 @ 09:21  --------------------------------------------------------  IN: 2700 mL / OUT: 1400 mL / NET: 1300 mL      PHYSICAL EXAM:  GENERAL: well-developed  CHEST/LUNG: Clear to auscultation bilaterally; No wheeze  HEART: Regular rate and rhythm; No murmurs, rubs, or gallops  ABDOMEN: Soft, Nontender, Nondistended; Bowel sounds present  EXTREMITIES:  no  edema    NEUROLOGY: sedated      ===================== LABS =====================                        9.6    6.95  )-----------( 227      ( 06 Apr 2020 06:00 )             28.2     04-06    141  |  107  |  22  ----------------------------<  122<H>  3.6   |  30  |  0.39<L>    Ca    7.9<L>      06 Apr 2020 06:00  Phos  2.9     04-06  Mg     2.3     04-06    TPro  5.9<L>  /  Alb  1.2<L>  /  TBili  0.6  /  DBili  x   /  AST  62<H>  /  ALT  23  /  AlkPhos  130<H>  04-06    PTT - ( 06 Apr 2020 06:00 )  PTT:41.6 sec    ABG - ( 07 Apr 2020 06:00 )  pH, Arterial: 7.38  pH, Blood: x     /  pCO2: 54    /  pO2: 100   / HCO3: 31    / Base Excess: 5.7   /  SaO2: 97                  CARDIAC MARKERS ( 06 Apr 2020 06:00 )  <0.015 ng/mL / x     / x     / x     / x          ================= MICROBIOLOGY ================    Culture - Sputum (collected 06 Apr 2020 14:53)  Source: .Sputum Sputum  Gram Stain (07 Apr 2020 03:51):    Few polymorphonuclear leukocytes per low power field    Few Squamous epithelial cells per low power field    Moderate Gram positive cocci in pairs seen per oil power field    Moderate Gram Negative Rods seen per oil power field      ================== IMAGING TO DATE ==================    ================== OTHER SIGNIFICANT FINDINGS ==================    ================== ALLERGIES ===================  No Known Allergies    ==================== MEDS =====================  aspirin  chewable 81 milliGRAM(s) Oral daily  atorvastatin 20 milliGRAM(s) Oral at bedtime  baclofen 10 milliGRAM(s) Oral daily  chlorhexidine 0.12% Liquid 15 milliLiter(s) Oral Mucosa every 12 hours  chlorhexidine 4% Liquid 1 Application(s) Topical <User Schedule>  cisatracurium Infusion 3 MICROgram(s)/kG/Min IV Continuous <Continuous>  enoxaparin Injectable 80 milliGRAM(s) SubCutaneous every 12 hours  famotidine Injectable 20 milliGRAM(s) IV Push two times a day  fentaNYL   Infusion. 1 MICROgram(s)/kG/Hr IV Continuous <Continuous>  levothyroxine 75 MICROGram(s) Oral daily  norepinephrine Infusion 0.05 MICROgram(s)/kG/Min IV Continuous <Continuous>  petrolatum Ophthalmic Ointment 1 Application(s) Both EYES two times a day  polyethylene glycol 3350 17 Gram(s) Oral daily  propofol Infusion 15 MICROgram(s)/kG/Min IV Continuous <Continuous>  trimethoprim/polymyxin Solution 1 Drop(s) Right EYE four times a day    PRN MEDICATIONS  acetaminophen  Suppository .. 650 milliGRAM(s) Rectal every 6 hours PRN  senna 2 Tablet(s) Oral at bedtime PRN      ================= CONSULTS ==================    ================= ASSESS/PLAN ==================  Patient is a 74y old Male who presents with a chief complaint of Transfer [place] - Covid-19 PNA (06 Apr 2020 12:12)  Currently admitted to medicine with the primary diagnosis of viral PNA now sedated and intubated    PLAN  #CV  - maintain MAP >65, on levophed    #RESP  on vent support day number 10  maintain lung protective strategies  reduced TV to 420 changed tv to reduce plateau pressures    repeat abg after above vent changes  -  #GI    tube feeds  -  #ENDO  - BS on SSI  #HEME  - hgb stable  #NEURO  - sedated on propofol  #    RENAL    lasix dose today for diuresis    hypothyroid    on synthroid    GI PPX: protonix  DVT PPX: lovenox    DIET:  tube feeds  ACTIVITY:  turn and position    ================= CODE STATUS =================                  () FULL CODE     |     () DNR     |     () DNI

## 2020-04-08 NOTE — CHART NOTE - NSCHARTNOTEFT_GEN_A_CORE
Assessment:   Pt MAP. 65, on levophed.  On vent support day 10.  Pt on tube feeds.  Lasix today for diuresis.   Pt had failed CPAP trial.  Renal - hypernatremia improved.  Pt on TF.    K 3.7L   Na 140 WDL     Glu 141    Mag 2.3  GI   Edema 2+  Bruce 9.  No Bruce history  No Bowel movements listed.  If pt not having BMs, suggest bowel regimen - Miralax.  PT on Nimbex, fentanyl, norepinephrine, propofol  Consult in for palliative team- pt intubated since 3/29 with no improvement  Propofol rate 18.3 ml/hr    Diet Prescription: Diet, NPO with Tube Feed:   Tube Feeding Modality: Orogastric  Vital 1.5 Magdaleno (VITAL1.5)  Total Volume for 24 Hours (mL): 720  Bolus  Total Volume of Bolus (mL):  180  Total # of Feeds: 3  Tube Feed Frequency: Every 6 hours   Tube Feed Start Time: 18:00  Bolus Feed Rate (mL per Hour): 180   Bolus Feed Duration (in Hours): 1  Bolus Feed Instructions:  Disregard feeding frequency q8hrs - pt only ot be fed while in supine position  Bolus 250 mL Vital 1.5 TID (TV daily  = 750 mL) w/ 7 packets of prosource daily.  No Carb Prosource TF     Qty per Day:  7 packets daily (04-04-20 @ 17:11)      Wt Hx:  Height (cm): 170.2 (03-29-20 @ 11:52), 165.1 (03-21-20 @ 19:00)  Weight (kg): 76.1 (04-01-20 @ 02:26), 76.1 (03-29-20 @ 11:52), 68 (03-21-20 @ 19:00)  BMI (kg/m2): 26.3 (04-01-20 @ 02:26), 26.3 (03-29-20 @ 11:52), 24.9 (03-21-20 @ 19:00)    Pertinent Medications: MEDICATIONS  (STANDING):  aspirin  chewable 81 milliGRAM(s) Oral daily  atorvastatin 20 milliGRAM(s) Oral at bedtime  baclofen 10 milliGRAM(s) Oral daily  chlorhexidine 0.12% Liquid 15 milliLiter(s) Oral Mucosa every 12 hours  chlorhexidine 4% Liquid 1 Application(s) Topical <User Schedule>  cisatracurium Infusion 3 MICROgram(s)/kG/Min (13.7 mL/Hr) IV Continuous <Continuous>  enoxaparin Injectable 80 milliGRAM(s) SubCutaneous every 12 hours  famotidine Injectable 20 milliGRAM(s) IV Push two times a day  fentaNYL   Infusion. 1 MICROgram(s)/kG/Hr (3.81 mL/Hr) IV Continuous <Continuous>  levothyroxine 75 MICROGram(s) Oral daily  norepinephrine Infusion 0.05 MICROgram(s)/kG/Min (7.13 mL/Hr) IV Continuous <Continuous>  petrolatum Ophthalmic Ointment 1 Application(s) Both EYES two times a day  polyethylene glycol 3350 17 Gram(s) Oral daily  propofol Infusion 15 MICROgram(s)/kG/Min (6.85 mL/Hr) IV Continuous <Continuous>  trimethoprim/polymyxin Solution 1 Drop(s) Right EYE four times a day    MEDICATIONS  (PRN):  acetaminophen  Suppository .. 650 milliGRAM(s) Rectal every 6 hours PRN Temp greater or equal to 38C (100.4F)  senna 2 Tablet(s) Oral at bedtime PRN Constipation    Pertinent Labs: 04-08 Na140 mmol/L Glu 141 mg/dL<H> K+ 3.3 mmol/L<L> Cr  0.35 mg/dL<L> BUN 19 mg/dL 04-07 Phos 2.8 mg/dL 04-06 Alb 1.2 g/dL<L> 04-05 Chol --    LDL --    HDL --    Trig 145 mg/dL        Recommendations:      Monitoring and Evaluation:   [x] PO intake/Nutr support infusion [ x ] Tolerance to Nutr [ x ] weights [ x ] labs[ x ] follow up per protocol  [ ] other: Assessment:   Pt MAP. 65, on levophed.  On vent support day 10.  Pt on tube feeds.  Lasix today for diuresis.   Pt had failed CPAP trial.  Renal - hypernatremia improved.  Pt on TF.    K 3.7L   Na 140 WDL     Glu 141    Mag 2.3  GI   Edema 2+  Bruce 9.  No Bruce history  No Bowel movements listed.  If pt not having BMs, suggest bowel regimen - Miralax.  PT on Nimbex, fentanyl, norepinephrine, propofol  Consult in for palliative team- pt intubated since 3/29 with no improvement  Propofol rate 18.3 ml/hr    To account for change in propofol rate  Suggest change Tube Feed rate  Vital 1.5 Magdaleno (Vital 1.5)  Total Volume for 24 hours (ml)  600  Bolus 200 ml TID  w 7 packets of prosource TF daily  Provides   1663 kcal     129.5 g protein      from TF, propofol and Prosource TF  TF provides 502 cc H2O    Diet Prescription: Diet, NPO with Tube Feed:   Tube Feeding Modality: Orogastric  Vital 1.5 Magdaleno (VITAL1.5)  Total Volume for 24 Hours (mL): 720  Bolus  Total Volume of Bolus (mL):  180  Total # of Feeds: 3  Tube Feed Frequency: Every 6 hours   Tube Feed Start Time: 18:00  Bolus Feed Rate (mL per Hour): 180   Bolus Feed Duration (in Hours): 1  Bolus Feed Instructions:  Disregard feeding frequency q8hrs - pt only ot be fed while in supine position  Bolus 250 mL Vital 1.5 TID (TV daily  = 750 mL) w/ 7 packets of prosource daily.  No Carb Prosource TF     Qty per Day:  7 packets daily (04-04-20 @ 17:11)      Wt Hx:  Height (cm): 170.2 (03-29-20 @ 11:52), 165.1 (03-21-20 @ 19:00)  Weight (kg): 76.1 (04-01-20 @ 02:26), 76.1 (03-29-20 @ 11:52), 68 (03-21-20 @ 19:00)  BMI (kg/m2): 26.3 (04-01-20 @ 02:26), 26.3 (03-29-20 @ 11:52), 24.9 (03-21-20 @ 19:00)    Pertinent Medications: MEDICATIONS  (STANDING):  aspirin  chewable 81 milliGRAM(s) Oral daily  atorvastatin 20 milliGRAM(s) Oral at bedtime  baclofen 10 milliGRAM(s) Oral daily  chlorhexidine 0.12% Liquid 15 milliLiter(s) Oral Mucosa every 12 hours  chlorhexidine 4% Liquid 1 Application(s) Topical <User Schedule>  cisatracurium Infusion 3 MICROgram(s)/kG/Min (13.7 mL/Hr) IV Continuous <Continuous>  enoxaparin Injectable 80 milliGRAM(s) SubCutaneous every 12 hours  famotidine Injectable 20 milliGRAM(s) IV Push two times a day  fentaNYL   Infusion. 1 MICROgram(s)/kG/Hr (3.81 mL/Hr) IV Continuous <Continuous>  levothyroxine 75 MICROGram(s) Oral daily  norepinephrine Infusion 0.05 MICROgram(s)/kG/Min (7.13 mL/Hr) IV Continuous <Continuous>  petrolatum Ophthalmic Ointment 1 Application(s) Both EYES two times a day  polyethylene glycol 3350 17 Gram(s) Oral daily  propofol Infusion 15 MICROgram(s)/kG/Min (6.85 mL/Hr) IV Continuous <Continuous>  trimethoprim/polymyxin Solution 1 Drop(s) Right EYE four times a day    MEDICATIONS  (PRN):  acetaminophen  Suppository .. 650 milliGRAM(s) Rectal every 6 hours PRN Temp greater or equal to 38C (100.4F)  senna 2 Tablet(s) Oral at bedtime PRN Constipation    Pertinent Labs: 04-08 Na140 mmol/L Glu 141 mg/dL<H> K+ 3.3 mmol/L<L> Cr  0.35 mg/dL<L> BUN 19 mg/dL 04-07 Phos 2.8 mg/dL 04-06 Alb 1.2 g/dL<L> 04-05 Chol --    LDL --    HDL --    Trig 145 mg/dL        Recommendations:      Monitoring and Evaluation:   [x] PO intake/Nutr support infusion [ x ] Tolerance to Nutr [ x ] weights [ x ] labs[ x ] follow up per protocol  [ ] other:

## 2020-04-08 NOTE — CONSULT NOTE ADULT - SUBJECTIVE AND OBJECTIVE BOX
Patient is a 74y old  Male who presents with a chief complaint of Transfer [place] - Covid-19 PNA (2020 12:22)    HPI:  From chart:  75 y/o M with a PMHx of BPH admitted from outside hospital on  for further care of viral syndrome secondary to COVID-19 infection; was admitted to outside hospital on 3/21 from which he was transferred on ventilatory support and pressor support; has been on vent for almost 14 days and has persistent fever. Patient had all lines replaced but continues with fevers and difficulty maintaining good oxygenation on the vent. Sputum culture from  is growing Staph aureus, sensitivity is pending. History per medical record and staff at the bedside.         PMH: as above  PSH: as above  Meds: per reconciliation sheet, noted below  MEDICATIONS  (STANDING):  aspirin  chewable 81 milliGRAM(s) Oral daily  atorvastatin 20 milliGRAM(s) Oral at bedtime  baclofen 10 milliGRAM(s) Oral daily  chlorhexidine 0.12% Liquid 15 milliLiter(s) Oral Mucosa every 12 hours  chlorhexidine 4% Liquid 1 Application(s) Topical <User Schedule>  cisatracurium Infusion 3 MICROgram(s)/kG/Min (13.7 mL/Hr) IV Continuous <Continuous>  enoxaparin Injectable 80 milliGRAM(s) SubCutaneous every 12 hours  famotidine Injectable 20 milliGRAM(s) IV Push two times a day  fentaNYL   Infusion. 1 MICROgram(s)/kG/Hr (3.81 mL/Hr) IV Continuous <Continuous>  levothyroxine 75 MICROGram(s) Oral daily  meropenem  IVPB 1000 milliGRAM(s) IV Intermittent every 8 hours  norepinephrine Infusion 0.05 MICROgram(s)/kG/Min (7.13 mL/Hr) IV Continuous <Continuous>  petrolatum Ophthalmic Ointment 1 Application(s) Both EYES two times a day  polyethylene glycol 3350 17 Gram(s) Oral daily  propofol Infusion 15 MICROgram(s)/kG/Min (6.85 mL/Hr) IV Continuous <Continuous>  trimethoprim/polymyxin Solution 1 Drop(s) Right EYE four times a day  vancomycin  IVPB 750 milliGRAM(s) IV Intermittent every 12 hours    MEDICATIONS  (PRN):  acetaminophen  Suppository .. 650 milliGRAM(s) Rectal every 6 hours PRN Temp greater or equal to 38C (100.4F)  senna 2 Tablet(s) Oral at bedtime PRN Constipation    Allergies    No Known Allergies    Intolerances      Social: no smoking, no alcohol, no illegal drugs; no recent travel, no exposure to TB  FAMILY HISTORY:  No pertinent family history in first degree relatives     ROS unable to obtain secondary to patient medical condition     Vital Signs Last 24 Hrs  T(C): 38 (2020 10:51), Max: 38 (2020 04:00)  T(F): 100.4 (2020 10:51), Max: 100.4 (2020 04:00)  HR: 110 (2020 14:20) (65 - 110)  BP: 160/96 (2020 14:20) (94/65 - 160/96)  BP(mean): --  RR: 25 (2020 14:20) (25 - 26)  SpO2: 92% (2020 14:20) (90% - 96%)  Daily     Daily       FiO2 40% Peep 12    PE:    Constitutional: frail looking  HEENT: NC/AT, orally intubated  Neck: supple; thyroid not palpable  Back: no tenderness  Respiratory: respiratory effort normal; scattered coarse breath sounds  Cardiovascular: S1S2 regular, no murmurs  Abdomen: soft, not tender, not distended, positive BS; no liver or spleen organomegaly  Genitourinary: no suprapubic tenderness  Musculoskeletal: no muscle tenderness, no joint swelling or tenderness  Neurological/ Psychiatric: orally intubated   Skin: no rashes; no palpable lesions    Labs: all available labs reviewed                        9.4    9.21  )-----------( 234      ( 2020 05:30 )             29.8     04-08    140  |  102  |  19  ----------------------------<  141<H>  3.3<L>   |  36<H>  |  0.35<L>    Ca    7.9<L>      2020 05:30  Phos  2.8     04-07  Mg     2.3     04-07         Urinalysis Basic - ( 2020 09:30 )    Color: Yellow / Appearance: Clear / S.015 / pH: x  Gluc: x / Ketone: Negative  / Bili: Negative / Urobili: 8 mg/dL   Blood: x / Protein: 30 mg/dL / Nitrite: Negative   Leuk Esterase: Negative / RBC: 3-5 /HPF / WBC 0-2   Sq Epi: x / Non Sq Epi: Occasional / Bacteria: Few    COVID-19 PCR . (20 @ 22:44)    COVID-19 PCR: Detected: LDT - Laboratory Developed Test All “detected” results on this new test  are considered presumptively positive results, are clinically actionable,  and specimens will be forwarded to [facility] for confirmation testing.  Another report (corrected report) will only be issued if discordant  results occur.  This test has been validated by [employer] to be accurate;  though it has not been FDA cleared/approved by the usual pathway.  As with all laboratory tests, results should be correlated with clinical  findings.    < from: Xray Chest 1 View- PORTABLE-Routine (20 @ 07:18) >    EXAM:  XR CHEST PORTABLE ROUTINE 1V                            PROCEDURE DATE:  2020          INTERPRETATION:  CHEST AP PORTABLE:    History: Shortness of Breath.     Date and time of exam: 2020 6:43 AM.    Technique: A single AP view of the chest was obtained.    Comparison exam: 2020 4:38 PM.    Findings:  Diffuse bilateral pulmonary infiltrates, right worse than left. Removal of left IJ central line since the prior exam..    Impression:  Removal of left IJ central line, otherwise stable.    < end of copied text >        Radiology: all available radiological tests reviewed    Advanced directives addressed: full resuscitation

## 2020-04-08 NOTE — CONSULT NOTE ADULT - ASSESSMENT
Pt is a 74y old Male with hx of BPH originally admitted to [place] 3/21 transferred to  with vent dependent resp failure secondary to COVID pneumonia. Pt intubated 3/29 at [place]. Hosp course notable for hypotension requiring pressors.  Palliative medicine consulted for assistance with goals of care.    1)Resp Failure, ARDS    2)COVID Pneumonia    3)Hypotension    4)DVTs    5)Advance Directives Pt is a 74y old Male with hx of BPH originally admitted to [facility] 3/21 transferred to  with vent dependent resp failure secondary to COVID pneumonia. Pt intubated 3/29 at [facility]. Hosp course notable for hypotension requiring pressors.  Palliative medicine consulted for assistance with goals of care.    1)Resp Failure, ARDS  -secondary to COVID pneumonia  -Vent dependent Day #12  -s/p zithromax, plaquenil  -not improving    2)COVID Pneumonia  -Vent dependent Day #12  -s/p zithromax, plaquenil    3)Hypotension  -Related to COVID  -On vasopressors    4)DVTs  -Imaging reviewed  -On Lovenox    5)Advance Directives  -Pt does not have capacity to make medical decisions due to intubation, sedation  -Pt has no HCP on file  -Pt has wife Ruby who would be surrogate decision maker [phone]  -Pt has son Ignacio [phone]  -will contact family to discuss goals of care

## 2020-04-08 NOTE — CHART NOTE - NSCHARTNOTEFT_GEN_A_CORE
This SW spoke with pts son Ignacio [phone] to schedule a phone meting with him and pts wife. Pts wife will need . Pts son and wife are available to speak via phone tomorrow at 1:00PM a their home number [phone]. Team will use [facility]  for this call. Our team will continue to follow.

## 2020-04-08 NOTE — CONSULT NOTE ADULT - SUBJECTIVE AND OBJECTIVE BOX
HPI: Pt is a 74y old Male with hx of BPH originally admitted to [facility] 3/21 transferred to  with vent dependent resp failure secondary to COVID pneumonia. Pt intubated 3/29 at [facility]. Hosp course notable for hypotension requiring pressors.  Palliative medicine consulted for assistance with goals of care.    Pt seen and examined by me for evaluation of goals of care.  Pt is intubated, sedated, paralyzed so unable to provide hx, ROS.      PAIN: ( )Yes   ( )No  DYSPNEA: ( ) Yes  ( ) No  Pt unable to provide due to intubation and sedation    PAST MEDICAL & SURGICAL HISTORY:  BPH (benign prostatic hyperplasia)  Per chart review as pt unable to provide due to intubation and sedation      SOCIAL HX:  Pt unable to provide due to intubation and sedation    Hx opiate tolerance ( )YES  ( )NO  Pt unable to provide due to intubation and sedation    Baseline ADLs  (Prior to Admission)  ( ) Independent   ( )Dependent  Pt unable to provide due to intubation and sedation    FAMILY HISTORY:  Pt unable to provide due to intubation and sedation      Review of Systems:  Unable to obtain as Pt unable to provide due to intubation and sedation      PHYSICAL EXAM:    Vital Signs Last 24 Hrs  T(C): 38 (08 Apr 2020 10:51), Max: 38 (08 Apr 2020 04:00)  T(F): 100.4 (08 Apr 2020 10:51), Max: 100.4 (08 Apr 2020 04:00)  HR: 87 (08 Apr 2020 12:00) (65 - 93)  BP: 117/58 (08 Apr 2020 12:00) (91/48 - 117/58)  RR: 25 (08 Apr 2020 12:00) (24 - 26)  SpO2: 92% (08 Apr 2020 12:00) (90% - 96%)      PPSV2: 10  %    General: intubated and sedated male  Mental Status: sedated  HEENT: eyes closed, ET tube in place  Lungs: coarse breath sounds b/l  Cardiac: S1S2+  GI: soft, + bowel sounds  : kim in place +urine output  Ext: edema x 4 exts  Neuro: sedated      LABS:                        9.4    9.21  )-----------( 234      ( 08 Apr 2020 05:30 )             29.8     04-08    140  |  102  |  19  ----------------------------<  141<H>  3.3<L>   |  36<H>  |  0.35<L>    Ca    7.9<L>      08 Apr 2020 05:30  Phos  2.8     04-07  Mg     2.3     04-07      PTT - ( 07 Apr 2020 09:04 )  PTT:41.5 sec  Albumin: Albumin, Serum: 1.2 g/dL (04-06 @ 06:00)      Allergies  No Known Allergies    MEDICATIONS  (STANDING):  aspirin  chewable 81 milliGRAM(s) Oral daily  atorvastatin 20 milliGRAM(s) Oral at bedtime  baclofen 10 milliGRAM(s) Oral daily  chlorhexidine 0.12% Liquid 15 milliLiter(s) Oral Mucosa every 12 hours  chlorhexidine 4% Liquid 1 Application(s) Topical <User Schedule>  cisatracurium Infusion 3 MICROgram(s)/kG/Min (13.7 mL/Hr) IV Continuous <Continuous>  enoxaparin Injectable 80 milliGRAM(s) SubCutaneous every 12 hours  famotidine Injectable 20 milliGRAM(s) IV Push two times a day  fentaNYL   Infusion. 1 MICROgram(s)/kG/Hr (3.81 mL/Hr) IV Continuous <Continuous>  levothyroxine 75 MICROGram(s) Oral daily  norepinephrine Infusion 0.05 MICROgram(s)/kG/Min (7.13 mL/Hr) IV Continuous <Continuous>  petrolatum Ophthalmic Ointment 1 Application(s) Both EYES two times a day  polyethylene glycol 3350 17 Gram(s) Oral daily  propofol Infusion 15 MICROgram(s)/kG/Min (6.85 mL/Hr) IV Continuous <Continuous>  trimethoprim/polymyxin Solution 1 Drop(s) Right EYE four times a day    MEDICATIONS  (PRN):  acetaminophen  Suppository .. 650 milliGRAM(s) Rectal every 6 hours PRN Temp greater or equal to 38C (100.4F)  senna 2 Tablet(s) Oral at bedtime PRN Constipation      RADIOLOGY/ADDITIONAL STUDIES:    EXAM:  XR CHEST PORTABLE ROUTINE 1V                        PROCEDURE DATE:  04/08/2020    Date and time of exam: 4/8/2020 6:43 AM.  Comparison exam: 4/6/2020 4:38 PM.    Findings:  Diffuse bilateral pulmonary infiltrates, right worse than left. Removal of left IJ central line since the prior exam..    Impression:  Removal of left IJ central line, otherwise stable.      EXAM:  XR CHEST PORTABLE IMMED 1V                        PROCEDURE DATE:  04/06/2020    COMPARISON: Earlier the same day at 12:24 PM    The tip of the ET tube isat the clavicular heads. The left IJ catheter tip again overlies the midline. There is a new right IJ catheter with this tip overlying the expected location of the superior vena cava. The tip of the NG tube is below the diaphragm.  AP view of the chest demonstrates bilateral nodular and confluent airspace infiltrates, slightly worse than on the prior study. There is no pleural effusion. There is no pneumothorax.  The heart is normal in size. The mediastinum and samina cannot be assessed due to projection. The pulmonary vasculature is normal.   Mild thoracic degenerative changes are present.    IMPRESSION:    New right central line in satisfactory position. No pneumothorax.  ET tube and NG tube in satisfactory position. Left IJ catheter unchanged with tip overlying the midline.  Bilateral airspace infiltrates slightly worse than prior.      EXAM:  US DPLX LWR EXT VEINS COMPL BI                        PROCEDURE DATE:  03/30/2020    COMPARISON: None available.  FINDINGS:    There is normal compressibility of the bilateral common femoral, femoral and popliteal veins. However, noncompressibility of the bilateral posterior tibial and peroneal veins are identified consistent with bilateral infragenicular DVT.    IMPRESSION:     Bilateral infragenicular lower extremity DVT.      EXAM:  XR CHEST AP OR PA 1V                        PROCEDURE DATE:  03/28/2020    COMPARISON: None    AP view of the chest demonstrates diffuse bilateral airspace infiltrates, greater on the right. There is no pleural effusion. There is no pneumothorax.    The heart is mildly enlarged. There is no mediastinal or hilar mass.     The pulmonary vasculature is normal.     Mild thoracic degenerative changes are present.    IMPRESSION:    Diffuse bilateral airspace infiltrates, greater on the right.

## 2020-04-08 NOTE — CONSULT NOTE ADULT - ASSESSMENT
73 y/o M with a PMHx of BPH admitted from outside hospital on 4/1 for further care of viral syndrome secondary to COVID-19 infection; was admitted to outside hospital on 3/21 from which he was transferred on ventilatory support and pressor support; has been on vent for almost 14 days and has persistent fever. Patient had all lines replaced but continues with fevers and difficulty maintaining good oxygenation on the vent. Sputum culture from 4/6 is growing Staph aureus, sensitivity is pending. History per medical record and staff at the bedside.   1. Patient transferred from outside hospital for further treatment of respiratory failure secondary to COVID-19 infection; given difficulty oxygenating and fever, concern about superimposed bacterial pneumonia, sinusitis or line infections, though the lines have been changed; PE should be considered as well  - follow up cultures ---noted Staph aureus in sputum, will follow up sensitivity  - vent and pressors per icu  - serial cbc and monitor temperature   - reviewed prior medical records to evaluate for resistant or atypical pathogens -- awaiting sensitivity on Staph aureus  - will start meropenem to cover hospital acquired pneumonia and possible sinusitis (ngt in place)  - consider replacing ngt tube with oral g tube  - will add vancomycin to treat resistant bacteria especially given lines and Staph aureus in sputum  - check vancomycin trough prior to fourth dose   - continue isolation  - if no improvement and still full care per family, consideration for change of ETT and/or empiric treatment of PE  - will not start IL inhibitor at this time given persistence of respiratory failure and shock  2. other issues: per medicine  2. other issues; per medicine

## 2020-04-08 NOTE — PROGRESS NOTE ADULT - SUBJECTIVE AND OBJECTIVE BOX
________________________________________________________________________________  DAILY PROGRESS NOTE:    ================== SUBJECTIVE ==================    ADELITANILAM GEORGEBROCK  /   74y  /  Male  /  MRN#: [id number]  Patient is a 74y old Male who presents with a chief complaint of Transfer [place] - Covid-19 PNA (07 Apr 2020 09:21)  Currently admitted to medicine with the primary diagnosis of     HOSPITAL DAY: 7d     ICU DAY:    SUMMARY OF HOSPITAL COURSE TO DATE     OVERNIGHT EVENTS:     TODAY: Patient was seen this morning at bedside. Currently, the patient reports ....    Review of systems is otherwise negative.    ================= PRESENT TODAY ==================    1-Evans Catheter:  Indication:  2-Central Line:   Location: [facility]/[place]  Indication:  3-IV Fluids:   Indication:  4-Drips:  4.1-Pressors: levophed    4.2-Sedation: prop/ fent/ nimbex- muscle relaxant  4.3-Heparin:     5-Intubated:   Oxygen: Sat: 95 percent at this time   Ventilator Settings: Tidal Volume: RR: PEEP: FiO2: I/E:   Mode: AC/ CMV (Assist Control/ Continuous Mandatory Ventilation), RR (machine): 25, TV (machine): 450, FiO2: 40, PEEP: 12, ITime: 1, MAP: 19, PIP: 40  ABG - ( 08 Apr 2020 05:06 )  pH, Arterial: 7.39  pH, Blood: x     /  pCO2: 54    /  pO2: 61    / HCO3: 32    / Base Excess: 7.3   /  SaO2: 90                    =================== OBJECTIVE ===================    VITAL SIGNS: Last 24 Hours  T(C): 37.5 (08 Apr 2020 08:00), Max: 38 (08 Apr 2020 04:00)  T(F): 99.5 (08 Apr 2020 08:00), Max: 100.4 (08 Apr 2020 04:00)  HR: 93 (08 Apr 2020 08:12) (65 - 93)  BP: 115/60 (08 Apr 2020 08:00) (91/48 - 115/60)  BP(mean): --  RR: 25 (08 Apr 2020 08:00) (24 - 26)  SpO2: 90% (08 Apr 2020 08:12) (90% - 97%)    04-07-20 @ 07:01  -  04-08-20 @ 07:00  --------------------------------------------------------  IN: 2369.1 mL / OUT: 2600 mL / NET: -231 mL    04-08-20 @ 07:01  -  04-08-20 @ 09:55  --------------------------------------------------------  IN: 0 mL / OUT: 300 mL / NET: -300 mL      PHYSICAL EXAM:  GENERAL: NAD, well-developed  HEAD:  Atraumatic, Normocephalic  CHEST/LUNG: Clear to auscultation bilaterally; No wheeze  HEART: Regular rate and rhythm; No murmurs, rubs, or gallops  ABDOMEN: Soft, Nontender, Nondistended; Bowel sounds present  EXTREMITIES: edema  PSYCH: AAOx3    ===================== LABS =====================                        9.4    9.21  )-----------( 234      ( 08 Apr 2020 05:30 )             29.8     04-08    140  |  102  |  19  ----------------------------<  141<H>  3.3<L>   |  36<H>  |  0.35<L>    Ca    7.9<L>      08 Apr 2020 05:30  Phos  2.8     04-07  Mg     2.3     04-07      PTT - ( 07 Apr 2020 09:04 )  PTT:41.5 sec    ABG - ( 08 Apr 2020 05:06 )  pH, Arterial: 7.39  pH, Blood: x     /  pCO2: 54    /  pO2: 61    / HCO3: 32    / Base Excess: 7.3   /  SaO2: 90                      ================= MICROBIOLOGY ================    Culture - Sputum (collected 06 Apr 2020 14:53)  Source: .Sputum Sputum  Gram Stain (07 Apr 2020 03:51):    Few polymorphonuclear leukocytes per low power field    Few Squamous epithelial cells per low power field    Moderate Gram positive cocci in pairs seen per oil power field    Moderate Gram Negative Rods seen per oil power field  Preliminary Report (08 Apr 2020 00:16):    Numerous Staphylococcus aureus    Culture - Blood (collected 06 Apr 2020 13:30)  Source: .Blood None  Preliminary Report (07 Apr 2020 19:01):    No growth to date.    Culture - Blood (collected 06 Apr 2020 13:24)  Source: .Blood None  Preliminary Report (07 Apr 2020 19:01):    No growth to date.        ================== IMAGING TO DATE ==================    ================== OTHER SIGNIFICANT FINDINGS ==================    ================== ALLERGIES ===================  No Known Allergies    ==================== MEDS =====================  aspirin  chewable 81 milliGRAM(s) Oral daily  atorvastatin 20 milliGRAM(s) Oral at bedtime  baclofen 10 milliGRAM(s) Oral daily  chlorhexidine 0.12% Liquid 15 milliLiter(s) Oral Mucosa every 12 hours  chlorhexidine 4% Liquid 1 Application(s) Topical <User Schedule>  cisatracurium Infusion 3 MICROgram(s)/kG/Min IV Continuous <Continuous>  enoxaparin Injectable 80 milliGRAM(s) SubCutaneous every 12 hours  famotidine Injectable 20 milliGRAM(s) IV Push two times a day  fentaNYL   Infusion. 1 MICROgram(s)/kG/Hr IV Continuous <Continuous>  levothyroxine 75 MICROGram(s) Oral daily  norepinephrine Infusion 0.05 MICROgram(s)/kG/Min IV Continuous <Continuous>  petrolatum Ophthalmic Ointment 1 Application(s) Both EYES two times a day  polyethylene glycol 3350 17 Gram(s) Oral daily  propofol Infusion 15 MICROgram(s)/kG/Min IV Continuous <Continuous>  trimethoprim/polymyxin Solution 1 Drop(s) Right EYE four times a day    PRN MEDICATIONS  acetaminophen  Suppository .. 650 milliGRAM(s) Rectal every 6 hours PRN  senna 2 Tablet(s) Oral at bedtime PRN      ================= CONSULTS ==================    ================= ASSESS/PLAN ==================  Patient is a 74y old Male who presents with a chief complaint of Transfer [place] - Covid-19 PNA (07 Apr 2020 09:21)  Currently admitted to medicine with the primary diagnosis of  covid /ards/dvt    PLAN  #CV :  Bp 106/54 hr 85 o2 sat 95 percent on Michael drip . hx of dvt requiring increasing fio2 . will order echo/ troponin  for today R/o cardiomyopathy   -  #RESP : remains intubated on  AC 25/450/60 with PEEP of 12. Fio2 increased this morning to fio2 of 60 percent after ABG :739/54/61/32/90 percent sat. will repeat abg.              Cxr - Diffuse B/l infiltrates  -  #GI Pepcid  -  #ENDO : Levothyroxine  -  #HEME H/H  9.4/29.8 <234  WBC^ 9.4   -  #NEURO cont sedation with prop/ fent/ nimbex   -  #RENAL: Bun/ Creat stable  19/.35 K -3.5 (will supplement) K rider x3  -    GI PPX:  cont pepcid   DVT PPX: Lovenox    DIET:  tube feeds Q6  ACTIVITY: supine.  consultation; Will  REQUEST ID evaluation as well as palliative care.    ================= CODE STATUS =================                  () FULL CODE     |     () DNR     |     () DNI ________________________________________________________________________________  DAILY PROGRESS NOTE:    ================== SUBJECTIVE ==================    ADELITANILAM GEORGEBROCK  /   74y  /  Male  /  MRN#: [id number]  Patient is a 74y old Male who presents with a chief complaint of Transfer [place] - Covid-19 PNA (07 Apr 2020 09:21)  Currently admitted to medicine with the primary diagnosis of     HOSPITAL DAY: 7d     ICU DAY:    SUMMARY OF HOSPITAL COURSE TO DATE     OVERNIGHT EVENTS:     TODAY: Patient was seen this morning at bedside. Currently, the patient reports ....    Review of systems is otherwise negative.    ================= PRESENT TODAY ==================    1-Evans Catheter:  Indication:  2-Central Line:   Location: [facility]/[place]  Indication:  3-IV Fluids:   Indication:  4-Drips:  4.1-Pressors: levophed    4.2-Sedation: prop/ fent/ nimbex- muscle relaxant  4.3-Heparin:     5-Intubated:   Oxygen: Sat: 95 percent at this time   Ventilator Settings: Tidal Volume: RR: PEEP: FiO2: I/E:   Mode: AC/ CMV (Assist Control/ Continuous Mandatory Ventilation), RR (machine): 25, TV (machine): 450, FiO2: 40, PEEP: 12, ITime: 1, MAP: 19, PIP: 40  ABG - ( 08 Apr 2020 05:06 )  pH, Arterial: 7.39  pH, Blood: x     /  pCO2: 54    /  pO2: 61    / HCO3: 32    / Base Excess: 7.3   /  SaO2: 90                    =================== OBJECTIVE ===================    VITAL SIGNS: Last 24 Hours  T(C): 37.5 (08 Apr 2020 08:00), Max: 38 (08 Apr 2020 04:00)  T(F): 99.5 (08 Apr 2020 08:00), Max: 100.4 (08 Apr 2020 04:00)  HR: 93 (08 Apr 2020 08:12) (65 - 93)  BP: 115/60 (08 Apr 2020 08:00) (91/48 - 115/60)  BP(mean): --  RR: 25 (08 Apr 2020 08:00) (24 - 26)  SpO2: 90% (08 Apr 2020 08:12) (90% - 97%)    04-07-20 @ 07:01  -  04-08-20 @ 07:00  --------------------------------------------------------  IN: 2369.1 mL / OUT: 2600 mL / NET: -231 mL    04-08-20 @ 07:01  -  04-08-20 @ 09:55  --------------------------------------------------------  IN: 0 mL / OUT: 300 mL / NET: -300 mL      PHYSICAL EXAM:  GENERAL: NAD, well-developed  HEAD:  Atraumatic, Normocephalic  CHEST/LUNG: Clear to auscultation bilaterally; No wheeze  HEART: Regular rate and rhythm; No murmurs, rubs, or gallops  ABDOMEN: Soft, Nontender, Nondistended; Bowel sounds present  EXTREMITIES: edema  PSYCH: AAOx3    ===================== LABS =====================                        9.4    9.21  )-----------( 234      ( 08 Apr 2020 05:30 )             29.8     04-08    140  |  102  |  19  ----------------------------<  141<H>  3.3<L>   |  36<H>  |  0.35<L>    Ca    7.9<L>      08 Apr 2020 05:30  Phos  2.8     04-07  Mg     2.3     04-07      PTT - ( 07 Apr 2020 09:04 )  PTT:41.5 sec    ABG - ( 08 Apr 2020 05:06 )  pH, Arterial: 7.39  pH, Blood: x     /  pCO2: 54    /  pO2: 61    / HCO3: 32    / Base Excess: 7.3   /  SaO2: 90                      ================= MICROBIOLOGY ================    Culture - Sputum (collected 06 Apr 2020 14:53)  Source: .Sputum Sputum  Gram Stain (07 Apr 2020 03:51):    Few polymorphonuclear leukocytes per low power field    Few Squamous epithelial cells per low power field    Moderate Gram positive cocci in pairs seen per oil power field    Moderate Gram Negative Rods seen per oil power field  Preliminary Report (08 Apr 2020 00:16):    Numerous Staphylococcus aureus    Culture - Blood (collected 06 Apr 2020 13:30)  Source: .Blood None  Preliminary Report (07 Apr 2020 19:01):    No growth to date.    Culture - Blood (collected 06 Apr 2020 13:24)  Source: .Blood None  Preliminary Report (07 Apr 2020 19:01):    No growth to date.        ================== IMAGING TO DATE ==================    ================== OTHER SIGNIFICANT FINDINGS ==================    ================== ALLERGIES ===================  No Known Allergies    ==================== MEDS =====================  aspirin  chewable 81 milliGRAM(s) Oral daily  atorvastatin 20 milliGRAM(s) Oral at bedtime  baclofen 10 milliGRAM(s) Oral daily  chlorhexidine 0.12% Liquid 15 milliLiter(s) Oral Mucosa every 12 hours  chlorhexidine 4% Liquid 1 Application(s) Topical <User Schedule>  cisatracurium Infusion 3 MICROgram(s)/kG/Min IV Continuous <Continuous>  enoxaparin Injectable 80 milliGRAM(s) SubCutaneous every 12 hours  famotidine Injectable 20 milliGRAM(s) IV Push two times a day  fentaNYL   Infusion. 1 MICROgram(s)/kG/Hr IV Continuous <Continuous>  levothyroxine 75 MICROGram(s) Oral daily  norepinephrine Infusion 0.05 MICROgram(s)/kG/Min IV Continuous <Continuous>  petrolatum Ophthalmic Ointment 1 Application(s) Both EYES two times a day  polyethylene glycol 3350 17 Gram(s) Oral daily  propofol Infusion 15 MICROgram(s)/kG/Min IV Continuous <Continuous>  trimethoprim/polymyxin Solution 1 Drop(s) Right EYE four times a day    PRN MEDICATIONS  acetaminophen  Suppository .. 650 milliGRAM(s) Rectal every 6 hours PRN  senna 2 Tablet(s) Oral at bedtime PRN      ================= CONSULTS ==================    ================= ASSESS/PLAN ==================  Patient is a 74y old Male who presents with a chief complaint of Transfer [place] - Covid-19 PNA (07 Apr 2020 09:21)  Currently admitted to medicine with the primary diagnosis of  covid /ards/dvt    PLAN  #CV :  Bp 106/54 hr 85 o2 sat 95 percent on Michael drip . hx of dvt requiring increasing fio2 . will order echo/ troponin  for today R/o cardiomyopathy            Echo Done : EF 60 , trace tricuspid regurg.   -  #RESP : remains intubated on  AC 25/450/60 with PEEP of 12. Fio2 increased this morning to fio2 of 60 percent after ABG :739/54/61/32/90 percent sat. will repeat abg.              Cxr - Diffuse B/l infiltrates             repeat ABG on AC 25/450/60  p12   7.40/56/80/34/96 percent sat . TV^ 500.  -  #GI Pepcid  -  #ENDO : Levothyroxine  -  #HEME H/H  9.4/29.8 <234  WBC^ 9.4   -  #NEURO cont sedation with prop/ fent/ nimbex   -  #RENAL: Bun/ Creat stable  19/.35 K -3.5 (will supplement) K rider x3  -    GI PPX:  cont pepcid   DVT PPX: Lovenox    DIET:  tube feeds Q6  ACTIVITY: supine.  consultation; Will  REQUEST ID evaluation as well as palliative care.    ================= CODE STATUS =================                  () FULL CODE     |     () DNR     |     () DNI ________________________________________________________________________________  DAILY PROGRESS NOTE:    ================== SUBJECTIVE ==================    ADELITANILAM GEORGEBROCK  /   74y  /  Male  /  MRN#: [id number]  Patient is a 74y old Male who presents with a chief complaint of Transfer [place] - Covid-19 PNA (07 Apr 2020 09:21)  Currently admitted to medicine with the primary diagnosis of     HOSPITAL DAY: 7d     ICU DAY:    SUMMARY OF HOSPITAL COURSE TO DATE     OVERNIGHT EVENTS:     TODAY: Patient was seen this morning at bedside. Currently, the patient reports ....    Review of systems is otherwise negative.    ================= PRESENT TODAY ==================    1-Evans Catheter:  Indication:  2-Central Line:   Location: [facility]/[place]  Indication:  3-IV Fluids:   Indication:  4-Drips:  4.1-Pressors: levophed    4.2-Sedation: prop/ fent/ nimbex- muscle relaxant  4.3-Heparin:     5-Intubated:   Oxygen: Sat: 95 percent at this time   Ventilator Settings: Tidal Volume: RR: PEEP: FiO2: I/E:   Mode: AC/ CMV (Assist Control/ Continuous Mandatory Ventilation), RR (machine): 25, TV (machine): 450, FiO2: 40, PEEP: 12, ITime: 1, MAP: 19, PIP: 40  ABG - ( 08 Apr 2020 05:06 )  pH, Arterial: 7.39  pH, Blood: x     /  pCO2: 54    /  pO2: 61    / HCO3: 32    / Base Excess: 7.3   /  SaO2: 90                    =================== OBJECTIVE ===================    VITAL SIGNS: Last 24 Hours  T(C): 37.5 (08 Apr 2020 08:00), Max: 38 (08 Apr 2020 04:00)  T(F): 99.5 (08 Apr 2020 08:00), Max: 100.4 (08 Apr 2020 04:00)  HR: 93 (08 Apr 2020 08:12) (65 - 93)  BP: 115/60 (08 Apr 2020 08:00) (91/48 - 115/60)  BP(mean): --  RR: 25 (08 Apr 2020 08:00) (24 - 26)  SpO2: 90% (08 Apr 2020 08:12) (90% - 97%)    04-07-20 @ 07:01  -  04-08-20 @ 07:00  --------------------------------------------------------  IN: 2369.1 mL / OUT: 2600 mL / NET: -231 mL    04-08-20 @ 07:01  -  04-08-20 @ 09:55  --------------------------------------------------------  IN: 0 mL / OUT: 300 mL / NET: -300 mL      PHYSICAL EXAM:  GENERAL: NAD, well-developed  HEAD:  Atraumatic, Normocephalic  CHEST/LUNG: Clear to auscultation bilaterally; No wheeze  HEART: Regular rate and rhythm; No murmurs, rubs, or gallops  ABDOMEN: Soft, Nontender, Nondistended; Bowel sounds present  EXTREMITIES: edema  PSYCH: AAOx3    ===================== LABS =====================                        9.4    9.21  )-----------( 234      ( 08 Apr 2020 05:30 )             29.8     04-08    140  |  102  |  19  ----------------------------<  141<H>  3.3<L>   |  36<H>  |  0.35<L>    Ca    7.9<L>      08 Apr 2020 05:30  Phos  2.8     04-07  Mg     2.3     04-07      PTT - ( 07 Apr 2020 09:04 )  PTT:41.5 sec    ABG - ( 08 Apr 2020 05:06 )  pH, Arterial: 7.39  pH, Blood: x     /  pCO2: 54    /  pO2: 61    / HCO3: 32    / Base Excess: 7.3   /  SaO2: 90                      ================= MICROBIOLOGY ================    Culture - Sputum (collected 06 Apr 2020 14:53)  Source: .Sputum Sputum  Gram Stain (07 Apr 2020 03:51):    Few polymorphonuclear leukocytes per low power field    Few Squamous epithelial cells per low power field    Moderate Gram positive cocci in pairs seen per oil power field    Moderate Gram Negative Rods seen per oil power field  Preliminary Report (08 Apr 2020 00:16):    Numerous Staphylococcus aureus    Culture - Blood (collected 06 Apr 2020 13:30)  Source: .Blood None  Preliminary Report (07 Apr 2020 19:01):    No growth to date.    Culture - Blood (collected 06 Apr 2020 13:24)  Source: .Blood None  Preliminary Report (07 Apr 2020 19:01):    No growth to date.        ================== IMAGING TO DATE ==================    ================== OTHER SIGNIFICANT FINDINGS ==================    ================== ALLERGIES ===================  No Known Allergies    ==================== MEDS =====================  aspirin  chewable 81 milliGRAM(s) Oral daily  atorvastatin 20 milliGRAM(s) Oral at bedtime  baclofen 10 milliGRAM(s) Oral daily  chlorhexidine 0.12% Liquid 15 milliLiter(s) Oral Mucosa every 12 hours  chlorhexidine 4% Liquid 1 Application(s) Topical <User Schedule>  cisatracurium Infusion 3 MICROgram(s)/kG/Min IV Continuous <Continuous>  enoxaparin Injectable 80 milliGRAM(s) SubCutaneous every 12 hours  famotidine Injectable 20 milliGRAM(s) IV Push two times a day  fentaNYL   Infusion. 1 MICROgram(s)/kG/Hr IV Continuous <Continuous>  levothyroxine 75 MICROGram(s) Oral daily  norepinephrine Infusion 0.05 MICROgram(s)/kG/Min IV Continuous <Continuous>  petrolatum Ophthalmic Ointment 1 Application(s) Both EYES two times a day  polyethylene glycol 3350 17 Gram(s) Oral daily  propofol Infusion 15 MICROgram(s)/kG/Min IV Continuous <Continuous>  trimethoprim/polymyxin Solution 1 Drop(s) Right EYE four times a day    PRN MEDICATIONS  acetaminophen  Suppository .. 650 milliGRAM(s) Rectal every 6 hours PRN  senna 2 Tablet(s) Oral at bedtime PRN      ================= CONSULTS ==================    ================= ASSESS/PLAN ==================  Patient is a 74y old Male who presents with a chief complaint of Transfer [place] - Covid-19 PNA (07 Apr 2020 09:21)  Currently admitted to medicine with the primary diagnosis of  covid /ards/dvt    PLAN  #CV :  Bp 106/54 hr 85 o2 sat 95 percent on Michael drip . hx of dvt requiring increasing fio2 . will order echo/ troponin  for today R/o cardiomyopathy            Echo Done : EF 60 , trace tricuspid regurg.  Troponin negative (<.015)  -  #RESP : remains intubated on  AC 25/450/60 with PEEP of 12. Fio2 increased this morning to fio2 of 60 percent after ABG :739/54/61/32/90 percent sat. will repeat abg.              Cxr - Diffuse B/l infiltrates             repeat ABG on AC 25/450/60  p12   7.40/56/80/34/96 percent sat . TV^ 500.  -  #GI Pepcid  -  #ENDO : Levothyroxine  -  #HEME H/H  9.4/29.8 <234  WBC^ 9.4   -  #NEURO cont sedation with prop/ fent/ nimbex   -  #RENAL: Bun/ Creat stable  19/.35 K -3.5 (will supplement) RAVINDER rider x3  -    GI PPX:  cont pepcid   DVT PPX: Lovenox    DIET:  tube feeds Q6  ACTIVITY: supine.  consultation; Will  REQUEST ID evaluation as well as palliative care.    ================= CODE STATUS =================                  () FULL CODE     |     () DNR     |     () DNI

## 2020-04-09 NOTE — PROGRESS NOTE ADULT - ASSESSMENT
Pt is a 74y old Male with hx of BPH originally admitted to [facility] 3/21 transferred to  with vent dependent resp failure secondary to COVID pneumonia. Pt intubated 3/29 at [facility]. Hosp course notable for hypotension requiring pressors.  Palliative medicine consulted for assistance with goals of care.    1)Resp Failure, ARDS  -secondary to COVID pneumonia  -Vent dependent Day #12  -s/p zithromax, plaquenil  -not improving    2)COVID Pneumonia  -Vent dependent Day #12  -s/p zithromax, plaquenil    3)Hypotension  -Related to COVID  -On vasopressors    4)DVTs  -Imaging reviewed  -On Lovenox    5)Advance Directives  -Pt does not have capacity to make medical decisions due to intubation, sedation  -Pt has no HCP on file  -Pt has wife Ruby who would be surrogate decision maker [phone]  -Pt has son Ignacio [phone]  -Family meeting scheduled for today at 1 pm

## 2020-04-09 NOTE — PROGRESS NOTE ADULT - ASSESSMENT
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 Acute hypoxemic respiratory failure/ARDS due to viral PNA secondary to COVID-19 infection   - on vent support   - will decrease fio2 today    2. Renal - improved hypernatremia with free water, + fluid balance  -k replacement today     3. DVT BL LE - LMWH    4. GI - TF    5. Hypothyroidism - synthroid    6) palative  consult called secondary  to pt not improving

## 2020-04-09 NOTE — PROGRESS NOTE ADULT - SUBJECTIVE AND OBJECTIVE BOX
HPI: Pt is a 74y old Male with hx of BPH originally admitted to [place] 3/21 transferred to  with vent dependent resp failure secondary to COVID pneumonia. Pt intubated 3/29 at [place]. Hosp course notable for hypotension requiring pressors.  Palliative medicine consulted for assistance with goals of care.    Pt seen and examined by me for followup of goals of care.  Pt is intubated, sedated so unable to provide hx, ROS. Pt remains on pressors      PAIN: ( )Yes   ( )No  DYSPNEA: ( ) Yes  ( ) No  Pt unable to provide due to intubation and sedation    Review of Systems:  Unable to obtain as Pt unable to provide due to intubation and sedation      PHYSICAL EXAM:    Vital Signs Last 24 Hrs  T(C): 36.6 (04-09-20 @ 12:05), Max: 38.4 (04-09-20 @ 04:04)  T(F): 97.9 (04-09-20 @ 12:05), Max: 101.2 (04-09-20 @ 04:04)  HR: 66 (04-09-20 @ 11:33) (66 - 110)  BP: 103/58 (04-09-20 @ 11:33) (96/54 - 160/96)  BP(mean): 64 (04-09-20 @ 07:53) (61 - 89)  RR: 24 (04-09-20 @ 11:33) (24 - 30)  SpO2: 97% (04-09-20 @ 11:33) (90% - 99%)      PPSV2: 10  %    General: intubated and sedated male  Mental Status: sedated  HEENT: eyes closed, ET tube in place  Lungs: coarse breath sounds b/l  Cardiac: S1S2+  GI: soft, + bowel sounds  : kim in place +urine output  Ext: edema x 4 exts  Neuro: sedated      LABS                          9.6    9.36  )-----------( 220      ( 09 Apr 2020 05:15 )             31.1     04-09    137  |  99  |  18  ----------------------------<  157<H>  3.3<L>   |  33<H>  |  0.40<L>    Ca    7.9<L>      09 Apr 2020 05:15  Phos  2.4     04-09  Mg     2.1     04-09    MEDICATIONS  (STANDING):  aspirin  chewable 81 milliGRAM(s) Oral daily  atorvastatin 20 milliGRAM(s) Oral at bedtime  baclofen 10 milliGRAM(s) Oral daily  chlorhexidine 0.12% Liquid 15 milliLiter(s) Oral Mucosa every 12 hours  chlorhexidine 4% Liquid 1 Application(s) Topical <User Schedule>  dexMEDEtomidine Infusion 0.4 MICROgram(s)/kG/Hr (7.61 mL/Hr) IV Continuous <Continuous>  enoxaparin Injectable 80 milliGRAM(s) SubCutaneous every 12 hours  famotidine Injectable 20 milliGRAM(s) IV Push two times a day  fentaNYL   Infusion. 1 MICROgram(s)/kG/Hr (3.81 mL/Hr) IV Continuous <Continuous>  levothyroxine 75 MICROGram(s) Oral daily  meropenem  IVPB 1000 milliGRAM(s) IV Intermittent every 8 hours  norepinephrine Infusion 0.05 MICROgram(s)/kG/Min (7.13 mL/Hr) IV Continuous <Continuous>  petrolatum Ophthalmic Ointment 1 Application(s) Both EYES two times a day  polyethylene glycol 3350 17 Gram(s) Oral daily  propofol Infusion 15 MICROgram(s)/kG/Min (6.85 mL/Hr) IV Continuous <Continuous>  trimethoprim/polymyxin Solution 1 Drop(s) Right EYE four times a day  vancomycin  IVPB 750 milliGRAM(s) IV Intermittent every 12 hours    MEDICATIONS  (PRN):  acetaminophen  Suppository .. 650 milliGRAM(s) Rectal every 6 hours PRN Temp greater or equal to 38C (100.4F)  senna 2 Tablet(s) Oral at bedtime PRN Constipation    RADIOLOGY/ADDITIONAL STUDIES:      EXAM:  XR CHEST PORTABLE ROUTINE 1V                        PROCEDURE DATE:  04/08/2020    Date and time of exam: 4/8/2020 6:43 AM.  Comparison exam: 4/6/2020 4:38 PM.    Findings:  Diffuse bilateral pulmonary infiltrates, right worse than left. Removal of left IJ central line since the prior exam..    Impression:  Removal of left IJ central line, otherwise stable.      EXAM:  XR CHEST PORTABLE IMMED 1V                        PROCEDURE DATE:  04/06/2020    COMPARISON: Earlier the same day at 12:24 PM    The tip of the ET tube isat the clavicular heads. The left IJ catheter tip again overlies the midline. There is a new right IJ catheter with this tip overlying the expected location of the superior vena cava. The tip of the NG tube is below the diaphragm.  AP view of the chest demonstrates bilateral nodular and confluent airspace infiltrates, slightly worse than on the prior study. There is no pleural effusion. There is no pneumothorax.  The heart is normal in size. The mediastinum and samina cannot be assessed due to projection. The pulmonary vasculature is normal.   Mild thoracic degenerative changes are present.    IMPRESSION:    New right central line in satisfactory position. No pneumothorax.  ET tube and NG tube in satisfactory position. Left IJ catheter unchanged with tip overlying the midline.  Bilateral airspace infiltrates slightly worse than prior.      EXAM:  US DPLX LWR EXT VEINS COMPL BI                        PROCEDURE DATE:  03/30/2020    COMPARISON: None available.  FINDINGS:    There is normal compressibility of the bilateral common femoral, femoral and popliteal veins. However, noncompressibility of the bilateral posterior tibial and peroneal veins are identified consistent with bilateral infragenicular DVT.    IMPRESSION:     Bilateral infragenicular lower extremity DVT.      EXAM:  XR CHEST AP OR PA 1V                        PROCEDURE DATE:  03/28/2020    COMPARISON: None    AP view of the chest demonstrates diffuse bilateral airspace infiltrates, greater on the right. There is no pleural effusion. There is no pneumothorax.    The heart is mildly enlarged. There is no mediastinal or hilar mass.     The pulmonary vasculature is normal.     Mild thoracic degenerative changes are present.    IMPRESSION:    Diffuse bilateral airspace infiltrates, greater on the right.

## 2020-04-09 NOTE — PROGRESS NOTE ADULT - ASSESSMENT
75 y/o M with a PMHx of BPH admitted from outside hospital on 4/1 for further care of viral syndrome secondary to COVID-19 infection; was admitted to outside hospital on 3/21 from which he was transferred on ventilatory support and pressor support; has been on vent for almost 14 days and has persistent fever. Patient had all lines replaced but continues with fevers and difficulty maintaining good oxygenation on the vent. Sputum culture from 4/6 is growing Staph aureus, sensitivity is pending. History per medical record and staff at the bedside.   1. Patient transferred from outside hospital for further treatment of respiratory failure secondary to COVID-19 infection; given difficulty oxygenating and fever, concern about superimposed bacterial pneumonia, sinusitis or line infections, though the lines have been changed; PE should be considered as well  - follow up cultures ---noted Staph aureus in sputum found to have MRSA and Strep pneumoniae in sputum culture  - vent and pressors per icu  - serial cbc and monitor temperature   - day #2 meropenem and vancomycin   - tolerating antibiotics without rashes or side effects   - check vancomycin trough prior to fourth dose   - continue isolation  - if no improvement and still full care per family, consideration for change of ETT and/or empiric treatment of PE  - will not start IL inhibitor at this time given persistence of respiratory failure and shock  2. other issues: per medicine

## 2020-04-09 NOTE — PROGRESS NOTE ADULT - SUBJECTIVE AND OBJECTIVE BOX
Date of service: 04-09-20 @ 16:35      Patient remains on ventilatory support; had ngt moved to ogt  Fevers have resolved since 4 am      ROS unable to obtain secondary to patient medical condition     MEDICATIONS  (STANDING):  aspirin  chewable 81 milliGRAM(s) Oral daily  atorvastatin 20 milliGRAM(s) Oral at bedtime  baclofen 10 milliGRAM(s) Oral daily  chlorhexidine 0.12% Liquid 15 milliLiter(s) Oral Mucosa every 12 hours  chlorhexidine 4% Liquid 1 Application(s) Topical <User Schedule>  dexMEDEtomidine Infusion 0.4 MICROgram(s)/kG/Hr (7.61 mL/Hr) IV Continuous <Continuous>  enoxaparin Injectable 80 milliGRAM(s) SubCutaneous every 12 hours  famotidine Injectable 20 milliGRAM(s) IV Push two times a day  fentaNYL   Infusion. 1 MICROgram(s)/kG/Hr (3.81 mL/Hr) IV Continuous <Continuous>  levothyroxine 75 MICROGram(s) Oral daily  meropenem  IVPB 1000 milliGRAM(s) IV Intermittent every 8 hours  norepinephrine Infusion 0.05 MICROgram(s)/kG/Min (7.13 mL/Hr) IV Continuous <Continuous>  polyethylene glycol 3350 17 Gram(s) Oral daily  propofol Infusion 15 MICROgram(s)/kG/Min (6.85 mL/Hr) IV Continuous <Continuous>  trimethoprim/polymyxin Solution 1 Drop(s) Right EYE four times a day  vancomycin  IVPB 750 milliGRAM(s) IV Intermittent every 12 hours    MEDICATIONS  (PRN):  acetaminophen  Suppository .. 650 milliGRAM(s) Rectal every 6 hours PRN Temp greater or equal to 38C (100.4F)  senna 2 Tablet(s) Oral at bedtime PRN Constipation      Vital Signs Last 24 Hrs  T(C): 36.6 (09 Apr 2020 12:05), Max: 38.4 (09 Apr 2020 04:04)  T(F): 97.9 (09 Apr 2020 12:05), Max: 101.2 (09 Apr 2020 04:04)  HR: 57 (09 Apr 2020 14:13) (57 - 105)  BP: 103/58 (09 Apr 2020 11:33) (96/54 - 133/76)  BP(mean): 64 (09 Apr 2020 07:53) (61 - 89)  RR: 24 (09 Apr 2020 11:33) (24 - 30)  SpO2: 99% (09 Apr 2020 14:13) (90% - 99%)    Mode: AC/ CMV (Assist Control/ Continuous Mandatory Ventilation), RR (machine): 25, TV (machine): 500, FiO2: 80, PEEP: 12, ITime: 1, MAP: 21, PIP: 44    Physical Exam:          Constitutional: frail looking  HEENT: NC/AT, orally intubated  Neck: supple; thyroid not palpable  Back: no tenderness  Respiratory: respiratory effort normal; scattered coarse breath sounds  Cardiovascular: S1S2 regular, no murmurs  Abdomen: soft, not tender, not distended, positive BS; no liver or spleen organomegaly  Genitourinary: no suprapubic tenderness  Musculoskeletal: no muscle tenderness, no joint swelling or tenderness  Neurological/ Psychiatric: orally intubated   Skin: no rashes; no palpable lesions    Labs: all available labs reviewed                         Labs:                        9.6    9.36  )-----------( 220      ( 09 Apr 2020 05:15 )             31.1     04-09    137  |  99  |  18  ----------------------------<  157<H>  3.3<L>   |  33<H>  |  0.40<L>    Ca    7.9<L>      09 Apr 2020 05:15  Phos  2.4     04-09  Mg     2.1     04-09             Cultures:       Culture - Urine (collected 04-07-20 @ 09:30)  Source: .Urine Catheterized  Final Report (04-09-20 @ 11:59):    No growth    Culture - Sputum (collected 04-06-20 @ 14:53)  Source: .Sputum Sputum  Gram Stain (04-07-20 @ 03:51):    Few polymorphonuclear leukocytes per low power field    Few Squamous epithelial cells per low power field    Moderate Gram positive cocci in pairs seen per oil power field    Moderate Gram Negative Rods seen per oil power field  Preliminary Report (04-09-20 @ 00:14):    Numerous Methicillin resistant Staphylococcus aureus    Numerous Streptococcus pneumoniae    Normal Respiratory Rupali absent  Organism: Methicillin resistant Staphylococcus aureus (04-08-20 @ 19:06)  Organism: Methicillin resistant Staphylococcus aureus (04-08-20 @ 19:06)      -  Ampicillin/Sulbactam: R <=8/4      -  Cefazolin: R <=4      -  Clindamycin: S 0.5      -  Erythromycin: R >4      -  Gentamicin: S <=1 Should not be used as monotherapy      -  Linezolid: S 4      -  Oxacillin: R 0.5      -  Penicillin: R 8      -  RIF- Rifampin: S <=1 Should not be used as monotherapy      -  Tetra/Doxy: I 8      -  Trimethoprim/Sulfamethoxazole: S <=0.5/9.5      -  Vancomycin: S 2      Method Type: PHILL    Culture - Blood (collected 04-06-20 @ 13:30)  Source: .Blood None  Preliminary Report (04-07-20 @ 19:01):    No growth to date.    Culture - Blood (collected 04-06-20 @ 13:24)  Source: .Blood None  Preliminary Report (04-07-20 @ 19:01):    No growth to date.      Ferritin, Serum: 595 ng/mL (04-07-20 @ 09:04)  C-Reactive Protein, Serum: 25.59 mg/dL (04-07-20 @ 09:04)  D-Dimer Assay, Quantitative: 3608 ng/mL DDU (04-06-20 @ 12:30)  Ferritin, Serum: 682 ng/mL (04-06-20 @ 06:00)  C-Reactive Protein, Serum: 18.10 mg/dL (04-06-20 @ 06:00)  Ferritin, Serum: 857 ng/mL (04-03-20 @ 05:30)  C-Reactive Protein, Serum: 10.78 mg/dL (04-03-20 @ 05:30)  C-Reactive Protein, Serum: 32.47 mg/dL (04-01-20 @ 05:24)  Ferritin, Serum: 709 ng/mL (04-01-20 @ 05:23)  C-Reactive Protein, Serum: 34.78 mg/dL (03-31-20 @ 13:16)  D-Dimer Assay, Quantitative: > 50,000 ng/mL DDU (03-30-20 @ 13:29)  C-Reactive Protein, Serum: 37.09 mg/dL (03-30-20 @ 10:26)  D-Dimer Assay, Quantitative: > 50,000 ng/mL DDU (03-30-20 @ 06:50)          COVID-19 PCR . (03.21.20 @ 22:44)    COVID-19 PCR: Detected: LDT - Laboratory Developed Test All “detected” results on this new test  are considered presumptively positive results, are clinically actionable,  and specimens will be forwarded to [facility] for confirmation testing.  Another report (corrected report) will only be issued if discordant  results occur.  This test has been validated by [employer] to be accurate;  though it has not been FDA cleared/approved by the usual pathway.  As with all laboratory tests, results should be correlated with clinical  findings.    < from: Xray Chest 1 View- PORTABLE-Routine (04.08.20 @ 07:18) >    EXAM:  XR CHEST PORTABLE ROUTINE 1V                            PROCEDURE DATE:  04/08/2020          INTERPRETATION:  CHEST AP PORTABLE:    History: Shortness of Breath.     Date and time of exam: 4/8/2020 6:43 AM.    Technique: A single AP view of the chest was obtained.    Comparison exam: 4/6/2020 4:38 PM.    Findings:  Diffuse bilateral pulmonary infiltrates, right worse than left. Removal of left IJ central line since the prior exam..    Impression:  Removal of left IJ central line, otherwise stable.    < end of copied text >        Radiology: all available radiological tests reviewed    Advanced directives addressed: full resuscitation

## 2020-04-09 NOTE — PROGRESS NOTE ADULT - ATTENDING COMMENTS
Patient seen and evaluated with rest of ICU team and plan discussed with ICU team
Pt seen and examined with PA. A&P reviewed.  Requires high FiO2 - will attempt to titrate down.  Decreased fentanyl.  Palliative care eval - scheduled for 1 pm

## 2020-04-09 NOTE — GOALS OF CARE CONVERSATION - ADVANCED CARE PLANNING - CONVERSATION DETAILS
Team first used  phone ID #[id number] to speak with pts wife Ruby. Pts wife defers decision making to pts son Ignacio. Team then spoke with Ignacio via phone ( Ignacio speaks English and does not need  phone). Pts son reports prior to admission pt. Ignacio with his son and wife. Pt. was very active and independent and was working up until being admitted.     Pts current medical condition and goals of care discussed. Pt. is currently intubated and has not been able to wean off of the vent. Pts son discussed how pt. had expressed to him in the past that he would never want to live on a machine, as he had seen his first wife go through that and he did not want that for himself. Pts son reports that he needs to speak with his family before making any decisions. Team discussed with pts son what to address with his family and decisions that would need to be made. Team discussed the option of compassionately liberating pt. from the vent, understanding his time would be short but focusing on keep him comfortable. We also discussed resuscitation and recommended placing a DNR, as it is very unlikely pt. would survive resuscitation.    Pts son expressed understanding of the above and expressed leaning toward compassionate liberation and comfort focus however, he was not ready to make that decision without speaking with the rest of his family. No limits have been set at this time. Our team will call pts son tomorrow to follow up. Emotional support provided. Our team will continue to follow.
Team spoke with pts son Ignacio and pts wife Ruby with [employer]  [employer] ID# [id number].  Ruby expressed that she was comfortable with the medical information to be provided to Ignacio and she defers decision making to Ignacio.  After that part of the conversation the  was no longer necessary as Ignacio declined because he speaks fluent English.  He explained from the beginning that any decision he made he would have to run by pts wife (who is not his mother) to be sure she was in agreement.  We reviewed the role of palliative medicine and reviewed pts condition.    Ignacio shared he was aware of how ill his father was because he has spoken with the doctors the last few days and he speaks to the nurses frequently for updates.  Reviewed that pt is critically ill, on the vent for 13 days unable to wean with high FiO2 requirements and hypotension related to COVID pneumonia, sepsis on vasopressor support.      Ignacio shared he is aware how sick his dad is and that he knows his wishes.  Ignacio shared that pt would not want to live attached to machines, life support.  We recommended putting a DNR order in the past and discussed the option of compassionate liberation from vent support.  Ignacio said it sounded like this plan made sense but he did have to discuss it with pts wife and a few other family members.  Reiterated that at this time pt remains full code but CPR will be offered at the discretion of the primary team.  He expressed understanding of this.    Plan left to followup tomorrow with Ignacio to give him an update as well as review the plan of care.    Emotional support provided    Spent 20 minutes discussing therapeutic alternatives and advance care planning

## 2020-04-09 NOTE — PROGRESS NOTE ADULT - SUBJECTIVE AND OBJECTIVE BOX
CC: Transfer [place] - Covid-19 PNA (04 Apr 2020 12:14)    HPI: 73 y/o M with a PMHx of BPH presented to  on 4/1/2020, transfer from [facility] with Positive Covid-19, originally admitted with complaints of shortness of breath x 6 days. Patient presented initially on 3/21/2020 at other facility and d/c'd from ED, COVID-19 testing done that has now resulted positive. Denies smoking, chest pain, cough, fever, diarrhea, vomiting, abdominal pain, known sick contacts, recent travel or any other acute complaints. Patient presented to [place] on Mechanical Vent, RR 30, , PEEP 16, O2 40%. Patient with R arterial line and L IJ Central line in place. Patient found to have bilateral DVTs at [place], started on Heparin gtt. Reason for transfer is unclear at this time.    ID comsult for positive sputum cultures    on propofol/ levophed, precedex    ICU Vital Signs Last 24 Hrs  T(C): 36.9 (09 Apr 2020 07:53), Max: 38.4 (09 Apr 2020 04:04)  T(F): 98.5 (09 Apr 2020 07:53), Max: 101.2 (09 Apr 2020 04:04)  HR: 74 (09 Apr 2020 08:45) (74 - 110)  BP: 96/54 (09 Apr 2020 07:53) (96/54 - 160/96)  BP(mean): 64 (09 Apr 2020 07:53) (61 - 89)  ABP: 93/51 (09 Apr 2020 07:53) (90/55 - 159/95)  ABP(mean): 68 (09 Apr 2020 07:53) (68 - 95)  RR: 30 (09 Apr 2020 07:53) (24 - 30)  SpO2: 96% (09 Apr 2020 08:45) (90% - 99%)                         9.6    9.36  )-----------( 220      ( 09 Apr 2020 05:15 )             31.1       ABG - ( 08 Apr 2020 13:21 )  pH, Arterial: 7.40  pH, Blood: x     /  pCO2: 56    /  pO2: 80    / HCO3: 34    / Base Excess: 7.9   /  SaO2: 96                       MEDICATIONS  (STANDING):  aspirin  chewable 81 milliGRAM(s) Oral daily  atorvastatin 20 milliGRAM(s) Oral at bedtime  baclofen 10 milliGRAM(s) Oral daily  chlorhexidine 0.12% Liquid 15 milliLiter(s) Oral Mucosa every 12 hours  chlorhexidine 4% Liquid 1 Application(s) Topical <User Schedule>  dexMEDEtomidine Infusion 0.4 MICROgram(s)/kG/Hr (7.61 mL/Hr) IV Continuous <Continuous>  enoxaparin Injectable 80 milliGRAM(s) SubCutaneous every 12 hours  famotidine Injectable 20 milliGRAM(s) IV Push two times a day  fentaNYL   Infusion. 1 MICROgram(s)/kG/Hr (3.81 mL/Hr) IV Continuous <Continuous>  levothyroxine 75 MICROGram(s) Oral daily  meropenem  IVPB 1000 milliGRAM(s) IV Intermittent every 8 hours  norepinephrine Infusion 0.05 MICROgram(s)/kG/Min (7.13 mL/Hr) IV Continuous <Continuous>  petrolatum Ophthalmic Ointment 1 Application(s) Both EYES two times a day  polyethylene glycol 3350 17 Gram(s) Oral daily  propofol Infusion 15 MICROgram(s)/kG/Min (6.85 mL/Hr) IV Continuous <Continuous>  trimethoprim/polymyxin Solution 1 Drop(s) Right EYE four times a day  vancomycin  IVPB 750 milliGRAM(s) IV Intermittent every 12 hours                      PHYSICAL EXAM:    General: male on ventilator  Eyes: conjunctiva and sclera clear  Head: Normocephalic; atraumatic  ENMT: ETT in place, OGT in place  Neck: Supple; no masses  Respiratory: BL crackles  Cardiovascular: S1, S2 reg  Gastrointestinal: Soft abd, + BS  Genitourinary: FQ to gravity  Extremities: No clubbing, cyanosis   Vascular: Peripheral pulses palpable 2+ bilaterally  Neurological: sedated  Skin: Warm and dry.   Musculoskeletal: without deformities

## 2020-04-10 NOTE — PROGRESS NOTE ADULT - SUBJECTIVE AND OBJECTIVE BOX
HPI: Pt is a 74y old Male with hx of BPH originally admitted to [place] 3/21 transferred to  with vent dependent resp failure secondary to COVID pneumonia. Pt intubated 3/29 at [place]. Hosp course notable for hypotension requiring pressors.  Palliative medicine consulted for assistance with goals of care.    Pt seen and examined by me for followup of goals of care.  Pt is intubated, sedated so unable to provide hx, ROS. Pt s/p ET tube change.      PAIN: ( )Yes   ( )No  DYSPNEA: ( ) Yes  ( ) No  Pt unable to provide due to intubation and sedation    Review of Systems:  Unable to obtain as Pt unable to provide due to intubation and sedation      PHYSICAL EXAM:    Vital Signs Last 24 Hrs  T(C): 37.7 (04-10-20 @ 12:00), Max: 38.2 (04-10-20 @ 07:00)  T(F): 99.9 (04-10-20 @ 12:00), Max: 100.8 (04-10-20 @ 07:00)  HR: 80 (04-10-20 @ 13:00) (52 - 96)  BP: 124/68 (04-10-20 @ 13:00) (106/56 - 130/64)  BP(mean): 81 (04-10-20 @ 08:00) (69 - 81)  RR: 25 (04-10-20 @ 13:00) (20 - 25)  SpO2: 93% (04-10-20 @ 13:00) (90% - 100%)      PPSV2: 10  %    General: intubated and sedated male  Mental Status: sedated  HEENT: eyes closed, ET tube in place  Lungs: coarse breath sounds b/l  Cardiac: S1S2+  GI: soft, + bowel sounds  : kim in place +urine output  Ext: edema x 4 exts  Neuro: sedated      LABS                          8.8    8.54  )-----------( 240      ( 10 Apr 2020 05:15 )             27.8     04-10    136  |  100  |  19  ----------------------------<  158<H>  3.4<L>   |  33<H>  |  0.37<L>    Ca    7.7<L>      10 Apr 2020 05:15  Phos  2.4     04-10  Mg     2.0     04-10          MEDICATIONS  (STANDING):  aspirin  chewable 81 milliGRAM(s) Oral daily  atorvastatin 20 milliGRAM(s) Oral at bedtime  baclofen 10 milliGRAM(s) Oral daily  chlorhexidine 0.12% Liquid 15 milliLiter(s) Oral Mucosa every 12 hours  chlorhexidine 4% Liquid 1 Application(s) Topical <User Schedule>  dexMEDEtomidine Infusion 0.4 MICROgram(s)/kG/Hr (7.61 mL/Hr) IV Continuous <Continuous>  enoxaparin Injectable 80 milliGRAM(s) SubCutaneous every 12 hours  famotidine Injectable 20 milliGRAM(s) IV Push two times a day  fentaNYL   Infusion. 1 MICROgram(s)/kG/Hr (3.81 mL/Hr) IV Continuous <Continuous>  levothyroxine 75 MICROGram(s) Oral daily  meropenem  IVPB 1000 milliGRAM(s) IV Intermittent every 8 hours  norepinephrine Infusion 0.05 MICROgram(s)/kG/Min (7.13 mL/Hr) IV Continuous <Continuous>  polyethylene glycol 3350 17 Gram(s) Oral daily  propofol Infusion 15 MICROgram(s)/kG/Min (6.85 mL/Hr) IV Continuous <Continuous>  trimethoprim/polymyxin Solution 1 Drop(s) Right EYE four times a day  vancomycin  IVPB 500 milliGRAM(s) IV Intermittent every 12 hours    MEDICATIONS  (PRN):  acetaminophen  Suppository .. 650 milliGRAM(s) Rectal every 6 hours PRN Temp greater or equal to 38C (100.4F)  senna 2 Tablet(s) Oral at bedtime PRN Constipation      RADIOLOGY/ADDITIONAL STUDIES:    EXAM:  XR CHEST PORTABLE IMMED 1V                        PROCEDURE DATE:  04/09/2020      NG tube is seen overlying the left upper quadrant. Endotracheal tube is slightly high in position approximately 6 cm above the junior. Support lines are unchanged. Diffuse bilateral pneumonia again noted.    Impression: NG tube placement as above. Endotracheal tube is high in position    EXAM:  XR CHEST PORTABLE ROUTINE 1V                        PROCEDURE DATE:  04/08/2020    Date and time of exam: 4/8/2020 6:43 AM.  Comparison exam: 4/6/2020 4:38 PM.    Findings:  Diffuse bilateral pulmonary infiltrates, right worse than left. Removal of left IJ central line since the prior exam..    Impression:  Removal of left IJ central line, otherwise stable.      EXAM:  XR CHEST PORTABLE IMMED 1V                        PROCEDURE DATE:  04/06/2020    COMPARISON: Earlier the same day at 12:24 PM    The tip of the ET tube isat the clavicular heads. The left IJ catheter tip again overlies the midline. There is a new right IJ catheter with this tip overlying the expected location of the superior vena cava. The tip of the NG tube is below the diaphragm.  AP view of the chest demonstrates bilateral nodular and confluent airspace infiltrates, slightly worse than on the prior study. There is no pleural effusion. There is no pneumothorax.  The heart is normal in size. The mediastinum and samina cannot be assessed due to projection. The pulmonary vasculature is normal.   Mild thoracic degenerative changes are present.    IMPRESSION:    New right central line in satisfactory position. No pneumothorax.  ET tube and NG tube in satisfactory position. Left IJ catheter unchanged with tip overlying the midline.  Bilateral airspace infiltrates slightly worse than prior.      EXAM:  US DPLX LWR EXT VEINS COMPL BI                        PROCEDURE DATE:  03/30/2020    COMPARISON: None available.  FINDINGS:    There is normal compressibility of the bilateral common femoral, femoral and popliteal veins. However, noncompressibility of the bilateral posterior tibial and peroneal veins are identified consistent with bilateral infragenicular DVT.    IMPRESSION:     Bilateral infragenicular lower extremity DVT.      EXAM:  XR CHEST AP OR PA 1V                        PROCEDURE DATE:  03/28/2020    COMPARISON: None    AP view of the chest demonstrates diffuse bilateral airspace infiltrates, greater on the right. There is no pleural effusion. There is no pneumothorax.    The heart is mildly enlarged. There is no mediastinal or hilar mass.     The pulmonary vasculature is normal.     Mild thoracic degenerative changes are present.    IMPRESSION:    Diffuse bilateral airspace infiltrates, greater on the right.

## 2020-04-10 NOTE — PROGRESS NOTE ADULT - SUBJECTIVE AND OBJECTIVE BOX
INTERVAL HPI/OVERNIGHT EVENTS: ***    PRESSORS: [ ] YES [ ] NO  WHICH:    ANTIBIOTICS:                  DATE STARTED:  ANTIBIOTICS:                  DATE STARTED:  ANTIBIOTICS:                  DATE STARTED:    Antimicrobial:  meropenem  IVPB 1000 milliGRAM(s) IV Intermittent every 8 hours  vancomycin  IVPB 500 milliGRAM(s) IV Intermittent every 12 hours    Cardiovascular:  norepinephrine Infusion 0.05 MICROgram(s)/kG/Min IV Continuous <Continuous>    Pulmonary:    Hematalogic:  aspirin  chewable 81 milliGRAM(s) Oral daily  enoxaparin Injectable 80 milliGRAM(s) SubCutaneous every 12 hours    Other:  acetaminophen  Suppository .. 650 milliGRAM(s) Rectal every 6 hours PRN  atorvastatin 20 milliGRAM(s) Oral at bedtime  baclofen 10 milliGRAM(s) Oral daily  chlorhexidine 0.12% Liquid 15 milliLiter(s) Oral Mucosa every 12 hours  chlorhexidine 4% Liquid 1 Application(s) Topical <User Schedule>  dexMEDEtomidine Infusion 0.4 MICROgram(s)/kG/Hr IV Continuous <Continuous>  famotidine Injectable 20 milliGRAM(s) IV Push two times a day  fentaNYL   Infusion. 1 MICROgram(s)/kG/Hr IV Continuous <Continuous>  levothyroxine 75 MICROGram(s) Oral daily  polyethylene glycol 3350 17 Gram(s) Oral daily  propofol Infusion 15 MICROgram(s)/kG/Min IV Continuous <Continuous>  senna 2 Tablet(s) Oral at bedtime PRN  trimethoprim/polymyxin Solution 1 Drop(s) Right EYE four times a day    acetaminophen  Suppository .. 650 milliGRAM(s) Rectal every 6 hours PRN  aspirin  chewable 81 milliGRAM(s) Oral daily  atorvastatin 20 milliGRAM(s) Oral at bedtime  baclofen 10 milliGRAM(s) Oral daily  chlorhexidine 0.12% Liquid 15 milliLiter(s) Oral Mucosa every 12 hours  chlorhexidine 4% Liquid 1 Application(s) Topical <User Schedule>  dexMEDEtomidine Infusion 0.4 MICROgram(s)/kG/Hr IV Continuous <Continuous>  enoxaparin Injectable 80 milliGRAM(s) SubCutaneous every 12 hours  famotidine Injectable 20 milliGRAM(s) IV Push two times a day  fentaNYL   Infusion. 1 MICROgram(s)/kG/Hr IV Continuous <Continuous>  levothyroxine 75 MICROGram(s) Oral daily  meropenem  IVPB 1000 milliGRAM(s) IV Intermittent every 8 hours  norepinephrine Infusion 0.05 MICROgram(s)/kG/Min IV Continuous <Continuous>  polyethylene glycol 3350 17 Gram(s) Oral daily  propofol Infusion 15 MICROgram(s)/kG/Min IV Continuous <Continuous>  senna 2 Tablet(s) Oral at bedtime PRN  trimethoprim/polymyxin Solution 1 Drop(s) Right EYE four times a day  vancomycin  IVPB 500 milliGRAM(s) IV Intermittent every 12 hours    Drug Dosing Weight  Height (cm): 170.2 (29 Mar 2020 11:52)  Weight (kg): 76.1 (01 Apr 2020 02:26)  BMI (kg/m2): 26.3 (01 Apr 2020 02:26)  BSA (m2): 1.88 (01 Apr 2020 02:26)    CENTRAL LINE: [ ] YES [ ] NO  LOCATION:   DATE INSERTED:  REMOVE: [ ] YES [ ] NO  EXPLAIN:    JANE: [ ] YES [ ] NO    DATE INSERTED:  REMOVE:  [ ] YES [ ] NO  EXPLAIN:    A-LINE:  [ ] YES [ ] NO  LOCATION:   DATE INSERTED:  REMOVE:  [ ] YES [ ] NO  EXPLAIN:    PMH -reviewed admission note, no change since admission  Heart faliure: acute [ ] chronic [ ] acute or chronic [ ] diastolic [ ] systolic [ ] combied systolic and diastolic[ ]  LITTLE: ATN[ ] renal medullary necrosis [ ] CKD I [ ]CKDII [ ]CKD III [ ]CKD IV [ ]CKD V [ ]Other pathological lesions [ ]  Nutrition Status: malnutrition [ ] cachexia [ ] morbid obesity/BMI=40 [ ] Supplement ordered [___________]     ICU Vital Signs Last 24 Hrs  T(C): 37.7 (10 Apr 2020 12:00), Max: 38.2 (10 Apr 2020 07:00)  T(F): 99.9 (10 Apr 2020 12:00), Max: 100.8 (10 Apr 2020 07:00)  HR: 82 (10 Apr 2020 12:00) (52 - 96)  BP: 124/69 (10 Apr 2020 12:00) (106/56 - 130/64)  BP(mean): 81 (10 Apr 2020 08:00) (69 - 81)  ABP: 114/67 (10 Apr 2020 12:00) (106/59 - 123/61)  ABP(mean): 79 (10 Apr 2020 10:00) (73 - 86)  RR: 25 (10 Apr 2020 12:00) (20 - 25)  SpO2: 93% (10 Apr 2020 12:00) (90% - 100%)      ABG - ( 10 Apr 2020 04:30 )  pH, Arterial: 7.44  pH, Blood: x     /  pCO2: 50    /  pO2: 94    / HCO3: 33    / Base Excess: 7.9   /  SaO2: 97                    04-09 @ 07:01  -  04-10 @ 07:00  --------------------------------------------------------  IN: 2962.9 mL / OUT: 1860 mL / NET: 1102.9 mL        Mode: AC/ CMV (Assist Control/ Continuous Mandatory Ventilation)  RR (machine): 25  TV (machine): 500  FiO2: 80  PEEP: 10  ITime: 0.1  MAP: 22  PIP: 43      PHYSICAL EXAM:    GENERAL: [ ]NAD, [ ]well-groomed, [ ]well-developed  HEAD:  [ ]Atraumatic, [ ]Normocephalic  EYES: [ ]EOMI, [ ]PERRLA, [ ]conjunctiva and sclera clear  ENMT: [ ]No tonsillar erythema, exudates, or enlargement; [ ]Moist mucous membranes, [ ]Good dentition, [ ]No lesions  NECK: [ ]Supple, normal appearance, [ ]No JVD; [ ]Normal thyroid; [ ]Trachea midline  NERVOUS SYSTEM:  [ ]Alert & Oriented X3, [ ]Good concentration; [ ]Motor Strength 5/5 B/L upper and lower extremities; [ ]DTRs 2+ intact and symmetric  CHEST/LUNG: [ ]No chest deformity; [ ]Normal percussion bilaterally; [ ]No rales, rhonchi, wheezing   HEART: [ ]Regular rate and rhythm; [ ]No murmurs, rubs, or gallops  ABDOMEN: [ ]Soft, Nontender, Nondistended; [ ]Bowel sounds present  EXTREMITIES:  [ ]2+ Peripheral Pulses, [ ]No clubbing, cyanosis, or edema  LYMPH: [ ]No lymphadenopathy noted  SKIN: [ ]No rashes or lesions; [ ]Good capillary refill      LABS:  CBC Full  -  ( 10 Apr 2020 05:15 )  WBC Count : 8.54 K/uL  RBC Count : 2.82 M/uL  Hemoglobin : 8.8 g/dL  Hematocrit : 27.8 %  Platelet Count - Automated : 240 K/uL  Mean Cell Volume : 98.6 fl  Mean Cell Hemoglobin : 31.2 pg  Mean Cell Hemoglobin Concentration : 31.7 gm/dL  Auto Neutrophil # : 7.69 K/uL  Auto Lymphocyte # : 0.34 K/uL  Auto Monocyte # : 0.26 K/uL  Auto Eosinophil # : 0.17 K/uL  Auto Basophil # : 0.00 K/uL  Auto Neutrophil % : 90.0 %  Auto Lymphocyte % : 4.0 %  Auto Monocyte % : 3.0 %  Auto Eosinophil % : 2.0 %  Auto Basophil % : 0.0 %    04-10    136  |  100  |  19  ----------------------------<  158<H>  3.4<L>   |  33<H>  |  0.37<L>    Ca    7.7<L>      10 Apr 2020 05:15  Phos  2.4     04-10  Mg     2.0     04-10              RADIOLOGY & ADDITIONAL STUDIES REVIEWED:  ***    [ ]GOALS OF CARE DISCUSSION WITH PATIENT/FAMILY/PROXY:    CRITICAL CARE TIME SPENT: 35 minutes      Assessment and Plan:     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 Acute hypoxemic respiratory failure/ARDS due to viral PNA secondary to COVID-19 infection   - on vent support Day 13  -still requiring high levels of fio2, today 80%; Failed weaning attmept again.   -still on levofed  -on propofol and precedex for sedation.     2. Renal - improved hypernatremia with free water, + fluid balance  -k replacement today     3. DVT BL LE - LMWH    4. GI - TF    5. Hypothyroidism - synthroid    6) palative  consult called secondary  to pt not improving    -meeting today about unweanable status , and high Fio2 requirments, not weanable at this time. poor prognosis.         44 minutes spent on total encounter; more than 50% of the visit was spent counseling and/or coordinating care by the attending physician. INTERVAL HPI/OVERNIGHT EVENTS: ***    PRESSORS: [ ] YES [ ] NO  WHICH:    ANTIBIOTICS:                  DATE STARTED:  ANTIBIOTICS:                  DATE STARTED:  ANTIBIOTICS:                  DATE STARTED:    Antimicrobial:  meropenem  IVPB 1000 milliGRAM(s) IV Intermittent every 8 hours  vancomycin  IVPB 500 milliGRAM(s) IV Intermittent every 12 hours    Cardiovascular:  norepinephrine Infusion 0.05 MICROgram(s)/kG/Min IV Continuous <Continuous>    Pulmonary:    Hematalogic:  aspirin  chewable 81 milliGRAM(s) Oral daily  enoxaparin Injectable 80 milliGRAM(s) SubCutaneous every 12 hours    Other:  acetaminophen  Suppository .. 650 milliGRAM(s) Rectal every 6 hours PRN  atorvastatin 20 milliGRAM(s) Oral at bedtime  baclofen 10 milliGRAM(s) Oral daily  chlorhexidine 0.12% Liquid 15 milliLiter(s) Oral Mucosa every 12 hours  chlorhexidine 4% Liquid 1 Application(s) Topical <User Schedule>  dexMEDEtomidine Infusion 0.4 MICROgram(s)/kG/Hr IV Continuous <Continuous>  famotidine Injectable 20 milliGRAM(s) IV Push two times a day  fentaNYL   Infusion. 1 MICROgram(s)/kG/Hr IV Continuous <Continuous>  levothyroxine 75 MICROGram(s) Oral daily  polyethylene glycol 3350 17 Gram(s) Oral daily  propofol Infusion 15 MICROgram(s)/kG/Min IV Continuous <Continuous>  senna 2 Tablet(s) Oral at bedtime PRN  trimethoprim/polymyxin Solution 1 Drop(s) Right EYE four times a day    acetaminophen  Suppository .. 650 milliGRAM(s) Rectal every 6 hours PRN  aspirin  chewable 81 milliGRAM(s) Oral daily  atorvastatin 20 milliGRAM(s) Oral at bedtime  baclofen 10 milliGRAM(s) Oral daily  chlorhexidine 0.12% Liquid 15 milliLiter(s) Oral Mucosa every 12 hours  chlorhexidine 4% Liquid 1 Application(s) Topical <User Schedule>  dexMEDEtomidine Infusion 0.4 MICROgram(s)/kG/Hr IV Continuous <Continuous>  enoxaparin Injectable 80 milliGRAM(s) SubCutaneous every 12 hours  famotidine Injectable 20 milliGRAM(s) IV Push two times a day  fentaNYL   Infusion. 1 MICROgram(s)/kG/Hr IV Continuous <Continuous>  levothyroxine 75 MICROGram(s) Oral daily  meropenem  IVPB 1000 milliGRAM(s) IV Intermittent every 8 hours  norepinephrine Infusion 0.05 MICROgram(s)/kG/Min IV Continuous <Continuous>  polyethylene glycol 3350 17 Gram(s) Oral daily  propofol Infusion 15 MICROgram(s)/kG/Min IV Continuous <Continuous>  senna 2 Tablet(s) Oral at bedtime PRN  trimethoprim/polymyxin Solution 1 Drop(s) Right EYE four times a day  vancomycin  IVPB 500 milliGRAM(s) IV Intermittent every 12 hours    Drug Dosing Weight  Height (cm): 170.2 (29 Mar 2020 11:52)  Weight (kg): 76.1 (01 Apr 2020 02:26)  BMI (kg/m2): 26.3 (01 Apr 2020 02:26)  BSA (m2): 1.88 (01 Apr 2020 02:26)    CENTRAL LINE: [ ] YES [ ] NO  LOCATION:   DATE INSERTED:  REMOVE: [ ] YES [ ] NO  EXPLAIN:    JANE: [ ] YES [ ] NO    DATE INSERTED:  REMOVE:  [ ] YES [ ] NO  EXPLAIN:    A-LINE:  [ ] YES [ ] NO  LOCATION:   DATE INSERTED:  REMOVE:  [ ] YES [ ] NO  EXPLAIN:    PMH -reviewed admission note, no change since admission  Heart faliure: acute [ ] chronic [ ] acute or chronic [ ] diastolic [ ] systolic [ ] combied systolic and diastolic[ ]  LITTLE: ATN[ ] renal medullary necrosis [ ] CKD I [ ]CKDII [ ]CKD III [ ]CKD IV [ ]CKD V [ ]Other pathological lesions [ ]  Nutrition Status: malnutrition [ ] cachexia [ ] morbid obesity/BMI=40 [ ] Supplement ordered [___________]     ICU Vital Signs Last 24 Hrs  T(C): 37.7 (10 Apr 2020 12:00), Max: 38.2 (10 Apr 2020 07:00)  T(F): 99.9 (10 Apr 2020 12:00), Max: 100.8 (10 Apr 2020 07:00)  HR: 82 (10 Apr 2020 12:00) (52 - 96)  BP: 124/69 (10 Apr 2020 12:00) (106/56 - 130/64)  BP(mean): 81 (10 Apr 2020 08:00) (69 - 81)  ABP: 114/67 (10 Apr 2020 12:00) (106/59 - 123/61)  ABP(mean): 79 (10 Apr 2020 10:00) (73 - 86)  RR: 25 (10 Apr 2020 12:00) (20 - 25)  SpO2: 93% (10 Apr 2020 12:00) (90% - 100%)      ABG - ( 10 Apr 2020 04:30 )  pH, Arterial: 7.44  pH, Blood: x     /  pCO2: 50    /  pO2: 94    / HCO3: 33    / Base Excess: 7.9   /  SaO2: 97                    04-09 @ 07:01  -  04-10 @ 07:00  --------------------------------------------------------  IN: 2962.9 mL / OUT: 1860 mL / NET: 1102.9 mL        Mode: AC/ CMV (Assist Control/ Continuous Mandatory Ventilation)  RR (machine): 25  TV (machine): 500  FiO2: 80  PEEP: 10  ITime: 0.1  MAP: 22  PIP: 43      PHYSICAL EXAM:    GENERAL: [ ]NAD, [ ]well-groomed, [ ]well-developed  HEAD:  [ ]Atraumatic, [ ]Normocephalic  EYES: [ ]EOMI, [ ]PERRLA, [ ]conjunctiva and sclera clear  ENMT: [ ]No tonsillar erythema, exudates, or enlargement; [ ]Moist mucous membranes, [ ]Good dentition, [ ]No lesions  NECK: [ ]Supple, normal appearance, [ ]No JVD; [ ]Normal thyroid; [ ]Trachea midline  NERVOUS SYSTEM:  [ ]Alert & Oriented X3, [ ]Good concentration; [ ]Motor Strength 5/5 B/L upper and lower extremities; [ ]DTRs 2+ intact and symmetric  CHEST/LUNG: [ ]No chest deformity; [ ]Normal percussion bilaterally; [ ]No rales, rhonchi, wheezing   HEART: [ ]Regular rate and rhythm; [ ]No murmurs, rubs, or gallops  ABDOMEN: [ ]Soft, Nontender, Nondistended; [ ]Bowel sounds present  EXTREMITIES:  [ ]2+ Peripheral Pulses, [ ]No clubbing, cyanosis, or edema  LYMPH: [ ]No lymphadenopathy noted  SKIN: [ ]No rashes or lesions; [ ]Good capillary refill      LABS:  CBC Full  -  ( 10 Apr 2020 05:15 )  WBC Count : 8.54 K/uL  RBC Count : 2.82 M/uL  Hemoglobin : 8.8 g/dL  Hematocrit : 27.8 %  Platelet Count - Automated : 240 K/uL  Mean Cell Volume : 98.6 fl  Mean Cell Hemoglobin : 31.2 pg  Mean Cell Hemoglobin Concentration : 31.7 gm/dL  Auto Neutrophil # : 7.69 K/uL  Auto Lymphocyte # : 0.34 K/uL  Auto Monocyte # : 0.26 K/uL  Auto Eosinophil # : 0.17 K/uL  Auto Basophil # : 0.00 K/uL  Auto Neutrophil % : 90.0 %  Auto Lymphocyte % : 4.0 %  Auto Monocyte % : 3.0 %  Auto Eosinophil % : 2.0 %  Auto Basophil % : 0.0 %    04-10    136  |  100  |  19  ----------------------------<  158<H>  3.4<L>   |  33<H>  |  0.37<L>    Ca    7.7<L>      10 Apr 2020 05:15  Phos  2.4     04-10  Mg     2.0     04-10              RADIOLOGY & ADDITIONAL STUDIES REVIEWED:  ***    [ ]GOALS OF CARE DISCUSSION WITH PATIENT/FAMILY/PROXY:    CRITICAL CARE TIME SPENT: 35 minutes      Assessment and Plan:     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 Acute hypoxemic respiratory failure/ARDS due to viral PNA secondary to COVID-19 infection w/ malinda superimposed bacterial infection   - on vent support Day 13  -still requiring high levels of fio2, today 80%; Failed weaning attmept again.   -still on levofed  -on propofol and precedex for sedation.   - given difficulty oxygenating and fever, concern about superimposed bacterial pneumonia, sinusitis or line infections, though the lines have been changed; PE should be considered as well  - follow up cultures ---noted Staph aureus in sputum found to have MRSA and Strep pneumoniae in sputum culture  -abx : on Vanco/ Meropenem    2. Renal - improved hypernatremia with free water, + fluid balance  -k replacement today     3. DVT BL LE - LMWH    4. GI - TF    5. Hypothyroidism - synthroid    6) palative  consult called secondary  to pt not improving    -meeting today about unweanable status , and high Fio2 requirments, not weanable at this time. poor prognosis.   -family still deciding about goals of care, currently no decision about limitations yet.         44 minutes spent on total encounter; more than 50% of the visit was spent counseling and/or coordinating care by the attending physician.

## 2020-04-10 NOTE — CHART NOTE - NSCHARTNOTEFT_GEN_A_CORE
ET tube 7.5 exchanged with et tube exchanger by MD due to leak in balloon.  Tube exchange went well.

## 2020-04-10 NOTE — PROGRESS NOTE ADULT - ASSESSMENT
Pt is a 74y old Male with hx of BPH originally admitted to [place] 3/21 transferred to  with vent dependent resp failure secondary to COVID pneumonia. Pt intubated 3/29 at [place]. Hosp course notable for hypotension requiring pressors.  Palliative medicine consulted for assistance with goals of care.    1)Resp Failure, ARDS  -secondary to COVID pneumonia  -Vent dependent Day #13  -s/p zithromax, plaquenil  -not improving    2)COVID Pneumonia  -Vent dependent Day #13  -s/p zithromax, plaquenil  -vent support    3)Hypotension  -Related to COVID  -On vasopressors    4)DVTs  -Imaging reviewed  -On Lovenox    5)Advance Directives  -Pt does not have capacity to make medical decisions due to intubation, sedation  -Pt has no HCP on file  -Pt has wife Ruby who would be surrogate decision maker [phone]  -Pt has adin Ignacio [phone]  -Goals of care meeting held 4/9- please see note for details.  -S/w adin Pierre today 4/10 to provide medical update and emotional support

## 2020-04-10 NOTE — PROGRESS NOTE ADULT - ASSESSMENT
75 y/o M with a PMHx of BPH admitted from outside hospital on 4/1 for further care of viral syndrome secondary to COVID-19 infection; was admitted to outside hospital on 3/21 from which he was transferred on ventilatory support and pressor support; has been on vent for almost 14 days and has persistent fever. Patient had all lines replaced but continues with fevers and difficulty maintaining good oxygenation on the vent. Sputum culture from 4/6 is growing Staph aureus, sensitivity is pending. History per medical record and staff at the bedside.   1. Patient transferred from outside hospital for further treatment of respiratory failure secondary to COVID-19 infection; given difficulty oxygenating and fever, concern about superimposed bacterial pneumonia, sinusitis or line infections, though the lines have been changed; PE should be considered as well  - follow up cultures ---noted Staph aureus in sputum found to have MRSA and Strep pneumoniae in sputum culture  - vent and pressors per icu  - serial cbc and monitor temperature   - day #3 meropenem and vancomycin   - tolerating antibiotics without rashes or side effects   - check vancomycin trough prior to fourth dose   - continue isolation  - if no improvement and still full care per family, consideration for change of ETT and/or empiric treatment of PE  - will not start IL inhibitor at this time given persistence of respiratory failure and shock  2. other issues: per medicine 73 y/o M with a PMHx of BPH admitted from outside hospital on 4/1 for further care of viral syndrome secondary to COVID-19 infection; was admitted to outside hospital on 3/21 from which he was transferred on ventilatory support and pressor support; has been on vent for almost 14 days and has persistent fever. Patient had all lines replaced but continues with fevers and difficulty maintaining good oxygenation on the vent. Sputum culture from 4/6 is growing Staph aureus, sensitivity is pending. History per medical record and staff at the bedside.   1. Patient transferred from outside hospital for further treatment of respiratory failure secondary to COVID-19 infection; given difficulty oxygenating and fever, concern about superimposed bacterial pneumonia, sinusitis or line infections, though the lines have been changed; PE should be considered as well  - follow up cultures ---noted Staph aureus in sputum found to have MRSA and Strep pneumoniae in sputum culture  - vent and pressors per icu  - serial cbc and monitor temperature   - day #3 meropenem and vancomycin   - tolerating antibiotics without rashes or side effects   - decreased vancomycin given high trough  - continue isolation  - if no improvement and still full care per family, consideration for change of ETT and/or empiric treatment of PE  - will not start IL inhibitor at this time given persistence of respiratory failure and shock  2. other issues: per medicine

## 2020-04-10 NOTE — PROGRESS NOTE ADULT - SUBJECTIVE AND OBJECTIVE BOX
Date of service: 04-10-20 @ 14:47      Patient lying in bed; on pressors and ventilatory support; has low grade fever      ROS unable to obtain secondary to patient medical condition     MEDICATIONS  (STANDING):  aspirin  chewable 81 milliGRAM(s) Oral daily  atorvastatin 20 milliGRAM(s) Oral at bedtime  baclofen 10 milliGRAM(s) Oral daily  chlorhexidine 0.12% Liquid 15 milliLiter(s) Oral Mucosa every 12 hours  chlorhexidine 4% Liquid 1 Application(s) Topical <User Schedule>  dexMEDEtomidine Infusion 0.4 MICROgram(s)/kG/Hr (7.61 mL/Hr) IV Continuous <Continuous>  enoxaparin Injectable 80 milliGRAM(s) SubCutaneous every 12 hours  famotidine Injectable 20 milliGRAM(s) IV Push two times a day  fentaNYL   Infusion. 1 MICROgram(s)/kG/Hr (3.81 mL/Hr) IV Continuous <Continuous>  levothyroxine 75 MICROGram(s) Oral daily  meropenem  IVPB 1000 milliGRAM(s) IV Intermittent every 8 hours  norepinephrine Infusion 0.05 MICROgram(s)/kG/Min (7.13 mL/Hr) IV Continuous <Continuous>  polyethylene glycol 3350 17 Gram(s) Oral daily  propofol Infusion 15 MICROgram(s)/kG/Min (6.85 mL/Hr) IV Continuous <Continuous>  trimethoprim/polymyxin Solution 1 Drop(s) Right EYE four times a day  vancomycin  IVPB 500 milliGRAM(s) IV Intermittent every 12 hours    MEDICATIONS  (PRN):  acetaminophen  Suppository .. 650 milliGRAM(s) Rectal every 6 hours PRN Temp greater or equal to 38C (100.4F)  senna 2 Tablet(s) Oral at bedtime PRN Constipation      Vital Signs Last 24 Hrs  T(C): 37.4 (10 Apr 2020 14:00), Max: 38.2 (10 Apr 2020 07:00)  T(F): 99.4 (10 Apr 2020 14:00), Max: 100.8 (10 Apr 2020 07:00)  HR: 72 (10 Apr 2020 14:00) (52 - 96)  BP: 106/62 (10 Apr 2020 14:00) (106/56 - 130/64)  BP(mean): 81 (10 Apr 2020 08:00) (69 - 81)  RR: 25 (10 Apr 2020 14:00) (20 - 25)  SpO2: 100% (10 Apr 2020 14:00) (90% - 100%)    Mode: AC/ CMV (Assist Control/ Continuous Mandatory Ventilation), RR (machine): 25, TV (machine): 500, FiO2: 80, PEEP: 10, ITime: 0.1, MAP: 22, PIP: 43    Physical Exam:          Constitutional: frail looking  HEENT: NC/AT, orally intubated  Neck: supple; thyroid not palpable  Back: no tenderness  Respiratory: respiratory effort normal; scattered coarse breath sounds  Cardiovascular: S1S2 regular, no murmurs  Abdomen: soft, not tender, not distended, positive BS; no liver or spleen organomegaly  Genitourinary: no suprapubic tenderness  Musculoskeletal: no muscle tenderness, no joint swelling or tenderness  Neurological/ Psychiatric: orally intubated   Skin: no rashes; no palpable lesions    Labs: all available labs reviewed                         Labs:                   Labs:                        8.8    8.54  )-----------( 240      ( 10 Apr 2020 05:15 )             27.8     04-10    136  |  100  |  19  ----------------------------<  158<H>  3.4<L>   |  33<H>  |  0.37<L>    Ca    7.7<L>      10 Apr 2020 05:15  Phos  2.4     04-10  Mg     2.0     04-10         Vancomycin Level, Trough: 21.2 ug/mL (04-10 @ 05:15)      Cultures:       Culture - Urine (collected 04-07-20 @ 09:30)  Source: .Urine Catheterized  Final Report (04-09-20 @ 11:59):    No growth    Culture - Sputum (collected 04-06-20 @ 14:53)  Source: .Sputum Sputum  Gram Stain (04-07-20 @ 03:51):    Few polymorphonuclear leukocytes per low power field    Few Squamous epithelial cells per low power field    Moderate Gram positive cocci in pairs seen per oil power field    Moderate Gram Negative Rods seen per oil power field  Final Report (04-09-20 @ 17:33):    Numerous Streptococcus pneumoniae    Therapy requires maximum dose of Ceftriaxone and/or    Penicillin. Interpretive criteria as follows:    Ceftriaxone breakpoints for meningitis infections:    <=0.5=Sensitive, 1.0=Intermediate, >=2.0=Resistant    Penicillin breakpoints for meningitis infections:    <=0.06=Sensitive, >= 0.12=Resistant    Ceftriaxone breakpoints for non-meningitis infections:    <=1.0=Sensitive, 2.0=Intermediate, >=4.0=Resistant    Penicillin breakpoints for non-meningitis infections:    <=2.0=Sensitive, 4.0=Intermediate, >=8.0=Resistant    Oral Penicillin breakpoints:    <=0.06=Sensitive, 0.12-1.0=Intermediate, >=2.0=Resistant    Please note: In case of suspected meningitis, CSF    interpretive criteria must be used independent of specimen source.    Numerous Methicillin resistant Staphylococcus aureus    Normal Respiratory Rupali absent  Organism: Methicillin resistant Staphylococcus aureus  Streptococcus pneumoniae  Streptococcus pneumoniae (04-09-20 @ 17:33)  Organism: Streptococcus pneumoniae (04-09-20 @ 17:33)      -  Ceftriaxone: See note 0.38      -  Penicillin: See note 0.75      Method Type: ETEST  Organism: Streptococcus pneumoniae (04-09-20 @ 17:33)      -  Clindamycin: S      -  Erythromycin: R Predicts results for azithromycin.      -  Levofloxacin: S      -  Trimethoprim/Sulfamethoxazole: S      -  Vancomycin: S      Method Type: KB  Organism: Methicillin resistant Staphylococcus aureus (04-09-20 @ 17:33)      -  Ampicillin/Sulbactam: R <=8/4      -  Cefazolin: R <=4      -  Clindamycin: S 0.5      -  Erythromycin: R >4      -  Gentamicin: S <=1 Should not be used as monotherapy      -  Linezolid: S 4      -  Oxacillin: R 0.5      -  Penicillin: R 8      -  RIF- Rifampin: S <=1 Should not be used as monotherapy      -  Tetra/Doxy: I 8      -  Trimethoprim/Sulfamethoxazole: S <=0.5/9.5      -  Vancomycin: S 2      Method Type: PHLIL    Culture - Blood (collected 04-06-20 @ 13:30)  Source: .Blood None  Preliminary Report (04-07-20 @ 19:01):    No growth to date.    Culture - Blood (collected 04-06-20 @ 13:24)  Source: .Blood None  Preliminary Report (04-07-20 @ 19:01):    No growth to date.            Ferritin, Serum: 595 ng/mL (04-07-20 @ 09:04)  C-Reactive Protein, Serum: 25.59 mg/dL (04-07-20 @ 09:04)  D-Dimer Assay, Quantitative: 3608 ng/mL DDU (04-06-20 @ 12:30)  Ferritin, Serum: 682 ng/mL (04-06-20 @ 06:00)  C-Reactive Protein, Serum: 18.10 mg/dL (04-06-20 @ 06:00)  Ferritin, Serum: 857 ng/mL (04-03-20 @ 05:30)  C-Reactive Protein, Serum: 10.78 mg/dL (04-03-20 @ 05:30)  C-Reactive Protein, Serum: 32.47 mg/dL (04-01-20 @ 05:24)  Ferritin, Serum: 709 ng/mL (04-01-20 @ 05:23)  C-Reactive Protein, Serum: 34.78 mg/dL (03-31-20 @ 13:16)  D-Dimer Assay, Quantitative: > 50,000 ng/mL DDU (03-30-20 @ 13:29)  C-Reactive Protein, Serum: 37.09 mg/dL (03-30-20 @ 10:26)  D-Dimer Assay, Quantitative: > 50,000 ng/mL DDU (03-30-20 @ 06:50)          COVID-19 PCR . (03.21.20 @ 22:44)    COVID-19 PCR: Detected: LDT - Laboratory Developed Test All “detected” results on this new test  are considered presumptively positive results, are clinically actionable,  and specimens will be forwarded to CDC for confirmation testing.  Another report (corrected report) will only be issued if discordant  results occur.  This test has been validated by [employer] to be accurate;  though it has not been FDA cleared/approved by the usual pathway.  As with all laboratory tests, results should be correlated with clinical  findings.    < from: Xray Chest 1 View- PORTABLE-Routine (04.08.20 @ 07:18) >    EXAM:  XR CHEST PORTABLE ROUTINE 1V                            PROCEDURE DATE:  04/08/2020          INTERPRETATION:  CHEST AP PORTABLE:    History: Shortness of Breath.     Date and time of exam: 4/8/2020 6:43 AM.    Technique: A single AP view of the chest was obtained.    Comparison exam: 4/6/2020 4:38 PM.    Findings:  Diffuse bilateral pulmonary infiltrates, right worse than left. Removal of left IJ central line since the prior exam..    Impression:  Removal of left IJ central line, otherwise stable.    < end of copied text >        Radiology: all available radiological tests reviewed    Advanced directives addressed: full resuscitation

## 2020-04-11 NOTE — CHART NOTE - NSCHARTNOTEFT_GEN_A_CORE
Clinical Nutrition BRIEF NOTE    *pt remains vented, on pressors with MAP>65, on propofol @ 18.3mL/hr (=483Kcal/day, on steroids.    *labs reviewed; 04-11 Na141 mmol/L Glu 109 mg/dL<H> K+ 3.9 mmol/L Cr  0.35 mg/dL<L> BUN 19 mg/dL Phos 3.5 mg/dL Alb 1.0 g/dL<L> PAB n/a     *electrolytes WNL. triglycerides 145 (4/5), consider re-checking.    *(+1) generalized edema.  urine output 2345mL.  no BM documented, if accurate rec'd consider changing bowel regimen (lactulose?).  violeta score of 9, no PU noted.    *per flowsheet, pt has received an average of 508mL/day of Vital 1.5 over the past four days.  Which combined with prosource TF and propofol provided ~1525Kcal and 111g protein.  Meeting 100% of estimated calorie needs and 92% of estimated protein needs.      *no new wt documented since admission.    *in v/o current clinical status; rec'd change TF to Jevity 1.5 with TF bolus of 150mL TID with 9pkts Prosource TF (or 6pkts Prosource NoCarb).  Which will provide ~1035Kcal, 128g protein, 342mL free water, and total TF volume of 450mL.  Consider adding additional free water flushes of 100mL before/after each bolus.  Which will provide ~600mL additional free water; total free water of 942mL.      RECOMMENDATIONS:  1)  change TF to Jevity 1.5 with TF bolus of 150mL TID with 9pkts Prosource TF (or 6pkts Prosource NoCarb)  2) consider checking vitamin D level and supplement prn  3) consider vitamin C and thiamin supplementation  4) monitor hydration status; consider free water flushes of 100mL before/after each TF bolus  5) monitor TF tolerance; keep back of bed > 35 degrees  6) monitor BM; adjust bowel regimen prn  7) add MVI with minerals daily to ensure 100% RDI met      ESTIMATED NUTR NEEDS:  1340-1675Kcal (20-25Kcal/Kg)  121-134g protein (1.8-2g/Kg BW)

## 2020-04-12 NOTE — PROGRESS NOTE ADULT - SUBJECTIVE AND OBJECTIVE BOX
73 yo man with respiratory failure secondary to COVID infection; now with bacterial superinfection (on vancomycin, meropenem).    Earlier in evening, patient with high airway pressures, FIO2 1.0.  Upon examination, breath sounds equal, ETT 23 cm at teeth, ETT suctioned.    Patient struggling against ventilator.  Decision to reinitiate cisatracurium; discontinue fentanyl.  Continue propofol/dexmetomidine.      As a result, oxygen saturation improving 93%, FIO2 weaned to 0.6.   Continued PEEP 15.  Hemodynamically stable off pressor support.

## 2020-04-13 NOTE — CHART NOTE - NSCHARTNOTEFT_GEN_A_CORE
Spoke with Ignacio regarding pts condition.  Reviewed that there has vj no major changes since we spoke last week, but unfortunately he is not improving.  I asked Ignacio if he had spoken to the rest of the family about the options moving forward.  He said he had a little bit but plans to have a more in depth conversation with them today.  He says he knows what he has to do (alluded in previous conversation to palliative extubation).  Advised I would call him to follow up tomorrow.

## 2020-04-13 NOTE — PROGRESS NOTE ADULT - SUBJECTIVE AND OBJECTIVE BOX
HPI: Pt is a 74y old Male with hx of BPH originally admitted to [place] 3/21 transferred to  with vent dependent resp failure secondary to COVID pneumonia. Pt intubated 3/29 at [place]. Hosp course notable for hypotension requiring pressors.  Palliative medicine consulted for assistance with goals of care.    Pt seen and examined by me for followup of goals of care.  Pt is intubated, sedated, paralyzed so unable to provide hx, ROS. Pt is on pressors as well.      PAIN: ( )Yes   ( )No  DYSPNEA: ( ) Yes  ( ) No  Pt unable to provide due to intubation and sedation    Review of Systems:  Unable to obtain as Pt unable to provide due to intubation and sedation      PHYSICAL EXAM:    Vital Signs Last 24 Hrs  T(C): 37.6 (04-13-20 @ 07:36), Max: 38.4 (04-12-20 @ 22:00)  T(F): 99.6 (04-13-20 @ 07:36), Max: 101.1 (04-12-20 @ 22:00)  HR: 71 (04-13-20 @ 09:30) (69 - 86)  BP: 98/62 (04-13-20 @ 09:30) (98/62 - 117/64)  BP(mean): 72 (04-13-20 @ 07:36) (72 - 76)  RR: 24 (04-13-20 @ 09:30) (24 - 30)  SpO2: 96% (04-13-20 @ 09:30) (95% - 100%)      PPSV2: 10  %    General: intubated, paralyzed and sedated male  Mental Status: sedated  HEENT: eyes closed, ET tube in place  Lungs: coarse breath sounds b/l  Cardiac: S1S2+  GI: soft, + bowel sounds  : kim in place +urine output  Ext: edema x 4 exts  Neuro: sedated      LABS                          9.7    11.84 )-----------( 377      ( 13 Apr 2020 05:00 )             26.7     04-13    140  |  100  |  14  ----------------------------<  140<H>  3.4<L>   |  37<H>  |  0.35<L>    Ca    7.8<L>      13 Apr 2020 05:00  Phos  3.1     04-13  Mg     2.2     04-13    TPro  6.7  /  Alb  1.1<L>  /  TBili  0.5  /  DBili  x   /  AST  58<H>  /  ALT  26  /  AlkPhos  136<H>  04-13    MEDICATIONS  (STANDING):  aspirin  chewable 81 milliGRAM(s) Oral daily  atorvastatin 20 milliGRAM(s) Oral at bedtime  baclofen 10 milliGRAM(s) Oral daily  chlorhexidine 0.12% Liquid 15 milliLiter(s) Oral Mucosa every 12 hours  chlorhexidine 4% Liquid 1 Application(s) Topical <User Schedule>  cisatracurium Infusion 3 MICROgram(s)/kG/Min (13.7 mL/Hr) IV Continuous <Continuous>  dexMEDEtomidine Infusion 0.4 MICROgram(s)/kG/Hr (7.61 mL/Hr) IV Continuous <Continuous>  enoxaparin Injectable 80 milliGRAM(s) SubCutaneous every 12 hours  famotidine Injectable 20 milliGRAM(s) IV Push two times a day  fentaNYL   Infusion. 1 MICROgram(s)/kG/Hr (3.81 mL/Hr) IV Continuous <Continuous>  levothyroxine 75 MICROGram(s) Oral daily  meropenem  IVPB 1000 milliGRAM(s) IV Intermittent every 8 hours  norepinephrine Infusion 0.05 MICROgram(s)/kG/Min (7.13 mL/Hr) IV Continuous <Continuous>  petrolatum Ophthalmic Ointment 1 Application(s) Both EYES two times a day  polyethylene glycol 3350 17 Gram(s) Oral daily  potassium acid phosphate/sodium acid phosphate tablet (K-PHOS No. 2) 1 Tablet(s) Oral four times a day with meals  propofol Infusion 15 MICROgram(s)/kG/Min (6.85 mL/Hr) IV Continuous <Continuous>  trimethoprim/polymyxin Solution 1 Drop(s) Right EYE four times a day  vancomycin  IVPB 500 milliGRAM(s) IV Intermittent every 12 hours    MEDICATIONS  (PRN):  acetaminophen  Suppository .. 650 milliGRAM(s) Rectal every 6 hours PRN Temp greater or equal to 38C (100.4F)  senna 2 Tablet(s) Oral at bedtime PRN Constipation      RADIOLOGY/ADDITIONAL STUDIES:    EXAM:  XR CHEST PORTABLE ROUTINE 1V                        PROCEDURE DATE:  04/12/2020    COMPARISON: 4/9/2020 available for review.    FINDINGS:   The lungs  show stable bilateral RIGHT greater than LEFT multifocal scattered airspace consolidations.    The heart and mediastinum are within normal limits.  ET tube tip above tracheal bifurcation.  NG tube tip beyond GE junction.  RIGHT IJ catheter tip in SVC  Visualized osseous structures are intact.    IMPRESSION:   No interval change.

## 2020-04-13 NOTE — PROGRESS NOTE ADULT - ASSESSMENT
- cont vent  - on vanco/merepenem as per ID  - supplement K+  - serum ferriten elevated  - labs in am  - dvt proph

## 2020-04-13 NOTE — PROGRESS NOTE ADULT - SUBJECTIVE AND OBJECTIVE BOX
Subjective:  sedated on vent    MEDICATIONS  (STANDING):  aspirin  chewable 81 milliGRAM(s) Oral daily  atorvastatin 20 milliGRAM(s) Oral at bedtime  baclofen 10 milliGRAM(s) Oral daily  chlorhexidine 0.12% Liquid 15 milliLiter(s) Oral Mucosa every 12 hours  chlorhexidine 4% Liquid 1 Application(s) Topical <User Schedule>  cisatracurium Infusion 3 MICROgram(s)/kG/Min (13.7 mL/Hr) IV Continuous <Continuous>  dexMEDEtomidine Infusion 0.4 MICROgram(s)/kG/Hr (7.61 mL/Hr) IV Continuous <Continuous>  enoxaparin Injectable 80 milliGRAM(s) SubCutaneous every 12 hours  famotidine Injectable 20 milliGRAM(s) IV Push two times a day  fentaNYL   Infusion. 1 MICROgram(s)/kG/Hr (3.81 mL/Hr) IV Continuous <Continuous>  levothyroxine 75 MICROGram(s) Oral daily  meropenem  IVPB 1000 milliGRAM(s) IV Intermittent every 8 hours  norepinephrine Infusion 0.05 MICROgram(s)/kG/Min (7.13 mL/Hr) IV Continuous <Continuous>  petrolatum Ophthalmic Ointment 1 Application(s) Both EYES two times a day  polyethylene glycol 3350 17 Gram(s) Oral daily  potassium acid phosphate/sodium acid phosphate tablet (K-PHOS No. 2) 1 Tablet(s) Oral four times a day with meals  propofol Infusion 15 MICROgram(s)/kG/Min (6.85 mL/Hr) IV Continuous <Continuous>  trimethoprim/polymyxin Solution 1 Drop(s) Right EYE four times a day  vancomycin  IVPB 500 milliGRAM(s) IV Intermittent every 12 hours    MEDICATIONS  (PRN):  acetaminophen  Suppository .. 650 milliGRAM(s) Rectal every 6 hours PRN Temp greater or equal to 38C (100.4F)  senna 2 Tablet(s) Oral at bedtime PRN Constipation      Allergies    No Known Allergies    Intolerances        REVIEW OF SYSTEMS:        Vital Signs Last 24 Hrs  T(C): 37.6 (13 Apr 2020 07:36), Max: 38.4 (12 Apr 2020 22:00)  T(F): 99.6 (13 Apr 2020 07:36), Max: 101.1 (12 Apr 2020 22:00)  HR: 71 (13 Apr 2020 09:30) (69 - 86)  BP: 98/62 (13 Apr 2020 09:30) (98/62 - 117/64)  BP(mean): 72 (13 Apr 2020 07:36) (72 - 76)  RR: 24 (13 Apr 2020 09:30) (24 - 30)  SpO2: 96% (13 Apr 2020 09:30) (95% - 100%)    Mode: AC/ CMV (Assist Control/ Continuous Mandatory Ventilation)  RR (machine): 30  TV (machine): 500  FiO2: 60  PEEP: 10  ITime: 0.8  PIP: 40      PHYSICAL EXAMINATION:  SKIN: no rashes  HEAD: NC/AT  EYES: PERRLA, EOMI  EARS: TM's intact  NOSE: no abnormalities  NECK:  Supple. No lymphadenopathy. Jugular venous pressure not elevated. Carotids equal.   HEART:  S1S2 reg  CHEST:  good air entry w ronchi; ET tube in place  ABDOMEN:  BS present   EXTREMITIES:  no C/C/E  NEURO: sedated on vent       LABS:                        9.7    11.84 )-----------( 377      ( 13 Apr 2020 05:00 )             26.7     04-13    140  |  100  |  14  ----------------------------<  140<H>  3.4<L>   |  37<H>  |  0.35<L>    Ca    7.8<L>      13 Apr 2020 05:00  Phos  3.1     04-13  Mg     2.2     04-13    TPro  6.7  /  Alb  1.1<L>  /  TBili  0.5  /  DBili  x   /  AST  58<H>  /  ALT  26  /  AlkPhos  136<H>  04-13          RADIOLOGY & ADDITIONAL TESTS:

## 2020-04-13 NOTE — PROGRESS NOTE ADULT - ASSESSMENT
Pt is a 74y old Male with hx of BPH originally admitted to [place] 3/21 transferred to  with vent dependent resp failure secondary to COVID pneumonia. Pt intubated 3/29 at [place]. Hosp course notable for hypotension requiring pressors.  Palliative medicine consulted for assistance with goals of care.    1)Resp Failure, ARDS  -secondary to COVID pneumonia  -Vent dependent Day #16  -s/p zithromax, plaquenil  -not improving    2)COVID Pneumonia  -Vent dependent Day #16  -s/p zithromax, plaquenil  -vent support    3)Hypotension  -Related to COVID  -On vasopressors    4)DVTs  -Imaging reviewed  -On Lovenox    5)Advance Directives  -Pt does not have capacity to make medical decisions due to intubation, sedation  -Pt has no HCP on file  -Pt has wife Ruby who would be surrogate decision maker [phone]  -Pt has adin Pierre [phone]  -Goals of care meeting held 4/9- please see note for details.  -Will contact pts adin Ignacio to readdress goals

## 2020-04-13 NOTE — PROGRESS NOTE ADULT - SUBJECTIVE AND OBJECTIVE BOX
Date of service: 04-13-20 @ 12:07    Patient remains on ventilatory support; on pressors; still with fever        ROS unable to obtain secondary to patient medical condition     MEDICATIONS  (STANDING):  aspirin  chewable 81 milliGRAM(s) Oral daily  atorvastatin 20 milliGRAM(s) Oral at bedtime  baclofen 10 milliGRAM(s) Oral daily  chlorhexidine 0.12% Liquid 15 milliLiter(s) Oral Mucosa every 12 hours  chlorhexidine 4% Liquid 1 Application(s) Topical <User Schedule>  cisatracurium Infusion 3 MICROgram(s)/kG/Min (13.7 mL/Hr) IV Continuous <Continuous>  dexMEDEtomidine Infusion 0.4 MICROgram(s)/kG/Hr (7.61 mL/Hr) IV Continuous <Continuous>  enoxaparin Injectable 80 milliGRAM(s) SubCutaneous every 12 hours  famotidine Injectable 20 milliGRAM(s) IV Push two times a day  fentaNYL   Infusion. 1 MICROgram(s)/kG/Hr (3.81 mL/Hr) IV Continuous <Continuous>  levothyroxine 75 MICROGram(s) Oral daily  meropenem  IVPB 1000 milliGRAM(s) IV Intermittent every 8 hours  norepinephrine Infusion 0.05 MICROgram(s)/kG/Min (7.13 mL/Hr) IV Continuous <Continuous>  petrolatum Ophthalmic Ointment 1 Application(s) Both EYES two times a day  polyethylene glycol 3350 17 Gram(s) Oral daily  potassium acid phosphate/sodium acid phosphate tablet (K-PHOS No. 2) 1 Tablet(s) Oral four times a day with meals  propofol Infusion 15 MICROgram(s)/kG/Min (6.85 mL/Hr) IV Continuous <Continuous>  trimethoprim/polymyxin Solution 1 Drop(s) Right EYE four times a day  vancomycin  IVPB 500 milliGRAM(s) IV Intermittent every 12 hours    MEDICATIONS  (PRN):  acetaminophen  Suppository .. 650 milliGRAM(s) Rectal every 6 hours PRN Temp greater or equal to 38C (100.4F)  senna 2 Tablet(s) Oral at bedtime PRN Constipation      Vital Signs Last 24 Hrs  T(C): 37.6 (13 Apr 2020 07:36), Max: 38.4 (12 Apr 2020 22:00)  T(F): 99.6 (13 Apr 2020 07:36), Max: 101.1 (12 Apr 2020 22:00)  HR: 121 (13 Apr 2020 11:30) (69 - 125)  BP: 118/80 (13 Apr 2020 11:30) (98/62 - 118/80)  BP(mean): 72 (13 Apr 2020 07:36) (72 - 76)  RR: 30 (13 Apr 2020 11:21) (24 - 30)  SpO2: 94% (13 Apr 2020 11:21) (94% - 100%)    Mode: AC/ CMV (Assist Control/ Continuous Mandatory Ventilation), RR (machine): 30, TV (machine): 500, FiO2: 60, PEEP: 10, ITime: 0.8, PIP: 39    Physical Exam:            Constitutional: frail looking  HEENT: NC/AT, orally intubated  Neck: supple; thyroid not palpable  Back: no tenderness  Respiratory: respiratory effort normal; scattered coarse breath sounds  Cardiovascular: S1S2 regular, no murmurs  Abdomen: soft, not tender, not distended, positive BS; no liver or spleen organomegaly  Genitourinary: no suprapubic tenderness  Musculoskeletal: no muscle tenderness, no joint swelling or tenderness  Neurological/ Psychiatric: orally intubated   Skin: no rashes; no palpable lesions    Labs: all available labs reviewed              Labs:                        9.7    11.84 )-----------( 377      ( 13 Apr 2020 05:00 )             26.7     04-13    140  |  100  |  14  ----------------------------<  140<H>  3.4<L>   |  37<H>  |  0.35<L>    Ca    7.8<L>      13 Apr 2020 05:00  Phos  3.1     04-13  Mg     2.2     04-13    TPro  6.7  /  Alb  1.1<L>  /  TBili  0.5  /  DBili  x   /  AST  58<H>  /  ALT  26  /  AlkPhos  136<H>  04-13           Cultures:       Culture - Urine (collected 04-07-20 @ 09:30)  Source: .Urine Catheterized  Final Report (04-09-20 @ 11:59):    No growth    Culture - Sputum (collected 04-06-20 @ 14:53)  Source: .Sputum Sputum  Gram Stain (04-07-20 @ 03:51):    Few polymorphonuclear leukocytes per low power field    Few Squamous epithelial cells per low power field    Moderate Gram positive cocci in pairs seen per oil power field    Moderate Gram Negative Rods seen per oil power field  Final Report (04-09-20 @ 17:33):    Numerous Streptococcus pneumoniae    Therapy requires maximum dose of Ceftriaxone and/or    Penicillin. Interpretive criteria as follows:    Ceftriaxone breakpoints for meningitis infections:    <=0.5=Sensitive, 1.0=Intermediate, >=2.0=Resistant    Penicillin breakpoints for meningitis infections:    <=0.06=Sensitive, >= 0.12=Resistant    Ceftriaxone breakpoints for non-meningitis infections:    <=1.0=Sensitive, 2.0=Intermediate, >=4.0=Resistant    Penicillin breakpoints for non-meningitis infections:    <=2.0=Sensitive, 4.0=Intermediate, >=8.0=Resistant    Oral Penicillin breakpoints:    <=0.06=Sensitive, 0.12-1.0=Intermediate, >=2.0=Resistant    Please note: In case of suspected meningitis, CSF    interpretive criteria must be used independent of specimen source.    Numerous Methicillin resistant Staphylococcus aureus    Normal Respiratory Rupali absent  Organism: Methicillin resistant Staphylococcus aureus  Streptococcus pneumoniae  Streptococcus pneumoniae (04-09-20 @ 17:33)  Organism: Streptococcus pneumoniae (04-09-20 @ 17:33)      -  Ceftriaxone: See note 0.38      -  Penicillin: See note 0.75      Method Type: ETEST  Organism: Streptococcus pneumoniae (04-09-20 @ 17:33)      -  Clindamycin: S      -  Erythromycin: R Predicts results for azithromycin.      -  Levofloxacin: S      -  Trimethoprim/Sulfamethoxazole: S      -  Vancomycin: S      Method Type: KB  Organism: Methicillin resistant Staphylococcus aureus (04-09-20 @ 17:33)      -  Ampicillin/Sulbactam: R <=8/4      -  Cefazolin: R <=4      -  Clindamycin: S 0.5      -  Erythromycin: R >4      -  Gentamicin: S <=1 Should not be used as monotherapy      -  Linezolid: S 4      -  Oxacillin: R 0.5      -  Penicillin: R 8      -  RIF- Rifampin: S <=1 Should not be used as monotherapy      -  Tetra/Doxy: I 8      -  Trimethoprim/Sulfamethoxazole: S <=0.5/9.5      -  Vancomycin: S 2      Method Type: PHILL    Culture - Blood (collected 04-06-20 @ 13:30)  Source: .Blood None  Final Report (04-10-20 @ 19:01):    No Growth Final    Culture - Blood (collected 04-06-20 @ 13:24)  Source: .Blood None  Final Report (04-10-20 @ 19:01):    No Growth Final      C-Reactive Protein, Serum: 17.29 mg/dL (04-13-20 @ 05:00)  Ferritin, Serum: 896 ng/mL (04-13-20 @ 05:00)  Ferritin, Serum: 595 ng/mL (04-07-20 @ 09:04)  C-Reactive Protein, Serum: 25.59 mg/dL (04-07-20 @ 09:04)  D-Dimer Assay, Quantitative: 3608 ng/mL DDU (04-06-20 @ 12:30)  Ferritin, Serum: 682 ng/mL (04-06-20 @ 06:00)  C-Reactive Protein, Serum: 18.10 mg/dL (04-06-20 @ 06:00)  Ferritin, Serum: 857 ng/mL (04-03-20 @ 05:30)  C-Reactive Protein, Serum: 10.78 mg/dL (04-03-20 @ 05:30)            COVID-19 PCR . (03.21.20 @ 22:44)    COVID-19 PCR: Detected: LDT - Laboratory Developed Test All “detected” results on this new test  are considered presumptively positive results, are clinically actionable,  and specimens will be forwarded to CDC for confirmation testing.  Another report (corrected report) will only be issued if discordant  results occur.  This test has been validated by [facility] to be accurate;  though it has not been FDA cleared/approved by the usual pathway.  As with all laboratory tests, results should be correlated with clinical  findings.    < from: Xray Chest 1 View- PORTABLE-Routine (04.08.20 @ 07:18) >    EXAM:  XR CHEST PORTABLE ROUTINE 1V                            PROCEDURE DATE:  04/08/2020          INTERPRETATION:  CHEST AP PORTABLE:    History: Shortness of Breath.     Date and time of exam: 4/8/2020 6:43 AM.    Technique: A single AP view of the chest was obtained.    Comparison exam: 4/6/2020 4:38 PM.    Findings:  Diffuse bilateral pulmonary infiltrates, right worse than left. Removal of left IJ central line since the prior exam..    Impression:  Removal of left IJ central line, otherwise stable.    < end of copied text >        Radiology: all available radiological tests reviewed    Advanced directives addressed: full resuscitation

## 2020-04-13 NOTE — PROGRESS NOTE ADULT - ASSESSMENT
73 y/o M with a PMHx of BPH admitted from outside hospital on 4/1 for further care of viral syndrome secondary to COVID-19 infection; was admitted to outside hospital on 3/21 from which he was transferred on ventilatory support and pressor support; has been on vent for almost 14 days and has persistent fever. Patient had all lines replaced but continues with fevers and difficulty maintaining good oxygenation on the vent. Sputum culture from 4/6 is growing Staph aureus, sensitivity is pending. History per medical record and staff at the bedside.   1. Patient transferred from outside hospital for further treatment of respiratory failure secondary to COVID-19 infection; given difficulty oxygenating and fever, concern about superimposed bacterial pneumonia, sinusitis or line infections, though the lines have been changed; PE should be considered as well  - follow up cultures ---noted Staph aureus in sputum found to have MRSA and Strep pneumoniae in sputum culture  - vent and pressors per icu  - serial cbc and monitor temperature   - day #6 meropenem and vancomycin ; expect another 24 hour rx  - tolerating antibiotics without rashes or side effects   - decreased vancomycin given high trough  - continue isolation  - palliative care eval in progress  - will not start IL inhibitor at this time given persistence of respiratory failure and shock  2. other issues: per medicine

## 2020-04-14 NOTE — PROGRESS NOTE ADULT - NSHPATTENDINGPLANDISCUSS_GEN_ALL_CORE
patient, nursing, staff
Unable to reach family
ICU PA
ICU resident

## 2020-04-14 NOTE — PROGRESS NOTE ADULT - ASSESSMENT
Pt is a 74y old Male with hx of BPH originally admitted to [place] 3/21 transferred to  with vent dependent resp failure secondary to COVID pneumonia. Pt intubated 3/29 at [place]. Hosp course notable for hypotension requiring pressors.  Palliative medicine consulted for assistance with goals of care.    1)Resp Failure, ARDS  -secondary to COVID pneumonia, now with bacterial pneumonia  -On abx  -Vent dependent Day #17  -s/p zithromax, plaquenil  -not improving  -Now complicated by extensive subcut emphysema with b/l chest tubes being placed    2)COVID Pneumonia, Bacterial Pneumonia  -Vent dependent Day #17  -s/p zithromax, plaquenil  -On abx per ID  -vent support    3)Hypotension  -Related to pneumonia  -On vasopressors    4)DVTs  -Imaging reviewed  -On Lovenox    5)Advance Directives  -Pt does not have capacity to make medical decisions due to intubation, sedation  -Pt has no HCP on file  -Pt has wife Ruby who would be surrogate decision maker [phone]  -Pt has adin Ignacio [phone]  -Goals of care meeting held 4/9- please see note for details.  -Will contact pts adin Pierre to readdress goals today

## 2020-04-14 NOTE — PROGRESS NOTE ADULT - SUBJECTIVE AND OBJECTIVE BOX
Case discussed on ICU PM rounds.    Vent day 17  30/500/50/10  progressing slowly    Chest tubes bilateral    ICU Vital Signs Last 24 Hrs  T(C): 37.8 (15 Apr 2020 00:01), Max: 37.8 (14 Apr 2020 19:38)  T(F): 100 (15 Apr 2020 00:01), Max: 100 (14 Apr 2020 19:38)  HR: 84 (15 Apr 2020 00:01) (61 - 122)  BP: 103/65 (14 Apr 2020 19:38) (99/68 - 125/64)  BP(mean): 78 (14 Apr 2020 19:38) (78 - 78)  ABP: 109/65 (15 Apr 2020 00:01) (83/51 - 126/59)  ABP(mean): 80 (15 Apr 2020 00:01) (60 - 85)  RR: 30 (15 Apr 2020 00:01) (28 - 34)  SpO2: 94% (15 Apr 2020 00:01) (93% - 100%)      ABG - ( 14 Apr 2020 09:42 )  pH, Arterial: 7.43  pH, Blood: x     /  pCO2: 57    /  pO2: 78    / HCO3: 37    / Base Excess: 11.0  /  SaO2: 95                              9.0    10.02 )-----------( 327      ( 14 Apr 2020 05:00 )             25.1         04-14    143  |  104  |  16  ----------------------------<  154<H>  3.4<L>   |  37<H>  |  0.33<L>    Ca    7.5<L>      14 Apr 2020 16:10  Phos  2.0     04-14  Mg     2.1     04-14    TPro  6.2  /  Alb  1.0<L>  /  TBili  0.5  /  DBili  x   /  AST  53<H>  /  ALT  28  /  AlkPhos  139<H>  04-14    LIVER FUNCTIONS - ( 14 Apr 2020 05:00 )  Alb: 1.0 g/dL / Pro: 6.2 gm/dL / ALK PHOS: 139 U/L / ALT: 28 U/L / AST: 53 U/L / GGT: x

## 2020-04-14 NOTE — PROGRESS NOTE ADULT - SUBJECTIVE AND OBJECTIVE BOX
Subjective:    Intubated, sedated. Events noted. Now w/ extensive Sub-Q emphysema    MEDICATIONS  (STANDING):  aspirin  chewable 81 milliGRAM(s) Oral daily  atorvastatin 20 milliGRAM(s) Oral at bedtime  baclofen 10 milliGRAM(s) Oral daily  chlorhexidine 0.12% Liquid 15 milliLiter(s) Oral Mucosa every 12 hours  chlorhexidine 4% Liquid 1 Application(s) Topical <User Schedule>  cisatracurium Infusion 3 MICROgram(s)/kG/Min (13.7 mL/Hr) IV Continuous <Continuous>  collagenase Ointment 1 Application(s) Topical two times a day  dexMEDEtomidine Infusion 0.4 MICROgram(s)/kG/Hr (7.61 mL/Hr) IV Continuous <Continuous>  enoxaparin Injectable 80 milliGRAM(s) SubCutaneous every 12 hours  famotidine Injectable 20 milliGRAM(s) IV Push two times a day  levothyroxine 75 MICROGram(s) Oral daily  meropenem  IVPB 1000 milliGRAM(s) IV Intermittent every 8 hours  norepinephrine Infusion 0.05 MICROgram(s)/kG/Min (7.13 mL/Hr) IV Continuous <Continuous>  petrolatum Ophthalmic Ointment 1 Application(s) Both EYES two times a day  polyethylene glycol 3350 17 Gram(s) Oral daily  potassium acid phosphate/sodium acid phosphate tablet (K-PHOS No. 2) 1 Tablet(s) Oral four times a day with meals  propofol Infusion 15 MICROgram(s)/kG/Min (6.85 mL/Hr) IV Continuous <Continuous>  trimethoprim/polymyxin Solution 1 Drop(s) Right EYE four times a day  vancomycin  IVPB 500 milliGRAM(s) IV Intermittent every 12 hours    MEDICATIONS  (PRN):  acetaminophen  Suppository .. 650 milliGRAM(s) Rectal every 6 hours PRN Temp greater or equal to 38C (100.4F)  LORazepam   Injectable 2 milliGRAM(s) IV Push every 4 hours PRN Agitation  senna 2 Tablet(s) Oral at bedtime PRN Constipation      Allergies    No Known Allergies    Intolerances        Vital Signs Last 24 Hrs  T(C): 37.3 (2020 10:30), Max: 37.7 (2020 04:00)  T(F): 99.2 (2020 10:30), Max: 99.8 (2020 04:00)  HR: 62 (2020 10:30) (62 - 125)  BP: 102/55 (2020 10:30) (95/65 - 118/80)  BP(mean): 81 (2020 18:00) (81 - 83)  RR: 30 (2020 10:30) (30 - 35)  SpO2: 99% (2020 10:30) (90% - 100%)    PHYSICAL EXAMINATION:    NECK:  Supple. No lymphadenopathy. Jugular venous pressure not elevated.   HEART:   The cardiac impulse has a normal quality. Reg., Nl S1 and S2.    CHEST:  Chest with scattered rhonchi. Bilateral crepitance of chest wall c/w subcutaneous emphysema.  Normal respiratory effort.  ABDOMEN:  Soft and nontender.   EXTREMITIES:  There is no edema.       LABS:                        9.0    10.02 )-----------( 327      ( 2020 05:00 )             25.1     04-14    142  |  102  |  20  ----------------------------<  136<H>  3.4<L>   |  40<H>  |  0.30<L>    Ca    7.6<L>      2020 05:00  Phos  2.0     -  Mg     2.1     -14    TPro  6.2  /  Alb  1.0<L>  /  TBili  0.5  /  DBili  x   /  AST  53<H>  /  ALT  28  /  AlkPhos  139<H>      AB.43/57/78/37 on 30/500/50%/10      RADIOLOGY & ADDITIONAL TESTS:    < from: Xray Chest 1 View- PORTABLE-Routine (20 @ 07:12) >  EXAM:  XR CHEST PORTABLE ROUTINE 1V                            PROCEDURE DATE:  2020          INTERPRETATION:  Clinical information: COVID-19. Evaluate lines and tubes.    TECHNIQUE: Frontal view of the chest.    COMPARISON: Prior chest x-rayexamination from 2020.    FINDINGS: The patient is status post endotracheal tube placement in good position. There is an NG Tube with its tip projecting below the diaphragm. There is a right IJ central line present with its tip at the SVC.    There is been interval worsening of marked bilateral subcutaneous chest wall emphysema.    There has been also interval worsening of patchy bilateral interstitial lung opacities worst within the right lower lobe. The heart size is mildly enlarged. Multilevel degenerative changes are noted within the imaged potions of the spine.    IMPRESSION: Intubated with interval worsening of bilateral airspace opacities.    Interval worsening of bilateral chest wall subcutaneous emphysema.      AMANDA ALONZO M.D., ATTENDING RADIOLOGIST      Assessment and Plan:   · Assessment		  - cont vent support as above  - on vanco/meropenem as per ID for s. pneumonaie  - supplement K+ and monitor.  - labs in am  - Lovenox-full dose for DVT  - Will place bilateral chest tubes due to development of sub-Q emphysema on high PEEP.  - Continue tube feeds with Jevity    Problem/Plan - 1:  ·  Problem: Acute on chronic respiratory failure with hypoxia and hypercapnia.     Problem/Plan - 2:  ·  Problem: COVID-19 virus infection.     Problem/Plan - 3:  ·  Problem: SARS-associated coronavirus as cause of disease classified elsewhere.     Problem/Plan - 4:  ·  Problem: Hypokalemia.     Problem/Plan - 5:  ·  Problem: Anemia, chronic disease.

## 2020-04-14 NOTE — PROGRESS NOTE ADULT - SUBJECTIVE AND OBJECTIVE BOX
HPI: Pt is a 74y old Male with hx of BPH originally admitted to [place] 3/21 transferred to  with vent dependent resp failure secondary to COVID pneumonia. Pt intubated 3/29 at [place]. Hosp course notable for hypotension requiring pressors.  Palliative medicine consulted for assistance with goals of care.    Pt seen and examined by me for followup of goals of care.  Pt is intubated, sedated, paralyzed so unable to provide hx, ROS. Pt is on pressors as well. Pt with low grade temps overnight and CXR shows worsening of subcutaneous emphysema and pt having b/l chest tubes placed.      PAIN: ( )Yes   ( )No  DYSPNEA: ( ) Yes  ( ) No  Pt unable to provide due to intubation and sedation    Review of Systems:  Unable to obtain as Pt unable to provide due to intubation and sedation      PHYSICAL EXAM:    Vital Signs Last 24 Hrs  T(C): 37.3 (04-14-20 @ 10:30), Max: 37.7 (04-14-20 @ 04:00)  T(F): 99.2 (04-14-20 @ 10:30), Max: 99.8 (04-14-20 @ 04:00)  HR: 62 (04-14-20 @ 10:30) (61 - 125)  BP: 102/55 (04-14-20 @ 10:30) (95/65 - 111/75)  BP(mean): 81 (04-13-20 @ 18:00) (81 - 83)  RR: 30 (04-14-20 @ 10:30) (30 - 35)  SpO2: 99% (04-14-20 @ 10:30) (90% - 100%)      PPSV2: 10  %    General: intubated, paralyzed and sedated male  Mental Status: sedated  HEENT: eyes closed, ET tube in place  Lungs: coarse breath sounds b/l, chest tubes being placed at time of my visit  Cardiac: S1S2+  GI: soft, + bowel sounds  : kim in place +urine output  Ext: edema x 4 exts  Neuro: sedated    LABS                          9.0    10.02 )-----------( 327      ( 14 Apr 2020 05:00 )             25.1     04-14    142  |  102  |  20  ----------------------------<  136<H>  3.4<L>   |  40<H>  |  0.30<L>    Ca    7.6<L>      14 Apr 2020 05:00  Phos  2.0     04-14  Mg     2.1     04-14    TPro  6.2  /  Alb  1.0<L>  /  TBili  0.5  /  DBili  x   /  AST  53<H>  /  ALT  28  /  AlkPhos  139<H>  04-14          MEDICATIONS  (STANDING):  aspirin  chewable 81 milliGRAM(s) Oral daily  atorvastatin 20 milliGRAM(s) Oral at bedtime  baclofen 10 milliGRAM(s) Oral daily  chlorhexidine 0.12% Liquid 15 milliLiter(s) Oral Mucosa every 12 hours  chlorhexidine 4% Liquid 1 Application(s) Topical <User Schedule>  cisatracurium Infusion 3 MICROgram(s)/kG/Min (13.7 mL/Hr) IV Continuous <Continuous>  collagenase Ointment 1 Application(s) Topical two times a day  dexMEDEtomidine Infusion 0.4 MICROgram(s)/kG/Hr (7.61 mL/Hr) IV Continuous <Continuous>  enoxaparin Injectable 80 milliGRAM(s) SubCutaneous every 12 hours  famotidine Injectable 20 milliGRAM(s) IV Push two times a day  levothyroxine 75 MICROGram(s) Oral daily  meropenem  IVPB 1000 milliGRAM(s) IV Intermittent every 8 hours  norepinephrine Infusion 0.05 MICROgram(s)/kG/Min (7.13 mL/Hr) IV Continuous <Continuous>  petrolatum Ophthalmic Ointment 1 Application(s) Both EYES two times a day  polyethylene glycol 3350 17 Gram(s) Oral daily  potassium acid phosphate/sodium acid phosphate tablet (K-PHOS No. 2) 1 Tablet(s) Oral four times a day with meals  propofol Infusion 15 MICROgram(s)/kG/Min (6.85 mL/Hr) IV Continuous <Continuous>  trimethoprim/polymyxin Solution 1 Drop(s) Right EYE four times a day  vancomycin  IVPB 500 milliGRAM(s) IV Intermittent every 12 hours    MEDICATIONS  (PRN):  acetaminophen  Suppository .. 650 milliGRAM(s) Rectal every 6 hours PRN Temp greater or equal to 38C (100.4F)  LORazepam   Injectable 2 milliGRAM(s) IV Push every 4 hours PRN Agitation  senna 2 Tablet(s) Oral at bedtime PRN Constipation        RADIOLOGY/ADDITIONAL STUDIES:    EXAM:  XR CHEST PORTABLE ROUTINE 1V                        PROCEDURE DATE:  04/12/2020    COMPARISON: 4/9/2020 available for review.    FINDINGS:   The lungs  show stable bilateral RIGHT greater than LEFT multifocal scattered airspace consolidations.    The heart and mediastinum are within normal limits.  ET tube tip above tracheal bifurcation.  NG tube tip beyond GE junction.  RIGHT IJ catheter tip in SVC  Visualized osseous structures are intact.    IMPRESSION:   No interval change.

## 2020-04-14 NOTE — PROGRESS NOTE ADULT - SUBJECTIVE AND OBJECTIVE BOX
Case discussed on PM rounds.  Events noted.    ICU Vital Signs Last 24 Hrs  T(C): 37.4 (13 Apr 2020 20:00), Max: 37.6 (13 Apr 2020 07:36)  T(F): 99.4 (13 Apr 2020 20:00), Max: 99.6 (13 Apr 2020 07:36)  HR: 90 (13 Apr 2020 22:28) (71 - 125)  BP: 95/65 (13 Apr 2020 19:00) (95/65 - 118/80)  BP(mean): 81 (13 Apr 2020 18:00) (72 - 83)  ABP: 94/56 (13 Apr 2020 22:28) (94/56 - 125/81)  ABP(mean): 78 (13 Apr 2020 19:00) (78 - 91)  RR: 30 (13 Apr 2020 22:28) (24 - 35)  SpO2: 99% (13 Apr 2020 22:28) (90% - 100%)      ABG - ( 13 Apr 2020 21:27 )  pH, Arterial: 7.38  pH, Blood: x     /  pCO2: 59    /  pO2: 68    / HCO3: 34    / Base Excess: 8.2   /  SaO2: 91                                  9.7    11.84 )-----------( 377      ( 13 Apr 2020 05:00 )             26.7         04-13    140  |  100  |  14  ----------------------------<  140<H>  3.4<L>   |  37<H>  |  0.35<L>    Ca    7.8<L>      13 Apr 2020 05:00  Phos  3.1     04-13  Mg     2.2     04-13    TPro  6.7  /  Alb  1.1<L>  /  TBili  0.5  /  DBili  x   /  AST  58<H>  /  ALT  26  /  AlkPhos  136<H>  04-13    LIVER FUNCTIONS - ( 13 Apr 2020 05:00 )  Alb: 1.1 g/dL / Pro: 6.7 gm/dL / ALK PHOS: 136 U/L / ALT: 26 U/L / AST: 58 U/L / GGT: x

## 2020-04-15 NOTE — PROGRESS NOTE ADULT - SUBJECTIVE AND OBJECTIVE BOX
Subjective:  sedated on vent  bilat chest tubes    MEDICATIONS  (STANDING):  aspirin  chewable 81 milliGRAM(s) Oral daily  atorvastatin 20 milliGRAM(s) Oral at bedtime  baclofen 10 milliGRAM(s) Oral daily  chlorhexidine 0.12% Liquid 15 milliLiter(s) Oral Mucosa every 12 hours  chlorhexidine 4% Liquid 1 Application(s) Topical <User Schedule>  cisatracurium Infusion 3 MICROgram(s)/kG/Min (13.7 mL/Hr) IV Continuous <Continuous>  collagenase Ointment 1 Application(s) Topical two times a day  collagenase Ointment 1 Application(s) Topical two times a day  dexMEDEtomidine Infusion 0.4 MICROgram(s)/kG/Hr (7.61 mL/Hr) IV Continuous <Continuous>  enoxaparin Injectable 80 milliGRAM(s) SubCutaneous every 12 hours  famotidine Injectable 20 milliGRAM(s) IV Push two times a day  levothyroxine 75 MICROGram(s) Oral daily  meropenem  IVPB 1000 milliGRAM(s) IV Intermittent every 8 hours  norepinephrine Infusion 0.05 MICROgram(s)/kG/Min (7.13 mL/Hr) IV Continuous <Continuous>  petrolatum Ophthalmic Ointment 1 Application(s) Both EYES two times a day  polyethylene glycol 3350 17 Gram(s) Oral daily  potassium acid phosphate/sodium acid phosphate tablet (K-PHOS No. 2) 1 Tablet(s) Oral four times a day with meals  propofol Infusion 15 MICROgram(s)/kG/Min (6.85 mL/Hr) IV Continuous <Continuous>  trimethoprim/polymyxin Solution 1 Drop(s) Right EYE four times a day  vancomycin  IVPB 500 milliGRAM(s) IV Intermittent every 12 hours    MEDICATIONS  (PRN):  acetaminophen  Suppository .. 650 milliGRAM(s) Rectal every 6 hours PRN Temp greater or equal to 38C (100.4F)  LORazepam   Injectable 2 milliGRAM(s) IV Push every 4 hours PRN Agitation  senna 2 Tablet(s) Oral at bedtime PRN Constipation      Allergies    No Known Allergies    Intolerances        REVIEW OF SYSTEMS: sedated      Vital Signs Last 24 Hrs  T(C): 37.7 (15 Apr 2020 09:00), Max: 38.3 (15 Apr 2020 07:00)  T(F): 99.8 (15 Apr 2020 09:00), Max: 100.9 (15 Apr 2020 07:00)  HR: 102 (15 Apr 2020 09:00) (62 - 122)  BP: 111/70 (15 Apr 2020 09:00) (99/68 - 125/64)  BP(mean): 78 (14 Apr 2020 19:38) (78 - 78)  RR: 30 (15 Apr 2020 09:00) (28 - 34)  SpO2: 96% (15 Apr 2020 09:00) (93% - 99%)    Mode: AC/ CMV (Assist Control/ Continuous Mandatory Ventilation)  RR (machine): 30  TV (machine): 500  FiO2: 50  PEEP: 10  ITime: 0.1  PIP: 34      PHYSICAL EXAMINATION:  SKIN: no rashes  HEAD: NC/AT  EYES: PERRLA, EOMI  EARS: TM's intact  NOSE: no abnormalities  NECK:  Supple. No lymphadenopathy. Jugular venous pressure not elevated. Carotids equal.   HEART:   S1S2 reg  CHEST:  bilat ronchi; sub q empysema ant chest wall; ET tube in place  ABDOMEN:  Soft and nontender.   EXTREMITIES:  no C/C/E  NEURO: sedated      LABS:                        9.2    12.88 )-----------( 409      ( 15 Apr 2020 04:35 )             29.0     04-15    143  |  106  |  20  ----------------------------<  145<H>  4.2   |  36<H>  |  0.36<L>    Ca    7.2<L>      15 Apr 2020 04:35  Phos  2.0     04-15  Mg     2.1     04-15    TPro  6.2  /  Alb  1.0<L>  /  TBili  0.5  /  DBili  x   /  AST  68<H>  /  ALT  34  /  AlkPhos  172<H>  04-15          RADIOLOGY & ADDITIONAL TESTS:

## 2020-04-15 NOTE — PROGRESS NOTE ADULT - ASSESSMENT
75 y/o M with a PMHx of BPH admitted from outside hospital on 4/1 for further care of viral syndrome secondary to COVID-19 infection; was admitted to outside hospital on 3/21 from which he was transferred on ventilatory support and pressor support; has been on vent for almost 14 days and has persistent fever. Patient had all lines replaced but continues with fevers and difficulty maintaining good oxygenation on the vent. Sputum culture from 4/6 is growing Staph aureus, sensitivity is pending. History per medical record and staff at the bedside.   1. Patient transferred from outside hospital for further treatment of respiratory failure secondary to COVID-19 infection; given difficulty oxygenating and fever, concern about superimposed bacterial pneumonia, sinusitis or line infections, though the lines have been changed; PE should be considered as well  - follow up cultures ---noted Staph aureus in sputum found to have MRSA and Strep pneumoniae in sputum culture  - vent and pressors per icu  - serial cbc and monitor temperature   - day #8 meropenem and vancomycin ; will stop antibiotics today  - bilateral chest tubes in place; palliative care evaluation  - monitor off antibiotics; grave prognosis  2. other issues: per medicine

## 2020-04-15 NOTE — PROGRESS NOTE ADULT - ASSESSMENT
- cont vent  - CXR bilat infiltrates w sub q emphysema  - on vanco/meropenem for MRSA/strep in sputum  - on precedex/propofol  - levophed for pressor support  - full dose lovenox for dvt

## 2020-04-15 NOTE — PROGRESS NOTE ADULT - SUBJECTIVE AND OBJECTIVE BOX
HPI: Pt is a 74y old Male with hx of BPH originally admitted to [facility] 3/21 transferred to  with vent dependent resp failure secondary to COVID pneumonia. Pt intubated 3/29 at [facility]. Hosp course notable for hypotension requiring pressors.  Palliative medicine consulted for assistance with goals of care.    Pt seen and examined by me for followup of goals of care.  Pt is intubated, sedated, so unable to provide hx, ROS. Pt is on pressors as well. Pt with low grade temps overnight and CXR showed worsening of subcutaneous emphysema so s/p b/l chest tubes 4/14      PAIN: ( )Yes   ( )No  DYSPNEA: ( ) Yes  ( ) No  Pt unable to provide due to intubation and sedation    Review of Systems:  Unable to obtain as Pt unable to provide due to intubation and sedation      PHYSICAL EXAM:    Vital Signs Last 24 Hrs  T(C): 37.7 (04-15-20 @ 09:00), Max: 38.3 (04-15-20 @ 07:00)  T(F): 99.8 (04-15-20 @ 09:00), Max: 100.9 (04-15-20 @ 07:00)  HR: 85 (04-15-20 @ 10:00) (70 - 122)  BP: 111/66 (04-15-20 @ 10:00) (99/68 - 125/64)  BP(mean): 78 (04-14-20 @ 19:38) (78 - 78)  RR: 30 (04-15-20 @ 10:00) (28 - 34)  SpO2: 99% (04-15-20 @ 10:00) (93% - 99%)      PPSV2: 10  %    General: intubated and sedated male  Mental Status: sedated  HEENT: eyes closed, ET tube in place  Lungs: coarse breath sounds b/l, chest tubes in place  Cardiac: S1S2+  GI: soft, + bowel sounds  : kim in place +urine output  Ext: edema x 4 exts  Neuro: sedated    LABS                          9.2    12.88 )-----------( 409      ( 15 Apr 2020 04:35 )             29.0     04-15    143  |  106  |  20  ----------------------------<  145<H>  4.2   |  36<H>  |  0.36<L>    Ca    7.2<L>      15 Apr 2020 04:35  Phos  2.0     04-15  Mg     2.1     04-15    TPro  6.2  /  Alb  1.0<L>  /  TBili  0.5  /  DBili  x   /  AST  68<H>  /  ALT  34  /  AlkPhos  172<H>  04-15      MEDICATIONS  (STANDING):  aspirin  chewable 81 milliGRAM(s) Oral daily  atorvastatin 20 milliGRAM(s) Oral at bedtime  baclofen 10 milliGRAM(s) Oral daily  chlorhexidine 0.12% Liquid 15 milliLiter(s) Oral Mucosa every 12 hours  chlorhexidine 4% Liquid 1 Application(s) Topical <User Schedule>  cisatracurium Infusion 3 MICROgram(s)/kG/Min (13.7 mL/Hr) IV Continuous <Continuous>  collagenase Ointment 1 Application(s) Topical two times a day  collagenase Ointment 1 Application(s) Topical two times a day  dexMEDEtomidine Infusion 0.4 MICROgram(s)/kG/Hr (7.61 mL/Hr) IV Continuous <Continuous>  enoxaparin Injectable 80 milliGRAM(s) SubCutaneous every 12 hours  famotidine Injectable 20 milliGRAM(s) IV Push two times a day  levothyroxine 75 MICROGram(s) Oral daily  meropenem  IVPB 1000 milliGRAM(s) IV Intermittent every 8 hours  norepinephrine Infusion 0.05 MICROgram(s)/kG/Min (7.13 mL/Hr) IV Continuous <Continuous>  petrolatum Ophthalmic Ointment 1 Application(s) Both EYES two times a day  polyethylene glycol 3350 17 Gram(s) Oral daily  potassium acid phosphate/sodium acid phosphate tablet (K-PHOS No. 2) 1 Tablet(s) Oral four times a day with meals  propofol Infusion 15 MICROgram(s)/kG/Min (6.85 mL/Hr) IV Continuous <Continuous>  trimethoprim/polymyxin Solution 1 Drop(s) Right EYE four times a day  vancomycin  IVPB 500 milliGRAM(s) IV Intermittent every 12 hours    MEDICATIONS  (PRN):  acetaminophen  Suppository .. 650 milliGRAM(s) Rectal every 6 hours PRN Temp greater or equal to 38C (100.4F)  LORazepam   Injectable 2 milliGRAM(s) IV Push every 4 hours PRN Agitation  senna 2 Tablet(s) Oral at bedtime PRN Constipation          RADIOLOGY/ADDITIONAL STUDIES:    EXAM:  XR CHEST PORTABLE IMMED 1V                        PROCEDURE DATE:  04/14/2020    COMPARISON: 4/14/2020 available for review.    FINDINGS:   The lungs  show diffuse multifocal airspace consolidations. There is severe thoracic subcutaneous emphysema.  Bilateral chest tubes in place. No pneumothorax.   There is mild cardiomegaly. With a pneumomediastinum.    ET tube tip above tracheal bifurcation.  NG tube tip beyond GE junction.  RIGHT IJ catheter tip in SVC    Visualized osseous structuresare intact.      Discussion  IMPRESSION:   Bilateral chest tubes. Severe thoracic subcutaneous emphysema.  The pneumomediastinum.  Diffuse of bilateral lung unchanged multifocal airspace consolidations..  Pneumonia due to Covid 19.

## 2020-04-15 NOTE — PROGRESS NOTE ADULT - ASSESSMENT
Pt is a 74y old Male with hx of BPH originally admitted to [facility] 3/21 transferred to  with vent dependent resp failure secondary to COVID pneumonia. Pt intubated 3/29 at [facility]. Hosp course notable for hypotension requiring pressors.  Palliative medicine consulted for assistance with goals of care.    1)Resp Failure, ARDS  -secondary to COVID pneumonia, now with bacterial pneumonia  -On abx  -Vent dependent Day #18  -s/p zithromax, plaquenil  -not improving  -Now complicated by extensive subcut emphysema with b/l chest tubes in place    2)COVID Pneumonia, Bacterial Pneumonia  -Vent dependent Day #18  -s/p zithromax, plaquenil  -On abx per ID  -vent support    3)Hypotension  -Related to pneumonia  -On vasopressors    4)DVTs  -Imaging reviewed  -On Lovenox    5)Advance Directives  -Pt does not have capacity to make medical decisions due to intubation, sedation  -Pt has no HCP on file  -Pt has wife Ruby who would be surrogate decision maker [phone]  -Pt has son Ignacio [phone]  -Goals of care meeting held 4/9- please see note for details.  -Spoke with son Ignacio today 4/15 regarding pts condition.  We discussed pts condition has not improved, and in ifact, has gotten worse.  Ignacio states he knows what he has to do but is struggling with the timing.  He is awaiting a virtual visit today for himself, pts wife, and pts sister to make a final decision.  He is aware his father is dying but feels personal responsibility by giving permission for liberation from vent support, though he knows is what his father would want.  He will reach out to me after his virtual visit with pt. Emotional support provided

## 2020-04-16 NOTE — PROGRESS NOTE ADULT - ASSESSMENT
Pt is a 74y old Male with hx of BPH originally admitted to [facility] 3/21 transferred to  with vent dependent resp failure secondary to COVID pneumonia. Pt intubated 3/29 at [facility]. Hosp course notable for hypotension requiring pressors.  Palliative medicine consulted for assistance with goals of care.    1)Resp Failure, ARDS  -secondary to COVID pneumonia, now with bacterial pneumonia  -s/p abx  -Vent dependent Day #19  -s/p zithromax, plaquenil  -not improving  -Now complicated by extensive subcut emphysema with b/l chest tubes in place    2)COVID Pneumonia, Bacterial Pneumonia  -Vent dependent Day #19  -s/p zithromax, plaquenil  -s/p abx per ID to cover bacterial pneumonia  -vent support    3)Hypotension  -Related to pneumonia  -On vasopressors    4)DVTs  -Imaging reviewed  -On Lovenox    5)Advance Directives  -Pt does not have capacity to make medical decisions due to intubation, sedation  -Pt has no HCP on file  -Pt has wife Ruby who would be surrogate decision maker [phone]  -Pt has son Ignacio [phone]  -Goals of care meeting held 4/9- please see note for details.  -Spoke with son Ignacio today 4/16 to followup our conversation yest.  He has not yet had the opportunity to video chat with his dad and other family members.  He also shares that he has had deaths in the family this week related to COVID so its an even harder time than usual to discuss  liberation from vent support with his family.  Plan for Ignacio to have Zoom call this evening and then I will followup tomorrow

## 2020-04-16 NOTE — PROGRESS NOTE ADULT - SUBJECTIVE AND OBJECTIVE BOX
Subjective:    Intubated, Sedated. Vent Day #19.    MEDICATIONS  (STANDING):  aspirin  chewable 81 milliGRAM(s) Oral daily  atorvastatin 20 milliGRAM(s) Oral at bedtime  baclofen 10 milliGRAM(s) Oral daily  chlorhexidine 0.12% Liquid 15 milliLiter(s) Oral Mucosa every 12 hours  chlorhexidine 4% Liquid 1 Application(s) Topical <User Schedule>  collagenase Ointment 1 Application(s) Topical two times a day  dexMEDEtomidine Infusion 0.4 MICROgram(s)/kG/Hr (7.61 mL/Hr) IV Continuous <Continuous>  enoxaparin Injectable 80 milliGRAM(s) SubCutaneous every 12 hours  famotidine Injectable 20 milliGRAM(s) IV Push two times a day  levothyroxine 75 MICROGram(s) Oral daily  norepinephrine Infusion 0.05 MICROgram(s)/kG/Min (7.13 mL/Hr) IV Continuous <Continuous>  polyethylene glycol 3350 17 Gram(s) Oral daily  potassium acid phosphate/sodium acid phosphate tablet (K-PHOS No. 2) 1 Tablet(s) Oral four times a day with meals  propofol Infusion 15 MICROgram(s)/kG/Min (6.85 mL/Hr) IV Continuous <Continuous>  trimethoprim/polymyxin Solution 1 Drop(s) Right EYE four times a day    MEDICATIONS  (PRN):  acetaminophen  Suppository .. 650 milliGRAM(s) Rectal every 6 hours PRN Temp greater or equal to 38C (100.4F)  LORazepam   Injectable 2 milliGRAM(s) IV Push every 4 hours PRN Agitation  senna 2 Tablet(s) Oral at bedtime PRN Constipation      Allergies    No Known Allergies    Intolerances        Vital Signs Last 24 Hrs  T(C): 37.7 (2020 13:15), Max: 37.8 (2020 10:20)  T(F): 99.8 (2020 13:15), Max: 100.1 (2020 10:20)  HR: 71 (2020 14:00) (65 - 109)  BP: 92/58 (2020 14:00) (92/58 - 136/77)  BP(mean): 73 (2020 10:20) (72 - 81)  RR: 30 (2020 14:00) (30 - 36)  SpO2: 98% (2020 14:00) (91% - 100%)    PHYSICAL EXAMINATION:    NECK:  Supple. No lymphadenopathy. Jugular venous pressure not elevated.   HEART:   The cardiac impulse has a normal quality. Reg., Nl S1 and S2.    CHEST:  Chest with bilateral rhonchi and subQ emphysema.   ABDOMEN:  Soft and nontender.   EXTREMITIES:  There is no edema.       LABS:                        8.3    11.83 )-----------( 462      ( 2020 09:45 )             26.3     -    139  |  103  |  14  ----------------------------<  145<H>  4.2   |  37<H>  |  0.31<L>    Ca    7.4<L>      2020 09:45  Phos  2.6       Mg     2.1         TPro  6.5  /  Alb  1.0<L>  /  TBili  0.3  /  DBili  x   /  AST  53<H>  /  ALT  32  /  AlkPhos  162<H>      AB.44/49/75/33 on 30/450/60/10    RADIOLOGY & ADDITIONAL TESTS:    < from: Xray Chest 1 View- PORTABLE-Routine (20 @ 07:41) >  EXAM:  XR CHEST PORTABLE ROUTINE 1V                            PROCEDURE DATE:  2020          INTERPRETATION:  Single AP view of the chest    Comparison:2020    Clinical Indication:Pneumonia    FINDINGS:There are bilateral diffuse patchy airspace opacities compatible with multifocal pneumonia. There is diffuse subcutaneous emphysema and small pneumomediastinum. There is an endotracheal tube with the tip seen two vertebral bodies above the junior. There is an NG tube seen coursing below the diaphragm with the tip off of the film. There is a right internal jugular central line with the tip in the region of the SVC. There are bilateral chest tubes in place. Heart size cannot be accurately evaluated in this projection.    IMPRESSION: No significant change compared to 2020.      DISCRETE X-RAY DATA:  Percent of LEFT lung opacification: 34-66%  Percent of RIGHT lung opacification: 34-66%  Change in lung opacification from most recent x-ray (<=3 days): Stable  Change from prior dated 3 or more days (same admission): Stable      KAYCEE MARIN       Assessment and Plan:   · Assessment		  - cont vent support as above  - Vanco/meropenem D/C'ed as per ID   - labs in am  - Lovenox-full dose for DVT  - Bilateral chest tubes   - Continue tube feeds with Jevity  - Palliative Care F/U noted-awaiting Video Conference call w/ family    Problem/Plan - 1:  ·  Problem: Acute on chronic respiratory failure with hypoxia and hypercapnia.     Problem/Plan - 2:  ·  Problem: COVID-19 virus infection.     Problem/Plan - 3:  ·  Problem: SARS-associated coronavirus as cause of disease classified elsewhere.     Problem/Plan - 4:  ·  Problem: Hypokalemia.     Problem/Plan - 5:  ·  Problem: Anemia, chronic disease.

## 2020-04-16 NOTE — PROGRESS NOTE ADULT - SUBJECTIVE AND OBJECTIVE BOX
HPI: Pt is a 74y old Male with hx of BPH originally admitted to [facility] 3/21 transferred to  with vent dependent resp failure secondary to COVID pneumonia. Pt intubated 3/29 at [facility]. Hosp course notable for hypotension requiring pressors.  Palliative medicine consulted for assistance with goals of care.    Pt seen and examined by me for followup of goals of care.  Pt is intubated, sedated, so unable to provide hx, ROS. Pt is on pressors as well. Pt with low grade temps overnight.    PAIN: ( )Yes   ( )No  DYSPNEA: ( ) Yes  ( ) No  Pt unable to provide due to intubation and sedation    Review of Systems:  Unable to obtain as Pt unable to provide due to intubation and sedation      PHYSICAL EXAM:    Vital Signs Last 24 Hrs  T(C): 37.7 (04-16-20 @ 13:15), Max: 37.8 (04-16-20 @ 10:20)  T(F): 99.8 (04-16-20 @ 13:15), Max: 100.1 (04-16-20 @ 10:20)  HR: 71 (04-16-20 @ 14:00) (65 - 109)  BP: 92/58 (04-16-20 @ 14:00) (92/58 - 136/77)  BP(mean): 73 (04-16-20 @ 10:20) (72 - 81)  RR: 30 (04-16-20 @ 14:00) (30 - 36)  SpO2: 98% (04-16-20 @ 14:00) (91% - 100%)      PPSV2: 10  %    General: intubated and sedated male  Mental Status: sedated  HEENT: eyes closed, ET tube in place  Lungs: coarse breath sounds b/l, chest tubes in place  Cardiac: S1S2+  GI: soft, + bowel sounds  : kim in place +urine output  Ext: edema x 4 exts  Neuro: sedated    LABS                          8.3    11.83 )-----------( 462      ( 16 Apr 2020 09:45 )             26.3     04-16    139  |  103  |  14  ----------------------------<  145<H>  4.2   |  37<H>  |  0.31<L>    Ca    7.4<L>      16 Apr 2020 09:45  Phos  2.6     04-16  Mg     2.1     04-16    TPro  6.5  /  Alb  1.0<L>  /  TBili  0.3  /  DBili  x   /  AST  53<H>  /  ALT  32  /  AlkPhos  162<H>  04-16          MEDICATIONS  (STANDING):  aspirin  chewable 81 milliGRAM(s) Oral daily  atorvastatin 20 milliGRAM(s) Oral at bedtime  baclofen 10 milliGRAM(s) Oral daily  chlorhexidine 0.12% Liquid 15 milliLiter(s) Oral Mucosa every 12 hours  chlorhexidine 4% Liquid 1 Application(s) Topical <User Schedule>  collagenase Ointment 1 Application(s) Topical two times a day  dexMEDEtomidine Infusion 0.4 MICROgram(s)/kG/Hr (7.61 mL/Hr) IV Continuous <Continuous>  enoxaparin Injectable 80 milliGRAM(s) SubCutaneous every 12 hours  famotidine Injectable 20 milliGRAM(s) IV Push two times a day  levothyroxine 75 MICROGram(s) Oral daily  norepinephrine Infusion 0.05 MICROgram(s)/kG/Min (7.13 mL/Hr) IV Continuous <Continuous>  polyethylene glycol 3350 17 Gram(s) Oral daily  potassium acid phosphate/sodium acid phosphate tablet (K-PHOS No. 2) 1 Tablet(s) Oral four times a day with meals  propofol Infusion 15 MICROgram(s)/kG/Min (6.85 mL/Hr) IV Continuous <Continuous>  trimethoprim/polymyxin Solution 1 Drop(s) Right EYE four times a day    MEDICATIONS  (PRN):  acetaminophen  Suppository .. 650 milliGRAM(s) Rectal every 6 hours PRN Temp greater or equal to 38C (100.4F)  LORazepam   Injectable 2 milliGRAM(s) IV Push every 4 hours PRN Agitation  senna 2 Tablet(s) Oral at bedtime PRN Constipation            RADIOLOGY/ADDITIONAL STUDIES:    EXAM:  XR CHEST PORTABLE ROUTINE 1V                        PROCEDURE DATE:  04/16/2020    Comparison:04/14/2020    Clinical Indication:Pneumonia    FINDINGS:There are bilateral diffuse patchy airspace opacities compatible with multifocal pneumonia. There is diffuse subcutaneous emphysema and small pneumomediastinum. There is an endotracheal tube with the tip seen two vertebral bodies above the junior. There is an NG tube seen coursing below the diaphragm with the tip off of the film. There is a right internal jugular central line with the tip in the region of the SVC. There are bilateral chest tubes in place. Heart size cannot be accurately evaluated in this projection.    IMPRESSION: No significant change compared to 04/14/2020.      DISCRETE X-RAY DATA:  Percent of LEFT lung opacification: 34-66%  Percent of RIGHT lung opacification: 34-66%  Change in lung opacification from most recent x-ray (<=3 days): Stable  Change from prior dated 3 or more days (same admission): Stable

## 2020-04-17 NOTE — PROGRESS NOTE ADULT - SUBJECTIVE AND OBJECTIVE BOX
HPI: Pt is a 74y old Male with hx of BPH originally admitted to [facility] 3/21 transferred to  with vent dependent resp failure secondary to COVID pneumonia. Pt intubated 3/29 at [facility]. Hosp course notable for hypotension requiring pressors.  Palliative medicine consulted for assistance with goals of care.    Pt seen and examined by me for followup of goals of care.  Pt is intubated, sedated, so unable to provide hx, ROS. Pt is on pressors as well.     PAIN: ( )Yes   ( )No  DYSPNEA: ( ) Yes  ( ) No  Pt unable to provide due to intubation and sedation    Review of Systems:  Unable to obtain as Pt unable to provide due to intubation and sedation      PHYSICAL EXAM:    Vital Signs Last 24 Hrs  T(C): 36.8 (04-17-20 @ 07:00), Max: 37.7 (04-16-20 @ 13:15)  T(F): 98.3 (04-17-20 @ 07:00), Max: 99.8 (04-16-20 @ 13:15)  HR: 71 (04-17-20 @ 07:00) (66 - 75)  BP: 94/61 (04-17-20 @ 06:00) (92/58 - 114/70)  BP(mean): 73 (04-16-20 @ 17:00) (73 - 73)  RR: 30 (04-17-20 @ 07:00) (30 - 30)  SpO2: 98% (04-17-20 @ 07:00) (95% - 100%)      PPSV2: 10  %    General: intubated and sedated male  Mental Status: sedated  HEENT: eyes closed, ET tube in place  Lungs: coarse breath sounds b/l, chest tubes in place  Cardiac: S1S2+  GI: soft, + bowel sounds  : kim in place +urine output  Ext: edema UE b/l  Neuro: sedated    LABS                          9.2    9.58  )-----------( 452      ( 17 Apr 2020 05:00 )             30.4     04-17    143  |  103  |  11  ----------------------------<  144<H>  3.9   |  40<H>  |  0.30<L>    Ca    7.9<L>      17 Apr 2020 05:00  Phos  2.8     04-17  Mg     2.2     04-17    TPro  6.4  /  Alb  1.1<L>  /  TBili  0.4  /  DBili  x   /  AST  53<H>  /  ALT  29  /  AlkPhos  155<H>  04-17      MEDICATIONS  (STANDING):  aspirin  chewable 81 milliGRAM(s) Oral daily  atorvastatin 20 milliGRAM(s) Oral at bedtime  baclofen 10 milliGRAM(s) Oral daily  chlorhexidine 0.12% Liquid 15 milliLiter(s) Oral Mucosa every 12 hours  chlorhexidine 4% Liquid 1 Application(s) Topical <User Schedule>  collagenase Ointment 1 Application(s) Topical two times a day  dexMEDEtomidine Infusion 0.4 MICROgram(s)/kG/Hr (7.61 mL/Hr) IV Continuous <Continuous>  enoxaparin Injectable 80 milliGRAM(s) SubCutaneous every 12 hours  famotidine Injectable 20 milliGRAM(s) IV Push two times a day  levothyroxine 75 MICROGram(s) Oral daily  norepinephrine Infusion 0.05 MICROgram(s)/kG/Min (7.13 mL/Hr) IV Continuous <Continuous>  polyethylene glycol 3350 17 Gram(s) Oral daily  potassium acid phosphate/sodium acid phosphate tablet (K-PHOS No. 2) 1 Tablet(s) Oral four times a day with meals  propofol Infusion 15 MICROgram(s)/kG/Min (6.85 mL/Hr) IV Continuous <Continuous>  trimethoprim/polymyxin Solution 1 Drop(s) Right EYE four times a day    MEDICATIONS  (PRN):  acetaminophen  Suppository .. 650 milliGRAM(s) Rectal every 6 hours PRN Temp greater or equal to 38C (100.4F)  LORazepam   Injectable 2 milliGRAM(s) IV Push every 4 hours PRN Agitation  senna 2 Tablet(s) Oral at bedtime PRN Constipation          RADIOLOGY/ADDITIONAL STUDIES:      EXAM:  XR CHEST PORTABLE ROUTINE 1V                        PROCEDURE DATE:  04/16/2020    Comparison:04/14/2020    Clinical Indication:Pneumonia    FINDINGS:There are bilateral diffuse patchy airspace opacities compatible with multifocal pneumonia. There is diffuse subcutaneous emphysema and small pneumomediastinum. There is an endotracheal tube with the tip seen two vertebral bodies above the junior. There is an NG tube seen coursing below the diaphragm with the tip off of the film. There is a right internal jugular central line with the tip in the region of the SVC. There are bilateral chest tubes in place. Heart size cannot be accurately evaluated in this projection.    IMPRESSION: No significant change compared to 04/14/2020.      DISCRETE X-RAY DATA:  Percent of LEFT lung opacification: 34-66%  Percent of RIGHT lung opacification: 34-66%  Change in lung opacification from most recent x-ray (<=3 days): Stable  Change from prior dated 3 or more days (same admission): Stable

## 2020-04-17 NOTE — CHART NOTE - NSCHARTNOTEFT_GEN_A_CORE
Spoke with Ignacio on the phone to review his father's condition and followup on our phone call from yesterday.  Ignacio states he was not able to have the zoom call because pts sister got home too late.  Ignacio expresses feeling very torn about making decisions regarding removing him from vent support.  He is conferring with multiple family members and expresses feeling like he doesn't want to take away hope from his family.  He expressed feeling a little pressured to make a decision.  I apologized if he felt like I was contributing to that feeling.  Explained that tomorrow will be 3 weeks pt has been on the vent.  I shared that there is a team that does evaluation of pts to see if they are a candidate for trach.  If they were considering that the team would assess him.  Explained that if the team felt he was not a good candidate for the trach it would be a matter of when he wished to have the pt removed from vent support. Ignacio asked if we could just remove him from the vent.  I explained it would be a collaborative decision between him and the primary team.  advised though that since its been nearly 3 weeks, the primary team might look to him for next steps.  Ignacio expressed desire to consult with an  to see what his rights are for his dad's care.  I advised him the ideal way for his father's care to proceed is having him and the primary team with the same goal.  I advised him I would not pressure him at all but that I am available for support and guidance as he wishes.  Plan to followup on Monday.  Emotional support provided

## 2020-04-17 NOTE — PROGRESS NOTE ADULT - ASSESSMENT
- cont vent  - CXR bilat infiltrates w sub q emphysema  - off abx  - on propofol; precedex d/c  - levophed for pressor support  - full dose lovenox for dvt

## 2020-04-17 NOTE — PROGRESS NOTE ADULT - SUBJECTIVE AND OBJECTIVE BOX
Case discussed on eICU PM rounds.  No acute issues.  Weaning slowly  30/450/50/10    ICU Vital Signs Last 24 Hrs  T(C): 36.7 (17 Apr 2020 19:43), Max: 37.3 (17 Apr 2020 17:00)  T(F): 98 (17 Apr 2020 19:43), Max: 99.1 (17 Apr 2020 17:00)  HR: 128 (17 Apr 2020 21:08) (67 - 128)  BP: 102/70 (17 Apr 2020 21:08) (90/58 - 107/71)  BP(mean): 77 (17 Apr 2020 21:08) (75 - 77)  ABP: 119/74 (17 Apr 2020 21:08) (93/53 - 119/74)  ABP(mean): 73 (17 Apr 2020 21:08) (70 - 89)  RR: 36 (17 Apr 2020 21:08) (30 - 36)  SpO2: 93% (17 Apr 2020 21:08) (93% - 99%)    ABG - ( 17 Apr 2020 17:33 )  pH, Arterial: 7.37  pH, Blood: x     /  pCO2: 69    /  pO2: 92    / HCO3: 39    / Base Excess: 12.2  /  SaO2: 97    FIO2- lowered to 40%                 9.2    9.58  )-----------( 452      ( 17 Apr 2020 05:00 )             30.4       04-17    143  |  103  |  11  ----------------------------<  144<H>  3.9   |  40<H>  |  0.30<L>    Ca    7.9<L>      17 Apr 2020 05:00  Phos  2.8     04-17  Mg     2.2     04-17    TPro  6.4  /  Alb  1.1<L>  /  TBili  0.4  /  DBili  x   /  AST  53<H>  /  ALT  29  /  AlkPhos  155<H>  04-17    LIVER FUNCTIONS - ( 17 Apr 2020 05:00 )  Alb: 1.1 g/dL / Pro: 6.4 gm/dL / ALK PHOS: 155 U/L / ALT: 29 U/L / AST: 53 U/L / GGT: x

## 2020-04-17 NOTE — PROGRESS NOTE ADULT - ASSESSMENT
Pt is a 74y old Male with hx of BPH originally admitted to [facility] 3/21 transferred to  with vent dependent resp failure secondary to COVID pneumonia. Pt intubated 3/29 at [facility]. Hosp course notable for hypotension requiring pressors.  Palliative medicine consulted for assistance with goals of care.    1)Resp Failure, ARDS  -secondary to COVID pneumonia, now with bacterial pneumonia  -s/p abx  -Vent dependent Day #19  -s/p zithromax, plaquenil  -not improving  -Now complicated by extensive subcut emphysema with b/l chest tubes in place    2)COVID Pneumonia, Bacterial Pneumonia  -Vent dependent Day #19  -s/p zithromax, plaquenil  -s/p abx per ID to cover bacterial pneumonia  -vent support    3)Hypotension  -Related to pneumonia  -On vasopressors    4)DVTs  -Imaging reviewed  -On Lovenox    5)Advance Directives  -Pt does not have capacity to make medical decisions due to intubation, sedation  -Pt has no HCP on file  -Pt has wife Ruby who would be surrogate decision maker [phone]  -Pt has son Ignacio [phone]  -Goals of care meeting held 4/9- please see note for details.  -Spoke with son Ignacio today 4/16 to followup our conversation yest.  He has not yet had the opportunity to video chat with his dad and other family members.  He also shares that he has had deaths in the family this week related to COVID so its an even harder time than usual to discuss  liberation from vent support with his family.  Plan for Ignacio to have Zoom call this evening and then I will followup tomorrow Pt is a 74y old Male with hx of BPH originally admitted to [facility] 3/21 transferred to  with vent dependent resp failure secondary to COVID pneumonia. Pt intubated 3/29 at [facility]. Hosp course notable for hypotension requiring pressors.  Palliative medicine consulted for assistance with goals of care.    1)Resp Failure, ARDS  -secondary to COVID pneumonia, now with bacterial pneumonia  -s/p abx  -Vent dependent Day #20  -s/p zithromax, plaquenil  -not improving  -Now complicated by extensive subcut emphysema with b/l chest tubes in place    2)COVID Pneumonia, Bacterial Pneumonia  -Vent dependent Day #20  -s/p zithromax, plaquenil  -s/p abx per ID to cover bacterial pneumonia  -vent support    3)Hypotension  -Related to pneumonia  -On vasopressors    4)DVTs  -Imaging reviewed  -On Lovenox    5)Advance Directives  -Pt does not have capacity to make medical decisions due to intubation, sedation  -Pt has no HCP on file  -Pt has wife Ruby who would be surrogate decision maker [phone]  -Pt has son Ignacio [phone]  -Goals of care meeting held 4/9- please see note for details.  -Spoke with son Ignacio  4/16 to followup our conversation yest.  He has not yet had the opportunity to video chat with his dad and other family members.  He also shares that he has had deaths in the family this week related to COVID so its an even harder time than usual to discuss  liberation from vent support with his family.  Plan for Ignacio to have Zoom call this evening.

## 2020-04-17 NOTE — PROGRESS NOTE ADULT - SUBJECTIVE AND OBJECTIVE BOX
Subjective:  sedated on vent    MEDICATIONS  (STANDING):  aspirin  chewable 81 milliGRAM(s) Oral daily  atorvastatin 20 milliGRAM(s) Oral at bedtime  baclofen 10 milliGRAM(s) Oral daily  chlorhexidine 0.12% Liquid 15 milliLiter(s) Oral Mucosa every 12 hours  chlorhexidine 4% Liquid 1 Application(s) Topical <User Schedule>  collagenase Ointment 1 Application(s) Topical two times a day  enoxaparin Injectable 80 milliGRAM(s) SubCutaneous every 12 hours  famotidine Injectable 20 milliGRAM(s) IV Push two times a day  levothyroxine 75 MICROGram(s) Oral daily  norepinephrine Infusion 0.05 MICROgram(s)/kG/Min (7.13 mL/Hr) IV Continuous <Continuous>  polyethylene glycol 3350 17 Gram(s) Oral daily  potassium acid phosphate/sodium acid phosphate tablet (K-PHOS No. 2) 1 Tablet(s) Oral four times a day with meals  propofol Infusion 15 MICROgram(s)/kG/Min (6.85 mL/Hr) IV Continuous <Continuous>  sodium chloride 0.9% Bolus 250 milliLiter(s) IV Bolus once  trimethoprim/polymyxin Solution 1 Drop(s) Right EYE four times a day    MEDICATIONS  (PRN):  acetaminophen  Suppository .. 650 milliGRAM(s) Rectal every 6 hours PRN Temp greater or equal to 38C (100.4F)  LORazepam   Injectable 2 milliGRAM(s) IV Push every 4 hours PRN Agitation  senna 2 Tablet(s) Oral at bedtime PRN Constipation      Allergies    No Known Allergies    Intolerances        REVIEW OF SYSTEMS: on vent        Vital Signs Last 24 Hrs  T(C): 37 (17 Apr 2020 11:00), Max: 37.7 (16 Apr 2020 13:15)  T(F): 98.6 (17 Apr 2020 11:00), Max: 99.8 (16 Apr 2020 13:15)  HR: 67 (17 Apr 2020 11:00) (66 - 75)  BP: 94/61 (17 Apr 2020 06:00) (92/58 - 114/70)  BP(mean): 73 (16 Apr 2020 17:00) (73 - 73)  RR: 30 (17 Apr 2020 11:00) (30 - 30)  SpO2: 97% (17 Apr 2020 11:00) (95% - 100%)    PHYSICAL EXAMINATION:  SKIN: no rashes  HEAD: NC/AT  EYES: PERRLA, EOMI  EARS: TM's intact  NOSE: no abnormalities  NECK:  Supple. No lymphadenopathy. Jugular venous pressure not elevated. Carotids equal.   HEART:   S1S2 reg  CHEST:  bilat ronchi  ABDOMEN:  Soft and nontender.   EXTREMITIES:  no C/C/E  NEURO: sedated on vent      LABS:                        9.2    9.58  )-----------( 452      ( 17 Apr 2020 05:00 )             30.4     04-17    143  |  103  |  11  ----------------------------<  144<H>  3.9   |  40<H>  |  0.30<L>    Ca    7.9<L>      17 Apr 2020 05:00  Phos  2.8     04-17  Mg     2.2     04-17    TPro  6.4  /  Alb  1.1<L>  /  TBili  0.4  /  DBili  x   /  AST  53<H>  /  ALT  29  /  AlkPhos  155<H>  04-17          RADIOLOGY & ADDITIONAL TESTS:

## 2020-04-18 NOTE — PROGRESS NOTE ADULT - SUBJECTIVE AND OBJECTIVE BOX
Subjective:    Intubated, sedated. Events noted. Vent Day #21    MEDICATIONS  (STANDING):  aspirin  chewable 81 milliGRAM(s) Oral daily  atorvastatin 20 milliGRAM(s) Oral at bedtime  baclofen 10 milliGRAM(s) Oral daily  chlorhexidine 0.12% Liquid 15 milliLiter(s) Oral Mucosa every 12 hours  chlorhexidine 4% Liquid 1 Application(s) Topical <User Schedule>  collagenase Ointment 1 Application(s) Topical two times a day  enoxaparin Injectable 80 milliGRAM(s) SubCutaneous every 12 hours  famotidine Injectable 20 milliGRAM(s) IV Push two times a day  levothyroxine 75 MICROGram(s) Oral daily  midazolam Infusion 0.02 mG/kG/Hr (1.52 mL/Hr) IV Continuous <Continuous>  norepinephrine Infusion 0.05 MICROgram(s)/kG/Min (7.13 mL/Hr) IV Continuous <Continuous>  polyethylene glycol 3350 17 Gram(s) Oral daily  propofol Infusion 15 MICROgram(s)/kG/Min (6.85 mL/Hr) IV Continuous <Continuous>  sodium chloride 0.9% Bolus 250 milliLiter(s) IV Bolus once  trimethoprim/polymyxin Solution 1 Drop(s) Right EYE four times a day    MEDICATIONS  (PRN):  acetaminophen  Suppository .. 650 milliGRAM(s) Rectal every 6 hours PRN Temp greater or equal to 38C (100.4F)  LORazepam   Injectable 2 milliGRAM(s) IV Push every 4 hours PRN Agitation  senna 2 Tablet(s) Oral at bedtime PRN Constipation      Allergies    No Known Allergies    Intolerances        Vital Signs Last 24 Hrs  T(C): 36.6 (2020 08:00), Max: 37.3 (2020 17:00)  T(F): 97.8 (2020 08:00), Max: 99.1 (2020 17:00)  HR: 123 (2020 12:00) (86 - 138)  BP: 106/69 (2020 12:00) (90/58 - 114/80)  BP(mean): 76 (2020 12:00) (71 - 87)  RR: 30 (2020 12:00) (30 - 36)  SpO2: 94% (2020 12:00) (92% - 98%)    PHYSICAL EXAMINATION:    NECK:  Supple. No lymphadenopathy. Jugular venous pressure not elevated.   HEART:   The cardiac impulse has a normal quality. Reg., Nl S1 and S2.    CHEST:  Chest with bilateral rhonchi/sub Q emphysema.  ABDOMEN:  Soft and nontender.   EXTREMITIES:  There is no edema.       LABS:                        9.5    10.83 )-----------( 451      ( 2020 04:25 )             27.1     04-18    141  |  101  |  17  ----------------------------<  145<H>  4.0   |  42<H>  |  0.29<L>    Ca    7.9<L>      2020 04:25  Phos  3.3     04-18  Mg     2.2     -18    TPro  6.9  /  Alb  1.2<L>  /  TBili  0.4  /  DBili  x   /  AST  54<H>  /  ALT  32  /  AlkPhos  184<H>      Ab.37/66/82/37 on 30/450/40/10      RADIOLOGY & ADDITIONAL TESTS:< from: Xray Chest 1 View- PORTABLE-Routine (20 @ 10:10) >  EXAM:  XR CHEST PORTABLE ROUTINE 1V                            PROCEDURE DATE:  2020          INTERPRETATION:  History: Cough. Follow-up pneumonia and pneumothorax    Chest:  one view.      Comparison: 2020    AP radiograph of the chestdemonstrates increase in RIGHT pneumothorax measuring approximately 25%. Near complete resolution of LEFT pneumothorax. Diffuse BILATERAL alveolar infiltrates unchanged. Lines and tubes are unchanged. The cardiac silhouette is normal in size. Osseousstructures are intact.    Impression: Interval increase in RIGHT pneumothorax measuring approximately 25%. Near complete resolution of LEFT pneumothorax. Diffuse BILATERAL alveolar infiltrates unchanged. Lines and tubes are unchanged.       JOSEFA LAWSON M.D., ATTENDING RADIOLOGIST      Assessment and Plan:   · Assessment		  - cont vent support as above  - labs in am  - Lovenox-full dose for DVT  - Bilateral chest tubes-check repeat CXR to re-evaluate right PTX after altering suction   - Continue tube feeds with Jevity  - Palliative Care F/U noted-awaiting Video Conference call w/ family    Problem/Plan - 1:  ·  Problem: Acute on chronic respiratory failure with hypoxia and hypercapnia.     Problem/Plan - 2:  ·  Problem: COVID-19 virus infection.     Problem/Plan - 3:  ·  Problem: SARS-associated coronavirus as cause of disease classified elsewhere.     Problem/Plan - 4:  ·  Problem: Hypokalemia.     Problem/Plan - 5:  ·  Problem: Anemia, chronic disease.

## 2020-04-18 NOTE — CHART NOTE - NSCHARTNOTEFT_GEN_A_CORE
HPI:73 yo man with respiratory failure secondary to COVID infection; now with bacterial superinfection. Transfer from [facility]. COVID(+).   Pt remains vented x 20 days. I & O's- fecal incontinence x 3 on 4/18 noted. Urine output-2600ml/24h, right chest tube-260ml, Left chest tube-270ml. +Liquid stools x 3 (4/17). Additional 300ml free water flush.   Sedation: propofol infusing @ 22.8ml/hr providing additional 600 calories which is included in enteral feeds total.   Current enteral regimen: Jevity 1.5 Bolus 150ml x 3 daily (total volume 450ml) + 9 packs of prosource TF daily. (total provided: 1635 calories/ 128gm protein/ 350 ml free water from enteral formula). Additional water flush before and after each feed- 600ml daily. (free water from formula and additional water flushes 950ml). Flow sheets indicating pt receiving 3x (150ml) bolus of Jevity 1.5 as ordered. Prosource TF packets not documented. Current tf meeting 100% of protein/energy needs.   Palliative team following and meeting with family, however remain undecided about trach placement vs. compassionate weaning.   + Pressure injuries noted (Sacrum/ coccyx unstageable, stage 2).   Labs reviewed: Last triglyceride 4/ (reorder every 3-4 days to monitor), due to long term propofol.   See below for recommendations.        Recommendations:  1) Continue with current enteral order and reevaluate if sedation d/c'd (propofol  2) Maintain aspiration precautions, back of bed >35 degrees.   3) Suggest add MVI w/minerals, Vit C 500 mg BID, add Zinc Sulfate 220 mg x 10 days to promote wound healing.   4) Monitor residuals if >250ml consider initiating PPI and or increasing dosage.   5) Consider PEG/Trach 2/2 long duration on mechanical vent/enteral feeds (20 days)  6) monitor weights   7) monitor labs/lytes and replete as needed  8) monitor hydration and strict I & O's   9) Consult wound care RN to evaluate current Pressure injuries/skin status.        Wt Hx: no recent weights since admission   Height (cm): 170.2 (03-29-20 @ 11:52), 165.1 (03-21-20 @ 19:00)  Weight (kg): 76.1 (04-01-20 @ 02:26), 76.1 (03-29-20 @ 11:52), 68 (03-21-20 @ 19:00)  BMI (kg/m2): 26.3 (04-01-20 @ 02:26), 26.3 (03-29-20 @ 11:52), 24.9 (03-21-20 @ 19:00)      ESTIMATED NUTR NEEDS: 67Kg IBW  1340-1675Kcal (20-25Kcal/Kg)  121-134g protein (1.8-2g/Kg BW).  1675-2010ml fluid (25-30ml/kg)          Labs: 04-18 Na141 mmol/L Glu 145 mg/dL<H> K+ 4.0 mmol/L Cr  0.29 mg/dL<L> BUN 17 mg/dL 04-18 Phos 3.3 mg/dL 04-18 Alb 1.2 g/dL<L> 04-16 Chol --    LDL --    HDL --    Trig 191 mg/dL<H>     CAPILLARY BLOOD GLUCOSE          Skin: violeta score = 9 (high risk of further skin breakdown)  Edema-+1   Skin- coccyx, sacrum stage 2 and unstageable    ***Will continue to monitor and follow up prn****

## 2020-04-19 NOTE — PROGRESS NOTE ADULT - ASSESSMENT
- cont vent  - CXR again shows increase in size of right PTX. Will increase suction  - may need second chest tube  - labs noted  - full dose lovenox for dvt

## 2020-04-19 NOTE — PROGRESS NOTE ADULT - SUBJECTIVE AND OBJECTIVE BOX
Subjective:  sedated on vent    MEDICATIONS  (STANDING):  aspirin  chewable 81 milliGRAM(s) Oral daily  atorvastatin 20 milliGRAM(s) Oral at bedtime  baclofen 10 milliGRAM(s) Oral daily  chlorhexidine 0.12% Liquid 15 milliLiter(s) Oral Mucosa every 12 hours  chlorhexidine 4% Liquid 1 Application(s) Topical <User Schedule>  collagenase Ointment 1 Application(s) Topical two times a day  enoxaparin Injectable 80 milliGRAM(s) SubCutaneous every 12 hours  famotidine Injectable 20 milliGRAM(s) IV Push two times a day  levothyroxine 75 MICROGram(s) Oral daily  midazolam Infusion 0.02 mG/kG/Hr (1.52 mL/Hr) IV Continuous <Continuous>  norepinephrine Infusion 0.05 MICROgram(s)/kG/Min (7.13 mL/Hr) IV Continuous <Continuous>  polyethylene glycol 3350 17 Gram(s) Oral daily  propofol Infusion 15 MICROgram(s)/kG/Min (6.85 mL/Hr) IV Continuous <Continuous>  sodium chloride 0.9% Bolus 250 milliLiter(s) IV Bolus once  trimethoprim/polymyxin Solution 1 Drop(s) Right EYE four times a day    MEDICATIONS  (PRN):  acetaminophen  Suppository .. 650 milliGRAM(s) Rectal every 6 hours PRN Temp greater or equal to 38C (100.4F)  LORazepam   Injectable 2 milliGRAM(s) IV Push every 4 hours PRN Agitation  senna 2 Tablet(s) Oral at bedtime PRN Constipation      Allergies    No Known Allergies    Intolerances        REVIEW OF SYSTEMS: on vent    CONSTITUTIONAL:  As per HPI.      Vital Signs Last 24 Hrs  T(C): 36.2 (19 Apr 2020 08:15), Max: 37 (19 Apr 2020 02:00)  T(F): 97.2 (19 Apr 2020 08:15), Max: 98.6 (19 Apr 2020 02:00)  HR: 120 (19 Apr 2020 10:18) (108 - 124)  BP: 109/74 (19 Apr 2020 10:18) (96/67 - 119/77)  BP(mean): 73 (19 Apr 2020 02:00) (73 - 96)  RR: 32 (19 Apr 2020 10:18) (28 - 32)  SpO2: 96% (19 Apr 2020 10:18) (94% - 97%)    Mode: AC/ CMV (Assist Control/ Continuous Mandatory Ventilation)  RR (machine): 30  TV (machine): 500  FiO2: 40  PEEP: 5  ITime: 1  MAP: 20  PIP: 45    PHYSICAL EXAMINATION:  SKIN: no rashes  HEAD: NC/AT  EYES: PERRLA, EOMI  EARS: TM's intact  NOSE: no abnormalities  NECK:  Supple. No lymphadenopathy. Jugular venous pressure not elevated. Carotids equal.   HEART:  S1S2 tachy  CHEST:  good air entry w bilat ronchi; bilat chest tubes  ABDOMEN:  Soft and nontender.   EXTREMITIES:  no C/C/E  NEURO: sedated on vent      LABS:                        9.1    12.03 )-----------( 487      ( 19 Apr 2020 05:15 )             28.1     04-19    141  |  100  |  25<H>  ----------------------------<  135<H>  3.9   |  42<H>  |  0.24<L>    Ca    7.7<L>      19 Apr 2020 05:15  Phos  2.9     04-19  Mg     2.2     04-19    TPro  7.0  /  Alb  1.3<L>  /  TBili  0.3  /  DBili  x   /  AST  50<H>  /  ALT  32  /  AlkPhos  162<H>  04-19          RADIOLOGY & ADDITIONAL TESTS:

## 2020-04-20 NOTE — CONSULT NOTE ADULT - ASSESSMENT
73yo M with PMH of BPH presented to  on 4/1/2020, transfer from [facility] with Positive Covid-19, originally admitted with complaints of shortness of breath x 6 days. Patient intubated at  on 3/29. Course complicated by right pneumothorax 4/14 requiring b/l CT placement. On T lovenox for DVT. Thoracic surgery called for persistent right pneumothorax.    Will review imaging with Dr. Alberto  Likely 2nd Right chest tube to be placed today  Hold T lovenox today please  C/W tubes to suction for now  CXR as per ICU  dressing changes daily     Jenni Valverde PA  Thoracic Sx  #[id number]

## 2020-04-20 NOTE — CONSULT NOTE ADULT - SUBJECTIVE AND OBJECTIVE BOX
HPI:  From chart:  73 y/o M with a PMHx of BPH presented to  on 2020, transfer from [facility] with Positive Covid-19, originally admitted with complaints of shortness of breath x 6 days. Patient presented initially on 3/21/2020 at other facility and d/c'd from ED, COVID-19 testing done that has now resulted positive. Denies smoking, chest pain, cough, fever, diarrhea, vomiting, abdominal pain, known sick contacts, recent travel or any other acute complaints. Patient presented to [place] on Mechanical Vent, RR 30, , PEEP 16, O2 40%. Patient with R arterial line and L IJ Central line in place. Patient found to have bilateral DVTs at [place], started on Heparin gtt. Reason for transfer is unclear at this time.  Called patient home number in chart, wife not answering phone to complete a history at this time. Medications reviewed from TAMARA Crow. (2020 03:10)    Patient intubated at  on 3/29. Course complicated by right pneumothorax  requiring b/l CT placement. On T lovenox for DVT. Thoracic surgery called for persistent right pneumothorax.  Vent 30 500 40% 5+.     PAST MEDICAL & SURGICAL HISTORY:  BPH (benign prostatic hyperplasia)  No pertinent past medical history  No significant past surgical history      REVIEW OF SYSTEMS  Unable to obtain at this time      MEDICATIONS  (STANDING):  aspirin  chewable 81 milliGRAM(s) Oral daily  atorvastatin 20 milliGRAM(s) Oral at bedtime  baclofen 10 milliGRAM(s) Oral daily  chlorhexidine 0.12% Liquid 15 milliLiter(s) Oral Mucosa every 12 hours  chlorhexidine 4% Liquid 1 Application(s) Topical <User Schedule>  collagenase Ointment 1 Application(s) Topical two times a day  enoxaparin Injectable 80 milliGRAM(s) SubCutaneous every 12 hours  famotidine Injectable 20 milliGRAM(s) IV Push two times a day  levothyroxine 75 MICROGram(s) Oral daily  midazolam Infusion 0.02 mG/kG/Hr (1.52 mL/Hr) IV Continuous <Continuous>  norepinephrine Infusion 0.05 MICROgram(s)/kG/Min (7.13 mL/Hr) IV Continuous <Continuous>  polyethylene glycol 3350 17 Gram(s) Oral daily  propofol Infusion 15 MICROgram(s)/kG/Min (6.85 mL/Hr) IV Continuous <Continuous>  sodium chloride 0.9% Bolus 250 milliLiter(s) IV Bolus once  trimethoprim/polymyxin Solution 1 Drop(s) Right EYE four times a day    MEDICATIONS  (PRN):  acetaminophen  Suppository .. 650 milliGRAM(s) Rectal every 6 hours PRN Temp greater or equal to 38C (100.4F)  LORazepam   Injectable 2 milliGRAM(s) IV Push every 4 hours PRN Agitation  senna 2 Tablet(s) Oral at bedtime PRN Constipation      Allergies: No Known Allergies      SOCIAL HISTORY:  Smoking Hx: never smoker as per chart  Etoh Hx: unknown  IVDA Hx: unknown    FAMILY HISTORY:  No pertinent family history in first degree relatives      Vital Signs Last 24 Hrs  T(C): 37.9 (2020 11:00), Max: 37.9 (2020 11:00)  T(F): 100.2 (2020 11:00), Max: 100.2 (2020 11:00)  HR: 122 (2020 11:00) (115 - 124)  BP: 98/69 (2020 11:00) (88/63 - 118/80)  BP(mean): 76 (2020 07:00) (67 - 82)  RR: 30 (2020 11:00) (30 - 33)  SpO2: 97% (2020 11:00) (95% - 97%)    I&O's Detail    2020 07:01  -  2020 07:00  --------------------------------------------------------  IN:    Free Water: 400 mL    midazolam Infusion: 46.7 mL    Miscellaneous Tube Feedin mL    norepinephrine Infusion: 34.2 mL    ns in tub fed  [id number]: 450 mL    propofol Infusion: 242 mL  Total IN: 1177.9 mL    OUT:    Chest Tube: 90 mL    Chest Tube: 100 mL    Indwelling Catheter - Urethral: 2000 mL  Total OUT: 2190 mL    Total NET: -1012.1 mL      2020 07:01  -  2020 14:18  --------------------------------------------------------  IN:  Total IN: 0 mL    OUT:    Indwelling Catheter - Urethral: 600 mL  Total OUT: 600 mL    Total NET: -600 mL      General: Intubated, sedated  Respiratory: Course BS b/l  CV: S1S2, no murmurs, rubs or gallops  Tubes: R Ct to -40, mild right sided SQ air, 100cc out in 24H, no leak  L CT to -20 sxn, bloody output, 50cc out in 24H, no leak     LABS:                        8.9    10.82 )-----------( 493      ( 2020 04:56 )             29.0         140  |  102  |  15  ----------------------------<  112<H>  3.5   |  36<H>  |  0.23<L>    Ca    7.6<L>      2020 04:56  Phos  2.2       Mg     2.2         TPro  6.6  /  Alb  1.3<L>  /  TBili  0.4  /  DBili  x   /  AST  51<H>  /  ALT  29  /  AlkPhos  156<H>            RADIOLOGY & ADDITIONAL STUDIES:  < from: Xray Chest 1 View- PORTABLE-Routine (20 @ 09:56) >  No change heart mediastinum. No significant change bilateral airspace disease. No gross interval change right pneumothorax. Decreased subcutaneous emphysema. No new abnormality.    < end of copied text >      ASSESSMENT:   74yMalePAST MEDICAL & SURGICAL HISTORY:  BPH (benign prostatic hyperplasia)  No pertinent past medical history  No significant past surgical history  HEALTH ISSUES - PROBLEM Dx:  Pneumothorax on right: Pneumothorax on right  DVT of lower limb, acute: DVT of lower limb, acute  MRSA (methicillin resistant staph aureus) culture positive: MRSA (methicillin resistant staph aureus) culture positive  Subcutaneous emphysema: Subcutaneous emphysema  Chest tube in place: Chest tube in place  Acute respiratory failure with hypoxia and hypercapnia: Acute respiratory failure with hypoxia and hypercapnia  Anemia, chronic disease: Anemia, chronic disease  Hypokalemia: Hypokalemia  SARS-associated coronavirus as cause of disease classified elsewhere: SARS-associated coronavirus as cause of disease classified elsewhere  COVID-19 virus infection: COVID-19 virus infection  Acute on chronic respiratory failure with hypoxia and hypercapnia: Acute on chronic respiratory failure with hypoxia and hypercapnia  Thrombophlebitis of popliteal vein

## 2020-04-20 NOTE — CHART NOTE - NSCHARTNOTEFT_GEN_A_CORE
HPI:75 yo man with respiratory failure secondary to COVID infection; now with bacterial superinfection. Transfer from [facility]. COVID(+).   Current assessment per [facility].  Vent continued.   Stable pneumothorax.  2x chest tubes in place.   Subcutaneous emphysema.  MRSA culture positive.   No new notes from Palliative, trach placement vs. compassionate weaning still in question.   Pt remains vented since 4/1 (20 days.)  I & O's- fecal incontinence x 3 on 4/18 noted.  Bowel sounds on 4/20 audible and normoactive  Sedation: propofol infusing @ 18.3 ml/hr providing additional  calories which is included in enteral feeds total.   Current enteral regimen: Jevity 1.5 Bolus 150ml x 3 daily (total volume 450ml) + 9 packs of prosource TF daily. (total provided: 1635 calories/ 128gm protein/ 350 ml free water from enteral formula). Additional water flush before and after each feed- 600ml daily. (free water from formula and additional water flushes 950ml). Flow sheets indicating pt receiving 3x (150ml) bolus of Jevity 1.5 as ordered. Prosource TF packets not documented. Current tf meeting 100% of protein/energy needs. + Pressure injuries noted (Sacrum/ coccyx unstageable, stage 2).   Labs reviewed:   4/20 Glucose 112H   Mag  2.2 WDL   K   WDL    Phos   2.2 L      Last triglyceride 4/ (reorder every 3-4 days to monitor), due to long term propofol.   See below for recommendations.        Recommendations:  1) Continue with current enteral order and reevaluate if sedation d/c'd (propofol)  2) Maintain aspiration precautions, back of bed >35 degrees.   3) Suggest add MVI w/minerals, Vit C 500 mg BID, add Zinc Sulfate 220 mg x 10 days to promote wound healing.   4) Consider PEG/Trach 2/2 long duration on mechanical vent/enteral feeds (20 days)  5) monitor weights   6) monitor labs/lytes and replete as needed  7) monitor hydration and strict I & O's   8) Consult wound care RN to evaluate current Pressure injuries/skin status.        Wt Hx: no recent weights since admission   Height (cm): 170.2 (03-29-20 @ 11:52), 165.1 (03-21-20 @ 19:00)  Weight (kg): 76.1 (04-01-20 @ 02:26), 76.1 (03-29-20 @ 11:52), 68 (03-21-20 @ 19:00)  BMI (kg/m2): 26.3 (04-01-20 @ 02:26), 26.3 (03-29-20 @ 11:52), 24.9 (03-21-20 @ 19:00)      ESTIMATED NUTR NEEDS: 67Kg IBW  1340-1675Kcal (20-25Kcal/Kg)  121-134g protein (1.8-2g/Kg BW).  1675-2010ml fluid (25-30ml/kg)

## 2020-04-20 NOTE — PROGRESS NOTE ADULT - SUBJECTIVE AND OBJECTIVE BOX
Medicine Progress Note    Patient is a 74y old  Male who presents with a chief complaint of Transfer [place] - Covid-19 PNA (19 Apr 2020 10:57)      SUBJECTIVE / OVERNIGHT EVENTS:    ADDITIONAL REVIEW OF SYSTEMS:    MEDICATIONS  (STANDING):  aspirin  chewable 81 milliGRAM(s) Oral daily  atorvastatin 20 milliGRAM(s) Oral at bedtime  baclofen 10 milliGRAM(s) Oral daily  chlorhexidine 0.12% Liquid 15 milliLiter(s) Oral Mucosa every 12 hours  chlorhexidine 4% Liquid 1 Application(s) Topical <User Schedule>  collagenase Ointment 1 Application(s) Topical two times a day  enoxaparin Injectable 80 milliGRAM(s) SubCutaneous every 12 hours  famotidine Injectable 20 milliGRAM(s) IV Push two times a day  levothyroxine 75 MICROGram(s) Oral daily  midazolam Infusion 0.02 mG/kG/Hr (1.52 mL/Hr) IV Continuous <Continuous>  norepinephrine Infusion 0.05 MICROgram(s)/kG/Min (7.13 mL/Hr) IV Continuous <Continuous>  polyethylene glycol 3350 17 Gram(s) Oral daily  propofol Infusion 15 MICROgram(s)/kG/Min (6.85 mL/Hr) IV Continuous <Continuous>  sodium chloride 0.9% Bolus 250 milliLiter(s) IV Bolus once  trimethoprim/polymyxin Solution 1 Drop(s) Right EYE four times a day    MEDICATIONS  (PRN):  acetaminophen  Suppository .. 650 milliGRAM(s) Rectal every 6 hours PRN Temp greater or equal to 38C (100.4F)  LORazepam   Injectable 2 milliGRAM(s) IV Push every 4 hours PRN Agitation  senna 2 Tablet(s) Oral at bedtime PRN Constipation    CAPILLARY BLOOD GLUCOSE        I&O's Summary    19 Apr 2020 07:01  -  20 Apr 2020 07:00  --------------------------------------------------------  IN: 1177.9 mL / OUT: 2190 mL / NET: -1012.1 mL        PHYSICAL EXAM:  Vital Signs Last 24 Hrs  T(C): 37.2 (20 Apr 2020 07:00), Max: 37.2 (20 Apr 2020 07:00)  T(F): 98.9 (20 Apr 2020 07:00), Max: 98.9 (20 Apr 2020 07:00)  HR: 124 (20 Apr 2020 08:10) (115 - 127)  BP: 109/68 (20 Apr 2020 07:00) (88/63 - 118/80)  BP(mean): 76 (20 Apr 2020 07:00) (67 - 82)  RR: 30 (20 Apr 2020 07:00) (30 - 33)  SpO2: 95% (20 Apr 2020 07:00) (94% - 97%)  CONSTITUTIONAL: NAD, well-developed, well-groomed  ENMT: Moist oral mucosa, no pharyngeal injection or exudates; normal dentition  RESPIRATORY: Normal respiratory effort; lungs are clear to auscultation bilaterally  CARDIOVASCULAR: Regular rate and rhythm, normal S1 and S2, no murmur/rub/gallop; No lower extremity edema; Peripheral pulses are 2+ bilaterally  ABDOMEN: Nontender to palpation, normoactive bowel sounds, no rebound/guarding; No hepatosplenomegaly  PSYCH: A+O to person, place, and time; affect appropriate  NEUROLOGY: CN 2-12 are intact and symmetric; no gross sensory deficits   SKIN: No rashes; no palpable lesions    LABS:                        8.9    10.82 )-----------( 493      ( 20 Apr 2020 04:56 )             29.0     04-20    140  |  102  |  15  ----------------------------<  112<H>  3.5   |  36<H>  |  0.23<L>    Ca    7.6<L>      20 Apr 2020 04:56  Phos  2.2     04-20  Mg     2.2     04-20    TPro  6.6  /  Alb  1.3<L>  /  TBili  0.4  /  DBili  x   /  AST  51<H>  /  ALT  29  /  AlkPhos  156<H>  04-20        Assessment and Plan:   · Assessment		  - cont vent  - 4/19/20 cxr stable pneumothorax  - 2x chest tubes in place, thoracic following  - labs noted, ordered for AM  - full dose lovenox for dvt     Problem/Plan - 1:  ·  Problem: COVID-19 virus infection.      Problem/Plan - 2:  ·  Problem: SARS-associated coronavirus as cause of disease classified elsewhere.      Problem/Plan - 3:  ·  Problem: Acute on chronic respiratory failure with hypoxia and hypercapnia.      Problem/Plan - 4:  ·  Problem: Pneumothorax on right.      Problem/Plan - 5:  ·  Problem: Chest tube in place.      Problem/Plan - 6:  Problem: DVT of lower limb, acute.     Problem/Plan - 7:  ·  Problem: Subcutaneous emphysema.      Problem/Plan - 8:  ·  Problem: MRSA (methicillin resistant staph aureus) culture positive.             COVID-19 PCR: Detected (21 Mar 2020 22:44)      RADIOLOGY & ADDITIONAL TESTS:  Imaging from Last 24 Hours:          COORDINATION OF CARE:  Care Discussed with Consultants/Other Providers:

## 2020-04-20 NOTE — CONSULT NOTE ADULT - ATTENDING COMMENTS
Patient seen and examined. Has been intubated for unfortunately 22 days due to COVID respiratory illness. Called by Dr. Guardado to see additional chest tube on the right is needed for better lung expansion.  His right lung has not been completely re expanded since 4/18. I do not think the Patient has an active airleak, I think the space in the chest is all due to compliance of the lung.     Recommended placing a 28 Irish chest tube in the anterior apical position to see if that improves lung re expansion.     Will cont to follow along with you

## 2020-04-21 NOTE — PROGRESS NOTE ADULT - SUBJECTIVE AND OBJECTIVE BOX
Subjective: No acute events overnight. Patient seen and examined this AM. Remains intubated, sedated. Second R CT placed yesterday for persistent pneumothorax.     Vital Signs:  Vital Signs Last 24 Hrs  T(C): 36.8 (20 @ 16:00), Max: 36.9 (20 @ 00:00)  T(F): 98.3 (20 @ 16:00), Max: 98.5 (20 @ 00:00)  HR: 114 (20 @ 17:00) (100 - 120)  BP: 92/68 (20 @ 17:00) (92/68 - 107/798)  RR: 30 (20 @ 12:00) (26 - 30)  SpO2: 97% (20 @ 17:00) (96% - 99%) on (O2)    I&O's Detail    2020 07:01  -  2020 07:00  --------------------------------------------------------  IN:    Free Water: 100 mL    midazolam Infusion: 127 mL    Miscellaneous Tube Feedin mL    ns in tub fed  [id number]: 300 mL    propofol Infusion: 393 mL  Total IN: 1055 mL    OUT:    Chest Tube: 180 mL    Chest Tube: 110 mL    Chest Tube: 37 mL    Indwelling Catheter - Urethral: 1750 mL  Total OUT: 2077 mL    Total NET: -1022 mL      2020 07:01  -  2020 18:32  --------------------------------------------------------  IN:    Free Water: 100 mL  Total IN: 100 mL    OUT:    Indwelling Catheter - Urethral: 330 mL  Total OUT: 330 mL    Total NET: -230 mL      General: Intubated, sedated  Neck: Neck supple, trachea midline, No JVD  Respiratory: Course BS b/l  CV: S1S2, no murmurs, rubs or gallops  Tubes: L CT to -20 sxn with 180cc out in 24H and no air leak  R CT #1 (old) to -20 sxn, 110 out overnight, no air leak  R CT #2 (new) to -20 sxn, 37cc out overnight, no leak     Relevant labs, radiology and Medications reviewed                        9.2    10.21 )-----------( 467      ( 2020 05:40 )             29.3         141  |  104  |  16  ----------------------------<  115<H>  3.3<L>   |  35<H>  |  0.24<L>    Ca    7.6<L>      2020 05:40  Phos  2.9       Mg     2.2         TPro  6.6  /  Alb  1.4<L>  /  TBili  0.5  /  DBili  x   /  AST  52<H>  /  ALT  30  /  AlkPhos  160<H>        MEDICATIONS  (STANDING):  aspirin  chewable 81 milliGRAM(s) Oral daily  atorvastatin 20 milliGRAM(s) Oral at bedtime  baclofen 10 milliGRAM(s) Oral daily  chlorhexidine 0.12% Liquid 15 milliLiter(s) Oral Mucosa every 12 hours  chlorhexidine 4% Liquid 1 Application(s) Topical <User Schedule>  collagenase Ointment 1 Application(s) Topical two times a day  enoxaparin Injectable 80 milliGRAM(s) SubCutaneous every 12 hours  famotidine Injectable 20 milliGRAM(s) IV Push two times a day  levothyroxine 75 MICROGram(s) Oral daily  midazolam Infusion 0.02 mG/kG/Hr (1.52 mL/Hr) IV Continuous <Continuous>  norepinephrine Infusion 0.05 MICROgram(s)/kG/Min (7.13 mL/Hr) IV Continuous <Continuous>  polyethylene glycol 3350 17 Gram(s) Oral daily  propofol Infusion 15 MICROgram(s)/kG/Min (6.85 mL/Hr) IV Continuous <Continuous>  sodium chloride 0.9% Bolus 250 milliLiter(s) IV Bolus once  trimethoprim/polymyxin Solution 1 Drop(s) Right EYE four times a day    MEDICATIONS  (PRN):  acetaminophen  Suppository .. 650 milliGRAM(s) Rectal every 6 hours PRN Temp greater or equal to 38C (100.4F)  senna 2 Tablet(s) Oral at bedtime PRN Constipation        Assessment  74y Male  w/ PAST MEDICAL & SURGICAL HISTORY:  BPH (benign prostatic hyperplasia)  No pertinent past medical history  No significant past surgical history  admitted with complaints of Patient is a 74y old  Male who presents with a chief complaint of Transfer [place] - Covid-19 PNA (2020 09:51)  patient underwent Chest tube placement  Chest tube insertion  US guided central cath  Cannulation, artery, percutaneous, for sampling

## 2020-04-21 NOTE — PROGRESS NOTE ADULT - SUBJECTIVE AND OBJECTIVE BOX
Subjective:    Intubated, sedated. Vent Day #24    MEDICATIONS  (STANDING):  aspirin  chewable 81 milliGRAM(s) Oral daily  atorvastatin 20 milliGRAM(s) Oral at bedtime  baclofen 10 milliGRAM(s) Oral daily  chlorhexidine 0.12% Liquid 15 milliLiter(s) Oral Mucosa every 12 hours  chlorhexidine 4% Liquid 1 Application(s) Topical <User Schedule>  collagenase Ointment 1 Application(s) Topical two times a day  enoxaparin Injectable 80 milliGRAM(s) SubCutaneous every 12 hours  famotidine Injectable 20 milliGRAM(s) IV Push two times a day  levothyroxine 75 MICROGram(s) Oral daily  midazolam Infusion 0.02 mG/kG/Hr (1.52 mL/Hr) IV Continuous <Continuous>  norepinephrine Infusion 0.05 MICROgram(s)/kG/Min (7.13 mL/Hr) IV Continuous <Continuous>  polyethylene glycol 3350 17 Gram(s) Oral daily  potassium chloride   Powder 40 milliEquivalent(s) Enteral Tube once  propofol Infusion 15 MICROgram(s)/kG/Min (6.85 mL/Hr) IV Continuous <Continuous>  sodium chloride 0.9% Bolus 250 milliLiter(s) IV Bolus once  trimethoprim/polymyxin Solution 1 Drop(s) Right EYE four times a day    MEDICATIONS  (PRN):  acetaminophen  Suppository .. 650 milliGRAM(s) Rectal every 6 hours PRN Temp greater or equal to 38C (100.4F)  senna 2 Tablet(s) Oral at bedtime PRN Constipation      Allergies    No Known Allergies    Intolerances        Vital Signs Last 24 Hrs  T(C): 36.1 (2020 09:00), Max: 37.9 (2020 11:00)  T(F): 97 (2020 09:00), Max: 100.2 (2020 11:00)  HR: 111 (2020 08:00) (100 - 129)  BP: 107/798 (2020 08:00) (90/69 - 114/79)  BP(mean): 83 (2020 02:00) (76 - 91)  RR: 30 (2020 08:00) (26 - 30)  SpO2: 98% (2020 08:00) (95% - 99%)    PHYSICAL EXAMINATION:    NECK:  Supple. No lymphadenopathy. Jugular venous pressure not elevated.   HEART:   The cardiac impulse has a normal quality. Reg., Nl S1 and S2.    CHEST:  Chest with bilateral rhonchi. +Subcutaneous air.   ABDOMEN:  Soft and nontender.   EXTREMITIES:  There is no edema.       LABS:                        9.2    10.21 )-----------( 467      ( 2020 05:40 )             29.3     04-21    141  |  104  |  16  ----------------------------<  115<H>  3.3<L>   |  35<H>  |  0.24<L>    Ca    7.6<L>      2020 05:40  Phos  2.9     -  Mg     2.2     -    TPro  6.6  /  Alb  1.4<L>  /  TBili  0.5  /  DBili  x   /  AST  52<H>  /  ALT  30  /  AlkPhos  160<H>      AB.43/54/81/35 on 30/450/40%/5      RADIOLOGY & ADDITIONAL TESTS:< from: Xray Chest 1 View-PORTABLE IMMEDIATE (20 @ 19:46) >  EXAM:  XR CHEST PORTABLE IMMED 1V                            PROCEDURE DATE:  2020          INTERPRETATION:  XR CHEST IMMEDIATE    Single AP view    HISTORY:  post right chest tube placement    Comparison: Same day chest x-ray      The tip ofthe ET tube is above the junior.    The NG tube is in the stomach.    Right IJ line tip in the SVC.    Right chest tubes without pneumothorax.    Left chest tube without pneumothorax.    The cardiac silhouette is within normal limits. Diffuse bilateral airspace disease. No pleural abnormality.    IMPRESSION: Additional right chest tube placed with resolution of pneumothorax.    Unchanged bilateral airspace disease      LATANYA GAING       Assessment and Plan:   · Assessment		  - cont vent support as above. Concern about Increased airway pressures and elevated PaCO2(compensated).  - labs in am  - Lovenox-full dose for DVT  - Bilateral chest tubes-re-expansion of right lung after 2nd Chest tube  - Continue tube feeds with Jevity  - Palliative Care F/U. Need to make decision regarding trach/PEG  - Monitor I+O's  - K+ Repletion  - Check repeat CXR     Problem/Plan - 1:  ·  Problem: Acute on chronic respiratory failure with hypoxia and hypercapnia.     Problem/Plan - 2:  ·  Problem: COVID-19 virus infection.     Problem/Plan - 3:  ·  Problem: SARS-associated coronavirus as cause of disease classified elsewhere.     Problem/Plan - 4:  ·  Problem: Hypokalemia.     Problem/Plan - 5:  ·  Problem: Anemia, chronic disease.

## 2020-04-21 NOTE — PROGRESS NOTE ADULT - ASSESSMENT
73yo M with PMH of BPH presented to  on 4/1/2020, transfer from [facility] with Positive Covid-19, originally admitted with complaints of shortness of breath x 6 days. Patient intubated at  on 3/29. Course complicated by right pneumothorax 4/14 requiring b/l CT placement. On T lovenox for DVT. Thoracic surgery called for persistent right pneumothorax. Second R CT placed 4/20 with reexpansion of lung.     maintain new R CT and L to -20 suction  Please keep R CT #1 (Old) clamped overnight  AM CXR  Possibly remove R CT #1 tomorrow if lung remains expanded  dressing changes daily     Jenni JONES  Thoracic Sx  #[id number]

## 2020-04-22 NOTE — PROGRESS NOTE ADULT - ASSESSMENT
75yo M with PMH of BPH presented to  on 4/1/2020, transfer from [facility] with Positive Covid-19, originally admitted with complaints of shortness of breath x 6 days. Patient intubated at  on 3/29. Course complicated by right pneumothorax 4/14 requiring b/l CT placement. On T lovenox for DVT. Thoracic surgery called for persistent right pneumothorax. Second R CT placed 4/20 with reexpansion of lung. Old R CT removed 4/22.     maintain B/L CT to -20 suction  old R tube (#1) removed today  AM CXR  maintain occlusive dressings to old chest tube site for at least 48H    Jenni Valverde PA  Thoracic Sx  #[id number]

## 2020-04-22 NOTE — PROGRESS NOTE ADULT - SUBJECTIVE AND OBJECTIVE BOX
Subjective:  no distress  on vent    MEDICATIONS  (STANDING):  aspirin  chewable 81 milliGRAM(s) Oral daily  atorvastatin 20 milliGRAM(s) Oral at bedtime  baclofen 10 milliGRAM(s) Oral daily  chlorhexidine 0.12% Liquid 15 milliLiter(s) Oral Mucosa every 12 hours  chlorhexidine 4% Liquid 1 Application(s) Topical <User Schedule>  collagenase Ointment 1 Application(s) Topical two times a day  enoxaparin Injectable 80 milliGRAM(s) SubCutaneous every 12 hours  famotidine Injectable 20 milliGRAM(s) IV Push two times a day  levothyroxine 75 MICROGram(s) Oral daily  midazolam Infusion 0.02 mG/kG/Hr (1.52 mL/Hr) IV Continuous <Continuous>  norepinephrine Infusion 0.05 MICROgram(s)/kG/Min (7.13 mL/Hr) IV Continuous <Continuous>  polyethylene glycol 3350 17 Gram(s) Oral daily  propofol Infusion 15 MICROgram(s)/kG/Min (6.85 mL/Hr) IV Continuous <Continuous>  sodium chloride 0.9% Bolus 250 milliLiter(s) IV Bolus once  trimethoprim/polymyxin Solution 1 Drop(s) Right EYE four times a day    MEDICATIONS  (PRN):  acetaminophen  Suppository .. 650 milliGRAM(s) Rectal every 6 hours PRN Temp greater or equal to 38C (100.4F)  senna 2 Tablet(s) Oral at bedtime PRN Constipation      Allergies    No Known Allergies    Intolerances        REVIEW OF SYSTEMS:        Vital Signs Last 24 Hrs  T(C): 36.9 (22 Apr 2020 08:00), Max: 37.6 (21 Apr 2020 20:00)  T(F): 98.4 (22 Apr 2020 08:00), Max: 99.6 (21 Apr 2020 20:00)  HR: 120 (22 Apr 2020 08:00) (105 - 120)  BP: 110/85 (22 Apr 2020 08:00) (92/68 - 111/81)  BP(mean): 93 (22 Apr 2020 08:00) (87 - 93)  RR: 26 (22 Apr 2020 08:00) (22 - 33)  SpO2: 96% (22 Apr 2020 08:00) (96% - 99%)    Mode: AC/ CMV (Assist Control/ Continuous Mandatory Ventilation)  RR (machine): 30  TV (machine): 450  FiO2: 40  PEEP: 5  ITime: 0.6  MAP: 15  PIP: 45      PHYSICAL EXAMINATION:  SKIN: no rashes  HEAD: NC/AT  EYES: PERRLA, EOMI  EARS: TM's intact  NOSE: no abnormalities  NECK:  Supple. No lymphadenopathy. Jugular venous pressure not elevated.    HEART:  S1S2 reg  CHEST:  bilat ronchi; 2 right chest tubes, 1 left chest tube  ABDOMEN:  Soft and nontender.   EXTREMITIES:  no C/C/E  NEURO: sedated on vent      LABS:                        9.2    9.59  )-----------( 421      ( 22 Apr 2020 05:41 )             28.3     04-22    141  |  105  |  13  ----------------------------<  114<H>  3.8   |  35<H>  |  0.25<L>    Ca    7.9<L>      22 Apr 2020 05:41  Phos  2.9     04-22  Mg     2.2     04-22    TPro  6.6  /  Alb  1.4<L>  /  TBili  0.4  /  DBili  x   /  AST  45<H>  /  ALT  27  /  AlkPhos  157<H>  04-22          RADIOLOGY & ADDITIONAL TESTS:

## 2020-04-22 NOTE — PROGRESS NOTE ADULT - SUBJECTIVE AND OBJECTIVE BOX
Subjective: Patient with no acute events overnight. Remains intubated/sedated.     Vital Signs:  Vital Signs Last 24 Hrs  T(C): 36.9 (04-22-20 @ 08:00), Max: 37.6 (04-21-20 @ 20:00)  T(F): 98.4 (04-22-20 @ 08:00), Max: 99.6 (04-21-20 @ 20:00)  HR: 120 (04-22-20 @ 12:00) (105 - 120)  BP: 103/81 (04-22-20 @ 12:00) (92/68 - 111/81)  RR: 26 (04-22-20 @ 08:00) (22 - 33)  SpO2: 98% (04-22-20 @ 12:00) (96% - 99%) on (O2)    I&O's Detail    21 Apr 2020 07:01  -  22 Apr 2020 07:00  --------------------------------------------------------  IN:    Free Water: 500 mL    midazolam Infusion: 150.9 mL    ns in tub fed  [id number]: 300 mL    propofol Infusion: 272.2 mL  Total IN: 1223 mL    OUT:    Chest Tube: 48 mL    Chest Tube: 34 mL    Indwelling Catheter - Urethral: 1680 mL  Total OUT: 1762 mL    Total NET: -539 mL      22 Apr 2020 07:01  -  22 Apr 2020 13:18  --------------------------------------------------------  IN:  Total IN: 0 mL    OUT:    Indwelling Catheter - Urethral: 825 mL  Total OUT: 825 mL    Total NET: -825 mL        General: Intubated, sedated  Neck: Neck supple, trachea midline, No JVD  Respiratory: Course BS b/l  CV: S1S2, no murmurs, rubs or gallops  Tubes: L CT to -20 sxn with 0cc out in 24H and no air leak  R CT #1 (old) clamped, 48 out yesterday (day shift)  R CT #2 (new) to -20 sxn, 34cc out overnight, no leak     Relevant labs, radiology and Medications reviewed                        9.2    9.59  )-----------( 421      ( 22 Apr 2020 05:41 )             28.3     04-22    141  |  105  |  13  ----------------------------<  114<H>  3.8   |  35<H>  |  0.25<L>    Ca    7.9<L>      22 Apr 2020 05:41  Phos  2.9     04-22  Mg     2.2     04-22    TPro  6.6  /  Alb  1.4<L>  /  TBili  0.4  /  DBili  x   /  AST  45<H>  /  ALT  27  /  AlkPhos  157<H>  04-22      MEDICATIONS  (STANDING):  aspirin  chewable 81 milliGRAM(s) Oral daily  atorvastatin 20 milliGRAM(s) Oral at bedtime  baclofen 10 milliGRAM(s) Oral daily  chlorhexidine 0.12% Liquid 15 milliLiter(s) Oral Mucosa every 12 hours  chlorhexidine 4% Liquid 1 Application(s) Topical <User Schedule>  collagenase Ointment 1 Application(s) Topical two times a day  enoxaparin Injectable 80 milliGRAM(s) SubCutaneous every 12 hours  famotidine Injectable 20 milliGRAM(s) IV Push two times a day  levothyroxine 75 MICROGram(s) Oral daily  midazolam Infusion 0.02 mG/kG/Hr (1.52 mL/Hr) IV Continuous <Continuous>  norepinephrine Infusion 0.05 MICROgram(s)/kG/Min (7.13 mL/Hr) IV Continuous <Continuous>  polyethylene glycol 3350 17 Gram(s) Oral daily  propofol Infusion 15 MICROgram(s)/kG/Min (6.85 mL/Hr) IV Continuous <Continuous>  sodium chloride 0.9% Bolus 250 milliLiter(s) IV Bolus once  trimethoprim/polymyxin Solution 1 Drop(s) Right EYE four times a day    MEDICATIONS  (PRN):  acetaminophen  Suppository .. 650 milliGRAM(s) Rectal every 6 hours PRN Temp greater or equal to 38C (100.4F)  senna 2 Tablet(s) Oral at bedtime PRN Constipation        Assessment  74y Male  w/ PAST MEDICAL & SURGICAL HISTORY:  BPH (benign prostatic hyperplasia)  No pertinent past medical history  No significant past surgical history  admitted with complaints of Patient is a 74y old  Male who [facility] a [facility] [place] [facility] (22 Apr 2020 09:12)   patient underwent Chest tube placement  Chest tube insertion  US guided central cath  Cannulation, artery, percutaneous, for sampling

## 2020-04-22 NOTE — PROGRESS NOTE ADULT - ASSESSMENT
- cont vent  - for trach today  - labs noted  - on full dose lovenox - cont vent  - for trach today  - labs noted  - on full dose lovenox  - right lung re-inflated, chest tubes w no leak

## 2020-04-23 NOTE — PROGRESS NOTE ADULT - SUBJECTIVE AND OBJECTIVE BOX
Subjective:  Pt w hypercapnea tonight, vent adjusted.    Vital Signs:  Vital Signs Last 24 Hrs  T(C): 36.6 (04-24-20 @ 05:00), Max: 38.2 (04-23-20 @ 16:31)  T(F): 97.9 (04-24-20 @ 05:00), Max: 100.7 (04-23-20 @ 16:31)  HR: 109 (04-24-20 @ 06:57) (109 - 129)  BP: 97/76 (04-24-20 @ 06:57) (84/65 - 140/66)  RR: 22 (04-24-20 @ 06:57) (22 - 33)  SpO2: 94% (04-24-20 @ 06:57) (91% - 95%) on (O2)    Telemetry/Alarms:    Relevant labs, radiology and Medications reviewed                        9.9    13.25 )-----------( 453      ( 24 Apr 2020 06:25 )             28.6     04-23    139  |  103  |  12  ----------------------------<  119<H>  3.8   |  34<H>  |  0.30<L>    Ca    7.8<L>      23 Apr 2020 05:30  Phos  3.5     04-23  Mg     2.1     04-23    TPro  6.9  /  Alb  1.5<L>  /  TBili  0.4  /  DBili  x   /  AST  43<H>  /  ALT  27  /  AlkPhos  171<H>  04-23      MEDICATIONS  (STANDING):  aspirin  chewable 81 milliGRAM(s) Oral daily  atorvastatin 20 milliGRAM(s) Oral at bedtime  baclofen 10 milliGRAM(s) Oral daily  chlorhexidine 0.12% Liquid 15 milliLiter(s) Oral Mucosa every 12 hours  chlorhexidine 4% Liquid 1 Application(s) Topical <User Schedule>  collagenase Ointment 1 Application(s) Topical two times a day  enoxaparin Injectable 80 milliGRAM(s) SubCutaneous every 12 hours  famotidine Injectable 20 milliGRAM(s) IV Push two times a day  levothyroxine 75 MICROGram(s) Oral daily  midazolam Infusion 0.02 mG/kG/Hr (1.52 mL/Hr) IV Continuous <Continuous>  norepinephrine Infusion 0.05 MICROgram(s)/kG/Min (7.13 mL/Hr) IV Continuous <Continuous>  polyethylene glycol 3350 17 Gram(s) Oral daily  propofol Infusion 15 MICROgram(s)/kG/Min (6.85 mL/Hr) IV Continuous <Continuous>  sodium chloride 0.9% Bolus 250 milliLiter(s) IV Bolus once    MEDICATIONS  (PRN):  acetaminophen  Suppository .. 650 milliGRAM(s) Rectal every 6 hours PRN Temp greater or equal to 38C (100.4F)  senna 2 Tablet(s) Oral at bedtime PRN Constipation      Physical exam  Gen sedated on vent  Card RRR  Pulm coarse B/S  Abd soft  Ext warm    Tubes: b/l CTs suction, no al    I&O's Summary    23 Apr 2020 07:01  -  24 Apr 2020 07:00  --------------------------------------------------------  IN: 837 mL / OUT: 990 mL / NET: -153 mL        Assessment  74y Male  w/ PAST MEDICAL & SURGICAL HISTORY:  BPH (benign prostatic hyperplasia)  No pertinent past medical history  No significant past surgical history  admitted with complaints of Patient is a 74y old  Male who presents with a chief complaint of Transfer [place] - Covid-19 [facility] (23 Apr 2020 14:59)  .  73yo M with PMH of BPH presented to  on 4/1/2020, transfer from [facility] with Positive Covid-19, originally admitted with complaints of shortness of breath x 6 days. Patient intubated at  on 3/29. Course complicated by right pneumothorax 4/14 requiring b/l CT placement. On T lovenox for DVT. Thoracic surgery called for persistent right pneumothorax. Second R CT placed 4/20 with reexpansion of lung. Old R CT removed 4/22.     maintain B/L CT to -20 suction  remove dressing old CT site 4/24  AM CXR        Discussed with Cardiothoracic Team at AM rounds.

## 2020-04-23 NOTE — PROGRESS NOTE ADULT - SUBJECTIVE AND OBJECTIVE BOX
Subjective:    Intubated, sedated. Events noted.    MEDICATIONS  (STANDING):  aspirin  chewable 81 milliGRAM(s) Oral daily  atorvastatin 20 milliGRAM(s) Oral at bedtime  baclofen 10 milliGRAM(s) Oral daily  chlorhexidine 0.12% Liquid 15 milliLiter(s) Oral Mucosa every 12 hours  chlorhexidine 4% Liquid 1 Application(s) Topical <User Schedule>  collagenase Ointment 1 Application(s) Topical two times a day  enoxaparin Injectable 80 milliGRAM(s) SubCutaneous every 12 hours  famotidine Injectable 20 milliGRAM(s) IV Push two times a day  levothyroxine 75 MICROGram(s) Oral daily  midazolam Infusion 0.02 mG/kG/Hr (1.52 mL/Hr) IV Continuous <Continuous>  norepinephrine Infusion 0.05 MICROgram(s)/kG/Min (7.13 mL/Hr) IV Continuous <Continuous>  polyethylene glycol 3350 17 Gram(s) Oral daily  propofol Infusion 15 MICROgram(s)/kG/Min (6.85 mL/Hr) IV Continuous <Continuous>  sodium chloride 0.9% Bolus 250 milliLiter(s) IV Bolus once  trimethoprim/polymyxin Solution 1 Drop(s) Right EYE four times a day    MEDICATIONS  (PRN):  acetaminophen  Suppository .. 650 milliGRAM(s) Rectal every 6 hours PRN Temp greater or equal to 38C (100.4F)  senna 2 Tablet(s) Oral at bedtime PRN Constipation      Allergies    No Known Allergies    Intolerances        Vital Signs Last 24 Hrs  T(C): 37.2 (2020 11:30), Max: 37.5 (2020 09:00)  T(F): 99 (2020 11:30), Max: 99.5 (2020 09:00)  HR: 120 (2020 11:30) (115 - 129)  BP: 140/66 (2020 11:30) (84/65 - 140/66)  BP(mean): 83 (2020 11:30) (80 - 88)  RR: 24 (2020 11:30) (24 - 35)  SpO2: 95% (2020 11:30) (94% - 99%)    PHYSICAL EXAMINATION:    NECK:  Supple. No lymphadenopathy. Jugular venous pressure not elevated.   HEART:   The cardiac impulse has a normal quality. Reg., Nl S1 and S2.    CHEST:  Chest with scattered rhonchi. Normal respiratory effort.  ABDOMEN:  Soft and nontender.   EXTREMITIES:  There is no edema.       LABS:                        9.6    11.25 )-----------( 444      ( 2020 05:30 )             31.1         139  |  103  |  12  ----------------------------<  119<H>  3.8   |  34<H>  |  0.30<L>    Ca    7.8<L>      2020 05:30  Phos  3.5       Mg     2.1         TPro  6.9  /  Alb  1.5<L>  /  TBili  0.4  /  DBili  x   /  AST  43<H>  /  ALT  27  /  AlkPhos  171<H>      AB.44/48/84/32 on 30/450/40%/5      RADIOLOGY & ADDITIONAL TESTS:< from: Xray Chest 1 View- PORTABLE-Routine (20 @ 07:12) >  EXAM:  XR CHEST PORTABLE ROUTINE 1V                            PROCEDURE DATE:  2020          INTERPRETATION:  RADIOGRAPH OF THE CHEST    Clinical Indication: COVID +.  Pneumothoraces, chest tubes.    Technique: Single frontal radiograph of the chest    Comparison: Prior radiograph of the chest from 2020, 2020 and 2020.      Findings:  Tubes and lines: Endotracheal tube is again seen, with the tip terminating approximately 3.5 cm above the junior. A nasogastric tube is seencoursing along the expected location of the esophagus, with the distal aspect overlying left upper quadrant however the tip is excluded from the field-of-view. There is a right internal jugular approach central venous catheter with the tip overlying the SVC. A single right apical chest tube is again seen. There appears to be interval removal of previously seen right midlung chest tube. Left-sided chest tube is unchanged.    Lungs and pleura: There is trace right apical pneumothorax, which appearsslightly increased compared to the prior study. No sizable left pneumothorax is seen. Bilateral patchy opacities are again seen, slightly improved compared to the prior study. There is no sizable pleural effusion.    Heart and mediastinum: The cardiomediastinal silhouette is stable.    Bones and soft tissue: Subcutaneous emphysema is seen along the bilateral chest walls/axillae, left more than right and similar to the prior study.    IMPRESSION:   Trace right apical pneumothorax, increased compared to the prior study.    A single right apical chest tube is again seen. There appears to be interval removal of previously seen right midlung chest tube. Left-sided chest tube is unchanged. Additional tubes and lines, as above.    Bilateral patchy opacities again seen, slightly improved compared to the prior study.        GHAZALA MARIA     Assessment and Plan:   · Assessment		  - cont vent support as above.   - labs in am  - Lovenox-full dose for DVT  - Bilateral chest tubes-sl right apical PTX  - Continue tube feeds with Jevity  - Palliative Care F/U. Need to make decision regarding trach/PEG-family apparently wants everything done. Will obtain consult from team regarding trach   - Monitor I+O's  - K+ Repletion    Problem/Plan - 1:  ·  Problem: Acute on chronic respiratory failure with hypoxia and hypercapnia.     Problem/Plan - 2:  ·  Problem: COVID-19 virus infection.     Problem/Plan - 3:  ·  Problem: SARS-associated coronavirus as cause of disease classified elsewhere.     Problem/Plan - 4:  ·  Problem: Hypokalemia.     Problem/Plan - 5:  ·  Problem: Anemia, chronic disease.

## 2020-04-24 NOTE — PROGRESS NOTE ADULT - SUBJECTIVE AND OBJECTIVE BOX
Subjective:    Intubated, sedated. Events noted. Vent Day #27    MEDICATIONS  (STANDING):  aspirin  chewable 81 milliGRAM(s) Oral daily  atorvastatin 20 milliGRAM(s) Oral at bedtime  baclofen 10 milliGRAM(s) Oral daily  chlorhexidine 0.12% Liquid 15 milliLiter(s) Oral Mucosa every 12 hours  chlorhexidine 4% Liquid 1 Application(s) Topical <User Schedule>  collagenase Ointment 1 Application(s) Topical two times a day  enoxaparin Injectable 80 milliGRAM(s) SubCutaneous every 12 hours  famotidine Injectable 20 milliGRAM(s) IV Push two times a day  levothyroxine 75 MICROGram(s) Oral daily  midazolam Infusion 0.02 mG/kG/Hr (1.52 mL/Hr) IV Continuous <Continuous>  norepinephrine Infusion 0.05 MICROgram(s)/kG/Min (7.13 mL/Hr) IV Continuous <Continuous>  polyethylene glycol 3350 17 Gram(s) Oral daily  propofol Infusion 15 MICROgram(s)/kG/Min (6.85 mL/Hr) IV Continuous <Continuous>  sodium chloride 0.9% Bolus 250 milliLiter(s) IV Bolus once    MEDICATIONS  (PRN):  acetaminophen  Suppository .. 650 milliGRAM(s) Rectal every 6 hours PRN Temp greater or equal to 38C (100.4F)  senna 2 Tablet(s) Oral at bedtime PRN Constipation      Allergies    No Known Allergies    Intolerances        Vital Signs Last 24 Hrs  T(C): 36.6 (2020 12:00), Max: 38.2 (2020 16:31)  T(F): 97.8 (2020 12:00), Max: 100.7 (2020 16:31)  HR: 100 (2020 12:00) (100 - 129)  BP: 104/66 (2020 12:00) (97/76 - 122/85)  BP(mean): 83 (2020 09:22) (83 - 96)  RR: 30 (2020 12:00) (22 - 33)  SpO2: 98% (2020 12:00) (91% - 98%)    PHYSICAL EXAMINATION:    NECK:  Supple. No lymphadenopathy. Jugular venous pressure not elevated.   HEART:   The cardiac impulse has a normal quality. Reg., Nl S1 and S2.    CHEST:  Chest with coarse bilateral breath sounds.   ABDOMEN:  Soft and nontender.   EXTREMITIES:  There is no edema.       LABS:                        9.9    13.25 )-----------( 453      ( 2020 06:25 )             28.6     04-24    139  |  102  |  19  ----------------------------<  135<H>  3.7   |  35<H>  |  0.28<L>    Ca    8.2<L>      2020 06:25  Phos  3.5       Mg     2.3         TPro  7.1  /  Alb  1.5<L>  /  TBili  0.6  /  DBili  x   /  AST  34  /  ALT  27  /  AlkPhos  162<H>      AB.40/55/72/34 on 30/450/40%/5       RADIOLOGY & ADDITIONAL TESTS:< from: Xray Chest 1 View- PORTABLE-Routine (20 @ 07:22) >  EXAM:  XR CHEST PORTABLE ROUTINE 1V                            PROCEDURE DATE:  2020          INTERPRETATION:  Exam Date: 2020 7:22 AM    Chest radiograph (one view)         CLINICAL INFORMATION:  Pneumonia    TECHNIQUE:  Single frontal view of the chest was obtained.    COMPARISON: 2020    FINDINGS/  IMPRESSION:      Tubes and lines are unchanged. Small right pneumothorax is essentially unchanged. No definite left pneumothorax is identified.    Diffuse airspace opacities throughout the bilateral lungs are unchanged.      SILVA MCMAHON M.D., ATTENDING RADIOLOGIST        Assessment and Plan:   · Assessment		  - cont vent support as above.   - labs in am  - Lovenox-full dose for DVT  - Bilateral chest tubes-sl right apical PTX-suction increased to -40cm H2O pressure  - Continue tube feeds with Jevity  - Palliative Care F/U. Need to make decision regarding Trach/PEG-family apparently wants everything done. Dr. Segura to evaluate today for possible Trach/PEG  - Monitor I+O's      Problem/Plan - 1:  ·  Problem: Acute on chronic respiratory failure with hypoxia and hypercapnia.     Problem/Plan - 2:  ·  Problem: COVID-19 virus infection.     Problem/Plan - 3:  ·  Problem: SARS-associated coronavirus as cause of disease classified elsewhere.     Problem/Plan - 4:  ·  Problem: Hypokalemia.     Problem/Plan - 5:  ·  Problem: Anemia, chronic disease.

## 2020-04-24 NOTE — PROVIDER CONTACT NOTE (EICU) - BACKGROUND
74 year old male with no significant PMH admitted for acute hypoxemic respiratory failure/ARDS secondary to COVID-19.  Hospital course complicated by DVT and bilateral pneumothoraces requiring thoracostomy decompression.
73 y/o with h/o BPH admitted to OSH 3/21 then transferred to [facility] on 4/1 for further management.

## 2020-04-24 NOTE — PROVIDER CONTACT NOTE (EICU) - ASSESSMENT
Staff expressing concern about mental status.  Stable vasopressor requirements  Stable/improving gas exchange
Acute hypoxic respiratory failure on ventilatory support intubated  SARS CoV2

## 2020-04-24 NOTE — PROVIDER CONTACT NOTE (EICU) - RECOMMENDATIONS
Prolonged continuous infusion of midazolam likely complicating mental status assessment.  Suggest discontinuing the midazolam infusion and use opioid analgesics for comfort, tolerance of IMV/ventilator synchrony.
Recommended decreasing tidal volume 500->450 (based on height, wt at 6 ml/kg) and decreasing PEEP incrementally; f/u abg

## 2020-04-24 NOTE — PROGRESS NOTE ADULT - ASSESSMENT
73yo M with PMH of BPH presented to  on 4/1/2020, transfer from [facility] with Positive Covid-19, originally admitted with complaints of shortness of breath x 6 days. Patient intubated at  on 3/29. Course complicated by right pneumothorax 4/14 requiring b/l CT placement. On T lovenox for DVT. Thoracic surgery called for persistent right pneumothorax. Second R CT placed 4/20 with reexpansion of lung. Old R CT removed 4/22 after successful clamp trial. 4/23 with small right apical pneumothorax- stable today 4/24.     R CT to -40 and L Ct to -20 for small apical right pneumo  AM CXR  Change chest tube dressings daily    Jenni Valverde PA  Thoracic Sx  #[id number]

## 2020-04-24 NOTE — PROGRESS NOTE ADULT - SUBJECTIVE AND OBJECTIVE BOX
Subjective: Patient remains intubated and sedated.     Vital Signs:  Vital Signs Last 24 Hrs  T(C): 36.6 (04-24-20 @ 05:00), Max: 38.2 (04-23-20 @ 16:31)  T(F): 97.9 (04-24-20 @ 05:00), Max: 100.7 (04-23-20 @ 16:31)  HR: 106 (04-24-20 @ 09:22) (105 - 129)  BP: 97/76 (04-24-20 @ 09:22) (97/76 - 122/85)  RR: 30 (04-24-20 @ 09:22) (22 - 33)  SpO2: 95% (04-24-20 @ 09:22) (91% - 95%) on (O2)    I&O's Detail    23 Apr 2020 07:01  -  24 Apr 2020 07:00  --------------------------------------------------------  IN:    Free Water: 200 mL    midazolam Infusion: 109 mL    norepinephrine Infusion: 116 mL    ns in tub fed  [id number]: 300 mL    propofol Infusion: 112 mL  Total IN: 837 mL    OUT:    Chest Tube: 40 mL    Chest Tube: 25 mL    Indwelling Catheter - Urethral: 925 mL  Total OUT: 990 mL    Total NET: -153 mL          General: Intubated, sedated   Neck: Neck supple, trachea midline, No JVD  Respiratory: Course BS B/L  CV: S1S2, no murmurs, rubs or gallops  Abdominal: Soft, NT, ND  Extremities: No edema  Tubes: B/L CT to -20, no air leaks, R 40 out, L 25 out    Relevant labs, radiology and Medications reviewed  < from: Xray Chest 1 View- PORTABLE-Routine (04.24.20 @ 07:22) >  Tubes and lines are unchanged. Small right pneumothorax is essentially unchanged. No definite left pneumothorax is identified.    Diffuse airspace opacities throughout the bilateral lungs are unchanged.    < end of copied text >                          9.9    13.25 )-----------( 453      ( 24 Apr 2020 06:25 )             28.6     04-24    139  |  102  |  19  ----------------------------<  135<H>  3.7   |  35<H>  |  0.28<L>    Ca    8.2<L>      24 Apr 2020 06:25  Phos  3.5     04-24  Mg     2.3     04-24    TPro  7.1  /  Alb  1.5<L>  /  TBili  0.6  /  DBili  x   /  AST  34  /  ALT  27  /  AlkPhos  162<H>  04-24      MEDICATIONS  (STANDING):  aspirin  chewable 81 milliGRAM(s) Oral daily  atorvastatin 20 milliGRAM(s) Oral at bedtime  baclofen 10 milliGRAM(s) Oral daily  chlorhexidine 0.12% Liquid 15 milliLiter(s) Oral Mucosa every 12 hours  chlorhexidine 4% Liquid 1 Application(s) Topical <User Schedule>  collagenase Ointment 1 Application(s) Topical two times a day  enoxaparin Injectable 80 milliGRAM(s) SubCutaneous every 12 hours  famotidine Injectable 20 milliGRAM(s) IV Push two times a day  levothyroxine 75 MICROGram(s) Oral daily  midazolam Infusion 0.02 mG/kG/Hr (1.52 mL/Hr) IV Continuous <Continuous>  norepinephrine Infusion 0.05 MICROgram(s)/kG/Min (7.13 mL/Hr) IV Continuous <Continuous>  polyethylene glycol 3350 17 Gram(s) Oral daily  propofol Infusion 15 MICROgram(s)/kG/Min (6.85 mL/Hr) IV Continuous <Continuous>  sodium chloride 0.9% Bolus 250 milliLiter(s) IV Bolus once    MEDICATIONS  (PRN):  acetaminophen  Suppository .. 650 milliGRAM(s) Rectal every 6 hours PRN Temp greater or equal to 38C (100.4F)  senna 2 Tablet(s) Oral at bedtime PRN Constipation        Assessment  74y Male  w/ PAST MEDICAL & SURGICAL HISTORY:  BPH (benign prostatic hyperplasia)  No pertinent past medical history  No significant past surgical history  admitted with complaints of Patient is a 74y old  Male who presents with a chief complaint of Transfer [place] - Covid-19 PNA (23 Apr 2020 23:10)   patient underwent Chest tube placement  Chest tube insertion  US guided central cath  Cannulation, artery, percutaneous, for sampling

## 2020-04-24 NOTE — PROVIDER CONTACT NOTE (EICU) - SITUATION
Rounds with team, visualized via camera.  Chart, meds, labs, imaging, vent settings and clinical data reviewed.

## 2020-04-25 NOTE — PROGRESS NOTE ADULT - SUBJECTIVE AND OBJECTIVE BOX
Patient is a 74y old  Male who presents with a chief complaint of Transfer [place] - Covid-19 PNA (24 Apr 2020 12:36)      HPI:  From chart:  75 y/o M with a PMHx of BPH presented to  on 4/1/2020, transfer from [facility] with Positive Covid-19, originally admitted with complaints of shortness of breath x 6 days. Patient presented initially on 3/21/2020 at other facility and d/c'd from ED, COVID-19 testing done that has now resulted positive. Denies smoking, chest pain, cough, fever, diarrhea, vomiting, abdominal pain, known sick contacts, recent travel or any other acute complaints. Patient presented to [place] on Mechanical Vent, RR 30, , PEEP 16, O2 40%. Patient with R arterial line and L IJ Central line in place. Patient found to have bilateral DVTs at [place], started on Heparin gtt. Reason for transfer is unclear at this time.  Called patient home number in chart, wife not answering phone to complete a history at this time. Medications reviewed from TAMARA Crow. (01 Apr 2020 03:10)  4/25: Pt seen and examined intubated sedated, B/L chest tubes, HD stable, mild tachycardia positive fluid balance. A febrile.  ROS:.  [] A ten-point review of systems was otherwise negative except as noted.  Systemic:	[ ] Fever	[ ] Chills	[ ] Night sweats    [ ] Fatigue	[ ] Other  [] Cardiovascular:  [] Pulmonary:  [] Renal/Urologic:  [] Gastrointestinal: abdominal pain, vomiting  [] Metabolic:  [] Neurologic:  [] Hematologic:  [] ENT:  [] Ophthalmologic:  [] Musculoskeletal:    [X ] Due to altered mental status/intubation, subjective information were not able to be obtained from the patient. History was obtained, to the extent possible, from review of the chart and collateral sources of information.    All other system review is negative .  PAST MEDICAL & SURGICAL HISTORY:  BPH (benign prostatic hyperplasia)  No pertinent past medical history  No significant past surgical history    FAMILY HISTORY:  No pertinent family history in first degree relatives    Social History:     Alcohol: Denied  Smoking: Denied  Drug Use: Denied        Vital Signs Last 24 Hrs  T(C): 37 (25 Apr 2020 11:30), Max: 37 (25 Apr 2020 11:30)  T(F): 98.6 (25 Apr 2020 11:30), Max: 98.6 (25 Apr 2020 11:30)  HR: 117 (25 Apr 2020 11:30) (91 - 118)  BP: 90/71 (25 Apr 2020 06:00) (90/71 - 128/86)  BP(mean): 84 (25 Apr 2020 01:30) (77 - 100)  RR: 30 (25 Apr 2020 11:30) (20 - 31)  SpO2: 96% (25 Apr 2020 11:30) (94% - 99%)    I&O's Summary    24 Apr 2020 07:01  -  25 Apr 2020 07:00  --------------------------------------------------------  IN: 2112.2 mL / OUT: 960 mL / NET: 1152.2 mL    25 Apr 2020 07:01  -  25 Apr 2020 12:01  --------------------------------------------------------  IN: 157.8 mL / OUT: 378 mL / NET: -220.2 mL        PHYSICAL EXAM:  Constitutional: Intubated, sedated.  Eyes:  WNL  ENMT:  WNL  Neck:  WNL, non tender  Back: Non tender  Respiratory: CTABL, b/L chest tubes no leak, decreased breath sounds at bases.  Cardiovascular:  S1+S2+0  Gastrointestinal: Soft, ND , NT  Genitourinary:  WNL  Extremities: NV intact  Vascular:  Intact  Neurological: Full exam  not possible.   Skin: sacral pressure ulcers.    < from: Xray Chest 1 View- PORTABLE-Routine (04.25.20 @ 07:47) >    Impression:    Extensive airspace disease bilaterally with a small right pneumothorax unchanged. Lines remain in place unchanged.    No significant interval change as compared to  4/24/2020            < end of copied text >      Labs:                          9.3    9.98  )-----------( 368      ( 25 Apr 2020 04:30 )             28.8       04-25    138  |  101  |  13  ----------------------------<  108<H>  3.7   |  35<H>  |  0.18<L>    Ca    7.8<L>      25 Apr 2020 04:30  Phos  2.8     04-25  Mg     2.0     04-25    TPro  6.5  /  Alb  1.3<L>  /  TBili  0.4  /  DBili  x   /  AST  32  /  ALT  21  /  AlkPhos  141<H>  04-25      Blood Gas Profile - Arterial in AM (04.25.20 @ 04:10)    pH, Arterial: 7.45    pCO2, Arterial: 48 mmHg    pO2, Arterial: 84 mmHg    HCO3, Arterial: 33 mmoL/L    Base Excess, Arterial: 7.7 mmol/L    Oxygen Saturation, Arterial: 96 %    Blood Gas Comments Arterial: FIO2:_  MODE:_   VT:_   RATE:_   PEEP:_  Comment:_30/450/40%/5    Blood Gas Source Arterial: Arterial

## 2020-04-25 NOTE — PROGRESS NOTE ADULT - ASSESSMENT
74 y old male with respiratory failure 2/2 Covid 19. B/L chest tube, positive fluid balance today mild tachycardia, mild vent asynchrony   COVID-19: [ X ] confirmed    /    [  ] suspected    /    [  ] ruled out    [ X ] acute respiratory failure with hypoxemia and hypercapnia    /    [  ] ARDS  [ X ] mechanical ventilation: PEEP of 5. FIO2 40 PF ration 210  [  ] high PEEP  [ XX ] continuous sedation to synchronize with mechanical ventilator: need mild increase in sedation   [  ] paralysis  [  ] prone positioning    [ X ] septic shock secondary to COVID-19 requiring:  [ X ] invasive hemodynamic monitoring  [ x ] vasopressors  [ x ] Antibiotics: Finished Suleman/VAnc 4/24  [x  ] Diet: Bolus feed.  [  ] acute kidney injury secondary to septic shock requiring:  [X  ] intensive fluid management in the setting of ARDS  [  ] renal replacement therapy  [ x ] Endocrine: ISS  [ X ] Lines: Marianne, CVC   Lasix 20 x 1. Possible trach Monday   The patient remains admitted to the ICU for acute critical care management. This patient is in critical condition at risk for imminent life-threatening decompensation secondary to respiratory and/or other organ failure.

## 2020-04-25 NOTE — CHART NOTE - NSCHARTNOTEFT_GEN_A_CORE
Clinical Nutrition BRIEF NOTE    *pt remains vented, Propofol providing 379 kcals    *labs reviewed; 04-25 Na138 mmol/L Glu 108 mg/dL<H> K+ 3.7 mmol/L Cr  0.18 mg/dL<L> BUN 13 mg/dL Phos 2.8 mg/dL Alb 1.3 g/dL<L> PAB n/a       Triglycerides slightly elevated 04/25; Na, K and Po4 WNL, Glucose slightly elevated       Bruce: 9; 1+ edema; BM noted on 04/22; Output 890ml Cath    Pt receiving 450ml Jevity daily, no prosource documented (recommend monitoring). Likely pt is meeting 100% of calorie needs and goals     *no new wt documented since admission.    Recommend continuing with Current tube feeding. Being tolerated and meeting needs. Will continue ot monitor pt's nutritional status       RECOMMENDATIONS:  C/w Jevity 1.5 bolus 150 ml TID with 9 packets of prosource.  monitor BM's  Monitor Labs  Will continue to monitor pt's nutritional status       ESTIMATED NUTR NEEDS:  1340-1675Kcal (20-25Kcal/Kg)  121-134g protein (1.8-2g/Kg BW)

## 2020-04-26 NOTE — ED ADULT NURSE REASSESSMENT NOTE - NS ED NURSE REASSESS COMMENT FT1
kim cath 14fr replaced with 14fr, urine specs sent. Pt with rectal temp 100.4, medicated with tylenol pr. Mepiliex dsg to stage 3 decubitii changed. Pt repositioned for pressure relief and comfort. VSS. Will  monitor.
pt received from   Gumaro GROSSMAN RN at 10am. pt sedated on vent, ETT 22cm at the lip. see vent settings in flowsheet, gtts and doses in flowsheet. BL chest tubes secure, dressings dry and intact. both draining bloody output.
pt received from Gumaro MERLOS
received report from shanelle, patient on the following drips, /propofol/levophed/fentanyl/midazalem, patient vent settings 30/450/40/5, patient pressure / o2 sat stable. L chest tube hooked up to -20 suction putting out sersanguinous fluid, R chest tube putting out pleural fluid -30 suction.   "
spoke with pt's son Ignacio and gave update.
updated pt son on phone
Blood cultures drawn by lab.

## 2020-04-26 NOTE — CHART NOTE - NSCHARTNOTEFT_GEN_A_CORE
Pt seen and examined with Dr. Silva.  Overnight events noted - febrile, hypotensive, tachycardic on high dose versed sedation.  Change lines, panculture, replete k, restart IV abx empirically, IVF bolus LR 500ml, POCUS for IVC evaluation.  Titrate down versed as tolerated.  Trach in am planned.

## 2020-04-26 NOTE — PROGRESS NOTE ADULT - SUBJECTIVE AND OBJECTIVE BOX
Patient is a 74y old  Male who presents with a chief complaint of Transfer [place] - Covid-19 PNA (25 Apr 2020 12:00)      HPI:  From chart:  75 y/o M with a PMHx of BPH presented to  on 4/1/2020, transfer from [facility] with Positive Covid-19, originally admitted with complaints of shortness of breath x 6 days. Patient presented initially on 3/21/2020 at other facility and d/c'd from ED, COVID-19 testing done that has now resulted positive. Denies smoking, chest pain, cough, fever, diarrhea, vomiting, abdominal pain, known sick contacts, recent travel or any other acute complaints. Patient presented to [place] on Mechanical Vent, RR 30, , PEEP 16, O2 40%. Patient with R arterial line and L IJ Central line in place. Patient found to have bilateral DVTs at [place], started on Heparin gtt. Reason for transfer is unclear at this time.    Called patient home number in chart, wife not answering phone to complete a history at this time. Medications reviewed from TAMARA Crow. (01 Apr 2020 03:10)  4/26: Pt seen and examined, tachycardia slightly better, was give 250 fluid bolus overnight. Low grdae fever. Low dose pressors    ROS:.  [] A ten-point review of systems was otherwise negative except as noted.  Systemic:	[ ] Fever	[ ] Chills	[ ] Night sweats    [ ] Fatigue	[ ] Other  [] Cardiovascular:  [] Pulmonary:  [] Renal/Urologic:  [] Gastrointestinal: abdominal pain, vomiting  [] Metabolic:  [] Neurologic:  [] Hematologic:  [] ENT:  [] Ophthalmologic:  [] Musculoskeletal:    [X ] Due to altered mental status/intubation, subjective information were not able to be obtained from the patient. History was obtained, to the extent possible, from review of the chart and collateral sources of information.    All other system review is negative .  PAST MEDICAL & SURGICAL HISTORY:  BPH (benign prostatic hyperplasia)  No pertinent past medical history  No significant past surgical history    FAMILY HISTORY:  No pertinent family history in first degree relatives    Social History:     Alcohol: Denied  Smoking: Denied  Drug Use: Denied        Vital Signs Last 24 Hrs  T(C): 37.6 (26 Apr 2020 08:00), Max: 38.2 (25 Apr 2020 22:00)  T(F): 99.6 (26 Apr 2020 08:00), Max: 100.7 (25 Apr 2020 22:00)  HR: 110 (26 Apr 2020 11:46) (101 - 119)  BP: 96/82 (26 Apr 2020 09:00) (96/82 - 96/82)  BP(mean): 72 (26 Apr 2020 09:00) (72 - 72)  RR: 30 (26 Apr 2020 11:00) (24 - 32)  SpO2: 95% (26 Apr 2020 11:46) (92% - 96%)    I&O's Summary    25 Apr 2020 07:01  -  26 Apr 2020 07:00  --------------------------------------------------------  IN: 849.9 mL / OUT: 5319 mL / NET: -4469.1 mL    26 Apr 2020 07:01  -  26 Apr 2020 13:49  --------------------------------------------------------  IN: 550 mL / OUT: 0 mL / NET: 550 mL        PHYSICAL EXAM:  Constitutional: intubated, sedated.  Eyes:  WNL  ENMT:  WNL  Neck:  WNL, non tender  Back: Non tender  Respiratory: CTABL, decreased breath sounds at bases, B/L chest tubes.  Cardiovascular:  S1+S2+0  Gastrointestinal: Soft, mild distension, NT.  Genitourinary:  WNL  Extremities: NV intact  Vascular:  Intact  Neurological: full exam not possible     Labs:                          9.0    13.88 )-----------( 372      ( 26 Apr 2020 05:28 )             27.2       04-26    138  |  100  |  12  ----------------------------<  146<H>  3.1<L>   |  34<H>  |  0.26<L>    Ca    7.8<L>      26 Apr 2020 05:28  Phos  3.1     04-26  Mg     1.8     04-26    TPro  6.3  /  Alb  1.3<L>  /  TBili  0.6  /  DBili  x   /  AST  29  /  ALT  19  /  AlkPhos  161<H>  04-26      PT/INR - ( 26 Apr 2020 05:28 )   PT: 14.8 sec;   INR: 1.32 ratio         PTT - ( 26 Apr 2020 05:28 )  PTT:47.5 sec  < from: Xray Chest 1 View- PORTABLE-Routine (04.26.20 @ 08:24) >  FINDINGS/  IMPRESSION:      Tubes and lines are in stable position.    Previously visualized small pneumothorax overlying the right upper lobe is no longer visualized. Diffuse airspace opacitiesthroughout the bilateral lungs are unchanged.        Blood Gas Profile - Arterial (04.26.20 @ 06:18)    pH, Arterial: 7.38    pCO2, Arterial: 57 mmHg    pO2, Arterial: 68 mmHg    HCO3, Arterial: 33 mmoL/L    Base Excess, Arterial: 7.0 mmol/L    Oxygen Saturation, Arterial: 92 %    Blood Gas Comments Arterial: 06:38

## 2020-04-26 NOTE — PROGRESS NOTE ADULT - ASSESSMENT
74 y old male with respiratory failure 2/2 Covid 19. B/L chest tube,t[place]ia better with fluid bolus, low grade fever.   COVID-19: [ X ] confirmed    /    [  ] suspected    /    [  ] ruled out    [ X ] acute respiratory failure with hypoxemia and hypercapnia    /    [  ] ARDS  [ X ] mechanical ventilation: PEEP of 5. FIO2 40 PF ration 210  [  ] high PEEP  [ XX ] continuous sedation to synchronize with mechanical ventilator: need mild increase in sedation : Wean off Midazolam.   [  ] paralysis  [  ] prone positioning    [ X ] septic shock secondary to COVID-19 requiring:  [ X ] invasive hemodynamic monitoring  [ x ] vasopressors  [ x ] Antibiotics: Finished Suleman/VAnc 4/24  [x  ] Diet: Bolus feed.  [  ] acute kidney injury secondary to septic shock requiring:  [X  ] intensive fluid management in the setting of ARDS  [  ] renal replacement therapy  [ x ] Endocrine: ISS  [ X ] Lines: [place] CVC place on in left iJ  Follow up on blood, urine, sputum cultures.   does not look dry on POCUS. Possible trach Monday   The patient remains admitted to the ICU for acute critical care management. This patient is in critical condition at risk for imminent life-threatening decompensation secondary to respiratory and/or other organ failure.

## 2020-04-27 NOTE — PROGRESS NOTE ADULT - ASSESSMENT
75 yo m pmhx BPH and hypothyroidism transferred from [facility] on 4/1 after being admitted at [facility] from (3/21 to 4/1) for covid 19 respiratory failure, ARDS, distributive shock, DVT with course further complicated by MRSA/strep pneumo PNA, bilateral ptx.      NEURO: Sedated with Propofol, versed and fentanyl.  Seroquel added this evening to help wean from sedation.   CV: Shock requiring vasopressor therapy, actively titrating levophed for MAP >65.  Weaning as tolerated. Midodrine added this evening to help wean off vasopressor therapy.   RESP: Acute --> chronic respiratory failure 2/2 COVID19, unable to wean from vent, remains on minimal vent settings, tentative plan for trach in am.   RENAL: Monitor lytes, replace as needed  GI: NPO after midnight for trach  ENDO: Hypothyroidism on synthroid    ID: Completed abx/antiviral therapy   HEME: Held lovenox for trach in am  DISPO: Full code.     Critical Care time: 40 mins assessing presenting problems of acute illness that poses high probability of life threatening deterioration or end organ damage/dysfunction.  Medical decision making including Initiating plan of care, reviewing data, reviewing radiology, discussing with multidisciplinary team, non inclusive of procedures, discussing goals of care with patient/family

## 2020-04-27 NOTE — PROGRESS NOTE ADULT - SUBJECTIVE AND OBJECTIVE BOX
HPI: Pt is a 74y old Male with hx of BPH originally admitted to [place] 3/21 transferred to  with vent dependent resp failure secondary to COVID pneumonia. Pt intubated 3/29 at [place]. Hosp course notable for hypotension requiring pressors.  Palliative medicine consulted for assistance with goals of care.    Pt seen and examined by me for followup of goals of care.  Pt is intubated, sedated, so unable to provide hx, ROS. Pt is on pressors as well.     PAIN: ( )Yes   ( )No  DYSPNEA: ( ) Yes  ( ) No  Pt unable to provide due to intubation and sedation    Review of Systems:  Unable to obtain as Pt unable to provide due to intubation and sedation      PHYSICAL EXAM:    Vital Signs Last 24 Hrs  T(C): 38.3 (04-27-20 @ 08:00), Max: 38.3 (04-27-20 @ 08:00)  T(F): 100.9 (04-27-20 @ 08:00), Max: 100.9 (04-27-20 @ 08:00)  HR: 117 (04-27-20 @ 09:00) (105 - 127)  BP: 92/58 (04-27-20 @ 09:00) (85/63 - 103/78)  BP(mean): 69 (04-27-20 @ 09:00) (69 - 85)  RR: 31 (04-27-20 @ 09:00) (30 - 31)  SpO2: 93% (04-27-20 @ 09:00) (90% - 95%)      PPSV2: 10  %    General: intubated and sedated male  Mental Status: sedated  HEENT: eyes closed, ET tube in place  Lungs: coarse breath sounds b/l, chest tubes in place  Cardiac: S1S2+  GI: soft, + bowel sounds  : kim in place +urine output  Ext: edema UE b/l  Neuro: sedated    LABS                          8.9    15.49 )-----------( 346      ( 27 Apr 2020 04:40 )             28.7     04-27    140  |  104  |  20  ----------------------------<  133<H>  4.3   |  31  |  0.23<L>    Ca    8.0<L>      27 Apr 2020 04:40  Phos  3.3     04-27  Mg     1.8     04-27    TPro  6.5  /  Alb  1.2<L>  /  TBili  0.7  /  DBili  x   /  AST  26  /  ALT  18  /  AlkPhos  180<H>  04-27    PT/INR - ( 26 Apr 2020 05:28 )   PT: 14.8 sec;   INR: 1.32 ratio         PTT - ( 26 Apr 2020 05:28 )  PTT:47.5 sec      MEDICATIONS  (STANDING):  aspirin  chewable 81 milliGRAM(s) Oral daily  atorvastatin 20 milliGRAM(s) Oral at bedtime  baclofen 10 milliGRAM(s) Oral daily  chlorhexidine 0.12% Liquid 15 milliLiter(s) Oral Mucosa every 12 hours  chlorhexidine 4% Liquid 1 Application(s) Topical <User Schedule>  collagenase Ointment 1 Application(s) Topical two times a day  enoxaparin Injectable 80 milliGRAM(s) SubCutaneous every 12 hours  famotidine Injectable 20 milliGRAM(s) IV Push two times a day  fentaNYL   Infusion 0.5 MICROgram(s)/kG/Hr (1.9 mL/Hr) IV Continuous <Continuous>  levothyroxine 75 MICROGram(s) Oral daily  midazolam Infusion 0.02 mG/kG/Hr (1.52 mL/Hr) IV Continuous <Continuous>  norepinephrine Infusion 0.05 MICROgram(s)/kG/Min (7.13 mL/Hr) IV Continuous <Continuous>  polyethylene glycol 3350 17 Gram(s) Oral daily  propofol Infusion 15 MICROgram(s)/kG/Min (6.85 mL/Hr) IV Continuous <Continuous>    MEDICATIONS  (PRN):  acetaminophen  Suppository .. 650 milliGRAM(s) Rectal every 6 hours PRN Temp greater or equal to 38C (100.4F)  senna 2 Tablet(s) Oral at bedtime PRN Constipation            RADIOLOGY/ADDITIONAL STUDIES:    EXAM:  XR CHEST PORTABLE ROUTINE 1V                        PROCEDURE DATE:  04/27/2020    Comparison: Prior radiograph of the chest from the 26th 2020.    Findings:  Tubes and lines: Endotracheal and nasogastric tubes are unchanged. Bilateral apical chest tubes are unchanged. Left internal jugular central venous catheteris unchanged    Lungs and pleura: Bilateral patchy opacities, right worse than left, are without significant change from the prior study. Trace left apical pneumothorax appears new compared to the prior study. There is no sizable right pneumothorax. There is no sizable pleural effusion.    Heart and mediastinum: The cardiomediastinal silhouette is stable.    Bones and soft tissue: There are no obvious acute osseous or soft tissue abnormalities. Minimal subcutaneous air is seen at the left lateralchest wall.    IMPRESSION:   Trace left apical pneumothorax appears new compared to the prior study. Bilateral chest tubes in place. No sizable right pneumothorax is seen.    Bilateral patchy opacities, right worse than left, are without significant change from the prior study.     Tubes and lines, as above.

## 2020-04-27 NOTE — PROGRESS NOTE ADULT - ASSESSMENT
Pt is a 74y old Male with hx of BPH originally admitted to [place] 3/21 transferred to  with vent dependent resp failure secondary to COVID pneumonia. Pt intubated 3/29 at [place]. Hosp course notable for hypotension requiring pressors.  Palliative medicine consulted for assistance with goals of care.    1)Resp Failure, ARDS  -secondary to COVID pneumonia, now with bacterial pneumonia  -s/p abx  -Vent dependent Day #30  -s/p zithromax, plaquenil  -not improving  -Now complicated by pneumothorax, subcut emphysema with b/l chest tubes in place    2)Fever  -COVID pneumonia, bacterial pneumonia, now with temp spike  -Vent dependent Day #30  -s/p zithromax, plaquenil  -s/p abx per ID to cover bacterial pneumonia  -vent support  -F/u cultures    3)Hypotension  -Related to pneumonia  -On vasopressors    4)DVTs  -Imaging reviewed  -On Lovenox    5)Advance Directives  -Pt does not have capacity to make medical decisions due to intubation, sedation  -Pt has no HCP on file  -Pt has wife Ruby who would be surrogate decision maker [phone]  -Pt has son Ignacio [phone]  -Goals of care meeting held 4/9- please see note for details.  -Spoke with son Ignacio  4/27.  He and his family have decided to pursue a trach.  He states he isn't feeling so good and that's why he can't come in to see his father.  He also notes he feels it would be too difficult for him.  Emotional support provided    At this point family have made their goals clear, they wish for no limits to be set.  As pt with minimal symptom burden that I can help alleviate, will sign off for now.  Please recall our team if we can be of further assistance    D/w Dr. Ting Draper and Dr. Guardado

## 2020-04-27 NOTE — PROGRESS NOTE ADULT - ASSESSMENT
73yo M with PMH of BPH presented to  on 4/1/2020, transfer from [facility] with Positive Covid-19, originally admitted with complaints of shortness of breath x 6 days. Patient intubated at  on 3/29. Course complicated by right pneumothorax 4/14 requiring b/l CT placement. On T lovenox for DVT. Thoracic surgery called for persistent right pneumothorax. Second R CT placed 4/20 with reexpansion of lung. Old R CT removed 4/22 after successful clamp trial. 4/23 with small right apical pneumothorax- stable today 4/24. 4/27 R PTX resolved, now tiny left apical pneumo.     maintain both chest tubes to -20 suction  CXR as per ICU team   Change chest tube dressings daily  record output per shift    Jenni JONES  Thoracic Sx  #[id number]

## 2020-04-27 NOTE — PROGRESS NOTE ADULT - SUBJECTIVE AND OBJECTIVE BOX
Patient is a 74y old  Male who presents with a chief complaint of Transfer [place] - Covid-19 PNA (2020 19:44)      BRIEF HOSPITAL COURSE:   73 yo m pmhx BPH and hypothyroidism transferred from [facility] on  after being admitted at [facility] from (3/21 to ) for covid 19 respiratory failure, ARDS, distributive shock, DVT.     : CVC and [place] replaced.     : Paralytic therapy restarted    : Sub q air, received bilateral chest tubes.     4/15: completed meropenem and vancomycin course for MRSA and strep pneumoniae PNA.     : Second right chest tube placed due to persistent ptx.     : Initial right sided chest tube removed.     : Febrile/hypotensive- Recultured, Right IJ CVC removed, new Left IJ CVC placed.     Events last 24 hours:   Transferred to ICU for tentative trach in am.  Remains on levophed and intubated.        PAST MEDICAL & SURGICAL HISTORY:  BPH (benign prostatic hyperplasia)  No pertinent past medical history  No significant past surgical history    Allergies  No Known Allergies      FAMILY HISTORY:  No pertinent family history in first degree relatives      Review of Systems:  Unable to obtain 2/2 intubated/sedated      Physical Examination:    General: Elderly male, lying in bed, NAD    HEENT: NC/AT, ETT and OG tube in place    PULM: Coarse to auscultation bilaterally, no significant sputum production appreciated    CVS: Regular rate and rhythm, no murmurs, rubs, or gallops appreciated    ABD: Soft, nondistended, nontender, normoactive bowel sounds, no masses appreciated    EXT: +edema, nontender    SKIN: Warm, no rashes appreciated    NEURO: Sedated      Medications:  norepinephrine Infusion 0.05 MICROgram(s)/kG/Min IV Continuous <Continuous>  acetaminophen  Suppository .. 650 milliGRAM(s) Rectal every 6 hours PRN  baclofen 10 milliGRAM(s) Oral daily  fentaNYL   Infusion 0.5 MICROgram(s)/kG/Hr IV Continuous <Continuous>  midazolam Infusion 0.02 mG/kG/Hr IV Continuous <Continuous>  propofol Infusion 15 MICROgram(s)/kG/Min IV Continuous <Continuous>  aspirin  chewable 81 milliGRAM(s) Oral daily  famotidine Injectable 20 milliGRAM(s) IV Push two times a day  polyethylene glycol 3350 17 Gram(s) Oral daily  senna 2 Tablet(s) Oral at bedtime PRN  atorvastatin 20 milliGRAM(s) Oral at bedtime  levothyroxine 75 MICROGram(s) Oral daily  lactated ringers. 1000 milliLiter(s) IV Continuous <Continuous>  chlorhexidine 0.12% Liquid 15 milliLiter(s) Oral Mucosa every 12 hours  chlorhexidine 4% Liquid 1 Application(s) Topical <User Schedule>  collagenase Ointment 1 Application(s) Topical two times a day      Mode: AC/ CMV (Assist Control/ Continuous Mandatory Ventilation)  RR (machine): 30  TV (machine): 450  FiO2: 40  PEEP: 5  ITime: 1  PIP: 45      ICU Vital Signs Last 24 Hrs  T(C): 37.7 (2020 20:00), Max: 38.3 (2020 08:00)  T(F): 99.8 (2020 20:00), Max: 100.9 (2020 08:00)  HR: 126 (2020 21:00) (115 - 128)  BP: 117/62 (2020 21:00) (79/53 - 123/57)  BP(mean): 74 (2020 21:00) (62 - 85)  ABP: 105/58 (2020 21:00) (71/40 - 112/63)  ABP(mean): 79 (2020 21:00) (53 - 84)  RR: 30 (2020 21:00) (29 - 31)  SpO2: 95% (2020 21:00) (90% - 100%)    Vital Signs Last 24 Hrs  T(C): 37.7 (2020 20:00), Max: 38.3 (2020 08:00)  T(F): 99.8 (2020 20:00), Max: 100.9 (2020 08:00)  HR: 126 (2020 21:00) (115 - 128)  BP: 117/62 (2020 21:00) (79/53 - 123/57)  BP(mean): 74 (2020 21:00) (62 - 85)  RR: 30 (2020 21:00) (29 - 31)  SpO2: 95% (2020 21:00) (90% - 100%)    ABG - ( 2020 16:00 )  pH, Arterial: 7.36  pH, Blood: x     /  pCO2: 55    /  pO2: 82    / HCO3: 30    / Base Excess: 5.5   /  SaO2: 96          I&O's Detail    2020 07:01  -  2020 07:00  --------------------------------------------------------  IN:    fentaNYL  Infusion: 132 mL    Free Water: 350 mL    IV PiggyBack: 500 mL    Lactated Ringers IV Bolus: 500 mL    midazolam Infusion: 181 mL    Miscellaneous Tube Feedin mL    norepinephrine Infusion: 520 mL    ns in tub fed  [id number]: 300 mL    propofol Infusion: 473 mL  Total IN: 3136 mL    OUT:    Chest Tube: 67 mL    Chest Tube: 45 mL    Indwelling Catheter - Urethral: 1050 mL  Total OUT: 1162 mL  Total NET: 1974 mL      2020 07:  -  2020 21:28  --------------------------------------------------------  IN:    fentaNYL  Infusion: 46.7 mL    Free Water: 100 mL    midazolam Infusion: 80 mL    Miscellaneous Tube Feedin mL    norepinephrine Infusion: 570 mL    ns in tub fed  [id number]: 150 mL    NSModularFluidAdult: 2 mL    propofol Infusion: 205.8 mL  Total IN: 1244.4 mL    OUT:    Chest Tube: 55 mL    Chest Tube: 35 mL    Indwelling Catheter - Urethral: 1075 mL  Total OUT: 1165 mL  Total NET: 79.4 mL      LABS:                        8.9    15.49 )-----------( 346      ( 2020 04:40 )             28.7     04-    140  |  104  |  20  ----------------------------<  133<H>  4.3   |  31  |  0.23<L>    Ca    8.0<L>      2020 04:40  Phos  3.3       Mg     1.8         TPro  6.5  /  Alb  1.2<L>  /  TBili  0.7  /  DBili  x   /  AST  26  /  ALT  18  /  AlkPhos  180<H>        PT/INR - ( 2020 05:28 )   PT: 14.8 sec;   INR: 1.32 ratio    PTT - ( 2020 05:28 )  PTT:47.5 sec      CULTURES:  Culture Results:   No growth to date. ( @ 10:22)  Culture Results:   No growth ( @ 01:00)  Culture Results:   No growth to date. ( @ 23:25)      RADIOLOGY:   < from: Xray Chest 1 View- PORTABLE-Routine (20 @ 06:52) >  EXAM:  XR CHEST PORTABLE ROUTINE 1V                          PROCEDURE DATE:  2020      INTERPRETATION:  RADIOGRAPH OF THE CHEST    Clinical Indication: Pneumonia, bilateral pneumothorax, follow-up.    Technique: Single frontal radiograph of the chest    Comparison: Prior radiograph of the chest from the 2020.    Findings:  Tubes and lines: Endotracheal and nasogastric tubes are unchanged. Bilateral apical chest tubes are unchanged. Left internal jugular central venous catheteris unchanged    Lungs and pleura: Bilateral patchy opacities, right worse than left, are without significant change from the prior study. Trace left apical pneumothorax appears new compared to the prior study. There is no sizable right pneumothorax. There is no sizable pleural effusion.    Heart and mediastinum: The cardiomediastinal silhouette is stable.    Bones and soft tissue: There are no obvious acute osseous or soft tissue abnormalities. Minimal subcutaneous air is seen at the left lateralchest wall.    IMPRESSION:   Trace left apical pneumothorax appears new compared to the prior study. Bilateral chest tubes in place. No sizable right pneumothorax is seen.    Bilateral patchy opacities, right worse than left, are without significant change from the prior study.     Tubes and lines, as above.    GHAZALA MARIA M.D., ATTENDING RADIOLOGIST  This document has been electronically signed. 2020  8:29AM    < end of copied text >      SUPPLEMENTAL O2: AC/VC  LINES: CVC, mani  IVF: N  JANE: Y  PPx: PPI  CONTACT: ALBER, [id number]

## 2020-04-27 NOTE — PROGRESS NOTE ADULT - SUBJECTIVE AND OBJECTIVE BOX
Subjective: Remains intubated and sedated.     Vital Signs:  Vital Signs Last 24 Hrs  T(C): 37.9 (20 @ 17:30), Max: 38.3 (20 @ 08:00)  T(F): 100.3 (20 @ 17:30), Max: 100.9 (20 @ 08:00)  HR: 126 (20 @ 18:00) (105 - 128)  BP: 112/72 (20 @ 18:00) (79/53 - 117/61)  RR: 30 (20 @ 18:00) (30 - 31)  SpO2: 100% (20 @ 18:00) (90% - 100%) on (O2)    I&O's Detail    2020 07:  -  2020 07:00  --------------------------------------------------------  IN:    fentaNYL  Infusion: 132 mL    Free Water: 350 mL    IV PiggyBack: 500 mL    Lactated Ringers IV Bolus: 500 mL    midazolam Infusion: 181 mL    Miscellaneous Tube Feedin mL    norepinephrine Infusion: 520 mL    ns in tub fed  [id number]: 300 mL    propofol Infusion: 473 mL  Total IN: 3136 mL    OUT:    Chest Tube: 67 mL    Chest Tube: 45 mL    Indwelling Catheter - Urethral: 1050 mL  Total OUT: 1162 mL    Total NET: 1974 mL      2020 07:  -  2020 19:45  --------------------------------------------------------  IN:    fentaNYL  Infusion: 46.7 mL    Free Water: 100 mL    midazolam Infusion: 80 mL    Miscellaneous Tube Feedin mL    norepinephrine Infusion: 570 mL    ns in tub fed  [id number]: 150 mL    NSModularFluidAdult: 2 mL    propofol Infusion: 205.8 mL  Total IN: 1244.4 mL    OUT:    Chest Tube: 55 mL    Chest Tube: 35 mL    Indwelling Catheter - Urethral: 1075 mL  Total OUT: 1165 mL    Total NET: 79.4 mL          General: Intubated, sedated  Neck: Neck supple, trachea midline, No JVD  Respiratory: B/L BS  CV: S1S2, no murmurs, rubs or gallops  Abdominal: Soft, NT, ND  Tubes: R CT to -20, 67cc out, no air leak  L CT to -20 sxn, 45 out, no air leak     Relevant labs, radiology and Medications reviewed                        8.9    15.49 )-----------( 346      ( 2020 04:40 )             28.7     04    140  |  104  |  20  ----------------------------<  133<H>  4.3   |  31  |  0.23<L>    Ca    8.0<L>      2020 04:40  Phos  3.3       Mg     1.8         TPro  6.5  /  Alb  1.2<L>  /  TBili  0.7  /  DBili  x   /  AST  26  /  ALT  18  /  AlkPhos  180<H>      PT/INR - ( 2020 05:28 )   PT: 14.8 sec;   INR: 1.32 ratio         PTT - ( 2020 05:28 )  PTT:47.5 sec  MEDICATIONS  (STANDING):  aspirin  chewable 81 milliGRAM(s) Oral daily  atorvastatin 20 milliGRAM(s) Oral at bedtime  baclofen 10 milliGRAM(s) Oral daily  chlorhexidine 0.12% Liquid 15 milliLiter(s) Oral Mucosa every 12 hours  chlorhexidine 4% Liquid 1 Application(s) Topical <User Schedule>  collagenase Ointment 1 Application(s) Topical two times a day  famotidine Injectable 20 milliGRAM(s) IV Push two times a day  fentaNYL   Infusion 0.5 MICROgram(s)/kG/Hr (1.9 mL/Hr) IV Continuous <Continuous>  lactated ringers. 1000 milliLiter(s) (75 mL/Hr) IV Continuous <Continuous>  levothyroxine 75 MICROGram(s) Oral daily  midazolam Infusion 0.02 mG/kG/Hr (1.52 mL/Hr) IV Continuous <Continuous>  norepinephrine Infusion 0.05 MICROgram(s)/kG/Min (7.13 mL/Hr) IV Continuous <Continuous>  polyethylene glycol 3350 17 Gram(s) Oral daily  propofol Infusion 15 MICROgram(s)/kG/Min (6.85 mL/Hr) IV Continuous <Continuous>    MEDICATIONS  (PRN):  acetaminophen  Suppository .. 650 milliGRAM(s) Rectal every 6 hours PRN Temp greater or equal to 38C (100.4F)  senna 2 Tablet(s) Oral at bedtime PRN Constipation        Assessment  74y Male  w/ PAST MEDICAL & SURGICAL HISTORY:  BPH (benign prostatic hyperplasia)  No pertinent past medical history  No significant past surgical history  admitted with complaints of Patient is a 74y old  Male who presents with a chief [facility] [place] [facility]   patient underwent Chest tube placement  Chest tube insertion  US guided central cath  Cannulation, artery, percutaneous, for sampling

## 2020-04-27 NOTE — PROGRESS NOTE ADULT - SUBJECTIVE AND OBJECTIVE BOX
Subjective:  sedated on vent    MEDICATIONS  (STANDING):  aspirin  chewable 81 milliGRAM(s) Oral daily  atorvastatin 20 milliGRAM(s) Oral at bedtime  baclofen 10 milliGRAM(s) Oral daily  chlorhexidine 0.12% Liquid 15 milliLiter(s) Oral Mucosa every 12 hours  chlorhexidine 4% Liquid 1 Application(s) Topical <User Schedule>  collagenase Ointment 1 Application(s) Topical two times a day  enoxaparin Injectable 80 milliGRAM(s) SubCutaneous every 12 hours  famotidine Injectable 20 milliGRAM(s) IV Push two times a day  fentaNYL   Infusion 0.5 MICROgram(s)/kG/Hr (1.9 mL/Hr) IV Continuous <Continuous>  levothyroxine 75 MICROGram(s) Oral daily  midazolam Infusion 0.02 mG/kG/Hr (1.52 mL/Hr) IV Continuous <Continuous>  norepinephrine Infusion 0.05 MICROgram(s)/kG/Min (7.13 mL/Hr) IV Continuous <Continuous>  polyethylene glycol 3350 17 Gram(s) Oral daily  propofol Infusion 15 MICROgram(s)/kG/Min (6.85 mL/Hr) IV Continuous <Continuous>    MEDICATIONS  (PRN):  acetaminophen  Suppository .. 650 milliGRAM(s) Rectal every 6 hours PRN Temp greater or equal to 38C (100.4F)  senna 2 Tablet(s) Oral at bedtime PRN Constipation      Allergies    No Known Allergies    Intolerances        REVIEW OF SYSTEMS: sedated on vent      Vital Signs Last 24 Hrs  T(C): 38.3 (27 Apr 2020 08:00), Max: 38.3 (27 Apr 2020 08:00)  T(F): 100.9 (27 Apr 2020 08:00), Max: 100.9 (27 Apr 2020 08:00)  HR: 117 (27 Apr 2020 09:00) (105 - 127)  BP: 92/58 (27 Apr 2020 09:00) (85/63 - 103/78)  BP(mean): 69 (27 Apr 2020 09:00) (69 - 85)  RR: 31 (27 Apr 2020 09:00) (30 - 31)  SpO2: 93% (27 Apr 2020 09:00) (90% - 95%)    Mode: AC/ CMV (Assist Control/ Continuous Mandatory Ventilation)  RR (machine): 30  TV (machine): 450  FiO2: 45  PEEP: 5  ITime: 0.6  MAP: 15  PIP: 47      PHYSICAL EXAMINATION:  SKIN: no rashes  HEAD: NC/AT  EYES: PERRLA, EOMI  EARS: TM's intact  NOSE: no abnormalities  NECK:  Supple. No lymphadenopathy.   HEART:  S1S2 reg  CHEST:  bilat ronci w good air entry; bilateral chest tubes  ABDOMEN:  Soft and nontender.   EXTREMITIES:  no C/C/E  NEURO: sedated      LABS:                        8.9    15.49 )-----------( 346      ( 27 Apr 2020 04:40 )             28.7     04-27    140  |  104  |  20  ----------------------------<  133<H>  4.3   |  31  |  0.23<L>    Ca    8.0<L>      27 Apr 2020 04:40  Phos  3.3     04-27  Mg     1.8     04-27    TPro  6.5  /  Alb  1.2<L>  /  TBili  0.7  /  DBili  x   /  AST  26  /  ALT  18  /  AlkPhos  180<H>  04-27    PT/INR - ( 26 Apr 2020 05:28 )   PT: 14.8 sec;   INR: 1.32 ratio         PTT - ( 26 Apr 2020 05:28 )  PTT:47.5 sec      RADIOLOGY & ADDITIONAL TESTS:

## 2020-04-27 NOTE — PROGRESS NOTE ADULT - PROBLEM SELECTOR PROBLEM 1
Acute on chronic respiratory failure with hypoxia and hypercapnia
COVID-19 virus infection
COVID-19 virus infection
Acute on chronic respiratory failure with hypoxia and hypercapnia
COVID-19 virus infection

## 2020-04-28 NOTE — PROGRESS NOTE ADULT - SUBJECTIVE AND OBJECTIVE BOX
75 y/o M admitted to [facility] 3/29 - COVID-19 POSITIVE  intubated 3/29 due to acute hypoxic respiratory failure  Patient found to have bilateral DVTs at [place], started on Heparin gtt.   Transferred to  on 4/1    PMHx of BPH     4/28: s/p uneventful tracheostomy placement.  Remains on pressors to support MAP.      30/450/50 +5    (+) Levophed    (+) Evans  (+) TF's    (+) Dip/Versed/Fent    B/L CT's (4/14; 4/20)  CVL 4/26    Hosp # 31  Vent day # 31    Trach placed 4/28      Allergies    No Known Allergies    ICU Vital Signs Last 24 Hrs  T(C): 37.3 (28 Apr 2020 16:04), Max: 37.9 (27 Apr 2020 17:30)  T(F): 99.2 (28 Apr 2020 16:04), Max: 100.3 (27 Apr 2020 17:30)  HR: 128 (28 Apr 2020 16:00) (124 - 137)  BP: 119/64 (28 Apr 2020 16:00) (94/52 - 129/67)  BP(mean): 73 (28 Apr 2020 16:00) (61 - 81)  ABP: 116/63 (28 Apr 2020 16:00) (53/31 - 125/65)  ABP(mean): 84 (28 Apr 2020 16:00) (40 - 90)  RR: 31 (28 Apr 2020 16:00) (29 - 32)  SpO2: 95% (28 Apr 2020 16:00) (85% - 100%)      Mode: AC/ CMV (Assist Control/ Continuous Mandatory Ventilation)  RR (machine): 30  TV (machine): 450  FiO2: 50  PEEP: 5  ITime: 1  PIP: 45      I&O's Summary    27 Apr 2020 07:01  -  28 Apr 2020 07:00  --------------------------------------------------------  IN: 3118.4 mL / OUT: 1935 mL / NET: 1183.4 mL    28 Apr 2020 07:01  -  28 Apr 2020 16:27  --------------------------------------------------------  IN: 954 mL / OUT: 775 mL / NET: 179 mL                              8.0    14.91 )-----------( 315      ( 28 Apr 2020 05:30 )             25.6       04-28    143  |  106  |  20  ----------------------------<  154<H>  3.6   |  34<H>  |  0.27<L>    Ca    7.8<L>      28 Apr 2020 05:30  Phos  3.2     04-28  Mg     1.7     04-28    TPro  6.2  /  Alb  1.1<L>  /  TBili  0.6  /  DBili  x   /  AST  26  /  ALT  14  /  AlkPhos  164<H>  04-28    LIVER FUNCTIONS - ( 28 Apr 2020 05:30 )  Alb: 1.1 g/dL / Pro: 6.2 gm/dL / ALK PHOS: 164 U/L / ALT: 14 U/L / AST: 26 U/L / GGT: x           PT/INR - ( 28 Apr 2020 05:30 )   PT: 16.0 sec;   INR: 1.43 ratio       PTT - ( 28 Apr 2020 05:30 )  PTT:46.8 sec    ABG - ( 28 Apr 2020 10:05 )  pH, Arterial: 7.29  pH, Blood: x     /  pCO2: 66    /  pO2: 62    / HCO3: 31    / Base Excess: 3.9   /  SaO2: 89          MEDICATIONS  (STANDING):  aspirin  chewable 81 milliGRAM(s) Oral daily  atorvastatin 20 milliGRAM(s) Oral at bedtime  baclofen 10 milliGRAM(s) Oral daily  chlorhexidine 0.12% Liquid 15 milliLiter(s) Oral Mucosa every 12 hours  chlorhexidine 4% Liquid 1 Application(s) Topical <User Schedule>  collagenase Ointment 1 Application(s) Topical two times a day  famotidine Injectable 20 milliGRAM(s) IV Push two times a day  fentaNYL   Infusion 0.5 MICROgram(s)/kG/Hr (1.9 mL/Hr) IV Continuous <Continuous>  levothyroxine 75 MICROGram(s) Oral daily  midazolam Infusion 0.02 mG/kG/Hr (1.52 mL/Hr) IV Continuous <Continuous>  midodrine 15 milliGRAM(s) Oral every 8 hours  norepinephrine Infusion 0.05 MICROgram(s)/kG/Min (7.13 mL/Hr) IV Continuous <Continuous>  polyethylene glycol 3350 17 Gram(s) Oral daily  propofol Infusion 15 MICROgram(s)/kG/Min (6.85 mL/Hr) IV Continuous <Continuous>  QUEtiapine 50 milliGRAM(s) Oral two times a day    MEDICATIONS  (PRN):  acetaminophen  Suppository .. 650 milliGRAM(s) Rectal every 6 hours PRN Temp greater or equal to 38C (100.4F)  senna 2 Tablet(s) Oral at bedtime PRN Constipation          DVT Prophylaxis:    Advanced Directives:  Discussed with:    Visit Information:  45 minutes of Critical Care time spent providing medical care for patient's acute illness/conditions that impairs at least one vital organ system and/or poses a high risk of imminent or life threatening deterioration in the patient's condition.  It includes time spent reviewing labs, radiology, discussing goals of care with patient and/or family, and discussing the case with a multidisciplinary team in an effort to prevent further life threatening deterioration or end organ damage.  This time is independent of any procedures performed.    ** Time is exclusive of billed procedures and/or teaching and/or routine family updates.

## 2020-04-28 NOTE — PROGRESS NOTE ADULT - ASSESSMENT
75 yo m  transferred [facility] on 4/1 after being admitted at [facility] from (3/29 to 4/1)  COVID-19 POSITIVE     ARDS due to COVID-19 infection  Acute hypoxic respiratory failure  COVID-19 viral pneumonia  Septic shock due to COVID-19 infection  DVT   Bacterial MRSA/strep pneumo PNA,   B/L pneumothoraces  s/p Trach on 4/28  PMHx BPH & hypothyroidism    Pt remains at high risk for deterioration, morbidity and mortality.    Plan at this time is for continued care in ICU  VAP prevention bundle  trach care  Resume AC post-trach - will d/w surgery team  NEURO: Minimize sedation as possible with intent to do SAT/SBT in am if remains stable.  Seroquel to help wean from sedation.   CV: vasopressor therapy, actively titrating levophed for MAP >65.  Weaning as tolerated. Midodrine to help wean off vasopressor therapy.   RESP: vent support; SAT/SBT as possible; Chest tubes - care as directed by CT sx team  GI: Resume TF's  ENDO: Hypothyroidism on synthroid    ID: Completed abx/antiviral therapy   Full AC for treatment of DVT as appropriate; monitor for s/s of bleeding given recent trach.  Supportive care

## 2020-04-28 NOTE — CHART NOTE - NSCHARTNOTEFT_GEN_A_CORE
Clinical Nutrition BRIEF NOTE    *pt s/p trach today (4/28).  propofol likely to be stopped; propofol infusing during rounds at 18.3mL/hr (=483Kcal/day).  Pt on pressor with MAP >65.  Pt on LR.    *labs reviewed; 04-28 Na143 mmol/L Glu 154 mg/dL<H> K+ 3.6 mmol/L Cr  0.27 mg/dL<L> BUN 20 mg/dL Phos 3.2 mg/dL Alb 1.1 g/dL<L> PAB n/a       *urine output: 1800mL.  BM on 4/27 with abd rounded, not distended.    *(+3) Lt/Rt arm edema.  violeta score of 9; stage 3 PU on coccyx.  stage 1 PU on upper back.  stage 2 PU on Rt ear.    *new wt noted; pt with wt loss of ~5.6Kg since admission (7% in 28 days); clinically significant.    *per flowsheet, besides day before trach, pt was receiving an average of 1331Kcal and 120g protein per day over 6 days.  Which met ~79% of estimated calorie and 99% of estimated protein needs.    *based on s/p trach, pending corpak placement, rec'd change TF to Jevity 1.5 with goal rate of 45mL/hr with 6pkts Prosource TF.  Which will provide ~1590Kcal, 123g protein, 684mL free water, and total TF volume of 900mL.    RECOMMENDATIONS:  1) change TF Rx to Jevity 1.5 @ 45mL/hr with 6pkts Prosource TF  2) monitor hydration status; consider free water flushes of 45mL q1hr (=900mL additional free water)  3) add MVi with minerals daily to ensure 100% RDI met  4) obtain new wt weekly to track/trend changes  5) monitor TF tolerance; keep back of bed > 35 degrees        ESTIMATED NUTR NEEDS:  1675-2010Kcal (25-30Kcal/Kg based on IBW: 67Kg)  121-134g protein (1.8-2g/Kg of IBW: 67Kg)  1675-2010mL fluid (25-30mL/Kg IBW)    Admit wt: 76.1Kg  new wt: 70.5Kg (4/28) (BMI 24)  IBW: 67Kg  Ht: 67"

## 2020-04-28 NOTE — PROGRESS NOTE ADULT - SUBJECTIVE AND OBJECTIVE BOX
Patient is a 74y old  Male who presents with a chief complaint of Transfer [place] - Covid-19 PNA (28 Apr 2020 16:27)      s/p Tracheostomy today. More hypoxic tonight requiring increase FiO2 to 90%.     PAST MEDICAL & SURGICAL HISTORY:  BPH (benign prostatic hyperplasia)  No pertinent past medical history  No significant past surgical history        Hosp day #27d    Vent day #  Mode: AC/ CMV (Assist Control/ Continuous Mandatory Ventilation)  RR (machine): 30  TV (machine): 450  FiO2: 70  PEEP: 5  ITime: 1  PIP: 45        Vital signs / Reviewed and Physical Exam Performed where pertinent and urgently required    Lab / Radiology  studies / ABG / Meds -  reviewed and interpreted into the assessment and treatment plan.      Assessment/Plan/Therapeutic interventions      Neuro - Sedation neuromuscular blockade to facilitate safe ventilation    CV -  Pressor support as needed to maintain MAP 65           Avoiding fluid challenges          QTC monitoring while on Azithromycin and Hydroxychloroquine.    Pulm -  ARDS-NET 4-6cc/kg IBW TV as able to maintain plateau pressures <30               Prone ventilation consideration as feasible  Pa02/Fi02 < 150 on Fi02 >60% and PEEP at least 5                 Vent bundle Reviewed     GI -  PPI  Enteric feeds as tolerated in tandem with NMB and prone ventilation    Renal - Even to negative fluid balance as tolerated by hemodynamics and renal fx.  Feeds to be provided in lieu of IVF.     Heme -  Pharmacologic DVT PPx  in addition to SCD's    ID - ABX discontinuation based on discussion with ID in conjunction with clinical features, culture data, and judicious procalcitonin monitoring.      Endo -  Aggressive glycemic control to limit FS glucose to < 180mg/dl.      COVID 19 specific considerations and therapeutic  options based on the available and rapidly changing literature    Goals of care considerations:  Ongoing assessment for patient specific treatment options based on progression or decline.  I have involved the family with updates and requests in guidance for medical decision making.          38  Minutes of critical care tiem spent in the management of this critically ill COVID-19 patient/PUI patient with continuous assessments and interventions based on the interpretation of multiple databases. Patient is a 74y old M pmhx BPH and Hypothyroidism presented to  as transfer from [facility] with COVID 19+, ARDS, Distributive shock and DVT. Hospital course further complicated by bilateral PTX requiring chest tube placement, MRSA and Strep Pneumonia, and distribtuive. shock.    Chronic respiratory failure s/p Tracheostomy today. More hypoxic tonight requiring increase FiO2 to 90%.     PAST MEDICAL & SURGICAL HISTORY:  BPH (benign prostatic hyperplasia)  No pertinent past medical history  No significant past surgical history        Hosp day #27d  Mode: AC/ CMV (Assist Control/ Continuous Mandatory Ventilation)  RR (machine): 30  TV (machine): 450  FiO2: 70  PEEP: 5  ITime: 1  PIP: 45        Vital signs / Reviewed and Physical Exam Performed where pertinent and urgently required    Lab / Radiology  studies / ABG / Meds -  reviewed and interpreted into the assessment and treatment plan.      Assessment/Plan/Therapeutic interventions      Neuro - Propofol, Versed, and Fentanyl gtt to facilitate safe ventilation    CV -  Levophed gtt required to maintain MAP 65, actively titrating           Avoiding fluid challenges    Pulm -  CHronic respiratory failure with ARDS. S/p Tracheostomy today. More hypoxic tonight, increased FiO2 to 90%.            	ARDS-NET 4-6cc/kg IBW TV as able to maintain plateau pressures <30               Prone ventilation consideration as feasible  Pa02/Fi02 < 150 on Fi02 >60% and PEEP at least 5                 Vent bundle Reviewed     GI -  PPI  Enteric feeds as tolerated in tandem with NMB and prone ventilation    Renal - Even to negative fluid balance as tolerated by hemodynamics and renal fx.  Feeds to be provided in lieu of IVF.     Heme - Resume Heparin gtt for full AC in setting of DVT    ID - COVID 19+ s/p treatment.    Endo -  Aggressive glycemic control to limit FS glucose to < 180mg/dl.      COVID 19 specific considerations and therapeutic  options based on the available and rapidly changing literature    Goals of care considerations:  Ongoing assessment for patient specific treatment options based on progression or decline.  I have involved the family with updates and requests in guidance for medical decision making.          38  Minutes of critical care tiem spent in the management of this critically ill COVID-19 patient/PUI patient with continuous assessments and interventions based on the interpretation of multiple databases.

## 2020-04-28 NOTE — BRIEF OPERATIVE NOTE - NSICDXBRIEFPREOP_GEN_ALL_CORE_FT
PRE-OP DIAGNOSIS:  Ventilator dependent 28-Apr-2020 13:51:29  CARLOTA LOPEZ  COVID-19 28-Apr-2020 13:51:22  CARLOTA LOPEZ

## 2020-04-29 NOTE — PROGRESS NOTE ADULT - ASSESSMENT
75yo M with PMH of BPH presented to  on 4/1/2020, transfer from [facility] with Positive Covid-19, originally admitted with complaints of shortness of breath x 6 days. Patient intubated at  on 3/29. Course complicated by right pneumothorax 4/14 requiring b/l CT placement. On T lovenox for DVT. Thoracic surgery called for persistent right pneumothorax. Second R CT placed 4/20 with reexpansion of lung. Old R CT removed 4/22 after successful clamp trial. 4/23 with small right apical pneumothorax- stable today 4/24. 4/27 R PTX resolved, now tiny left apical pneumo. 4/28 s/p trach and b/l small apical pneumothoraxes.     maintain both chest tubes to -40 suction  CXR as per ICU team   Change chest tube dressings daily  record output per shift    Patient seen and examined with Dr. Alberto.     Jenni Valverde PA  Thoracic Sx  #[id number]

## 2020-04-29 NOTE — PROCEDURE NOTE - NSINDICATIONS_GEN_A_CORE
venous access/critical illness
critical illness
feeding tube replacement
monitoring purposes
sq emphysema/pneumothorax
cannulation purposes/monitoring purposes/critical patient
pneumothorax
pneumothorax/sq emphysema

## 2020-04-29 NOTE — PROCEDURE NOTE - PROCEDURE DATE TIME, MLM
06-Apr-2020 13:06
29-Apr-2020 12:35
14-Apr-2020 11:16
29-Apr-2020 15:40
14-Apr-2020 10:20
20-Apr-2020 18:23

## 2020-04-29 NOTE — PROGRESS NOTE ADULT - SUBJECTIVE AND OBJECTIVE BOX
75 y/o M admitted to [facility] 3/29 - COVID-19 POSITIVE  intubated 3/29 due to acute hypoxic respiratory failure  Patient found to have bilateral DVTs at [place], started on Heparin gtt.   Transferred to  on 4/1    PMHx of BPH     4/28: s/p uneventful tracheostomy placement.  Remains on pressors to support MAP.  4/29: remains lethargic; left chest tube revision by thoracic,       30/450/50 +5    (+) Levophed    (+) Evans  (+) TF's    (+) Dip/Versed/Fent    B/L CT's (4/14; 4/20)  CVL 4/26    Hosp # 31  Vent day # 31    Trach placed 4/28      Allergies    No Known Allergies    ICU Vital Signs Last 24 Hrs  T(C): 37.3 (28 Apr 2020 16:04), Max: 37.9 (27 Apr 2020 17:30)  T(F): 99.2 (28 Apr 2020 16:04), Max: 100.3 (27 Apr 2020 17:30)  HR: 128 (28 Apr 2020 16:00) (124 - 137)  BP: 119/64 (28 Apr 2020 16:00) (94/52 - 129/67)  BP(mean): 73 (28 Apr 2020 16:00) (61 - 81)  ABP: 116/63 (28 Apr 2020 16:00) (53/31 - 125/65)  ABP(mean): 84 (28 Apr 2020 16:00) (40 - 90)  RR: 31 (28 Apr 2020 16:00) (29 - 32)  SpO2: 95% (28 Apr 2020 16:00) (85% - 100%)      Mode: AC/ CMV (Assist Control/ Continuous Mandatory Ventilation)  RR (machine): 30  TV (machine): 450  FiO2: 50  PEEP: 5  ITime: 1  PIP: 45      I&O's Summary    27 Apr 2020 07:01  -  28 Apr 2020 07:00  --------------------------------------------------------  IN: 3118.4 mL / OUT: 1935 mL / NET: 1183.4 mL    28 Apr 2020 07:01  -  28 Apr 2020 16:27  --------------------------------------------------------  IN: 954 mL / OUT: 775 mL / NET: 179 mL                              8.0    14.91 )-----------( 315      ( 28 Apr 2020 05:30 )             25.6       04-28    143  |  106  |  20  ----------------------------<  154<H>  3.6   |  34<H>  |  0.27<L>    Ca    7.8<L>      28 Apr 2020 05:30  Phos  3.2     04-28  Mg     1.7     04-28    TPro  6.2  /  Alb  1.1<L>  /  TBili  0.6  /  DBili  x   /  AST  26  /  ALT  14  /  AlkPhos  164<H>  04-28    LIVER FUNCTIONS - ( 28 Apr 2020 05:30 )  Alb: 1.1 g/dL / Pro: 6.2 gm/dL / ALK PHOS: 164 U/L / ALT: 14 U/L / AST: 26 U/L / GGT: x           PT/INR - ( 28 Apr 2020 05:30 )   PT: 16.0 sec;   INR: 1.43 ratio       PTT - ( 28 Apr 2020 05:30 )  PTT:46.8 sec    ABG - ( 28 Apr 2020 10:05 )  pH, Arterial: 7.29  pH, Blood: x     /  pCO2: 66    /  pO2: 62    / HCO3: 31    / Base Excess: 3.9   /  SaO2: 89          MEDICATIONS  (STANDING):  aspirin  chewable 81 milliGRAM(s) Oral daily  atorvastatin 20 milliGRAM(s) Oral at bedtime  baclofen 10 milliGRAM(s) Oral daily  chlorhexidine 0.12% Liquid 15 milliLiter(s) Oral Mucosa every 12 hours  chlorhexidine 4% Liquid 1 Application(s) Topical <User Schedule>  collagenase Ointment 1 Application(s) Topical two times a day  famotidine Injectable 20 milliGRAM(s) IV Push two times a day  fentaNYL   Infusion 0.5 MICROgram(s)/kG/Hr (1.9 mL/Hr) IV Continuous <Continuous>  levothyroxine 75 MICROGram(s) Oral daily  midazolam Infusion 0.02 mG/kG/Hr (1.52 mL/Hr) IV Continuous <Continuous>  midodrine 15 milliGRAM(s) Oral every 8 hours  norepinephrine Infusion 0.05 MICROgram(s)/kG/Min (7.13 mL/Hr) IV Continuous <Continuous>  polyethylene glycol 3350 17 Gram(s) Oral daily  propofol Infusion 15 MICROgram(s)/kG/Min (6.85 mL/Hr) IV Continuous <Continuous>  QUEtiapine 50 milliGRAM(s) Oral two times a day    MEDICATIONS  (PRN):  acetaminophen  Suppository .. 650 milliGRAM(s) Rectal every 6 hours PRN Temp greater or equal to 38C (100.4F)  senna 2 Tablet(s) Oral at bedtime PRN Constipation          DVT Prophylaxis:    Advanced Directives:  Discussed with:    Visit Information:  45 minutes of Critical Care time spent providing medical care for patient's acute illness/conditions that impairs at least one vital organ system and/or poses a high risk of imminent or life threatening deterioration in the patient's condition.  It includes time spent reviewing labs, radiology, discussing goals of care with patient and/or family, and discussing the case with a multidisciplinary team in an effort to prevent further life threatening deterioration or end organ damage.  This time is independent of any procedures performed.    ** Time is exclusive of billed procedures and/or teaching and/or routine family updates. 73 y/o M admitted to [facility] 3/29 - COVID-19 POSITIVE  intubated 3/29 due to acute hypoxic respiratory failure  Patient found to have bilateral DVTs at [place], started on Heparin gtt.   Transferred to  on 4/1    PMHx of BPH     4/28: s/p uneventful tracheostomy placement.  Remains on pressors to support MAP.  4/29: remains lethargic; left chest tube revision by thoracic, remains lethargic while SAT tried and sedation tapered      34/450/70 +5  7.22/74/167    (+) Levophed tapered   Sedation tapered    (+) Evans  (+) TF's    (+) Dip/Versed/Fent- tapered for SAT  Levo tapered too    Marianne changed 4/29    Hosp # 32  Vent day # 31/ trach #1      heent pupils ~2 mm reactive  chest: bilat chest tubes; decreased BS bibasilar  cvs s1s2 reg  abd soft mild distention  neuro lethargic, no waking up SAT                            7.5    14.23 )-----------( 267      ( 29 Apr 2020 06:10 )             25.7       04-29    143  |  105  |  17  ----------------------------<  201<H>  2.6<LL>   |  35<H>  |  0.27<L>    Ca    8.0<L>      29 Apr 2020 06:10  Phos  3.5     04-29  Mg     1.9     04-29    TPro  6.1  /  Alb  1.0<L>  /  TBili  0.6  /  DBili  x   /  AST  33  /  ALT  14  /  AlkPhos  197<H>  04-29

## 2020-04-29 NOTE — PROCEDURE NOTE - NSPOSTPRCRAD_GEN_A_CORE
chest radiograph
Ordered
post-procedure radiography performed/no pneumothorax
chest tube in correct position
chest tube in correct position

## 2020-04-29 NOTE — PROGRESS NOTE ADULT - SUBJECTIVE AND OBJECTIVE BOX
Subjective: Patient hypercapnic s/p trach yesterday. CXR  with b/l small pneumo. Transitioned from heparin gtt to T lovenox. Hypokalemic.     Vital Signs:  Vital Signs Last 24 Hrs  T(C): 36.1 (20 @ 14:00), Max: 38.2 (20 @ 05:00)  T(F): 96.9 (20 @ 14:00), Max: 100.7 (20 @ 05:00)  HR: 126 (20 @ 14:00) (123 - 137)  BP: 128/72 (20 @ 13:00) (94/48 - 128/72)  RR: 30 (20 @ 14:00) (30 - 34)  SpO2: 97% (20 @ 14:00) (90% - 100%) on (O2)    I&O's Detail    2020 07:  -  2020 07:00  --------------------------------------------------------  IN:    fentaNYL  Infusion: 87.5 mL    Free Water: 130 mL    heparin  Infusion.: 102.1 mL    lactated ringers.: 700 mL    midazolam Infusion: 140.1 mL    Miscellaneous Tube Feedin mL    norepinephrine Infusion: 1745.2 mL    ns in tub fed  [id number]: 394 mL    NSModularFluidAdult: 4 mL    propofol Infusion: 384.7 mL  Total IN: 3787.7 mL    OUT:    Chest Tube: 74 mL    Indwelling Catheter - Urethral: 1800 mL    Rectal Tube: 50 mL  Total OUT: 1924 mL    Total NET: 1863.7 mL      2020 07:  -  2020 14:59  --------------------------------------------------------  IN:    Free Water: 200 mL    IV PiggyBack: 400 mL  Total IN: 600 mL    OUT:    Indwelling Catheter - Urethral: 325 mL    Rectal Tube: 100 mL  Total OUT: 425 mL    Total NET: 175 mL            General: On full vent support   Neck: Neck supple, trachea midline, No JVD, trach in place  Respiratory: B/L BS  CV: S1S2, no murmurs, rubs or gallops  Abdominal: Soft, NT, distended  Tubes: L CT no air leak, 0 output, flushed and suctioned today for presumed clog, to suction raised to -40  R CT to -20 suction, raised to -40, 95cc out, not clogged, no air leak     Relevant labs, radiology and Medications reviewed                        7.5    14. )-----------( 267      ( 2020 06:10 )             25.7         143  |  105  |  17  ----------------------------<  201<H>  2.6<LL>   |  35<H>  |  0.27<L>    Ca    8.0<L>      2020 06:10  Phos  3.5       Mg     1.9         TPro  6.1  /  Alb  1.0<L>  /  TBili  0.6  /  DBili  x   /  AST  33  /  ALT  14  /  AlkPhos  197<H>      PT/INR - ( 2020 05:30 )   PT: 16.0 sec;   INR: 1.43 ratio         PTT - ( 2020 09:20 )  PTT:115.5 sec  MEDICATIONS  (STANDING):  aspirin  chewable 81 milliGRAM(s) Oral daily  atorvastatin 20 milliGRAM(s) Oral at bedtime  baclofen 10 milliGRAM(s) Oral daily  chlorhexidine 4% Liquid 1 Application(s) Topical <User Schedule>  collagenase Ointment 1 Application(s) Topical two times a day  dextrose 5%. 1000 milliLiter(s) (50 mL/Hr) IV Continuous <Continuous>  dextrose 50% Injectable 12.5 Gram(s) IV Push once  dextrose 50% Injectable 25 Gram(s) IV Push once  dextrose 50% Injectable 25 Gram(s) IV Push once  enoxaparin Injectable 70 milliGRAM(s) SubCutaneous two times a day  famotidine Injectable 20 milliGRAM(s) IV Push two times a day  insulin lispro (HumaLOG) corrective regimen sliding scale   SubCutaneous every 6 hours  levothyroxine 75 MICROGram(s) Oral daily  midodrine 15 milliGRAM(s) Oral every 8 hours  norepinephrine Infusion 0.05 MICROgram(s)/kG/Min (3.31 mL/Hr) IV Continuous <Continuous>  polyethylene glycol 3350 17 Gram(s) Oral daily  propofol Infusion 15 MICROgram(s)/kG/Min (6.85 mL/Hr) IV Continuous <Continuous>  QUEtiapine 50 milliGRAM(s) Oral two times a day    MEDICATIONS  (PRN):  acetaminophen  Suppository .. 650 milliGRAM(s) Rectal every 6 hours PRN Temp greater or equal to 38C (100.4F)  dextrose 40% Gel 15 Gram(s) Oral once PRN Blood Glucose LESS THAN 70 milliGRAM(s)/deciliter  glucagon  Injectable 1 milliGRAM(s) IntraMuscular once PRN Glucose LESS THAN 70 milligrams/deciliter  senna 2 Tablet(s) Oral at bedtime PRN Constipation        Assessment  74y Male  w/ PAST MEDICAL & SURGICAL HISTORY:  BPH (benign prostatic hyperplasia)  No pertinent past medical history  No significant past surgical history  admitted with complaints of Patient is a 74y old  Male who presents with a chief complaint of Transfer [place] - Covid-19 PNA (2020 09:16)   patient underwent Open tracheostomy  Chest tube placement  Chest tube insertion  US guided central cath  Cannulation, artery, percutaneous, for sampling

## 2020-04-29 NOTE — PROCEDURE NOTE - NSSITEPREP_SKIN_A_CORE
alcohol/Adherence to aseptic technique: hand hygiene prior to donning barriers (gown, gloves), don cap and mask, sterile drape over patient alcohol/Adherence to aseptic technique: hand hygiene prior to donning barriers (gown, gloves), don cap and mask, sterile drape over patient/chlorhexidine

## 2020-04-29 NOTE — PROCEDURE NOTE - NSINFORMCONSENT_GEN_A_CORE
This was an emergent procedure.
Benefits, risks, and possible complications of procedure explained to patient/caregiver who verbalized understanding and gave verbal consent.
This was an emergent procedure.

## 2020-04-29 NOTE — PROCEDURE NOTE - NSPROCDETAILS_GEN_ALL_CORE
sutured in place/Seldinger technique/ultrasound guidance/positive blood return obtained via catheter
sterile dressing applied/sterile technique, catheter placed/ultrasound guidance/lumen(s) aspirated and flushed/guidewire recovered
sterile dressing applied/ultrasound guidance/guidewire recovered/sterile technique, catheter placed/lumen(s) aspirated and flushed
dressing applied/secured in place/sterile dressing applied/supine position
location identified, draped/prepped, sterile technique used, needle inserted/introduced/connected to a pressurized flush line/sutured in place/hemostasis with direct pressure, dressing applied/positive blood return obtained via catheter/all materials/supplies accounted for at end of procedure
dressing applied/secured in place/supine position/sterile dressing applied
dressing applied/supine position

## 2020-04-29 NOTE — PROGRESS NOTE ADULT - SUBJECTIVE AND OBJECTIVE BOX
Patient seen this am on rounds.  No acute events overnight a per nursing staff.  Patient remains critically ill.  POD 1 from Open Bedside COVID protocol Tracheostomy.  Tolerated well.  Good tidal volumes on vent, suggestion of mucus plugging while attempting ventilation after trach placed - heavy suctioning improved condition.  No oozing, hepatin drip on.    ICU Vital Signs Last 24 Hrs  T(C): 38.2 (29 Apr 2020 05:00), Max: 38.2 (29 Apr 2020 05:00)  T(F): 100.7 (29 Apr 2020 05:00), Max: 100.7 (29 Apr 2020 05:00)  HR: 124 (29 Apr 2020 09:00) (124 - 137)  BP: 96/56 (29 Apr 2020 09:00) (94/48 - 129/67)  BP(mean): 64 (29 Apr 2020 09:00) (58 - 80)  ABP: 95/54 (29 Apr 2020 09:00) (86/49 - 125/65)  ABP(mean): 71 (29 Apr 2020 09:00) (63 - 90)  RR: 34 (29 Apr 2020 09:00) (30 - 34)  SpO2: 94% (29 Apr 2020 09:00) (85% - 100%)    Critically ill, Sedated, intubated on vent.  Trach midline, no evidence of bleeding, hematoma.                            7.5    14.23 )-----------( 267      ( 29 Apr 2020 06:10 )             25.7     04-29    143  |  105  |  17  ----------------------------<  201<H>  2.6<LL>   |  35<H>  |  0.27<L>    Ca    8.0<L>      29 Apr 2020 06:10  Phos  3.5     04-29  Mg     1.9     04-29    TPro  6.1  /  Alb  1.0<L>  /  TBili  0.6  /  DBili  x   /  AST  33  /  ALT  14  /  AlkPhos  197<H>  04-29    ABG - ( 29 Apr 2020 06:12 )  pH, Arterial: 7.20  pH, Blood: x     /  pCO2: 84    /  pO2: 78    / HCO3: 31    / Base Excess: 2.8   /  SaO2: 93

## 2020-04-29 NOTE — PROGRESS NOTE ADULT - ASSESSMENT
73 yo m  transferred from [facility] on 4/1 after being admitted at [facility] from (3/29 to 4/1)  COVID-19 POSITIVE     ARDS due to COVID-19 infection  Acute hypoxic respiratory failure  COVID-19 viral pneumonia  Septic shock due to COVID-19 infection  DVT   Bacterial MRSA/strep pneumo PNA,   B/L pneumothoraces  s/p Trach on 4/28  PMHx BPH & hypothyroidism    Pt remains at high risk for deterioration, morbidity and mortality.    Plan at this time is for continued care in ICU  VAP prevention bundle  trach care  Resume AC post-trach - will d/w surgery team  NEURO: Minimize sedation as possible with intent to do SAT/SBT in am if remains stable.  Seroquel to help wean from sedation.   CV: vasopressor therapy, actively titrating levophed for MAP >65.  Weaning as tolerated. Midodrine to help wean off vasopressor therapy.   RESP: vent support; SAT/SBT as possible; Chest tubes - care as directed by CT sx team  GI: Resume TF's  ENDO: Hypothyroidism on synthroid    ID: Completed abx/antiviral therapy   Full AC for treatment of DVT as appropriate; monitor for s/s of bleeding given recent trach.  Supportive care 75 yo m  transferred from [facility] on 4/1 after being admitted at [facility] from (3/29 to 4/1)  COVID-19 POSITIVE     ARDS due to COVID-19 infection  Acute hypoxic respiratory failure  COVID-19 viral pneumonia  Septic shock due to COVID-19 infection  DVT   Bacterial MRSA/strep pneumo PNA,   B/L pneumothoraces  s/p Trach on 4/28  Proteus in the sputum      - chest tube revised by thoracic, ? blood clot; functioning well; chest tubes are  staying for now per thoracic  - sinus tach taper to dc levo; if BP support needed anish will be used  - ID consult re: Proteus in the sputum  - K replaced this am repeat f/up K  - GI re: PEG; he is on LMWH 70 bid needs to be held- plan for Fri case d/w [place]  - VAP prevention bundle  - trach care  - Seroquel to help wean from sedation.   - ID consult re: Proteus in the sputum  - Marianne changed today  - te remains lethargic failing SAT  - taper to dc Levo / sinus tach; substitute for another pressor if necessary 75 yo m  transferred from [facility] on 4/1 after being admitted at [facility] from (3/29 to 4/1)  COVID-19 POSITIVE     ARDS due to COVID-19 infection  Acute hypoxic respiratory failure  COVID-19 viral pneumonia  Septic shock due to COVID-19 infection  DVT   Bacterial MRSA/strep pneumo PNA,   B/L pneumothoraces  s/p Trach on 4/28  Proteus in the sputum      - chest tube revised by thoracic, ? blood clot; functioning well; chest tubes are  staying for now per thoracic  - sinus tach taper to dc levo; if BP support needed anish will be used  - ID consult re: Proteus in the sputum  - K replaced this am repeat f/up K  - GI re: PEG; he is on LMWH 70 bid needs to be held- plan for Fri case d/w [place]  - VAP prevention bundle  - trach care  - Seroquel to help wean from sedation.   - ID consult re: Proteus in the sputum  - Marianne changed today  - te remains lethargic failing SAT  - taper to dc Levo / sinus tach; substitute for another pressor if necessary  - start sliding scale r/o diabetes

## 2020-04-29 NOTE — PROCEDURE NOTE - ADDITIONAL PROCEDURE DETAILS
OG tube removed prior to procedure
CXR reviewed with Dr. Silva - brooke placement.  Advised RN line ok to use.
procedure closely observed  good arterial waveform and pulsatile flow  secured and dressing applied as appropriate  NO overt complications noted

## 2020-04-29 NOTE — CHART NOTE - NSCHARTNOTEFT_GEN_A_CORE
Spoke with son Ignacio  Advised trached  Will need peg  still critical  Son familiar with PEG from prior experience with his mother  Questions answered

## 2020-04-29 NOTE — PROCEDURE NOTE - NSICDXPROCEDURE_GEN_ALL_CORE_FT
PROCEDURES:  Cannulation, artery, percutaneous, for sampling 06-Apr-2020 13:09:10  Zaina Smith A
PROCEDURES:  Percutaneous catheterization of artery 29-Apr-2020 15:41:05  Ruperto Jewell
PROCEDURES:  US guided central cath 06-Apr-2020 15:49:12  Blanca Chen
PROCEDURES:  Chest tube insertion 14-Apr-2020 17:18:56  Ruperto Cooper
PROCEDURES:  Chest tube placement 20-Apr-2020 18:24:44 Right Kaitlin Gomez

## 2020-04-29 NOTE — PROGRESS NOTE ADULT - ASSESSMENT
74M with COVID PNA and VDRF, POD 1 Tracheostomy.  - No evidence of bleeding from Trach site,  - No packing to be removed, may replace gauze over trach site for secretions  - Suture removal in 7-10 days.  - No further surgical intervention.    will discuss with attending, Dr. Breen.

## 2020-04-30 NOTE — CHART NOTE - NSCHARTNOTEFT_GEN_A_CORE
Spoke to son Ignacio  Explained increased FiO2 now 100%  Doing worse than yesterday  Very Critical  Son wanted reassurance we would call him if "anything happens"

## 2020-04-30 NOTE — PROGRESS NOTE ADULT - SUBJECTIVE AND OBJECTIVE BOX
73 y/o M admitted to [facility] 3/29 - COVID-19 POSITIVE  intubated 3/29 due to acute hypoxic respiratory failure  Patient found to have bilateral DVTs at [place], started on Heparin gtt.   Transferred to  on 4/1    PMHx of BPH     4/28: s/p uneventful tracheostomy placement.  Remains on pressors to support MAP.      4/30: pt doing very poorly - severely deteriorated last 24 hrs - hypoxic; hypotensive  High FIO2 and PEEP; multiple pressors    34/450/100 +15    ABG pending    (+) Levophed; Phenylephrine    (+) Evans  (+) TF's    (+) Dip/Fent    B/L CT's (4/14; 4/20)  CVL 4/26    Hosp # [phone]  Vent day # 33    Trach placed 4/28    Allergies    No Known Allergies      ICU Vital Signs Last 24 Hrs  T(C): 37.8 (30 Apr 2020 15:00), Max: 38.2 (30 Apr 2020 06:00)  T(F): 100 (30 Apr 2020 15:00), Max: 100.8 (30 Apr 2020 06:00)  HR: 145 (30 Apr 2020 15:00) (95 - 152)  BP: 101/61 (30 Apr 2020 15:00) (86/54 - 134/73)  BP(mean): 70 (30 Apr 2020 15:00) (62 - 88)  ABP: 85/55 (30 Apr 2020 15:00) (77/44 - 115/63)  ABP(mean): 70 (30 Apr 2020 15:00) (58 - 86)  RR: 33 (30 Apr 2020 15:00) (27 - 35)  SpO2: 92% (30 Apr 2020 15:00) (88% - 99%)      Mode: AC/ CMV (Assist Control/ Continuous Mandatory Ventilation)  RR (machine): 34  TV (machine): 450  FiO2: 100  PEEP: 15  ITime: 1  MAP: 19  PIP: 39      I&O's Summary    29 Apr 2020 07:01  -  30 Apr 2020 07:00  --------------------------------------------------------  IN: 3806 mL / OUT: 2214 mL / NET: 1592 mL    30 Apr 2020 07:01  -  30 Apr 2020 15:06  --------------------------------------------------------  IN: 0 mL / OUT: 120 mL / NET: -120 mL                              7.1    15.18 )-----------( 259      ( 30 Apr 2020 06:00 )             24.5       04-30    144  |  104  |  19  ----------------------------<  163<H>  3.5   |  38<H>  |  0.25<L>    Ca    8.2<L>      30 Apr 2020 06:00  Phos  2.0     04-30  Mg     1.8     04-30    TPro  6.2  /  Alb  1.0<L>  /  TBili  0.4  /  DBili  x   /  AST  36  /  ALT  15  /  AlkPhos  224<H>  04-30      CAPILLARY BLOOD GLUCOSE      POCT Blood Glucose.: 115 mg/dL (30 Apr 2020 12:43)  POCT Blood Glucose.: 157 mg/dL (30 Apr 2020 05:07)  POCT Blood Glucose.: 178 mg/dL (29 Apr 2020 23:39)  POCT Blood Glucose.: 172 mg/dL (29 Apr 2020 17:06)      LIVER FUNCTIONS - ( 30 Apr 2020 06:00 )  Alb: 1.0 g/dL / Pro: 6.2 gm/dL / ALK PHOS: 224 U/L / ALT: 15 U/L / AST: 36 U/L / GGT: x           PTT - ( 29 Apr 2020 09:20 )  PTT:115.5 sec    ABG - ( 30 Apr 2020 05:20 )  pH, Arterial: 7.31  pH, Blood: x     /  pCO2: 76    /  pO2: 70    / HCO3: 37    / Base Excess: 10.1  /  SaO2: 93          MEDICATIONS  (STANDING):  aspirin  chewable 81 milliGRAM(s) Oral daily  atorvastatin 20 milliGRAM(s) Oral at bedtime  baclofen 10 milliGRAM(s) Oral daily  cefTRIAXone Injectable. 1000 milliGRAM(s) IV Push every 24 hours  chlorhexidine 0.12% Liquid 15 milliLiter(s) Oral Mucosa two times a day  chlorhexidine 4% Liquid 1 Application(s) Topical <User Schedule>  collagenase Ointment 1 Application(s) Topical two times a day  dextrose 5%. 1000 milliLiter(s) (50 mL/Hr) IV Continuous <Continuous>  dextrose 50% Injectable 12.5 Gram(s) IV Push once  dextrose 50% Injectable 25 Gram(s) IV Push once  dextrose 50% Injectable 25 Gram(s) IV Push once  enoxaparin Injectable 70 milliGRAM(s) SubCutaneous two times a day  famotidine Injectable 20 milliGRAM(s) IV Push two times a day  insulin lispro (HumaLOG) corrective regimen sliding scale   SubCutaneous every 6 hours  levothyroxine 75 MICROGram(s) Oral daily  midazolam Infusion 0.02 mG/kG/Hr (1.41 mL/Hr) IV Continuous <Continuous>  midodrine 15 milliGRAM(s) Oral every 8 hours  norepinephrine Infusion 0.05 MICROgram(s)/kG/Min (3.31 mL/Hr) IV Continuous <Continuous>  phenylephrine    Infusion 3 MICROgram(s)/kG/Min (39.7 mL/Hr) IV Continuous <Continuous>  polyethylene glycol 3350 17 Gram(s) Oral daily  QUEtiapine 50 milliGRAM(s) Oral two times a day  vasopressin Infusion 0.04 Unit(s)/Min (2.4 mL/Hr) IV Continuous <Continuous>    MEDICATIONS  (PRN):  acetaminophen  Suppository .. 650 milliGRAM(s) Rectal every 6 hours PRN Temp greater or equal to 38C (100.4F)  dextrose 40% Gel 15 Gram(s) Oral once PRN Blood Glucose LESS THAN 70 milliGRAM(s)/deciliter  glucagon  Injectable 1 milliGRAM(s) IntraMuscular once PRN Glucose LESS THAN 70 milligrams/deciliter  HYDROmorphone  Injectable 1 milliGRAM(s) IV Push every 4 hours PRN pain or agitation  senna 2 Tablet(s) Oral at bedtime PRN Constipation            DVT Prophylaxis: Lovenox 70mg SC q12h    Visit Information:  45 minutes of Critical Care time spent providing medical care for patient's acute illness/conditions that impairs at least one vital organ system and/or poses a high risk of imminent or life threatening deterioration in the patient's condition.  It includes time spent reviewing labs, radiology, discussing goals of care with patient and/or family, and discussing the case with a multidisciplinary team in an effort to prevent further life threatening deterioration or end organ damage.  This time is independent of any procedures performed.    ** Time is exclusive of billed procedures and/or teaching and/or routine family updates.

## 2020-04-30 NOTE — PROGRESS NOTE ADULT - SUBJECTIVE AND OBJECTIVE BOX
Subjective:  Pt seen, on 100% PEEP of 15, with PAPs 60, sating in 90s.     Vital Signs:  Vital Signs Last 24 Hrs  T(C): 37.9 (04-30-20 @ 08:00), Max: 38.2 (04-30-20 @ 06:00)  T(F): 100.2 (04-30-20 @ 08:00), Max: 100.8 (04-30-20 @ 06:00)  HR: 144 (04-30-20 @ 11:30) (95 - 152)  BP: 103/61 (04-30-20 @ 11:00) (86/54 - 134/73)  RR: 31 (04-30-20 @ 11:30) (27 - 35)  SpO2: 92% (04-30-20 @ 11:30) (88% - 100%) on (O2)    Telemetry/Alarms:    Relevant labs, radiology and Medications reviewed                        7.1    15.18 )-----------( 259      ( 30 Apr 2020 06:00 )             24.5     04-30    144  |  104  |  19  ----------------------------<  163<H>  3.5   |  38<H>  |  0.25<L>    Ca    8.2<L>      30 Apr 2020 06:00  Phos  2.0     04-30  Mg     1.8     04-30    TPro  6.2  /  Alb  1.0<L>  /  TBili  0.4  /  DBili  x   /  AST  36  /  ALT  15  /  AlkPhos  224<H>  04-30    PTT - ( 29 Apr 2020 09:20 )  PTT:115.5 sec  MEDICATIONS  (STANDING):  aspirin  chewable 81 milliGRAM(s) Oral daily  atorvastatin 20 milliGRAM(s) Oral at bedtime  baclofen 10 milliGRAM(s) Oral daily  chlorhexidine 0.12% Liquid 15 milliLiter(s) Oral Mucosa two times a day  chlorhexidine 4% Liquid 1 Application(s) Topical <User Schedule>  collagenase Ointment 1 Application(s) Topical two times a day  dextrose 5%. 1000 milliLiter(s) (50 mL/Hr) IV Continuous <Continuous>  dextrose 50% Injectable 12.5 Gram(s) IV Push once  dextrose 50% Injectable 25 Gram(s) IV Push once  dextrose 50% Injectable 25 Gram(s) IV Push once  enoxaparin Injectable 70 milliGRAM(s) SubCutaneous two times a day  famotidine Injectable 20 milliGRAM(s) IV Push two times a day  insulin lispro (HumaLOG) corrective regimen sliding scale   SubCutaneous every 6 hours  levothyroxine 75 MICROGram(s) Oral daily  midodrine 15 milliGRAM(s) Oral every 8 hours  norepinephrine Infusion 0.05 MICROgram(s)/kG/Min (3.31 mL/Hr) IV Continuous <Continuous>  phenylephrine    Infusion 3 MICROgram(s)/kG/Min (39.7 mL/Hr) IV Continuous <Continuous>  polyethylene glycol 3350 17 Gram(s) Oral daily  potassium phosphate IVPB 15 milliMole(s) IV Intermittent once  propofol Infusion 30 MICROgram(s)/kG/Min (12.7 mL/Hr) IV Continuous <Continuous>  QUEtiapine 50 milliGRAM(s) Oral two times a day    MEDICATIONS  (PRN):  acetaminophen  Suppository .. 650 milliGRAM(s) Rectal every 6 hours PRN Temp greater or equal to 38C (100.4F)  dextrose 40% Gel 15 Gram(s) Oral once PRN Blood Glucose LESS THAN 70 milliGRAM(s)/deciliter  glucagon  Injectable 1 milliGRAM(s) IntraMuscular once PRN Glucose LESS THAN 70 milligrams/deciliter  HYDROmorphone  Injectable 1 milliGRAM(s) IV Push every 4 hours PRN pain or agitation  senna 2 Tablet(s) Oral at bedtime PRN Constipation      Physical exam  Gen sedated on vent    Card RRR  Pulm coarse b/l R>L  Abd soft  Ext warm    Tubes: b/l chest tubes to -40, no AL, left put out 60 and right none    CXR < from: Xray Chest 1 View- PORTABLE-Urgent (04.30.20 @ 09:26) >  rontal expiratory view of the chest shows the heart to be normal in size. Tracheostomy tube, feeding tube, left jugular line and bilateral chest tubes are present.    The lungs are slightly clearer and there is no evidence of pneumothorax nor pleural effusion.    IMPRESSION:  Slight clearing of the lungs. No pneumothorax.    < end of copied text >      I&O's Summary    29 Apr 2020 07:01  -  30 Apr 2020 07:00  --------------------------------------------------------  IN: 3806 mL / OUT: 2214 mL / NET: 1592 mL    30 Apr 2020 07:01  -  30 Apr 2020 11:49  --------------------------------------------------------  IN: 0 mL / OUT: 65 mL / NET: -65 mL        Assessment  74y Male  w/ PAST MEDICAL & SURGICAL HISTORY:  BPH (benign prostatic hyperplasia)  No pertinent past medical history  No significant past surgical history  admitted with complaints of Patient is a 74y old  Male who presents with a chief complaint of Transfer [place] - Covid-19 PNA (29 Apr 2020 14:56)  .  73yo M with PMH of BPH presented to  on 4/1/2020, transfer from [facility] with Positive Covid-19, originally admitted with complaints of shortness of breath x 6 days. Patient intubated at  on 3/29. Course complicated by right pneumothorax 4/14 requiring b/l CT placement. On T lovenox for DVT. Thoracic surgery called for persistent right pneumothorax. Second R CT placed 4/20 with reexpansion of lung. Old R CT removed 4/22 after successful clamp trial. 4/23 with small right apical pneumothorax- stable today 4/24. 4/27 R PTX resolved, now tiny left apical pneumo. 4/28 s/p trach      maintain both chest tubes to -40 suction, no PTX clearly seen on CXR  Change chest tube dressings daily  record output per shift  no further thoracic intervention at this time    Discussed with Cardiothoracic Team at AM rounds.

## 2020-04-30 NOTE — PROGRESS NOTE ADULT - ASSESSMENT
75 y/o M with a PMHx of BPH admitted from outside hospital on 4/1 for further care of viral syndrome secondary to COVID-19 infection.  1. Patient transferred from outside hospital for further treatment of respiratory failure secondary to COVID-19 infection  - hospital course complicated by pneumonia with Strep pneumonia and MRSA; bilateral chest tubes; respiratory failure  - s/p trach on 4/28  - Proteus in sputum  - will start ceftriaxone to treat Proteus  - grave prognosis as patient is unresponsive, not weaning despite greater than one month on ventilatory support, pressor support  - consideration for comfort care measures      2. other issues: per medicine

## 2020-04-30 NOTE — PROGRESS NOTE ADULT - SUBJECTIVE AND OBJECTIVE BOX
spoke with pts son Ignacio via telephone.  severity of illness an potential for death explained in detail.  All questions answered.  Encouraged to do Video conf. call to see his dad.  He wants to come out to see him in the morning if possible.  He will speak with pts wife Ruby and explain our conversation to her as well.    Given pt on multiple pressors, high FIO2 and high PEEP,   pt will NOT benefit from CPR and as such, on the basis of futility,   NO further resuscitation efforts will be undertaken should cardiac arrest occur.

## 2020-04-30 NOTE — PROGRESS NOTE ADULT - SUBJECTIVE AND OBJECTIVE BOX
Date of service: 04-30-20 @ 12:17    Asked to reconsult on this patient; s/p trach; has fever, sputum cultures are growing Proteus  Patient remains on ventilatory support, on pressors, s/p trach on 4/28      ROS unable to obtain secondary to patient medical condition     MEDICATIONS  (STANDING):  aspirin  chewable 81 milliGRAM(s) Oral daily  atorvastatin 20 milliGRAM(s) Oral at bedtime  baclofen 10 milliGRAM(s) Oral daily  cefTRIAXone Injectable. 1000 milliGRAM(s) IV Push every 24 hours  chlorhexidine 0.12% Liquid 15 milliLiter(s) Oral Mucosa two times a day  chlorhexidine 4% Liquid 1 Application(s) Topical <User Schedule>  collagenase Ointment 1 Application(s) Topical two times a day  dextrose 5%. 1000 milliLiter(s) (50 mL/Hr) IV Continuous <Continuous>  dextrose 50% Injectable 12.5 Gram(s) IV Push once  dextrose 50% Injectable 25 Gram(s) IV Push once  dextrose 50% Injectable 25 Gram(s) IV Push once  enoxaparin Injectable 70 milliGRAM(s) SubCutaneous two times a day  famotidine Injectable 20 milliGRAM(s) IV Push two times a day  insulin lispro (HumaLOG) corrective regimen sliding scale   SubCutaneous every 6 hours  levothyroxine 75 MICROGram(s) Oral daily  midodrine 15 milliGRAM(s) Oral every 8 hours  norepinephrine Infusion 0.05 MICROgram(s)/kG/Min (3.31 mL/Hr) IV Continuous <Continuous>  phenylephrine    Infusion 3 MICROgram(s)/kG/Min (39.7 mL/Hr) IV Continuous <Continuous>  polyethylene glycol 3350 17 Gram(s) Oral daily  potassium phosphate IVPB 15 milliMole(s) IV Intermittent once  propofol Infusion 30 MICROgram(s)/kG/Min (12.7 mL/Hr) IV Continuous <Continuous>  QUEtiapine 50 milliGRAM(s) Oral two times a day    MEDICATIONS  (PRN):  acetaminophen  Suppository .. 650 milliGRAM(s) Rectal every 6 hours PRN Temp greater or equal to 38C (100.4F)  dextrose 40% Gel 15 Gram(s) Oral once PRN Blood Glucose LESS THAN 70 milliGRAM(s)/deciliter  glucagon  Injectable 1 milliGRAM(s) IntraMuscular once PRN Glucose LESS THAN 70 milligrams/deciliter  HYDROmorphone  Injectable 1 milliGRAM(s) IV Push every 4 hours PRN pain or agitation  senna 2 Tablet(s) Oral at bedtime PRN Constipation      Vital Signs Last 24 Hrs  T(C): 37.9 (30 Apr 2020 08:00), Max: 38.2 (30 Apr 2020 06:00)  T(F): 100.2 (30 Apr 2020 08:00), Max: 100.8 (30 Apr 2020 06:00)  HR: 144 (30 Apr 2020 12:00) (95 - 152)  BP: 97/61 (30 Apr 2020 12:00) (86/54 - 134/73)  BP(mean): 69 (30 Apr 2020 12:00) (62 - 88)  RR: 34 (30 Apr 2020 12:00) (27 - 35)  SpO2: 91% (30 Apr 2020 12:00) (88% - 100%)    Mode: AC/ CMV (Assist Control/ Continuous Mandatory Ventilation), RR (machine): 34, TV (machine): 450, FiO2: 100, PEEP: 15, ITime: 1, MAP: 19, PIP: 39    Physical Exam:          Constitutional: frail looking  HEENT: NC/AT  Neck: trach in place  Back: no tenderness  Respiratory: respiratory effort normal; scattered coarse breath sounds, bilateral chest tubes  Cardiovascular: S1S2 regular, no murmurs  Abdomen: soft, not tender, not distended, positive BS; no liver or spleen organomegaly  Genitourinary: no suprapubic tenderness  Musculoskeletal: no muscle tenderness, no joint swelling or tenderness  Neurological/ Psychiatric: nonresponsive  Skin: no rashes; no palpable lesions    Labs: all available labs reviewed                Labs:                        7.1    15.18 )-----------( 259      ( 30 Apr 2020 06:00 )             24.5     04-30    144  |  104  |  19  ----------------------------<  163<H>  3.5   |  38<H>  |  0.25<L>    Ca    8.2<L>      30 Apr 2020 06:00  Phos  2.0     04-30  Mg     1.8     04-30    TPro  6.2  /  Alb  1.0<L>  /  TBili  0.4  /  DBili  x   /  AST  36  /  ALT  15  /  AlkPhos  224<H>  04-30           Cultures:       Culture - Sputum (collected 04-27-20 @ 05:10)  Source: .Sputum Sputum trap  Gram Stain (04-27-20 @ 13:34):    Rare polymorphonuclear leukocytes per low power field    Rare Squamous epithelial cells per low power field    Numerous Gram Negative Rods per oil power field    Moderate Yeast like cells per oil power field    Few Gram Positive Rods per oil power field  Final Report (04-29-20 @ 11:15):    Numerous Proteus mirabilis    Normal Respiratory Rupali present  Organism: Proteus mirabilis (04-29-20 @ 11:15)  Organism: Proteus mirabilis (04-29-20 @ 11:15)      -  Amikacin: S <=16      -  Amoxicillin/Clavulanic Acid: S <=8/4      -  Ampicillin: S <=8 These ampicillin results predict results for amoxicillin      -  Ampicillin/Sulbactam: S <=4/2 Enterobacter, Citrobacter, and Serratia may develop resistance during prolonged therapy (3-4 days)      -  Aztreonam: S <=4      -  Cefazolin: S <=2 Enterobacter, Citrobacter, and Serratia may develop resistance during prolonged therapy (3-4 days)      -  Cefepime: S <=2      -  Cefoxitin: S <=8      -  Ceftriaxone: S <=1 Enterobacter, Citrobacter, and Serratia may develop resistance during prolonged therapy      -  Ciprofloxacin: S <=0.25      -  Ertapenem: S <=0.5      -  Gentamicin: S <=2      -  Levofloxacin: S <=0.5      -  Meropenem: S <=1      -  Piperacillin/Tazobactam: S <=8      -  Tobramycin: S <=2      -  Trimethoprim/Sulfamethoxazole: S <=0.5/9.5      Method Type: PHILL    Culture - Blood (collected 04-26-20 @ 10:22)  Source: .Blood None  Preliminary Report (04-27-20 @ 15:02):    No growth to date.    Culture - Urine (collected 04-26-20 @ 10:15)  Source: .Urine Catheterized  Final Report (04-28-20 @ 14:19):    No growth    Culture - Urine (collected 04-26-20 @ 01:00)  Source: .Urine Catheterized  Final Report (04-27-20 @ 10:09):    No growth    Culture - Blood (collected 04-25-20 @ 23:25)  Source: .Blood None  Preliminary Report (04-27-20 @ 10:02):    No growth to date.      Ferritin, Serum: 460 ng/mL (04-25-20 @ 04:30)  C-Reactive Protein, Serum: 10.59 mg/dL (04-25-20 @ 04:30)        COVID-19 PCR . (03.21.20 @ 22:44)    COVID-19 PCR: Detected: LDT - Laboratory Developed Test All “detected” results on this new test  are considered presumptively positive results, are clinically actionable,  and specimens will be [facility] for confirmation testing.  Another report (corrected report) will only be issued if discordant  results occur.  This test has been validated by [employer] to be accurate;  though it has not been FDA cleared/approved by the usual pathway.  As with all laboratory tests, results should be correlated with clinical  findings.    < from: Xray Chest 1 View- PORTABLE-Routine (04.08.20 @ 07:18) >    EXAM:  XR CHEST PORTABLE ROUTINE 1V                            PROCEDURE DATE:  04/08/2020          INTERPRETATION:  CHEST AP PORTABLE:    History: Shortness of Breath.     Date and time of exam: 4/8/2020 6:43 AM.    Technique: A single AP view of the chest was obtained.    Comparison exam: 4/6/2020 4:38 PM.    Findings:  Diffuse bilateral pulmonary infiltrates, right worse than left. Removal of left IJ central line since the prior exam..    Impression:  Removal of left IJ central line, otherwise stable.    < end of copied text >        Radiology: all available radiological tests reviewed    Advanced directives addressed: full resuscitation

## 2020-04-30 NOTE — PROGRESS NOTE ADULT - ASSESSMENT
73 yo m  transferred from [facility] on 4/1 after being admitted at [facility] from (3/29 to 4/1)  COVID-19 POSITIVE     ARDS due to COVID-19 infection  Acute hypoxic respiratory failure  COVID-19 viral pneumonia  Septic shock due to COVID-19 infection  DVT   Bacterial MRSA/strep pneumo PNA,   B/L pneumothoraces  s/p Trach on 4/28  PMHx BPH & hypothyroidism  acute deterioration with worsening hypoxia and shock requiring vasopressors  Sinus tachycardia    Pt remains at high risk for deterioration, morbidity and mortality.    Plan at this time is for continued care in ICU  vent adjustments:  ABG  versed drip - d/c diprivan  vasopressin - d/c Levophed  Continue Phenylephrine infusion     VAP prevention bundle  trach care  Resume AC post-trach - will d/w surgery team  NEURO: Minimize sedation as possible with intent to do SAT/SBT in am if remains stable.  Seroquel to help wean from sedation.   CV: vasopressor therapy, actively titrating levophed for MAP >65.  Weaning as tolerated. Midodrine to help wean off vasopressor therapy.   RESP: vent support; SAT/SBT as possible; Chest tubes - care as directed by CT sx team  GI: Resume TF's  ENDO: Hypothyroidism on synthroid    ID: Completed abx/antiviral therapy - start Rocephin to treat Proteus in sputum  Full AC for treatment of DVT as appropriate; monitor for s/s of bleeding given recent trach.  Supportive care

## 2020-05-01 NOTE — CHART NOTE - NSCHARTNOTEFT_GEN_A_CORE
[place]  # [id number], Omid Silver    I spoke with patient's wife Ruby Magallanes via [place]  to obtain verbal consent for blood transfusion. Risk and benefits of blood transfusion explained to Mrs. Higgins and she gave consent. Mrs. Higgins also again defers further medical decisions to patient's son Dave Levi. Mr. Levi also agrees to blood transfusion today. Consent form completed and placed in patient's chart.

## 2020-05-01 NOTE — PROGRESS NOTE ADULT - ASSESSMENT
73yo M with PMH of BPH presented to  on 4/1/2020, transfer from [facility] with Positive Covid-19, originally admitted with complaints of shortness of breath x 6 days. Patient intubated at  on 3/29. Course complicated by right pneumothorax 4/14 requiring b/l CT placement. On T lovenox for DVT. Thoracic surgery called for persistent right pneumothorax. Second R CT placed 4/20 with reexpansion of lung. Old R CT removed 4/22 after successful clamp trial. 4/23 with small right apical pneumothorax- stable today 4/24. 4/27 R PTX resolved, now tiny left apical pneumo. 4/28 s/p trach and b/l small apical pneumothoraxes. Last CXR 4/30 no pneumo. 5/1 WBC of 21, H/H drop, requiring pressors and 90% FiO2.     maintain both chest tubes to -40 suction  Change chest tube dressings daily  record output per shift    Patient seen and examined with Dr. Alberto.     Jenni Valverde PA  Thoracic Sx  #[id number]

## 2020-05-01 NOTE — PROGRESS NOTE ADULT - SUBJECTIVE AND OBJECTIVE BOX
73 y/o M admitted to [facility] 3/29 - COVID-19 POSITIVE  intubated 3/29 due to acute hypoxic respiratory failure  Patient found to have bilateral DVTs at [place], started on Heparin gtt.   Transferred to  on 4/1    PMHx of BPH     4/28: s/p uneventful tracheostomy placement.  Remains on pressors to support MAP.      4/30: pt doing very poorly - severely deteriorated last 24 hrs - hypoxic; hypotensive  High FIO2 and PEEP; multiple pressors    5/1: continues to do poorly; remains on multiple vasopressors; still on high FiO2 and PEEP  Anemia noted - NO overt source of bleeding noted.    34/450/100 +15  7.27/85/132  P:F 132    (+) Vasopressin; Phenylephrine    (+) Evans  (+) TF's    (+) versed     B/L CT's (4/14; 4/20)  CVL 4/26    Hosp # 34  Vent day # 34    Trach placed 4/28    PAST MEDICAL & SURGICAL HISTORY:  BPH (benign prostatic hyperplasia)  No pertinent past medical history  No significant past surgical history      Allergies    No Known Allergies        ICU Vital Signs Last 24 Hrs  T(C): 39.1 (01 May 2020 04:00), Max: 39.1 (01 May 2020 04:00)  T(F): 102.3 (01 May 2020 04:00), Max: 102.3 (01 May 2020 04:00)  HR: 139 (01 May 2020 09:00) (138 - 147)  BP: 101/55 (01 May 2020 09:00) (96/54 - 109/63)  BP(mean): 65 (01 May 2020 09:00) (63 - 73)  ABP: 89/52 (01 May 2020 09:00) (78/46 - 97/60)  ABP(mean): 68 (01 May 2020 09:00) (59 - 77)  RR: 33 (01 May 2020 09:00) (21 - 36)  SpO2: 97% (01 May 2020 09:00) (87% - 99%)      Mode: AC/ CMV (Assist Control/ Continuous Mandatory Ventilation)  RR (machine): 34  TV (machine): 450  FiO2: 90  PEEP: 15  PIP: 41      I&O's Summary    30 Apr 2020 07:01  -  01 May 2020 07:00  --------------------------------------------------------  IN: 1746 mL / OUT: 1720 mL / NET: 26 mL                              6.3    18.67 )-----------( 230      ( 01 May 2020 08:45 )             21.3       05-01    146<H>  |  105  |  30<H>  ----------------------------<  183<H>  3.8   |  40<H>  |  0.31<L>    Ca    8.3<L>      01 May 2020 05:00  Phos  2.8     05-01  Mg     2.1     05-01    TPro  6.2  /  Alb  1.0<L>  /  TBili  0.4  /  DBili  x   /  AST  36  /  ALT  15  /  AlkPhos  224<H>  04-30      CAPILLARY BLOOD GLUCOSE      POCT Blood Glucose.: 189 mg/dL (01 May 2020 05:13)  POCT Blood Glucose.: 139 mg/dL (30 Apr 2020 23:20)  POCT Blood Glucose.: 145 mg/dL (30 Apr 2020 18:53)  POCT Blood Glucose.: 115 mg/dL (30 Apr 2020 12:43)      LIVER FUNCTIONS - ( 30 Apr 2020 06:00 )  Alb: 1.0 g/dL / Pro: 6.2 gm/dL / ALK PHOS: 224 U/L / ALT: 15 U/L / AST: 36 U/L / GGT: x             ABG - ( 01 May 2020 05:00 )  pH, Arterial: 7.27  pH, Blood: x     /  pCO2: 85    /  pO2: 132   / HCO3: 38    / Base Excess: 10.2  /  SaO2: 98              MEDICATIONS  (STANDING):  aspirin  chewable 81 milliGRAM(s) Oral daily  atorvastatin 20 milliGRAM(s) Oral at bedtime  baclofen 10 milliGRAM(s) Oral daily  cefTRIAXone Injectable. 1000 milliGRAM(s) IV Push every 24 hours  chlorhexidine 0.12% Liquid 15 milliLiter(s) Oral Mucosa two times a day  chlorhexidine 4% Liquid 1 Application(s) Topical <User Schedule>  collagenase Ointment 1 Application(s) Topical two times a day  dextrose 5%. 1000 milliLiter(s) (50 mL/Hr) IV Continuous <Continuous>  dextrose 50% Injectable 12.5 Gram(s) IV Push once  dextrose 50% Injectable 25 Gram(s) IV Push once  dextrose 50% Injectable 25 Gram(s) IV Push once  enoxaparin Injectable 70 milliGRAM(s) SubCutaneous two times a day  famotidine Injectable 20 milliGRAM(s) IV Push two times a day  insulin lispro (HumaLOG) corrective regimen sliding scale   SubCutaneous every 6 hours  levothyroxine 75 MICROGram(s) Oral daily  midazolam Infusion 0.02 mG/kG/Hr (1.41 mL/Hr) IV Continuous <Continuous>  midodrine 15 milliGRAM(s) Oral every 8 hours  norepinephrine Infusion 0.05 MICROgram(s)/kG/Min (3.31 mL/Hr) IV Continuous <Continuous>  phenylephrine    Infusion 3 MICROgram(s)/kG/Min (39.7 mL/Hr) IV Continuous <Continuous>  polyethylene glycol 3350 17 Gram(s) Oral daily  QUEtiapine 50 milliGRAM(s) Oral two times a day  vasopressin Infusion 0.04 Unit(s)/Min (2.4 mL/Hr) IV Continuous <Continuous>    MEDICATIONS  (PRN):  acetaminophen  Suppository .. 650 milliGRAM(s) Rectal every 6 hours PRN Temp greater or equal to 38C (100.4F)  dextrose 40% Gel 15 Gram(s) Oral once PRN Blood Glucose LESS THAN 70 milliGRAM(s)/deciliter  glucagon  Injectable 1 milliGRAM(s) IntraMuscular once PRN Glucose LESS THAN 70 milligrams/deciliter  HYDROmorphone  Injectable 1 milliGRAM(s) IV Push every 4 hours PRN pain or agitation  senna 2 Tablet(s) Oral at bedtime PRN Constipation          DVT Prophylaxis: Lovenox 70mg SC q12h    Visit Information:  45 minutes of Critical Care time spent providing medical care for patient's acute illness/conditions that impairs at least one vital organ system and/or poses a high risk of imminent or life threatening deterioration in the patient's condition.  It includes time spent reviewing labs, radiology, discussing goals of care with patient and/or family, and discussing the case with a multidisciplinary team in an effort to prevent further life threatening deterioration or end organ damage.  This time is independent of any procedures performed.    ** Time is exclusive of billed procedures and/or teaching and/or routine family updates.

## 2020-05-01 NOTE — PROGRESS NOTE ADULT - ASSESSMENT
75 y/o M with a PMHx of BPH admitted from outside hospital on 4/1 for further care of viral syndrome secondary to COVID-19 infection.  1. Patient transferred from outside hospital for further treatment of respiratory failure secondary to COVID-19 infection  - hospital course complicated by pneumonia with Strep pneumonia and MRSA; bilateral chest tubes; respiratory failure  - s/p trach on 4/28  - Proteus in sputum  - will start ceftriaxone to treat Proteus, day #2  - tolerating antibiotics without rashes or side effects   - grave prognosis as patient is unresponsive, not weaning despite greater than one month on ventilatory support, pressor support  - consideration for comfort care measures      2. other issues: per medicine

## 2020-05-01 NOTE — PROGRESS NOTE ADULT - SUBJECTIVE AND OBJECTIVE BOX
Date of service: 05-01-20 @ 13:40      Patient lying in bed; still on ventilatory support; febrile      ROS unable to obtain secondary to patient medical condition     MEDICATIONS  (STANDING):  aspirin  chewable 81 milliGRAM(s) Oral daily  atorvastatin 20 milliGRAM(s) Oral at bedtime  baclofen 10 milliGRAM(s) Oral daily  cefTRIAXone Injectable. 1000 milliGRAM(s) IV Push every 24 hours  chlorhexidine 0.12% Liquid 15 milliLiter(s) Oral Mucosa two times a day  chlorhexidine 4% Liquid 1 Application(s) Topical <User Schedule>  collagenase Ointment 1 Application(s) Topical two times a day  dextrose 5%. 1000 milliLiter(s) (50 mL/Hr) IV Continuous <Continuous>  dextrose 50% Injectable 12.5 Gram(s) IV Push once  dextrose 50% Injectable 25 Gram(s) IV Push once  dextrose 50% Injectable 25 Gram(s) IV Push once  enoxaparin Injectable 70 milliGRAM(s) SubCutaneous two times a day  famotidine Injectable 20 milliGRAM(s) IV Push two times a day  insulin lispro (HumaLOG) corrective regimen sliding scale   SubCutaneous every 6 hours  levothyroxine 75 MICROGram(s) Oral daily  midazolam Infusion 0.02 mG/kG/Hr (1.41 mL/Hr) IV Continuous <Continuous>  midodrine 15 milliGRAM(s) Oral every 8 hours  norepinephrine Infusion 0.05 MICROgram(s)/kG/Min (3.31 mL/Hr) IV Continuous <Continuous>  phenylephrine    Infusion 3 MICROgram(s)/kG/Min (39.7 mL/Hr) IV Continuous <Continuous>  polyethylene glycol 3350 17 Gram(s) Oral daily  QUEtiapine 50 milliGRAM(s) Oral two times a day  vasopressin Infusion 0.04 Unit(s)/Min (2.4 mL/Hr) IV Continuous <Continuous>    MEDICATIONS  (PRN):  acetaminophen  Suppository .. 650 milliGRAM(s) Rectal every 6 hours PRN Temp greater or equal to 38C (100.4F)  dextrose 40% Gel 15 Gram(s) Oral once PRN Blood Glucose LESS THAN 70 milliGRAM(s)/deciliter  glucagon  Injectable 1 milliGRAM(s) IntraMuscular once PRN Glucose LESS THAN 70 milligrams/deciliter  HYDROmorphone  Injectable 1 milliGRAM(s) IV Push every 4 hours PRN pain or agitation  senna 2 Tablet(s) Oral at bedtime PRN Constipation      Vital Signs Last 24 Hrs  T(C): 38.1 (01 May 2020 12:35), Max: 39.1 (01 May 2020 04:00)  T(F): 100.5 (01 May 2020 12:35), Max: 102.3 (01 May 2020 04:00)  HR: 132 (01 May 2020 13:00) (132 - 147)  BP: 103/63 (01 May 2020 13:00) (96/54 - 109/63)  BP(mean): 71 (01 May 2020 13:00) (63 - 73)  RR: 34 (01 May 2020 13:00) (21 - 36)  SpO2: 100% (01 May 2020 13:00) (87% - 100%)    Mode: AC/ CMV (Assist Control/ Continuous Mandatory Ventilation), RR (machine): 34, TV (machine): 450, FiO2: 90, PEEP: 15, PIP: 42    Physical Exam:        Constitutional: frail looking  HEENT: NC/AT  Neck: trach in place  Back: no tenderness  Respiratory: respiratory effort normal; scattered coarse breath sounds, bilateral chest tubes  Cardiovascular: S1S2 regular, no murmurs  Abdomen: soft, not tender, not distended, positive BS; no liver or spleen organomegaly  Genitourinary: no suprapubic tenderness  Musculoskeletal: no muscle tenderness, no joint swelling or tenderness  Neurological/ Psychiatric: nonresponsive  Skin: no rashes; no palpable lesions    Labs: all available labs reviewed                Labs:                        7.1    15.18 )-----------( 259      ( 30 Apr 2020 06:00 )             24.5     04-30    144  |  104  |  19  ----------------------------<  163<H>  3.5   |  38<H>  |  0.25<L>    Ca    8.2<L>      30 Apr 2020 06:00  Phos  2.0     04-30  Mg     1.8     04-30    TPro  6.2  /  Alb  1.0<L>  /  TBili  0.4  /  DBili  x   /  AST  36  /  ALT  15  /  AlkPhos  224<H>  04-30           Cultures:       Culture - Sputum (collected 04-27-20 @ 05:10)  Source: .Sputum Sputum trap  Gram Stain (04-27-20 @ 13:34):    Rare polymorphonuclear leukocytes per low power field    Rare Squamous epithelial cells per low power field    Numerous Gram Negative Rods per oil power field    Moderate Yeast like cells per oil power field    Few Gram Positive Rods per oil power field  Final Report (04-29-20 @ 11:15):    Numerous Proteus mirabilis    Normal Respiratory Rupali present  Organism: Proteus mirabilis (04-29-20 @ 11:15)  Organism: Proteus mirabilis (04-29-20 @ 11:15)      -  Amikacin: S <=16      -  Amoxicillin/Clavulanic Acid: S <=8/4      -  Ampicillin: S <=8 These ampicillin results predict results for amoxicillin      -  Ampicillin/Sulbactam: S <=4/2 Enterobacter, Citrobacter, and Serratia may develop resistance during prolonged therapy (3-4 days)      -  Aztreonam: S <=4      -  Cefazolin: S <=2 Enterobacter, Citrobacter, and Serratia may develop resistance during prolonged therapy (3-4 days)      -  Cefepime: S <=2      -  Cefoxitin: S <=8      -  Ceftriaxone: S <=1 Enterobacter, Citrobacter, and Serratia may develop resistance during prolonged therapy      -  Ciprofloxacin: S <=0.25      -  Ertapenem: S <=0.5      -  Gentamicin: S <=2      -  Levofloxacin: S <=0.5      -  Meropenem: S <=1      -  Piperacillin/Tazobactam: S <=8      -  Tobramycin: S <=2      -  Trimethoprim/Sulfamethoxazole: S <=0.5/9.5      Method Type: PHILL    Culture - Blood (collected 04-26-20 @ 10:22)  Source: .Blood None  Preliminary Report (04-27-20 @ 15:02):    No growth to date.    Culture - Urine (collected 04-26-20 @ 10:15)  Source: .Urine Catheterized  Final Report (04-28-20 @ 14:19):    No growth    Culture - Urine (collected 04-26-20 @ 01:00)  Source: .Urine Catheterized  Final Report (04-27-20 @ 10:09):    No growth    Culture - Blood (collected 04-25-20 @ 23:25)  Source: .Blood None  Preliminary Report (04-27-20 @ 10:02):    No growth to date.      Ferritin, Serum: 460 ng/mL (04-25-20 @ 04:30)  C-Reactive Protein, Serum: 10.59 mg/dL (04-25-20 @ 04:30)        COVID-19 PCR . (03.21.20 @ 22:44)    COVID-19 PCR: Detected: LDT - Laboratory Developed Test All “detected” results on this new test  are considered presumptively positive results, are clinically actionable,  and specimens will be forwarded to [facility][note 3] for confirmation testing.  Another report (corrected report) will only be issued if discordant  results occur.  This test has been validated by [employer] to be accurate;  though it has not been FDA cleared/approved by the usual pathway.  As with all laboratory tests, results should be correlated with clinical  findings.    < from: Xray Chest 1 View- PORTABLE-Routine (04.08.20 @ 07:18) >    EXAM:  XR CHEST PORTABLE ROUTINE 1V                            PROCEDURE DATE:  04/08/2020          INTERPRETATION:  CHEST AP PORTABLE:    History: Shortness of Breath.     Date and time of exam: 4/8/2020 6:43 AM.    Technique: A single AP view of the chest was obtained.    Comparison exam: 4/6/2020 4:38 PM.    Findings:  Diffuse bilateral pulmonary infiltrates, right worse than left. Removal of left IJ central line since the prior exam..    Impression:  Removal of left IJ central line, otherwise stable.    < end of copied text >        Radiology: all available radiological tests reviewed    Advanced directives addressed: full resuscitation

## 2020-05-01 NOTE — PROGRESS NOTE ADULT - SUBJECTIVE AND OBJECTIVE BOX
Subjective: Patient remains intubated and sedated, on pressors. Requiring 90% FiO2.     Vital Signs:  Vital Signs Last 24 Hrs  T(C): 38.2 (05-01-20 @ 10:00), Max: 39.1 (05-01-20 @ 04:00)  T(F): 100.8 (05-01-20 @ 10:00), Max: 102.3 (05-01-20 @ 04:00)  HR: 139 (05-01-20 @ 10:00) (138 - 147)  BP: 97/56 (05-01-20 @ 10:00) (96/54 - 109/63)  RR: 35 (05-01-20 @ 10:00) (21 - 36)  SpO2: 98% (05-01-20 @ 10:00) (87% - 99%) on (O2)      General: Intubated, sedated   Neck: Neck supple, trachea midline, No JVD, trach in place  Respiratory: B/L BS  CV: S1S2, no murmurs, rubs or gallops  Abdominal: Soft, NT, distended  Tubes: L CT no air leak, 20 output, to -40 sxn  R CT to -40 suction, 80 cc out, no air leak     Relevant labs, radiology and Medications reviewed                        6.3    18.67 )-----------( 230      ( 01 May 2020 08:45 )             21.3     05-01    146<H>  |  105  |  30<H>  ----------------------------<  183<H>  3.8   |  40<H>  |  0.31<L>    Ca    8.3<L>      01 May 2020 05:00  Phos  2.8     05-01  Mg     2.1     05-01    TPro  6.2  /  Alb  1.0<L>  /  TBili  0.4  /  DBili  x   /  AST  36  /  ALT  15  /  AlkPhos  224<H>  04-30      MEDICATIONS  (STANDING):  aspirin  chewable 81 milliGRAM(s) Oral daily  atorvastatin 20 milliGRAM(s) Oral at bedtime  baclofen 10 milliGRAM(s) Oral daily  cefTRIAXone Injectable. 1000 milliGRAM(s) IV Push every 24 hours  chlorhexidine 0.12% Liquid 15 milliLiter(s) Oral Mucosa two times a day  chlorhexidine 4% Liquid 1 Application(s) Topical <User Schedule>  collagenase Ointment 1 Application(s) Topical two times a day  dextrose 5%. 1000 milliLiter(s) (50 mL/Hr) IV Continuous <Continuous>  dextrose 50% Injectable 12.5 Gram(s) IV Push once  dextrose 50% Injectable 25 Gram(s) IV Push once  dextrose 50% Injectable 25 Gram(s) IV Push once  enoxaparin Injectable 70 milliGRAM(s) SubCutaneous two times a day  famotidine Injectable 20 milliGRAM(s) IV Push two times a day  insulin lispro (HumaLOG) corrective regimen sliding scale   SubCutaneous every 6 hours  levothyroxine 75 MICROGram(s) Oral daily  midazolam Infusion 0.02 mG/kG/Hr (1.41 mL/Hr) IV Continuous <Continuous>  midodrine 15 milliGRAM(s) Oral every 8 hours  norepinephrine Infusion 0.05 MICROgram(s)/kG/Min (3.31 mL/Hr) IV Continuous <Continuous>  phenylephrine    Infusion 3 MICROgram(s)/kG/Min (39.7 mL/Hr) IV Continuous <Continuous>  polyethylene glycol 3350 17 Gram(s) Oral daily  QUEtiapine 50 milliGRAM(s) Oral two times a day  vasopressin Infusion 0.04 Unit(s)/Min (2.4 mL/Hr) IV Continuous <Continuous>    MEDICATIONS  (PRN):  acetaminophen  Suppository .. 650 milliGRAM(s) Rectal every 6 hours PRN Temp greater or equal to 38C (100.4F)  dextrose 40% Gel 15 Gram(s) Oral once PRN Blood Glucose LESS THAN 70 milliGRAM(s)/deciliter  glucagon  Injectable 1 milliGRAM(s) IntraMuscular once PRN Glucose LESS THAN 70 milligrams/deciliter  HYDROmorphone  Injectable 1 milliGRAM(s) IV Push every 4 hours PRN pain or agitation  senna 2 Tablet(s) Oral at bedtime PRN Constipation        Assessment  74y Male  w/ PAST MEDICAL & SURGICAL HISTORY:  BPH (benign prostatic hyperplasia)  No pertinent past medical history  No significant past surgical history  admitted with complaints of Patient is a 74y old  Male who presents with a chief complaint of [facility] [place] - Covid-19 PNA (01 May 2020 09:44)  patient underwent Percutaneous catheterization of artery  Open tracheostomy  Chest tube placement  Chest tube insertion  US guided central cath  Cannulation, artery, percutaneous, for sampling

## 2020-05-01 NOTE — PROGRESS NOTE ADULT - ASSESSMENT
75 yo m  transferred from [facility] on 4/1 after being admitted at [facility] from (3/29 to 4/1)  COVID-19 POSITIVE     ARDS due to COVID-19 infection  Acute hypoxic respiratory failure  COVID-19 viral pneumonia  Septic shock due to COVID-19 infection  DVT   Bacterial MRSA/strep pneumo PNA,   B/L pneumothoraces  s/p Trach on 4/28  PMHx BPH & hypothyroidism  acute deterioration with worsening hypoxia and shock requiring vasopressors  Sinus tachycardia    Pt remains at high risk for deterioration, morbidity and mortality.    Plan at this time is for continued care in ICU  vent adjustments:  ABG  versed drip - d/c diprivan  vasopressin - d/c Levophed  Continue Phenylephrine infusion     VAP prevention bundle  trach care  Resume AC post-trach - will d/w surgery team  NEURO: Minimize sedation as possible with intent to do SAT/SBT in am if remains stable.  Seroquel to help wean from sedation.   CV: vasopressor therapy, actively titrating levophed for MAP >65.  Weaning as tolerated. Midodrine to help wean off vasopressor therapy.   RESP: vent support; SAT/SBT as possible; Chest tubes - care as directed by CT sx team  GI: Resume TF's  ENDO: Hypothyroidism on synthroid    ID: Completed abx/antiviral therapy - start Rocephin to treat Proteus in sputum  Full AC for treatment of DVT as appropriate; monitor for s/s of bleeding given recent trach.  Supportive care 73 yo m  transferred from [facility] on 4/1 after being admitted at [facility] from (3/29 to 4/1)  COVID-19 POSITIVE     ARDS due to COVID-19 infection  Acute hypoxic respiratory failure  COVID-19 viral pneumonia  Septic shock due to COVID-19 infection  DVT   Bacterial MRSA/strep pneumo PNA,   B/L pneumothoraces  s/p Trach on 4/28    PMHx BPH & hypothyroidism    acute deterioration with worsening hypoxia and shock requiring vasopressors  anemia   Sinus tachycardia    Pt remains at high risk for deterioration, morbidity and mortality.    Plan at this time is for continued care in ICU  vent adjustments:  ABG  versed drip - d/c diprivan  vasopressin - d/c Levophed  Continue Phenylephrine infusion; vasopressin infusion  STOP versed infusion to assess mentation    STAT T&S as well as repeat CBC obtained  will transfuse PRBC once consent obtained from family    VAP prevention bundle  trach care  Resume AC post-trach - will d/w surgery team  NEURO: Minimize sedation as possible with intent to do SAT/SBT in am if remains stable.  Seroquel to help wean from sedation.   CV: vasopressor therapy, actively titrating levophed for MAP >65.  Weaning as tolerated. Midodrine to help wean off vasopressor therapy.   RESP: vent support; SAT/SBT as possible; Chest tubes - care as directed by CT sx team  GI: Resume TF's  ENDO: Hypothyroidism on synthroid    ID: Completed abx/antiviral therapy - start Rocephin to treat Proteus in sputum  Full AC for treatment of DVT as appropriate; monitor for s/s of bleeding given recent trach.  Supportive care

## 2020-05-02 NOTE — PROGRESS NOTE ADULT - ASSESSMENT
75 yo m  [facility]  COVID-19 POSITIVE     ARDS due to COVID-19 infection  Acute hypoxic respiratory failure  COVID-19 viral pneumonia  Septic shock due to COVID-19 infection  DVT   Bacterial MRSA/strep pneumo PNA,   B/L pneumothoraces  s/p Trach on 4/28      PMHx BPH & hypothyroidism    acute deterioration with worsening hypoxia and shock requiring vasopressors  Anemia - No overt source of bleeding - s/p 2u PRBC on 5/1  Sinus tachycardia    Pt remains at high risk for deterioration, morbidity and mortality.    Plan at this time is for continued care in ICU  vent adjustments:  Keep OFF sedation  Continue Phenylephrine infusion; vasopressin infusion    VAP prevention bundle  trach care  CV: vasopressor therapy, actively titrating levophed for MAP >65.  Weaning as tolerated. Midodrine to help wean off vasopressor therapy.   RESP:  Low VT high PEEP strategy as possible - Minimize FiO2 as possible per [employer] guidelines - Goal PPlat <30 but given poor compliance of lungs unable to achieve despite multiple attempts to adjust vent - has been vented >4 weeks   GI: TF's  ENDO: Hypothyroidism on synthroid    ID: Completed abx/antiviral therapy - start Rocephin to treat Proteus in sputum  Full AC for treatment of DVT as appropriate; monitor for s/s of bleeding given recent trach.  Son Ignacio and pts wife Ruby aware of poor prognosis - Wife came to bedside yesterday to see patient.  Supportive care

## 2020-05-02 NOTE — PROGRESS NOTE ADULT - SUBJECTIVE AND OBJECTIVE BOX
75 y/o M admitted to [facility] 3/29 - COVID-19 POSITIVE  intubated 3/29 due to acute hypoxic respiratory failure  Patient found to have bilateral DVTs at [place], started on Heparin gtt.   Transferred to  on 4/1    PMHx of BPH     4/28: s/p uneventful tracheostomy placement.  Remains on pressors to support MAP.      4/30: pt doing very poorly - severely deteriorated last 24 hrs - hypoxic; hypotensive  High FIO2 and PEEP; multiple pressors    5/1: continues to do poorly; remains on multiple vasopressors; still on high FiO2 and PEEP  Anemia noted - NO overt source of bleeding noted.    5/2: continues to do poorly; remains on multiple vasopressors; still on high FiO2 and PEEP  Anemia noted - Hgb reasonable s/p 2u PRBC yesterday.    34/450/90 +15  7.36/77/145  P:F 161  PPlat 54    (+) Vasopressin; Phenylephrine    (+) Evans  (+) TF's    OFF sedation > 24 hrs     B/L CT's (4/14; 4/20)  CVL 4/26    Hosp # 35  Vent day # 35    Trach placed 4/28      Allergies    No Known Allergies      ICU Vital Signs Last 24 Hrs  T(C): 37.6 (02 May 2020 12:00), Max: 38.1 (01 May 2020 20:00)  T(F): 99.6 (02 May 2020 12:00), Max: 100.5 (01 May 2020 20:00)  HR: 121 (02 May 2020 12:00) (99 - 128)  BP: 122/68 (02 May 2020 12:00) (100/65 - 126/77)  BP(mean): 80 (02 May 2020 12:00) (71 - 88)  ABP: 118/62 (02 May 2020 12:00) (94/59 - 124/67)  ABP(mean): 88 (02 May 2020 12:00) (76 - 93)  RR: 29 (02 May 2020 12:00) (23 - 35)  SpO2: 99% (02 May 2020 12:00) (97% - 100%)      Mode: AC/ CMV (Assist Control/ Continuous Mandatory Ventilation)  RR (machine): 34  TV (machine): 450  FiO2: 90  PEEP: 15  ITime: 1  MAP: 26  PIP: 50      I&O's Summary    01 May 2020 07:01  -  02 May 2020 07:00  --------------------------------------------------------  IN: 2199.7 mL / OUT: 2135 mL / NET: 64.7 mL                              8.7    21.48 )-----------( 233      ( 02 May 2020 06:10 )             28.2       05-02    149<H>  |  105  |  32<H>  ----------------------------<  187<H>  3.5   |  43<H>  |  0.20<L>    Ca    8.2<L>      02 May 2020 06:10  Phos  1.4     05-02  Mg     2.1     05-02        CAPILLARY BLOOD GLUCOSE      POCT Blood Glucose.: 175 mg/dL (02 May 2020 11:35)  POCT Blood Glucose.: 197 mg/dL (02 May 2020 06:25)  POCT Blood Glucose.: 195 mg/dL (02 May 2020 06:24)  POCT Blood Glucose.: 189 mg/dL (01 May 2020 23:38)  POCT Blood Glucose.: 214 mg/dL (01 May 2020 16:42)              ABG - ( 02 May 2020 06:15 )  pH, Arterial: 7.36  pH, Blood: x     /  pCO2: 77    /  pO2: 145   / HCO3: 43    / Base Excess: 15.3  /  SaO2: 99            MEDICATIONS  (STANDING):  aspirin  chewable 81 milliGRAM(s) Oral daily  atorvastatin 20 milliGRAM(s) Oral at bedtime  baclofen 10 milliGRAM(s) Oral daily  cefTRIAXone Injectable. 1000 milliGRAM(s) IV Push every 24 hours  chlorhexidine 0.12% Liquid 15 milliLiter(s) Oral Mucosa two times a day  chlorhexidine 4% Liquid 1 Application(s) Topical <User Schedule>  collagenase Ointment 1 Application(s) Topical two times a day  dextrose 5%. 1000 milliLiter(s) (50 mL/Hr) IV Continuous <Continuous>  dextrose 50% Injectable 12.5 Gram(s) IV Push once  dextrose 50% Injectable 25 Gram(s) IV Push once  dextrose 50% Injectable 25 Gram(s) IV Push once  enoxaparin Injectable 70 milliGRAM(s) SubCutaneous two times a day  famotidine Injectable 20 milliGRAM(s) IV Push two times a day  insulin lispro (HumaLOG) corrective regimen sliding scale   SubCutaneous every 6 hours  levothyroxine 75 MICROGram(s) Oral daily  midazolam Infusion 0.02 mG/kG/Hr (1.41 mL/Hr) IV Continuous <Continuous>  midodrine 15 milliGRAM(s) Oral every 8 hours  norepinephrine Infusion 0.05 MICROgram(s)/kG/Min (3.31 mL/Hr) IV Continuous <Continuous>  phenylephrine    Infusion 3 MICROgram(s)/kG/Min (39.7 mL/Hr) IV Continuous <Continuous>  polyethylene glycol 3350 17 Gram(s) Oral daily  vasopressin Infusion 0.04 Unit(s)/Min (2.4 mL/Hr) IV Continuous <Continuous>    MEDICATIONS  (PRN):  acetaminophen   Tablet .. 650 milliGRAM(s) Oral every 6 hours PRN Temp greater or equal to 38C (100.4F), Mild Pain (1 - 3)  dextrose 40% Gel 15 Gram(s) Oral once PRN Blood Glucose LESS THAN 70 milliGRAM(s)/deciliter  glucagon  Injectable 1 milliGRAM(s) IntraMuscular once PRN Glucose LESS THAN 70 milligrams/deciliter  HYDROmorphone  Injectable 1 milliGRAM(s) IV Push every 4 hours PRN pain or agitation  senna 2 Tablet(s) Oral at bedtime PRN Constipation            DVT Prophylaxis: Lovenox 70mg SC q12h    Visit Information:  45 minutes of Critical Care time spent providing medical care for patient's acute illness/conditions that impairs at least one vital organ system and/or poses a high risk of imminent or life threatening deterioration in the patient's condition.  It includes time spent reviewing labs, radiology, discussing goals of care with patient and/or family, and discussing the case with a multidisciplinary team in an effort to prevent further life threatening deterioration or end organ damage.  This time is independent of any procedures performed.    ** Time is exclusive of billed procedures and/or teaching and/or routine family updates.

## 2020-05-02 NOTE — CHART NOTE - NSCHARTNOTEFT_GEN_A_CORE
Clinical Nutrition BRIEF NOTE    *pt s/p trach today (4/28).    Pt on pressors with MAP >65.      *labs reviewed; 05-02 Na149 mmol/L<H> Glu 187 mg/dL<H> K+ 3.5 mmol/L Cr  0.20 mg/dL<L> BUN 32 mg/dL<H> Phos 1.4 mg/dL<L> Alb n/a   PAB n/a     *hypophosphatemia.  hyperglycemia (-214, on insulin).  No A1C checked, rec'd checking A1C as pt has no PMH of DM.    *(+3) generalized edema.  violeta score of 10; stage 3 PU on coccyx.  stage 2 PU on Rt ear.  stage 1 PU on midline back.    *urine output: 1850mL.  Rectal tube output: 275mL.    *new wt documented, appears stable.    *per flowsheet, pt has received an average of 841mL/day of Jevity 1.5 over the past 3 days.  Combined with prosource TF, pt received 1502Kcal and 120g protein.  Which meets ~90% of estimated calorie needs and ~99% of estimated protein needs.    *rec'd change TF 2/2 blood glucose control to Glucerna 1.5 with goal rate of 60mL/hr with 3pkts Prosource TF daily.  Which will provide ~1920Kcal, 132g protein, 911mL free water, and total TF volume of 1200mL.    RECOMMENDATIONS:  1) change TF to Glucerna 1.5 @ 60mL/hr with 3pkts Prosource TF  2) add MVI with minerals daily and vitamin C 500mg BID to optimize wound healing  3) monitor hydration status; adjust free water flushes prn  4) monitor TF tolerance; keep back of bed > 35 degrees  5) weekly wt checks to track/trend changes      ESTIMATED NUTR NEEDS:  1675-2010Kcal (25-30Kcal/Kg based on IBW: 67Kg)  121-134g protein (1.8-2g/Kg of IBW: 67Kg)  1675-2010mL fluid (25-30mL/Kg IBW)    Admit wt: 76.1Kg  Wt Hx:  73.9Kg (5/2)[BMI 26]  70.5Kg (4/28) (BMI 24)  IBW: 67Kg  Ht: 67".

## 2020-05-03 NOTE — PROGRESS NOTE ADULT - SUBJECTIVE AND OBJECTIVE BOX
75 y/o M admitted to [facility] 3/29 - COVID-19 POSITIVE  intubated 3/29 due to acute hypoxic respiratory failure  Patient found to have bilateral DVTs at [place], started on Heparin gtt.   Transferred to  on 4/1    PMHx of BPH     4/28: s/p uneventful tracheostomy placement.  Remains on pressors to support MAP.      4/30: pt doing very poorly - severely deteriorated last 24 hrs - hypoxic; hypotensive  High FIO2 and PEEP; multiple pressors    5/1: continues to do poorly; remains on multiple vasopressors; still on high FiO2 and PEEP  Anemia noted - NO overt source of bleeding noted.    5/2: continues to do poorly; remains on multiple vasopressors; still on high FiO2 and PEEP  Anemia noted - Hgb reasonable s/p 2u PRBC yesterday.    5/3: remains critically ill - Fever overnight 103*F  Remains unresponsive despite several days OFF sedation    30/400/60 +12  7.28/102/64  P:F 170  PPlat 44    (+) Vasopressin; Phenylephrine    (+) Evans  (+) TF's    OFF sedation > 48hrs    B/L CT's (4/14; 4/20)  CVL 4/26 #7    Hosp # 36  Vent day # 36    Trach placed 4/28        Allergies    No Known Allergies      ICU Vital Signs Last 24 Hrs  T(C): 38 (03 May 2020 10:00), Max: 39.8 (03 May 2020 03:35)  T(F): 100.4 (03 May 2020 10:00), Max: 103.6 (03 May 2020 03:35)  HR: 125 (03 May 2020 10:00) (103 - 143)  BP: 109/69 (03 May 2020 10:00) (95/58 - 126/76)  BP(mean): 77 (03 May 2020 10:00) (64 - 88)  ABP: 110/67 (03 May 2020 10:00) (82/47 - 126/77)  ABP(mean): 88 (03 May 2020 10:00) (61 - 100)  RR: 38 (03 May 2020 10:00) (23 - 40)  SpO2: 98% (03 May 2020 10:00) (88% - 100%)      I&O's Summary    02 May 2020 07:01  -  03 May 2020 07:00  --------------------------------------------------------  IN: 1783.7 mL / OUT: 1530 mL / NET: 253.7 mL                              8.9    16.60 )-----------( 227      ( 03 May 2020 03:00 )             30.3       05-03    155<H>  |  108  |  45<H>  ----------------------------<  168<H>  4.3   |  >45<HH>  |  0.41<L>    Ca    7.9<L>      03 May 2020 03:00  Phos  3.9     05-03  Mg     2.3     05-03        CAPILLARY BLOOD GLUCOSE      POCT Blood Glucose.: 144 mg/dL (03 May 2020 05:26)  POCT Blood Glucose.: 166 mg/dL (02 May 2020 23:45)  POCT Blood Glucose.: 171 mg/dL (02 May 2020 17:14)  POCT Blood Glucose.: 175 mg/dL (02 May 2020 11:35)        ABG - ( 03 May 2020 05:15 )  pH, Arterial: 7.28  pH, Blood: x     /  pCO2: 102   /  pO2: 64    / HCO3: 47    / Base Excess: 16.9  /  SaO2: 90          MEDICATIONS  (STANDING):  aspirin  chewable 81 milliGRAM(s) Oral daily  atorvastatin 20 milliGRAM(s) Oral at bedtime  baclofen 10 milliGRAM(s) Oral daily  cefTRIAXone Injectable. 1000 milliGRAM(s) IV Push every 24 hours  chlorhexidine 0.12% Liquid 15 milliLiter(s) Oral Mucosa two times a day  chlorhexidine 4% Liquid 1 Application(s) Topical <User Schedule>  collagenase Ointment 1 Application(s) Topical two times a day  dextrose 5%. 1000 milliLiter(s) (50 mL/Hr) IV Continuous <Continuous>  dextrose 50% Injectable 12.5 Gram(s) IV Push once  dextrose 50% Injectable 25 Gram(s) IV Push once  dextrose 50% Injectable 25 Gram(s) IV Push once  enoxaparin Injectable 70 milliGRAM(s) SubCutaneous two times a day  famotidine Injectable 20 milliGRAM(s) IV Push two times a day  insulin lispro (HumaLOG) corrective regimen sliding scale   SubCutaneous every 6 hours  levothyroxine 75 MICROGram(s) Oral daily  midodrine 15 milliGRAM(s) Oral every 8 hours  phenylephrine    Infusion 3 MICROgram(s)/kG/Min (39.7 mL/Hr) IV Continuous <Continuous>  polyethylene glycol 3350 17 Gram(s) Oral daily  vasopressin Infusion 0.04 Unit(s)/Min (2.4 mL/Hr) IV Continuous <Continuous>    MEDICATIONS  (PRN):  acetaminophen   Tablet .. 650 milliGRAM(s) Oral every 6 hours PRN Temp greater or equal to 38C (100.4F), Mild Pain (1 - 3)  dextrose 40% Gel 15 Gram(s) Oral once PRN Blood Glucose LESS THAN 70 milliGRAM(s)/deciliter  glucagon  Injectable 1 milliGRAM(s) IntraMuscular once PRN Glucose LESS THAN 70 milligrams/deciliter  HYDROmorphone  Injectable 1 milliGRAM(s) IV Push every 4 hours PRN pain or agitation  senna 2 Tablet(s) Oral at bedtime PRN Constipation              DVT Prophylaxis: Lovenox 70mg SC q12h    Visit Information:  45 minutes of Critical Care time spent providing medical care for patient's acute illness/conditions that impairs at least one vital organ system and/or poses a high risk of imminent or life threatening deterioration in the patient's condition.  It includes time spent reviewing labs, radiology, discussing goals of care with patient and/or family, and discussing the case with a multidisciplinary team in an effort to prevent further life threatening deterioration or end organ damage.  This time is independent of any procedures performed.    ** Time is exclusive of billed procedures and/or teaching and/or routine family updates. 75 y/o M admitted to [facility] 3/29 - COVID-19 POSITIVE  intubated 3/29 due to acute hypoxic respiratory failure  Patient found to have bilateral DVTs at [place], started on Heparin gtt.   Transferred to  on 4/1    PMHx of BPH     4/28: s/p uneventful tracheostomy placement.  Remains on pressors to support MAP.      4/30: pt doing very poorly - severely deteriorated last 24 hrs - hypoxic; hypotensive  High FIO2 and PEEP; multiple pressors    5/1: continues to do poorly; remains on multiple vasopressors; still on high FiO2 and PEEP  Anemia noted - NO overt source of bleeding noted.    5/2: continues to do poorly; remains on multiple vasopressors; still on high FiO2 and PEEP  Anemia noted - Hgb reasonable s/p 2u PRBC yesterday.    5/3: remains critically ill - Fever overnight 103*F  Remains unresponsive despite several days OFF sedation    38/450/60 +10  7.28/102/64  P:F 170  PPlat 44    (+) Vasopressin; Phenylephrine    (+) Evans  (+) TF's    OFF sedation > 48hrs    B/L CT's (4/14; 4/20)  CVL 4/26 #7    Hosp # 36  Vent day # 36    Trach placed 4/28        Allergies    No Known Allergies      ICU Vital Signs Last 24 Hrs  T(C): 38 (03 May 2020 10:00), Max: 39.8 (03 May 2020 03:35)  T(F): 100.4 (03 May 2020 10:00), Max: 103.6 (03 May 2020 03:35)  HR: 125 (03 May 2020 10:00) (103 - 143)  BP: 109/69 (03 May 2020 10:00) (95/58 - 126/76)  BP(mean): 77 (03 May 2020 10:00) (64 - 88)  ABP: 110/67 (03 May 2020 10:00) (82/47 - 126/77)  ABP(mean): 88 (03 May 2020 10:00) (61 - 100)  RR: 38 (03 May 2020 10:00) (23 - 40)  SpO2: 98% (03 May 2020 10:00) (88% - 100%)      I&O's Summary    02 May 2020 07:01  -  03 May 2020 07:00  --------------------------------------------------------  IN: 1783.7 mL / OUT: 1530 mL / NET: 253.7 mL                              8.9    16.60 )-----------( 227      ( 03 May 2020 03:00 )             30.3       05-03    155<H>  |  108  |  45<H>  ----------------------------<  168<H>  4.3   |  >45<HH>  |  0.41<L>    Ca    7.9<L>      03 May 2020 03:00  Phos  3.9     05-03  Mg     2.3     05-03        CAPILLARY BLOOD GLUCOSE      POCT Blood Glucose.: 144 mg/dL (03 May 2020 05:26)  POCT Blood Glucose.: 166 mg/dL (02 May 2020 23:45)  POCT Blood Glucose.: 171 mg/dL (02 May 2020 17:14)  POCT Blood Glucose.: 175 mg/dL (02 May 2020 11:35)        ABG - ( 03 May 2020 05:15 )  pH, Arterial: 7.28  pH, Blood: x     /  pCO2: 102   /  pO2: 64    / HCO3: 47    / Base Excess: 16.9  /  SaO2: 90          MEDICATIONS  (STANDING):  aspirin  chewable 81 milliGRAM(s) Oral daily  atorvastatin 20 milliGRAM(s) Oral at bedtime  baclofen 10 milliGRAM(s) Oral daily  cefTRIAXone Injectable. 1000 milliGRAM(s) IV Push every 24 hours  chlorhexidine 0.12% Liquid 15 milliLiter(s) Oral Mucosa two times a day  chlorhexidine 4% Liquid 1 Application(s) Topical <User Schedule>  collagenase Ointment 1 Application(s) Topical two times a day  dextrose 5%. 1000 milliLiter(s) (50 mL/Hr) IV Continuous <Continuous>  dextrose 50% Injectable 12.5 Gram(s) IV Push once  dextrose 50% Injectable 25 Gram(s) IV Push once  dextrose 50% Injectable 25 Gram(s) IV Push once  enoxaparin Injectable 70 milliGRAM(s) SubCutaneous two times a day  famotidine Injectable 20 milliGRAM(s) IV Push two times a day  insulin lispro (HumaLOG) corrective regimen sliding scale   SubCutaneous every 6 hours  levothyroxine 75 MICROGram(s) Oral daily  midodrine 15 milliGRAM(s) Oral every 8 hours  phenylephrine    Infusion 3 MICROgram(s)/kG/Min (39.7 mL/Hr) IV Continuous <Continuous>  polyethylene glycol 3350 17 Gram(s) Oral daily  vasopressin Infusion 0.04 Unit(s)/Min (2.4 mL/Hr) IV Continuous <Continuous>    MEDICATIONS  (PRN):  acetaminophen   Tablet .. 650 milliGRAM(s) Oral every 6 hours PRN Temp greater or equal to 38C (100.4F), Mild Pain (1 - 3)  dextrose 40% Gel 15 Gram(s) Oral once PRN Blood Glucose LESS THAN 70 milliGRAM(s)/deciliter  glucagon  Injectable 1 milliGRAM(s) IntraMuscular once PRN Glucose LESS THAN 70 milligrams/deciliter  HYDROmorphone  Injectable 1 milliGRAM(s) IV Push every 4 hours PRN pain or agitation  senna 2 Tablet(s) Oral at bedtime PRN Constipation              DVT Prophylaxis: Lovenox 70mg SC q12h    Visit Information:  45 minutes of Critical Care time spent providing medical care for patient's acute illness/conditions that impairs at least one vital organ system and/or poses a high risk of imminent or life threatening deterioration in the patient's condition.  It includes time spent reviewing labs, radiology, discussing goals of care with patient and/or family, and discussing the case with a multidisciplinary team in an effort to prevent further life threatening deterioration or end organ damage.  This time is independent of any procedures performed.    ** Time is exclusive of billed procedures and/or teaching and/or routine family updates.

## 2020-05-03 NOTE — PROGRESS NOTE ADULT - ASSESSMENT
75 yo m  transferred from [facility] on 4/1 after being admitted at [facility] from (3/29 to 4/1)  COVID-19 POSITIVE     ARDS due to COVID-19 infection  Acute hypoxic respiratory failure  COVID-19 viral pneumonia  Septic shock due to COVID-19 infection  DVT   Bacterial MRSA/strep pneumo PNA,   B/L pneumothoraces  s/p Trach on 4/28  toxic metabolic encephalopathy       PMHx BPH & hypothyroidism    acute deterioration with worsening hypoxia and shock requiring vasopressors  Anemia - No overt source of bleeding - s/p 2u PRBC on 5/1  Sinus tachycardia    Pt remains at high risk for deterioration, morbidity and mortality.    Plan at this time is for continued care in ICU  PPlat 54 => 44 with changes - will continue to attempt adjustments to optimize oxygenation and PPlat as possible  pt has been on vent 35 days; lung compliance has deteriorated likely due to either progressing of ARDS or oxygen toxicity.  Keep OFF sedation; Monitor neurologic status  Continue Phenylephrine infusion; vasopressin infusion  VAP prevention bundle  trach care  CV: vasopressor therapy, actively titrating levophed for MAP >65.  Weaning as tolerated. Midodrine to help wean off vasopressor therapy.   RESP:  Low VT high PEEP strategy as possible - Minimize FiO2 as possible per ARDSnet guidelines - Goal PPlat <30 but given poor compliance of lungs unable to achieve despite multiple attempts to adjust vent - has been vented >4 weeks   GI: TF's  ENDO: Hypothyroidism on synthroid    ID: Completed abx/antiviral therapy - start Rocephin to treat Proteus in sputum; may need to broaden coverage if continues to have fevers  Full AC for treatment of DVT as appropriate; monitor for s/s of bleeding given recent trach.  Son Ignacio and pts wife Ruby aware of poor prognosis - Wife came to bedside 5/1 to see patient.  Supportive care 73 yo m  transferred from [facility] on 4/1 after being admitted at [facility] from (3/29 to 4/1)  COVID-19 POSITIVE     ARDS due to COVID-19 infection  Acute hypoxic respiratory failure  COVID-19 viral pneumonia  Septic shock due to COVID-19 infection  DVT   Bacterial MRSA/strep pneumo PNA,   B/L pneumothoraces  s/p Trach on 4/28  toxic metabolic encephalopathy       PMHx BPH & hypothyroidism    acute deterioration with worsening hypoxia and shock requiring vasopressors  Anemia - No overt source of bleeding - s/p 2u PRBC on 5/1  Sinus tachycardia    Pt remains at high risk for deterioration, morbidity and mortality.    Plan at this time is for continued care in ICU  PPlat 54 => 44 with changes - will continue to attempt adjustments to optimize oxygenation and PPlat as possible  pt has been on vent 35 days; lung compliance has deteriorated likely due to either progressing of ARDS or oxygen toxicity.  ABG at 1300 to reassess  Keep OFF sedation; Monitor neurologic status  Continue Phenylephrine infusion; vasopressin infusion  VAP prevention bundle  trach care  CV: vasopressor therapy, actively titrating levophed for MAP >65.  Weaning as tolerated. Midodrine to help wean off vasopressor therapy.   RESP:  Low VT high PEEP strategy as possible - Minimize FiO2 as possible per ARDSnet guidelines - Goal PPlat <30 but given poor compliance of lungs unable to achieve despite multiple attempts to adjust vent - has been vented >4 weeks   GI: TF's  ENDO: Hypothyroidism on synthroid    ID: Completed abx/antiviral therapy - start Rocephin to treat Proteus in sputum; may need to broaden coverage if continues to have fevers  Full AC for treatment of DVT as appropriate; monitor for s/s of bleeding given recent trach.  Son Ignacio and pts wife Ruby aware of poor prognosis - Wife came to bedside 5/1 to see patient.  Supportive care

## 2020-05-03 NOTE — PROGRESS NOTE ADULT - SUBJECTIVE AND OBJECTIVE BOX
spoke with pts son Ignacio who will speak with pts wife  explained current situation in which pt continues to do poorly   remains on 2 pressors, vent support, unresponsive  optimistic but realistically he will likely not survive this ordeal  ALL questions answered.

## 2020-05-04 NOTE — PROGRESS NOTE ADULT - SUBJECTIVE AND OBJECTIVE BOX
HPI:      Hosp day #  Vent day #      Vital signs/reviewed and physical exam performed where pertinent and urgently required.    Lab/radiology studies/ABG/meds reviewed and interpreted into the assesment and treatment plan.    Assessment/Plan/Therapeutic interventions      Neuro: Sedation neuromuscular blockade as needed to facilitate safe ventilation    Cor:  Pressor support as needed to maintain MAP 65.  Avoiding fluid challenges.     Pulm:  ARDS-NET 4-6cc/kg IBW TV as able to maintain plateau pressures <30. Prone ventilation consideration as feasible.  Pa02/Fi02 < 150 on Fi02 >60% and PEEP at least 5.    GI:  PPI  Enteric feeds as tolerated     Renal: Even to negative fluid balance as tolerated by hemodynamics and renal fx.      Heme:  Pharmacologic DVT P in addition to SCD's in low risk,  full AC in high risk  following D- Dimer clinical course and doppler studied where appropriate.     ID: ABX discontinuation based on discussion with ID in conjunction with clinical features, culture data, and judicious procalcitonin monitoring.      Endo Aggressive glycemic control to limit FS glucose to < 180mg/dl.        COVID 19 specific considerations and therapeutic options based on the available and rapidly changing literature    Goals of care considerations:  Ongoing assessment for patient specific treatment options based on progression or decline.  I have involved the family with updates and requests in guidance for medical decision making.      38 minutes of critical care time spent in the management of this critically ill COVID-19 patient/PUI patient with continuous assessments and interventions based on the interpretation of multiple databases. HPI:    74y old M pmhx BPH and Hypothyroidism presented to  as transfer from [facility] with COVID 19+, ARDS, Distributive shock and DVT. Hospital course further complicated by bilateral PTX requiring chest tube placement, MRSA and Strep Pneumonia  Now proteus in sputum on ctx.        S/p Trach 4/28     Now septic shock on michael and  vaso  unresponsive off sedation.      Bilateral infiltrates on the latest CXR.   acute on chronic hypercapnia  with hypernatremia  free water added        Hosp day # 33  transfer from [facility]        Vital signs/reviewed and physical exam performed where pertinent and urgently required.    Lab/radiology studies/ABG/meds reviewed and interpreted into the assessment and treatment plan.    Assessment/Plan/Therapeutic interventions      Neuro:  Hold all sedation   CT head if not awake soon.      Cor:  Pressor support as needed to maintain MAP 65.  Avoiding fluid challenges.   Michael vaso     Pulm:  ARDS-NET 4-6cc/kg IBW TV as able to maintain plateau pressures <30. Prone ventilation consideration as feasible.  Pa02/Fi02 < 150 on Fi02 >60% and PEEP at least 5.    GI:  PPI  Enteric feeds as tolerated     Renal: Even to negative fluid balance as tolerated by hemodynamics and renal fx.      Heme:  Full AC for bilateral DVT LE     ID: ABX discontinuation based on discussion with ID in conjunction with clinical features, culture data, and judicious procalcitonin monitoring.      Endo Aggressive glycemic control to limit FS glucose to < 180mg/dl.        COVID 19 specific considerations and therapeutic options based on the available and rapidly changing literature    Goals of care considerations:  Ongoing assessment for patient specific treatment options based on progression or decline.  I have involved the family with updates and requests in guidance for medical decision making.      38 minutes of critical care time spent in the management of this critically ill COVID-19 patient/PUI patient with continuous assessments and interventions based on the interpretation of multiple databases.

## 2020-05-04 NOTE — CHART NOTE - NSCHARTNOTEFT_GEN_A_CORE
[facility] # 36  ICU # 36         Vent # 35/ 6  R axilla A-line # 5  [facility] CVL # 8    HPI:    75 y/o male with DVT tx from [facility] to  on 4/1--COVID-19 Positive--ARDS--Likely Viral PNA--Acute type 1 resp failure on full vent support--CT placed while in PACU    5/4:  Vent 80/12.  No sedation--not responsive.  On phenyl and vaso gtts.  WBC up 25.  Na up 156.  Doing very poorly     ROS: Unobtainable due to clinical Condition  PMH As above  Meds reviewed    Trach in situ connected to Vent   Chest:  B/L air entry.   No wheezing.  B/L CT   Heart: S1 S 2 regualr  Abd: Soft No guarding  Extrem: No cynosis or edema  Neuro: Sedated    Labs/radiology reviewed    IMP:    75 y/o male with DVT tx from [facility] to  on 4/1--COVID-19 Positive--ARDS--Likely Viral PNA--Acute type 1 resp failure on full vent support--CT placed while in PACU  Septic shock on pressors  Severe met alkalosis  TM Encephalopathy related to virus   Hypernatremia     Critically ill.  High risk for acute decompensation and deterioration including death.  Given advance comorbidities and overall life support requirement on vent and pressors for shock, Resuscitation would be futile and should not be performed as it will not change the overall outcome and pt will unfortunately succumb to COVID-19 infection.  D/W ICU team     Plan:    Full vent support--LPV strategy to maintain plateau pressures < 30--High PEEP/low TV--Permissive hypercapnea and acidemia.  Decrease FIO2 and PEEP as tolerated.  Not candidate for weaning  Actively titrate pressors to MAP > 60  Observe off sedation  Conservative fluid management   Cont Statin  TF as per protocol--add 500 q6 and 30/hr FW  HOB > 30  Evans  Strict I+Os  DVT prophy--AC with LMWH    COVID-19 specific considerations and therapeutic options based on the available and rapidly changing literature and recommendations     Case reviewed and d/w multi discipline rounds     CC time excluding procedure:  45 cc mins

## 2020-05-04 NOTE — CHART NOTE - NSCHARTNOTEFT_GEN_A_CORE
Clinical Nutrition BRIEF NOTE    *pt s/p trach (4/28).   remains critically ill with fever and unresponsive off sedation. ARDS due to COVID-19 infection. Acute hypoxic respiratory failure 2/2 COVID-19 viral pneumonia. Septic shock due to COVID-19 infection. Bacterial MRSA/strep pneumo PNA. B/L pneumothoraces. toxic metabolic encephalopathy.  *pt on two pressors; vasopressin @ 0.04 and phenylephrine at 0.38. MAP >65.    *labs reviewed: 05-04 Na156 mmol/L<H> Glu 190 mg/dL<H> K+ 5.8 mmol/L<H> Cr  0.44 mg/dL<L> BUN 71 mg/dL<H> Phos 2.3 mg/dL<L> Alb n/a   PAB n/a     *-195.  *hypophosphatemia.  hyperkalemia and hypernatremia (now ordered for free water flushes 500mL q6hrs and 30mL q1hr =2600mL free water).    *(+3) generalized edema.  violeta score of 9; stage 3 PU on coccyx.  stage 2 PU on upper back, Rt/Lt ear, and Rt cheek.    *abd soft, non-distended.    *urine output: 1575mL.  Rectal tube output: 225mL (bowel regimen moved to prn).    *per flowsheet, pt received an average of 1244mL/day of Glucerna 1.5 over the past two days.  combined with prosource TF, provided ~1986Kcal and 136g protein.  Which met 100% of estimated nutr needs.      *based on current labs/meds; rec'd TF change to Nepro @ 40mL/hr with 5pkts Prosource TF.  Which will provide 1640Kcal, 120g protein, 800mL total TF volume, and 582mL free water.       RECOMMENDATIONS:  1) change TF to Nepro @ 40mL/hr with 5pkts Prosource TF  2) add MVI with minerals daily and vitamin C 500mg BID to optimize wound healing  3) monitor hydration status; consider hourly free water flushes of 55mL q1hr (=1100mL free water)  4) monitor TF tolerance; keep back of bed > 35 degrees  5) weekly wt checks to track/trend changes    ESTIMATED NUTR NEEDS:  1675-2010Kcal (25-30Kcal/Kg based on IBW: 67Kg)  121-134g protein (1.8-2g/Kg of IBW: 67Kg)  1675-2010mL fluid (25-30mL/Kg IBW)    Admit wt: 76.1Kg  Wt Hx:  72.7Kg (5/3)  73.9Kg (5/2)[BMI 26]  70.5Kg (4/28) (BMI 24)  IBW: 67Kg  Ht: 67". Clinical Nutrition BRIEF NOTE    *pt s/p trach (4/28).   remains critically ill with fever and unresponsive off sedation. ARDS due to COVID-19 infection. Acute hypoxic respiratory failure 2/2 COVID-19 viral pneumonia. Septic shock due to COVID-19 infection. Bacterial MRSA/strep pneumo PNA. B/L pneumothoraces. toxic metabolic encephalopathy.  *pt on two pressors;  MAP >65.    *labs reviewed: 05-04 Na156 mmol/L<H> Glu 190 mg/dL<H> K+ 5.8 mmol/L<H> Cr  0.44 mg/dL<L> BUN 71 mg/dL<H> Phos 2.3 mg/dL<L> Alb n/a   PAB n/a     *-195.  *hypophosphatemia.  hyperkalemia and hypernatremia (now ordered for free water flushes 500mL q6hrs and 30mL q1hr =2600mL free water).    *(+3) generalized edema.  violeta score of 9; stage 3 PU on coccyx.  stage 2 PU on upper back, Rt/Lt ear, and Rt cheek.    *abd soft, non-distended.    *urine output: 1575mL.  Rectal tube output: 225mL (bowel regimen moved to prn).    *per flowsheet, pt received an average of 1244mL/day of Glucerna 1.5 over the past two days.  combined with prosource TF, provided ~1986Kcal and 136g protein.  Which met 100% of estimated nutr needs.      *based on current labs/meds; rec'd TF change to Nepro @ 40mL/hr with 5pkts Prosource TF.  Which will provide 1640Kcal, 120g protein, 800mL total TF volume, and 582mL free water.       RECOMMENDATIONS:  1) change TF to Nepro @ 40mL/hr with 5pkts Prosource TF  2) add MVI with minerals daily and vitamin C 500mg BID to optimize wound healing  3) monitor hydration status; consider hourly free water flushes of 55mL q1hr (=1100mL free water)  4) monitor TF tolerance; keep back of bed > 35 degrees  5) weekly wt checks to track/trend changes    ESTIMATED NUTR NEEDS:  1675-2010Kcal (25-30Kcal/Kg based on IBW: 67Kg)  121-134g protein (1.8-2g/Kg of IBW: 67Kg)  1675-2010mL fluid (25-30mL/Kg IBW)    Admit wt: 76.1Kg  Wt Hx:  72.7Kg (5/3)  73.9Kg (5/2)[BMI 26]  70.5Kg (4/28) (BMI 24)  IBW: 67Kg  Ht: 67".

## 2020-05-04 NOTE — CHART NOTE - NSCHARTNOTEFT_GEN_A_CORE
Spoke with Son Ignacio via phone-- [phone].  All concerns addressed including but not limited to diagnosis, treatment plan and overall prognosis.  Reviewed the acuity of his condition and he expressed understanding.

## 2020-05-05 NOTE — PROGRESS NOTE ADULT - ASSESSMENT
75 y/o M with a PMHx of BPH admitted from outside hospital on 4/1 for further care of viral syndrome secondary to COVID-19 infection.  1. Patient transferred from outside hospital for further treatment of respiratory failure secondary to COVID-19 infection  - hospital course complicated by pneumonia with Strep pneumonia and MRSA; bilateral chest tubes; respiratory failure  - s/p trach on 4/28  - Proteus in sputum  - on ceftriaxone to treat Proteus, day #6; will stop antibiotics today  - tolerating antibiotics without rashes or side effects   - grave prognosis as patient is unresponsive, not weaning despite greater than one month on ventilatory support, pressor support  - consideration for comfort care measures      2. other issues: per medicine

## 2020-05-05 NOTE — PROGRESS NOTE ADULT - SUBJECTIVE AND OBJECTIVE BOX
Date of service: 05-05-20 @ 10:42    Patient remains unresponsive, off sedation        ROS: unable to obtain secondary to patient medical condition     MEDICATIONS  (STANDING):  aspirin  chewable 81 milliGRAM(s) Oral daily  atorvastatin 20 milliGRAM(s) Oral at bedtime  baclofen 10 milliGRAM(s) Oral daily  cefTRIAXone Injectable. 1000 milliGRAM(s) IV Push every 24 hours  chlorhexidine 0.12% Liquid 15 milliLiter(s) Oral Mucosa two times a day  chlorhexidine 4% Liquid 1 Application(s) Topical <User Schedule>  collagenase Ointment 1 Application(s) Topical two times a day  enoxaparin Injectable 70 milliGRAM(s) SubCutaneous two times a day  famotidine Injectable 20 milliGRAM(s) IV Push two times a day  levothyroxine 75 MICROGram(s) Oral daily  midodrine 15 milliGRAM(s) Oral every 8 hours  norepinephrine Infusion 0.05 MICROgram(s)/kG/Min (3.31 mL/Hr) IV Continuous <Continuous>  phenylephrine    Infusion 3 MICROgram(s)/kG/Min (39.7 mL/Hr) IV Continuous <Continuous>  polyethylene glycol 3350 17 Gram(s) Oral daily  vasopressin Infusion 0.04 Unit(s)/Min (2.4 mL/Hr) IV Continuous <Continuous>    MEDICATIONS  (PRN):  acetaminophen   Tablet .. 650 milliGRAM(s) Oral every 6 hours PRN Temp greater or equal to 38C (100.4F), Mild Pain (1 - 3)  HYDROmorphone  Injectable 1 milliGRAM(s) IV Push every 4 hours PRN pain or agitation  senna 2 Tablet(s) Oral at bedtime PRN Constipation      Vital Signs Last 24 Hrs  T(C): 37.2 (05 May 2020 08:00), Max: 37.6 (05 May 2020 00:00)  T(F): 99 (05 May 2020 08:00), Max: 99.6 (05 May 2020 00:00)  HR: 109 (05 May 2020 10:00) (94 - 112)  BP: 91/53 (05 May 2020 10:00) (85/53 - 108/70)  BP(mean): 62 (05 May 2020 10:00) (59 - 79)  RR: 37 (05 May 2020 10:00) (21 - 38)  SpO2: 100% (05 May 2020 10:00) (94% - 100%)    Mode: AC/ CMV (Assist Control/ Continuous Mandatory Ventilation), RR (machine): 38, TV (machine): 450, FiO2: 70, PEEP: 12, ITime: 1, MAP: 26, PIP: 47    Physical Exam:        Constitutional: frail looking  HEENT: NC/AT  Neck: trach in place  Back: no tenderness  Respiratory: respiratory effort normal; scattered coarse breath sounds, bilateral chest tubes  Cardiovascular: S1S2 regular, no murmurs  Abdomen: soft, not tender, not distended, positive BS; no liver or spleen organomegaly  Genitourinary: no suprapubic tenderness  Musculoskeletal: no muscle tenderness, no joint swelling or tenderness  Neurological/ Psychiatric: nonresponsive  Skin: no rashes; no palpable lesions    Labs: all available labs reviewed              Labs:                        6.6    25.83 )-----------( 189      ( 05 May 2020 05:30 )             23.3     05-05    158<H>  |  113<H>  |  88<H>  ----------------------------<  208<H>  4.3   |  >45<HH>  |  0.40<L>    Ca    7.5<L>      05 May 2020 05:30  Phos  2.9     05-05  Mg     2.5     05-05             Cultures:       Ferritin, Serum: 460 ng/mL (04-25-20 @ 04:30)  C-Reactive Protein, Serum: 10.59 mg/dL (04-25-20 @ 04:30)        COVID-19 PCR . (03.21.20 @ 22:44)    COVID-19 PCR: Detected: LDT - Laboratory Developed Test All “detected” results on this new test  are considered presumptively positive results, are clinically actionable,  and specimens will be forwarded to CDC for confirmation testing.  Another report (corrected report) will only be issued if discordant  results occur.  This test has been validated by [employer] to be accurate;  though it has not been FDA cleared/approved by the usual pathway.  As with all laboratory tests, results should be correlated with clinical  findings.    < from: Xray Chest 1 View- PORTABLE-Routine (04.08.20 @ 07:18) >    EXAM:  XR CHEST PORTABLE ROUTINE 1V                            PROCEDURE DATE:  04/08/2020          INTERPRETATION:  CHEST AP PORTABLE:    History: Shortness of Breath.     Date and time of exam: 4/8/2020 6:43 AM.    Technique: A single AP view of the chest was obtained.    Comparison exam: 4/6/2020 4:38 PM.    Findings:  Diffuse bilateral pulmonary infiltrates, right worse than left. Removal of left IJ central line since the prior exam..    Impression:  Removal of left IJ central line, otherwise stable.    < end of copied text >        Radiology: all available radiological tests reviewed    Advanced directives addressed: full resuscitation

## 2020-05-05 NOTE — CHART NOTE - NSCHARTNOTEFT_GEN_A_CORE
Spoke with Son Ignacio via phone-- [phone].  All concerns addressed including but not limited to diagnosis, treatment plan and overall prognosis.  Reviewed the acuity of his condition--despite multiple pressors for BP support, his BP remains low.  Will likely not survive hospitalization.

## 2020-05-05 NOTE — PROGRESS NOTE ADULT - SUBJECTIVE AND OBJECTIVE BOX
Subjective:  Pt seen, on high dose pressors. 70% FIO2. Pt anemic. Na 158    Vital Signs:  Vital Signs Last 24 Hrs  T(C): 37.2 (05-05-20 @ 08:00), Max: 37.6 (05-05-20 @ 00:00)  T(F): 99 (05-05-20 @ 08:00), Max: 99.6 (05-05-20 @ 00:00)  HR: 120 (05-05-20 @ 12:07) (94 - 121)  BP: 116/59 (05-05-20 @ 12:00) (85/53 - 120/65)  RR: 37 (05-05-20 @ 12:00) (21 - 38)  SpO2: 100% (05-05-20 @ 12:07) (94% - 100%) on (O2)    Telemetry/Alarms:    Relevant labs, radiology and Medications reviewed                        6.4    24.53 )-----------( 188      ( 05 May 2020 11:30 )             21.8     05-05    158<H>  |  113<H>  |  88<H>  ----------------------------<  208<H>  4.3   |  >45<HH>  |  0.40<L>    Ca    7.5<L>      05 May 2020 05:30  Phos  2.9     05-05  Mg     2.5     05-05        MEDICATIONS  (STANDING):  aspirin  chewable 81 milliGRAM(s) Oral daily  atorvastatin 20 milliGRAM(s) Oral at bedtime  baclofen 10 milliGRAM(s) Oral daily  chlorhexidine 0.12% Liquid 15 milliLiter(s) Oral Mucosa two times a day  chlorhexidine 4% Liquid 1 Application(s) Topical <User Schedule>  collagenase Ointment 1 Application(s) Topical two times a day  enoxaparin Injectable 70 milliGRAM(s) SubCutaneous two times a day  famotidine Injectable 20 milliGRAM(s) IV Push two times a day  levothyroxine 75 MICROGram(s) Oral daily  midodrine 15 milliGRAM(s) Oral every 8 hours  norepinephrine Infusion 0.05 MICROgram(s)/kG/Min (3.31 mL/Hr) IV Continuous <Continuous>  phenylephrine    Infusion 3 MICROgram(s)/kG/Min (39.7 mL/Hr) IV Continuous <Continuous>  polyethylene glycol 3350 17 Gram(s) Oral daily  vasopressin Infusion 0.04 Unit(s)/Min (2.4 mL/Hr) IV Continuous <Continuous>    MEDICATIONS  (PRN):  acetaminophen   Tablet .. 650 milliGRAM(s) Oral every 6 hours PRN Temp greater or equal to 38C (100.4F), Mild Pain (1 - 3)  HYDROmorphone  Injectable 1 milliGRAM(s) IV Push every 4 hours PRN pain or agitation  senna 2 Tablet(s) Oral at bedtime PRN Constipation      Physical exam  Gen sedated on vent  Card tachycardic  Pulm coarse b/l  Abd soft  Ext warm    Tubes: Right CT 34cc, Left no output, both to -40 suction, no AL    I&O's Summary    04 May 2020 07:01  -  05 May 2020 07:00  --------------------------------------------------------  IN: 5143 mL / OUT: 1934 mL / NET: 3209 mL        Assessment  74y Male  w/ PAST MEDICAL & SURGICAL HISTORY:  BPH (benign prostatic hyperplasia)  No pertinent past medical history  No significant past surgical history  admitted with complaints of Patient is a 74y old  Male who presents with a chief complaint of Transfer [place] - Covid-19 [facility] (05 May 2020 10:41)  .  73yo M with PMH of BPH presented to  on 4/1/2020, transfer from [facility] with Positive Covid-19, originally admitted with complaints of shortness of breath x 6 days. Patient intubated at  on 3/29. Course complicated by right pneumothorax 4/14 requiring b/l CT placement. On T lovenox for DVT. Thoracic surgery called for persistent right pneumothorax. Second R CT placed 4/20 with reexpansion of lung. Old R CT removed 4/22 after successful clamp trial. 4/23 with small right apical pneumothorax- stable today 4/24. 4/27 R PTX resolved, now tiny left apical pneumo. 4/28 s/p trach and b/l small apical pneumothoraxes. Last CXR 4/30 no pneumo. 5/5 Pt anemic, hypernatremic, requiring pressors and 70% FiO2.     maintain both chest tubes to -40 suction  Change chest tube dressings QOD and prn soiling  record output per shift  consider CXR    Discussed with Cardiothoracic Team at AM rounds.

## 2020-05-05 NOTE — PROGRESS NOTE ADULT - SUBJECTIVE AND OBJECTIVE BOX
74y old M pmhx BPH and Hypothyroidism presented to  as transfer from [facility] with COVID 19+, ARDS, Distributive shock and DVT. Hospital course further complicated by bilateral PTX requiring chest tube placement, MRSA and Strep Pneumonia  Now proteus in sputum  was on ceftriaxone stopped today 5/5.       S/p Trach 4/28     Now septic shock on michael and  vaso levo  unresponsive off sedation.      Bilateral infiltrates on the latest CXR.   acute on chronic hypercapnia  with hypernatremia  free water added        Hosp day # 34  transfer from [facility]    Worsening clinical status with worsening shock and resp acidosis febrile     PX dismal.  Don't see how he can survive.           Vital signs/reviewed and physical exam performed where pertinent and urgently required.    Lab/radiology studies/ABG/meds reviewed and interpreted into the assessment and treatment plan.    Assessment/Plan/Therapeutic interventions      Neuro:  Hold all sedation   CT head if not awake soon.      Cor:  Pressor support as needed to maintain MAP 65.  Avoiding fluid challenges.   Michael vaso levo septic shock     Pulm:  ARDS-NET 4-6cc/kg IBW TV as able to maintain plateau pressures <30. Prone ventilation consideration as feasible.  Pa02/Fi02 < 150 on Fi02 >60% and PEEP at least 5.    GI:  PPI  Enteric feeds as tolerated     Renal: Even to negative fluid balance as tolerated by hemodynamics and renal fx.      Heme:  Full AC for bilateral DVT LE     ID: ABX discontinuation based on discussion with ID in conjunction with clinical features, culture data, and judicious procalcitonin monitoring.   OFF ctx     Endo Aggressive glycemic control to limit FS glucose to < 180mg/dl.        COVID 19 specific considerations and therapeutic options based on the available and rapidly changing literature    Goals of care considerations:  Ongoing assessment for patient specific treatment options based on progression or decline.  I have involved the family with updates and requests in guidance for medical decision making.      34 minutes of critical care time spent in the management of this critically ill COVID-19 patient  with continuous assessments and interventions based on the interpretation of multiple databases.

## 2020-05-05 NOTE — CHART NOTE - NSCHARTNOTEFT_GEN_A_CORE
[facility] # 37  ICU # 37         Vent # 35/ 7  R axilla A-line # 6  [facility] CVL # 9    HPI:    73 y/o male with DVT tx from [facility] to  on 4/1--COVID-19 Positive--ARDS--Likely Viral PNA--Acute type 1 resp failure on full vent support--CT placed while in PACU    5/4:  Vent 80/12.  No sedation--not responsive.  On phenyl and vaso gtts.  WBC up 25.  Na up 156.  Doing very poorly   5/5:  Vent 70/12.  On very high dose pressors.  not responsive off sedation.  possible erroneous CBC.  WBC steady 25.  Na up 158.       ROS: Unobtainable due to clinical Condition  PMH As above  Meds reviewed    Trach in situ connected to Vent   Chest:  B/L air entry.   No wheezing.  B/L CT   Heart: S1 S 2 regualr  Abd: Soft No guarding  Extrem: No cynosis or edema  Neuro: Sedated    Labs/radiology reviewed    IMP:    73 y/o male with DVT tx from [facility] to  on 4/1--COVID-19 Positive--ARDS--Likely Viral PNA--Acute type 1 resp failure on full vent support--CT placed while in PACU  Proteus PNA--completed 6 days of CTX  Septic shock on high dose pressors  Severe met alkalosis  TM Encephalopathy related to virus   Hypernatremia   B/L DVT on LMWH    Critically ill.  High risk for acute decompensation and deterioration including death.  Given advance comorbidities and overall life support requirement on vent and pressors for shock, Resuscitation would be futile and should not be performed as it will not change the overall outcome and pt will unfortunately succumb to COVID-19 infection despite best efforts and medical Rx.  D/W ICU team     Plan:    Full vent support--LPV strategy to maintain plateau pressures < 30--High PEEP/low TV--Permissive hypercapnea and acidemia.  Decrease FIO2 and PEEP as tolerated.  Not candidate for weaning  Actively titrate pressors to MAP > 60--add Norepi gtt today to cap at 2   Observe off sedation  Conservative fluid management   Cont Statin  TF as per protocol--add 500 q6 and increase to 60/hr FW  HOB > 30  Evans  Strict I+Os  DVT prophy--AC with LMWH    COVID-19 specific considerations and therapeutic options based on the available and rapidly changing literature and recommendations     Case reviewed and d/w multi discipline rounds     CC time excluding procedure:  45 cc mins

## 2020-05-06 NOTE — PROGRESS NOTE ADULT - SUBJECTIVE AND OBJECTIVE BOX
Patient is a 74y old  Male who presents with a chief complaint of Transfer [place] - Covid-19 PNA (06 May 2020 11:23)      BRIEF HOSPITAL COURSE: 74y Male  pmhx presented as transfer from [facility] on 4/1 with COVID 19+ in acute hypoxic respiratory failure. Course further complicated septic shock , bilateral pneumothorax on chest tube placement, MRSA and Strept Pneumonia.    Patient remains on ventilator with poor mental status off sedation. Increasing pressor requirements now in triple pressor shock.    PAST MEDICAL & SURGICAL HISTORY:  BPH (benign prostatic hyperplasia)  No pertinent past medical history  No significant past surgical history        Hosp day #35d    Vent day #  Mode: AC/ CMV (Assist Control/ Continuous Mandatory Ventilation)  RR (machine): 38  TV (machine): 450  FiO2: 70  PEEP: 12  ITime: 0.7  MAP: 25  PIP: 47        Vital signs / Reviewed and Physical Exam Performed where pertinent and urgently required    Lab / Radiology  studies / ABG / Meds -  reviewed and interpreted into the assessment and treatment plan.      Assessment/Plan/Therapeutic interventions      Neuro - Poor mental status off sedation, will utilize PRN if needed for vent synchronicity    CV -  Septic shock now on triple pressor requirements; levo, anish, vaso           Avoiding fluid challenges      Pulm -  Prolonged hypoxic respiratory failure s/p Trach. Compensated respiratory acidosis             Diffuse bilateral airspace dz on CXR. ARDS-NET 4-6cc/kg IBW TV as able to maintain plateau pressures <30               Prone ventilation consideration as feasible  Pa02/Fi02 < 150 on Fi02 >60% and PEEP at least 5                 Vent bundle Reviewed     GI -  PPI  Enteric feeds as tolerated in tandem with NMB and prone ventilation    Renal - Hypernatremia; free water 500 Q6. Even to negative fluid balance as tolerated by hemodynamics and renal fx.  Feeds to be provided in lieu of IVF.     Heme -  Progressive Anemia, transfused 1PRBC. Will hold AC tonight. Keep SCD's    ID - COVID 19+ s/p treatment.  Proteus in sputum s/p course of Ceftriaxone. ABX discontinuation based on discussion with ID in conjunction with clinical features, culture data, and judicious procalcitonin monitoring.      Endo -  Aggressive glycemic control to limit FS glucose to < 180mg/dl.      COVID 19 specific considerations and therapeutic  options based on the available and rapidly changing literature    Goals of care considerations:  Very poor prongosis, family updated earlier and made aware. Ongoing assessment for patient specific treatment options based on progression or decline.      33  Minutes of critical care tiem spent in the management of this critically ill COVID-19 patient/PUI patient with continuous assessments and interventions based on the interpretation of multiple databases.

## 2020-05-06 NOTE — PROVIDER CONTACT NOTE (CRITICAL VALUE NOTIFICATION) - ACTION/TREATMENT ORDERED:
no further orders received at this time
ORDERS RECEIVED
PA Love aware
md aware and at bedside. RR increased

## 2020-05-06 NOTE — PROGRESS NOTE ADULT - SUBJECTIVE AND OBJECTIVE BOX
Subjective: Patient remains intubated on 70% Fio2, on high pressor support (anish/levo/vaso). Unresponsive.     Vital Signs:  Vital Signs Last 24 Hrs  T(C): 37.1 (05-06-20 @ 08:00), Max: 38.4 (05-05-20 @ 21:00)  T(F): 98.7 (05-06-20 @ 08:00), Max: 101.2 (05-06-20 @ 04:00)  HR: 126 (05-06-20 @ 10:00) (119 - 129)  BP: 110/62 (05-06-20 @ 10:00) (104/60 - 121/67)  RR: 37 (05-06-20 @ 10:00) (32 - 39)  SpO2: 99% (05-06-20 @ 10:00) (97% - 100%) on (O2)    I&O's Detail    05 May 2020 07:01  -  06 May 2020 07:00  --------------------------------------------------------  IN:    Enteral Tube Flush: 2110 mL    Free Water: 1000 mL    Nepro: 430 mL    norepinephrine Infusion: 400 mL    ns in tub fed  [id number]: 460 mL    phenylephrine   Infusion: 1889 mL    vasopressin Infusion: 60 mL  Total IN: 6349 mL    OUT:    Chest Tube: 90 mL    Indwelling Catheter - Urethral: 2020 mL    Rectal Tube: 600 mL  Total OUT: 2710 mL    Total NET: 3639 mL          General: Intubated, unresponsive  Neck: Neck supple, trachea midline, No JVD, trach in place  Respiratory: B/L BS  CV: S1S2, no murmurs, rubs or gallops  Abdominal: Soft, NT, distended  Tubes: L CT no air leak, 0 output, to -40 sxn  R CT to -40 suction, 90 cc out, no air leak     Relevant labs, radiology and Medications reviewed                        5.8    18.62 )-----------( 183      ( 06 May 2020 06:33 )             18.6     05-06    159<H>  |  114<H>  |  72<H>  ----------------------------<  204<H>  4.5   |  44<H>  |  0.33<L>    Ca    7.5<L>      06 May 2020 06:33  Phos  1.8     05-06  Mg     2.4     05-06        MEDICATIONS  (STANDING):  aspirin  chewable 81 milliGRAM(s) Oral daily  atorvastatin 20 milliGRAM(s) Oral at bedtime  baclofen 10 milliGRAM(s) Oral daily  chlorhexidine 0.12% Liquid 15 milliLiter(s) Oral Mucosa two times a day  chlorhexidine 4% Liquid 1 Application(s) Topical <User Schedule>  collagenase Ointment 1 Application(s) Topical two times a day  enoxaparin Injectable 70 milliGRAM(s) SubCutaneous two times a day  famotidine Injectable 20 milliGRAM(s) IV Push two times a day  levothyroxine 75 MICROGram(s) Oral daily  midodrine 15 milliGRAM(s) Oral every 8 hours  norepinephrine Infusion 0.05 MICROgram(s)/kG/Min (3.31 mL/Hr) IV Continuous <Continuous>  phenylephrine    Infusion 3 MICROgram(s)/kG/Min (39.7 mL/Hr) IV Continuous <Continuous>  polyethylene glycol 3350 17 Gram(s) Oral daily  potassium phosphate IVPB 15 milliMole(s) IV Intermittent once  vasopressin Infusion 0.04 Unit(s)/Min (2.4 mL/Hr) IV Continuous <Continuous>    MEDICATIONS  (PRN):  acetaminophen   Tablet .. 650 milliGRAM(s) Oral every 6 hours PRN Temp greater or equal to 38C (100.4F), Mild Pain (1 - 3)  HYDROmorphone  Injectable 1 milliGRAM(s) IV Push every 4 hours PRN pain or agitation  senna 2 Tablet(s) Oral at bedtime PRN Constipation        Assessment  74y Male  w/ PAST MEDICAL & SURGICAL HISTORY:  BPH (benign prostatic hyperplasia)  No pertinent past medical history  No significant past surgical history  admitted with complaints of Patient is a 74y old  Male who presents with a chief complaint of Transfer [place] - Covid-19 PNA (05 May 2020 23:18)  patient underwent Percutaneous catheterization of artery  Open tracheostomy  Chest tube placement  Chest tube insertion  US guided central cath  Cannulation, artery, percutaneous, for sampling

## 2020-05-06 NOTE — CHART NOTE - NSCHARTNOTEFT_GEN_A_CORE
[facility] # 38  ICU # 38         Vent # 35/ 8  R axilla A-line # 7  [facility] CVL # 10    HPI:    73 y/o male with DVT tx from [facility] to  on 4/1--COVID-19 Positive--ARDS--Likely Viral PNA--Acute type 1 resp failure on full vent support--CT placed while in PACU    5/4:  Vent 80/12.  No sedation--not responsive.  On phenyl and vaso gtts.  WBC up 25.  Na up 156.  Doing very poorly   5/5:  Vent 70/12.  On very high dose pressors.  not responsive off sedation.  possible erroneous CBC.  WBC steady 25.  Na up 158  5/6:  Vent 70/12.  On 3 pressors.  Not responsive. WBC down 18.  Na up 159    ROS: Unobtainable due to clinical Condition  PMH As above  Meds reviewed    Trach in situ connected to Vent   Chest:  B/L air entry.   No wheezing.  B/L CT   Heart: S1 S 2 regualr  Abd: Soft No guarding  Extrem: No cynosis or edema  Neuro: Sedated    Labs/radiology reviewed    IMP:    73 y/o male with DVT tx from [facility] to  on 4/1--COVID-19 Positive--ARDS--Likely Viral PNA--Acute type 1 resp failure on full vent support--CT placed while in PACU  Proteus PNA--completed 6 days of CTX  Septic shock on high dose pressors  Severe met alkalosis  TM Encephalopathy related to virus   Hypernatremia   B/L DVT on LMWH  Anemia of chronic disease     Critically ill.  High risk for acute decompensation and deterioration including death.  Given advance comorbidities and overall life support requirement on vent and pressors for shock, Resuscitation would be futile and should not be performed as it will not change the overall outcome and pt will unfortunately succumb to COVID-19 infection despite best efforts and medical Rx.  D/W ICU team     Plan:    Full vent support--LPV strategy to maintain plateau pressures < 30--High PEEP/low TV--Permissive hypercapnea and acidemia.  Decrease FIO2 and PEEP as tolerated.  Not candidate for weaning  Actively titrate pressors to MAP > 60--added Norepi gtt today to cap at 2   Tx 1u PC today  Observe off sedation  Conservative fluid management   Cont Statin  TF as per protocol--added 500 q6 and increase to 60/hr FW.  D5W gtt x 1L  HOB > 30  TF as per protocol  Cristina  Strict I+Os  DVT prophy--AC with LMWH    COVID-19 specific considerations and therapeutic options based on the available and rapidly changing literature and recommendations     Case reviewed and d/w multi discipline rounds     CC time excluding procedure:  45 cc mins

## 2020-05-06 NOTE — PROGRESS NOTE ADULT - ASSESSMENT
75yo M with PMH of BPH presented to  on 4/1/2020, transfer from [facility] with Positive Covid-19, originally admitted with complaints of shortness of breath x 6 days. Patient intubated at  on 3/29. Course complicated by right pneumothorax 4/14 requiring b/l CT placement. On T lovenox for DVT. Thoracic surgery called for persistent right pneumothorax. Second R CT placed 4/20 with reexpansion of lung. Old R CT removed 4/22 after successful clamp trial. 4/23 with small right apical pneumothorax- stable today 4/24. 4/27 R PTX resolved, now tiny left apical pneumo. 4/28 s/p trach and b/l small apical pneumothoraxes. Last CXR 4/30 no pneumo. 5/5 Pt anemic, hypernatremic, requiring pressors and 70% FiO2. 5/6 receiving 1 U PRBC for HCT 19. CXR with no PTX.     maintain both chest tubes to -40 suction  Change chest tube dressings QOD and prn soiling  record output per shift  consider CXR      Jenni JONES  Thoracic Sx  #[id number]

## 2020-05-06 NOTE — CHART NOTE - NSCHARTNOTEFT_GEN_A_CORE
Spoke with Son Ignacio via phone-- [phone].  All concerns addressed including but not limited to diagnosis, treatment plan and overall prognosis.  The option of comfort care discussed and he is going to consider it

## 2020-05-07 NOTE — PROGRESS NOTE ADULT - SUBJECTIVE AND OBJECTIVE BOX
Subjective:  Pt seen, sedated. Anemia 6.3/17.2. Tmax on 102.1 on 70% FIO2.     Vital Signs:  Vital Signs Last 24 Hrs  T(C): 37.9 (05-07-20 @ 12:00), Max: 38.9 (05-06-20 @ 18:00)  T(F): 100.3 (05-07-20 @ 12:00), Max: 102.1 (05-06-20 @ 18:00)  HR: 126 (05-07-20 @ 12:00) (120 - 131)  BP: 105/54 (05-07-20 @ 12:00) (103/53 - 117/62)  RR: 38 (05-07-20 @ 12:00) (34 - 39)  SpO2: 99% (05-07-20 @ 12:00) (95% - 100%) on (O2)    Telemetry/Alarms:    Relevant labs, radiology and Medications reviewed                        6.3    22.64 )-----------( 171      ( 07 May 2020 06:00 )             17.2     05-07    152<H>  |  112<H>  |  46<H>  ----------------------------<  188<H>  4.4   |  40<H>  |  0.19<L>    Ca    6.8<L>      07 May 2020 06:00  Phos  1.8     05-06  Mg     2.0     05-07    TPro  x   /  Alb  1.1<L>  /  TBili  x   /  DBili  x   /  AST  x   /  ALT  x   /  AlkPhos  x   05-07      MEDICATIONS  (STANDING):  aspirin  chewable 81 milliGRAM(s) Oral daily  atorvastatin 20 milliGRAM(s) Oral at bedtime  baclofen 10 milliGRAM(s) Oral daily  chlorhexidine 0.12% Liquid 15 milliLiter(s) Oral Mucosa two times a day  chlorhexidine 4% Liquid 1 Application(s) Topical <User Schedule>  collagenase Ointment 1 Application(s) Topical two times a day  enoxaparin Injectable 70 milliGRAM(s) SubCutaneous every 12 hours  famotidine Injectable 20 milliGRAM(s) IV Push two times a day  levothyroxine 75 MICROGram(s) Oral daily  midodrine 15 milliGRAM(s) Oral every 8 hours  norepinephrine Infusion 0.05 MICROgram(s)/kG/Min (3.31 mL/Hr) IV Continuous <Continuous>  phenylephrine    Infusion 3 MICROgram(s)/kG/Min (39.7 mL/Hr) IV Continuous <Continuous>  vasopressin Infusion 0.04 Unit(s)/Min (2.4 mL/Hr) IV Continuous <Continuous>    MEDICATIONS  (PRN):  acetaminophen   Tablet .. 650 milliGRAM(s) Oral every 6 hours PRN Temp greater or equal to 38C (100.4F), Mild Pain (1 - 3)  HYDROmorphone  Injectable 1 milliGRAM(s) IV Push every 4 hours PRN pain or agitation  polyethylene glycol 3350 17 Gram(s) Oral daily PRN Constipation  senna 2 Tablet(s) Oral at bedtime PRN Constipation      Physical exam    Neuro sedated on vent  Card tachycardic  Pulm coarse b/l  Abd soft  Ext warm    Tubes: -40 suction b/l no ALs. Left 20cc and Right 160cc output last 24 hours    I&O's Summary    06 May 2020 07:01  -  07 May 2020 07:00  --------------------------------------------------------  IN: 7017.1 mL / OUT: 2080 mL / NET: 4937.1 mL        Assessment  74y Male  w/ PAST MEDICAL & SURGICAL HISTORY:  BPH (benign prostatic hyperplasia)  No pertinent past medical history  No significant past surgical history  admitted with complaints of Patient is a 74y old  Male who presents with a chief complaint of Transfer [place] - Covid-19 PNA (06 May 2020 21:39)  .  73yo M with PMH of BPH presented to  on 4/1/2020, transfer from [facility] with Positive Covid-19, originally admitted with complaints of shortness of breath x 6 days. Patient intubated at  on 3/29. Course complicated by right pneumothorax 4/14 requiring b/l CT placement. On T lovenox for DVT. Thoracic surgery called for persistent right pneumothorax. Second R CT placed 4/20 with reexpansion of lung. Old R CT removed 4/22. 4/28 s/p trach.  5/6 receiving 1 U PRBC for HCT 19. CXR 5/6 with no PTX. 5/7 H and H 6.3/17/2, on 3 pressors    maintain both chest tubes to -40 suction  Change chest tube dressings QOD and prn soiling  record output per shift  possible comfort care    Discussed with Cardiothoracic Team at AM rounds.

## 2020-05-07 NOTE — CHART NOTE - NSCHARTNOTEFT_GEN_A_CORE
[facility] # 39  ICU # 39         Vent # 35/ 9  R axilla A-line # 8  [facility] CVL # 11    HPI:    73 y/o male with DVT tx from [facility] to  on 4/1--COVID-19 Positive--ARDS--Likely Viral PNA--Acute type 1 resp failure on full vent support--CT placed while in PACU    5/4:  Vent 80/12.  No sedation--not responsive.  On phenyl and vaso gtts.  WBC up 25.  Na up 156.  Doing very poorly   5/5:  Vent 70/12.  On very high dose pressors.  not responsive off sedation.  possible erroneous CBC.  WBC steady 25.  Na up 158  5/6:  Vent 70/12.  On 3 pressors.  Not responsive. WBC down 18. Na up 159  5/7:  Vent 70/12. On 3 pressors.  s/p 1u PC on 5/6.  Na down 152 after 1L D5W gtt.  WBC up 22.  Doing poorly.      ROS: Unobtainable due to clinical Condition  PMH As above  Meds reviewed    Trach in situ connected to Vent   Chest:  B/L air entry.   No wheezing.  B/L CT   Heart: S1 S 2 regular  Abd: Soft No guarding  Extrem: No cynosis or edema  Neuro: Not responsive     Labs/radiology reviewed    IMP:    73 y/o male with DVT tx from [facility] to  on 4/1--COVID-19 Positive--ARDS--Likely Viral PNA--Acute type 1 resp failure on full vent support--CT placed while in PACU  Proteus PNA--completed 6 days of CTX  Septic shock on high dose pressors  Severe met alkalosis  TM Encephalopathy related to virus   Hypernatremia   B/L DVT on LMWH  Anemia of chronic disease     Critically ill.  High risk for acute decompensation and deterioration including death.  Given advance comorbidities and overall life support requirement on vent and pressors for shock, Resuscitation would be futile and should not be performed as it will not change the overall outcome and pt will unfortunately succumb to COVID-19 infection despite best efforts and medical Rx.  D/W ICU team     Plan:    Full vent support--LPV strategy to maintain plateau pressures < 30--High PEEP/low TV--Permissive hypercapnea and acidemia.  Decrease FIO2 and PEEP as tolerated.  Not candidate for weaning  Actively titrate pressors to MAP > 60--added Norepi gtt today to cap at 2   No further PRBC tx as it will not change overall mortality   Observe off sedation  Conservative fluid management   Cont Statin  TF as per protocol--added 500 q6 and increase to 60/hr FW.    HOB > 30  TF as per protocol  Evans  Strict I+Os  DVT prophy--AC with LMWH    COVID-19 specific considerations and therapeutic options based on the available and rapidly changing literature and recommendations     Case reviewed and d/w multi discipline rounds     CC time excluding procedure:  45 cc mins

## 2020-05-07 NOTE — CHART NOTE - NSCHARTNOTEFT_GEN_A_CORE
Spoke with Son Ignacio via phone-- [phone].  All concerns addressed including but not limited to diagnosis, treatment plan and overall prognosis.  The option of comfort care reviewed again.  He needs to speak with pt's sister regarding comfort care.  Rest of family wants to continue current care

## 2020-05-08 NOTE — CHART NOTE - NSCHARTNOTEFT_GEN_A_CORE
Spoke with Son Ignacio via phone-- [phone].  All concerns addressed including but not limited to diagnosis, treatment plan and overall prognosis.  The option of comfort care reviewed again.  It is still up in the air as to proceed with comfort care or not.  He needs to speak with family again

## 2020-05-08 NOTE — PROGRESS NOTE ADULT - SUBJECTIVE AND OBJECTIVE BOX
Subjective:  Pt seen, tachycardic. Tmax 101.6. Anemic 5.6/18/3, blood transfusing when seen.     Vital Signs:  Vital Signs Last 24 Hrs  T(C): 38 (05-08-20 @ 12:12), Max: 38.7 (05-07-20 @ 21:00)  T(F): 100.4 (05-08-20 @ 12:12), Max: 101.6 (05-07-20 @ 21:00)  HR: 115 (05-08-20 @ 15:42) (115 - 125)  BP: 115/61 (05-08-20 @ 14:00) (93/52 - 120/62)  RR: 24 (05-08-20 @ 14:00) (24 - 39)  SpO2: 96% (05-08-20 @ 15:42) (90% - 100%) on (O2)    Telemetry/Alarms:    Relevant labs, radiology and Medications reviewed                        5.6    22.81 )-----------( 189      ( 08 May 2020 06:10 )             18.3     05-08    151<H>  |  111<H>  |  35<H>  ----------------------------<  181<H>  3.2<L>   |  40<H>  |  0.18<L>    Ca    7.0<L>      08 May 2020 06:10  Phos  1.5     05-08  Mg     1.9     05-08    TPro  x   /  Alb  1.1<L>  /  TBili  x   /  DBili  x   /  AST  x   /  ALT  x   /  AlkPhos  x   05-07      MEDICATIONS  (STANDING):  aspirin  chewable 81 milliGRAM(s) Oral daily  atorvastatin 20 milliGRAM(s) Oral at bedtime  baclofen 10 milliGRAM(s) Oral daily  chlorhexidine 0.12% Liquid 15 milliLiter(s) Oral Mucosa two times a day  chlorhexidine 4% Liquid 1 Application(s) Topical <User Schedule>  collagenase Ointment 1 Application(s) Topical two times a day  enoxaparin Injectable 70 milliGRAM(s) SubCutaneous every 12 hours  famotidine Injectable 20 milliGRAM(s) IV Push two times a day  levothyroxine 75 MICROGram(s) Oral daily  midodrine 15 milliGRAM(s) Oral every 8 hours  norepinephrine Infusion 0.05 MICROgram(s)/kG/Min (3.31 mL/Hr) IV Continuous <Continuous>  phenylephrine    Infusion 3 MICROgram(s)/kG/Min (39.7 mL/Hr) IV Continuous <Continuous>  vasopressin Infusion 0.04 Unit(s)/Min (2.4 mL/Hr) IV Continuous <Continuous>    MEDICATIONS  (PRN):  acetaminophen   Tablet .. 650 milliGRAM(s) Oral every 6 hours PRN Temp greater or equal to 38C (100.4F), Mild Pain (1 - 3)  polyethylene glycol 3350 17 Gram(s) Oral daily PRN Constipation  senna 2 Tablet(s) Oral at bedtime PRN Constipation      Physical exam  Neuro sedated on vent  Card tachycardic  Pulm coarse b/l  Abd soft  Ext warm    Tubes: -40 suction b/l no ALs. Left 5cc and Right 200cc output last 24 hours    I&O's Summary    07 May 2020 07:01  -  08 May 2020 07:00  --------------------------------------------------------  IN: 3429.6 mL / OUT: 2455 mL / NET: 974.6 mL    08 May 2020 07:01  -  08 May 2020 15:52  --------------------------------------------------------  IN: 900 mL / OUT: 0 mL / NET: 900 mL        Assessment  74y Male  w/ PAST MEDICAL & SURGICAL HISTORY:  BPH (benign prostatic hyperplasia)  No pertinent past medical history  No significant past surgical history  admitted with complaints of Patient is a 74y old  Male who presents with a chief complaint of Transfer [place] - Covid-19 PNA (07 May 2020 12:29)  .  73yo M with PMH of BPH presented to  on 4/1/2020, transfer from [facility] with Positive Covid-19, originally admitted with complaints of shortness of breath x 6 days. Patient intubated at  on 3/29. Course complicated by right pneumothorax 4/14 requiring b/l CT placement. On T lovenox for DVT. Thoracic surgery called for persistent right pneumothorax. Second R CT placed 4/20 with reexpansion of lung. Old R CT removed 4/22. 4/28 s/p trach.  5/6 receiving 1 U PRBC for HCT 19. CXR 5/6 with no PTX. 5/7 H and H 6.3/17/2, on 3 pressors    maintain both chest tubes to -40 suction  Change chest tube dressings QOD and prn soiling  record output per shift  transfusion of blood product    Discussed with Cardiothoracic Team at AM rounds.

## 2020-05-08 NOTE — CHART NOTE - NSCHARTNOTEFT_GEN_A_CORE
[facility] # 40  ICU # 40         Vent # 35/ 10  R axilla A-line # 9  [facility] CVL # 12     HPI:    73 y/o male with DVT tx from [facility] to  on 4/1--COVID-19 Positive--ARDS--Likely Viral PNA--Acute type 1 resp failure on full vent support--CT placed while in PACU    5/4:  Vent 80/12.  No sedation--not responsive.  On phenyl and vaso gtts.  WBC up 25.  Na up 156.  Doing very poorly   5/5:  Vent 70/12.  On very high dose pressors.  not responsive off sedation.  possible erroneous CBC.  WBC steady 25.  Na up 158  5/6:  Vent 70/12.  On 3 pressors.  Not responsive. WBC down 18. Na up 159  5/7:  Vent 70/12. On 3 pressors.  s/p 1u PC on 5/6.  Na down 152 after 1L D5W gtt.  WBC up 22.  Doing poorly  5/8: Vent 70/12.  On 3 pressors.  Not awake.  Na down 151.  Hb 5.6  WBC steady 22.   Doing very poorly    ROS: Unobtainable due to clinical Condition  PMH As above  Meds reviewed    Trach in situ connected to Vent   Chest:  B/L air entry.   No wheezing.  B/L CT   Heart: S1 S 2 regular  Abd: Soft No guarding  Extrem: No cynosis or edema  Neuro: Not responsive     Labs/radiology reviewed    IMP:    73 y/o male with DVT tx from [facility] to  on 4/1--COVID-19 Positive--ARDS--Likely Viral PNA--Acute type 1 resp failure on full vent support--CT placed while in PACU  Proteus PNA--completed 6 days of CTX  Septic shock on high dose pressors  Severe met alkalosis  TM Encephalopathy related to virus   Hypernatremia   B/L DVT on LMWH  Anemia of chronic disease     Critically ill.  High risk for acute decompensation and deterioration including death.  Given advance comorbidities and overall life support requirement on vent and pressors for shock, Resuscitation would be futile and should not be performed as it will not change the overall outcome and pt will unfortunately succumb to COVID-19 infection despite best efforts and medical Rx.  D/W ICU team     Plan:    Full vent support--LPV strategy to maintain plateau pressures < 30--High PEEP/low TV--Permissive hypercapnea and acidemia.  Decrease FIO2 and PEEP as tolerated.  Not candidate for weaning  Actively titrate pressors to MAP > 60--added Norepi gtt cap at 2   Will give 1u PC today  Observe off sedation  Conservative fluid management   Cont Statin  TF as per protocol--added 500 q6 and increase to 60/hr FW  HOB > 30  TF as per protocol  Evans  Strict I+Os  DVT prophy--AC with LMWH    COVID-19 specific considerations and therapeutic options based on the available and rapidly changing literature and recommendations     Case reviewed and d/w multi discipline rounds     CC time excluding procedure:  45 cc mins

## 2020-05-08 NOTE — CHART NOTE - NSCHARTNOTEFT_GEN_A_CORE
Clinical Nutrition BRIEF NOTE    *pt s/p trach (4/28).   remains critically ill with fever and unresponsive off sedation. ARDS due to COVID-19 infection. Acute hypoxic respiratory failure 2/2 COVID-19 viral pneumonia. Septic shock due to COVID-19 infection. Bacterial MRSA/strep pneumo PNA. B/L pneumothoraces. toxic metabolic encephalopathy.  *pt on three pressors;  MAP >65.    *labs reviewed; 05-08 Na151 mmol/L<H> Glu 181 mg/dL<H> K+ 3.2 mmol/L<L> Cr  0.18 mg/dL<L> BUN 35 mg/dL<H> Phos 1.5 mg/dL<L> Alb n/a   PAB n/a     *hypokalemia, hypernatremia, hypophosphatemia; will change TF to less restrictive formula.  Pt ordered for 500mL q6hrs free water flush and 60mL q1hr (=3200mL free water)    *urine output: 1900mL.  Rectal Tube; 300mL.  consider adding imodium.    *(+3) generalized edema.  violeta score of 10; stage 3 PU on coccyx.  stage 2 PU on mid back.  stage 2 PU on Rt/Lt ear and Rt cheek.    *per flowsheet, pt has received an average of ~1330Kcal and 92g protein per day over the past 3 days.  Which met ~79% of estimated calorie and ~76% of estimated protein needs.    *based on current labs and meds; rec'd change of TF to Glucerna 1.5 with goal rate of 60mL/hr with 2pkts Prosource TF.  Which will provide ~1880Kcal, 121g protein, 911mL free water, and total TF volume of 1200mL.      RECOMMENDATIONS:  1) change TF to Glucerna 1.5 @ 60mL/hr with 2pkts Prosource TF  2) add MVI with minerals daily and vitamin C 500mg BID to optimize wound healing  3) monitor hydration status; ordered for 500mL q6hrs and 60mL q1hr free water flushes (=3200mL)  4) monitor TF tolerance; keep back of bed > 35 degrees  5) weekly wt checks to track/trend changes    ESTIMATED NUTR NEEDS:  1675-2010Kcal (25-30Kcal/Kg based on IBW: 67Kg)  121-134g protein (1.8-2g/Kg of IBW: 67Kg)  1675-2010mL fluid (25-30mL/Kg IBW)    Admit wt: 76.1Kg  Wt Hx:  72.7Kg (5/3)  73.9Kg (5/2)[BMI 26]  70.5Kg (4/28) (BMI 24)  IBW: 67Kg  Ht: 67".

## 2020-05-08 NOTE — PROGRESS NOTE ADULT - SUBJECTIVE AND OBJECTIVE BOX
Patient is a 74y old  Male who presents with a chief complaint of Transfer [place] - Covid-19 PNA (08 May 2020 15:51)      BRIEF HOSPITAL COURSE: 74y Male  pmhx presented as transfer from [facility] on 4/1 with COVID 19+ in acute hypoxic respiratory failure. Course further complicated septic shock , bilateral pneumothorax on chest tube placement, MRSA and Strept Pneumonia.    Patient remains on ventilator with poor mental status off sedation. Remains in triple pressor shock.      PAST MEDICAL & SURGICAL HISTORY:  BPH (benign prostatic hyperplasia)  No pertinent past medical history  No significant past surgical history        Hosp day #37d    Vent day #  Mode: AC/ CMV (Assist Control/ Continuous Mandatory Ventilation)  RR (machine): 38  TV (machine): 450  FiO2: 70  PEEP: 10  ITime: 0.7  MAP: 44  PIP: 50        Vital signs / Reviewed and Physical Exam Performed where pertinent and urgently required    Lab / Radiology  studies / ABG / Meds -  reviewed and interpreted into the assessment and treatment plan.      Assessment/Plan/Therapeutic interventions      Neuro - Poor metnal status off sedation, utilize PRN if needed for vent synchrnonicity    CV -  Septic shock requiring triple pressor support and BP still tenuous           Avoiding fluid challenges      Pulm -  Prolonged hypoxic resp failure s/p trach. Compensated respiratory acidosis               ARDS-NET 4-6cc/kg IBW TV as able to maintain plateau pressures <30               Prone ventilation consideration as feasible  Pa02/Fi02 < 150 on Fi02 >60% and PEEP at least 5                 Vent bundle Reviewed     GI -  PPI  Enteric feeds as tolerated in tandem with NMB and prone ventilation    Renal - Even to negative fluid balance as tolerated by hemodynamics and renal fx.  Hypernatremia; supplemental free water. Feeds to be provided in lieu of IVF.     Heme -  Progressive anemia, transfused 1 PRBC. Pharmacologic DVT PPx  in addition to SCD's    ID - COVID 19+ s/p treatment. Proteus in sputum s/p Ceftriaxone course. ABX discontinuation based on discussion with ID in conjunction with clinical features, culture data, and judicious procalcitonin monitoring.      Endo -  Aggressive glycemic control to limit FS glucose to < 180mg/dl.      COVID 19 specific considerations and therapeutic  options based on the available and rapidly changing literature    Goals of care considerations:  Ongoing assessment for patient specific treatment options based on progression or decline.      Prognosis remains poor. Comfort measures have been discussed with family, still deciding and would like to continue all aggressive measures    38  Minutes of critical care tiem spent in the management of this critically ill COVID-19 patient/PUI patient with continuous assessments and interventions based on the interpretation of multiple databases. Patient is a 74y old  Male who presents with a chief complaint of Transfer [place] - Covid-19 PNA (08 May 2020 15:51)      BRIEF HOSPITAL COURSE: 74y Male  pmhx presented as transfer from [facility] on 4/1 with COVID 19+ in acute hypoxic respiratory failure. Course further complicated septic shock , bilateral pneumothorax on chest tube placement, MRSA and Strept Pneumonia.    Patient remains on ventilator with poor mental status off sedation. Remains in triple pressor shock.      PAST MEDICAL & SURGICAL HISTORY:  BPH (benign prostatic hyperplasia)  No pertinent past medical history  No significant past surgical history        Hosp day #37d    Vent day #  Mode: AC/ CMV (Assist Control/ Continuous Mandatory Ventilation)  RR (machine): 38  TV (machine): 450  FiO2: 70  PEEP: 10  ITime: 0.7  MAP: 44  PIP: 50        Vital signs / Reviewed and Physical Exam Performed where pertinent and urgently required    Lab / Radiology  studies / ABG / Meds -  reviewed and interpreted into the assessment and treatment plan.      Assessment/Plan/Therapeutic interventions      Neuro - Poor metnal status off sedation, utilize PRN if needed for vent synchrnonicity    CV -  Septic shock requiring triple pressor support and BP still tenuous           Avoiding fluid challenges      Pulm -  Prolonged hypoxic resp failure s/p trach. Compensated respiratory acidosis             Bilateral pneumothoraces, chest tube remain to -40 suction               ARDS-NET 4-6cc/kg IBW TV as able to maintain plateau pressures <30               Prone ventilation consideration as feasible  Pa02/Fi02 < 150 on Fi02 >60% and PEEP at least 5                 Vent bundle Reviewed     GI -  PPI  Enteric feeds as tolerated in tandem with NMB and prone ventilation    Renal - Even to negative fluid balance as tolerated by hemodynamics and renal fx.  Hypernatremia; supplemental free water. Feeds to be provided in lieu of IVF.     Heme -  Progressive anemia, transfused 1 PRBC. Pharmacologic DVT PPx  in addition to SCD's    ID - COVID 19+ s/p treatment. Proteus in sputum s/p Ceftriaxone course. ABX discontinuation based on discussion with ID in conjunction with clinical features, culture data, and judicious procalcitonin monitoring.      Endo -  Aggressive glycemic control to limit FS glucose to < 180mg/dl.      COVID 19 specific considerations and therapeutic  options based on the available and rapidly changing literature    Goals of care considerations:  Ongoing assessment for patient specific treatment options based on progression or decline.      Prognosis remains poor. Comfort measures have been discussed with family, still deciding and would like to continue all aggressive measures    38  Minutes of critical care tiem spent in the management of this critically ill COVID-19 patient/PUI patient with continuous assessments and interventions based on the interpretation of multiple databases.

## 2020-05-09 NOTE — CHART NOTE - NSCHARTNOTEFT_GEN_A_CORE
[facility] # 41  ICU # 41         Vent # 35/ 11  R axilla A-line # 10  [facility] CVL # 13    HPI:    73 y/o male with DVT tx from [facility] to  on 4/1--COVID-19 Positive--ARDS--Likely Viral PNA--Acute type 1 resp failure on full vent support--CT placed while in PACU    5/4:  Vent 80/12.  No sedation--not responsive.  On phenyl and vaso gtts.  WBC up 25.  Na up 156.  Doing very poorly   5/5:  Vent 70/12.  On very high dose pressors.  not responsive off sedation.  possible erroneous CBC.  WBC steady 25.  Na up 158  5/6:  Vent 70/12.  On 3 pressors.  Not responsive. WBC down 18. Na up 159  5/7:  Vent 70/12. On 3 pressors.  s/p 1u PC on 5/6.  Na down 152 after 1L D5W gtt.  WBC up 22.  Doing poorly  5/8: Vent 70/12.  On 3 pressors.  Not awake.  Na down 151.  Hb 5.6  WBC steady 22.   Doing very poorly  5/9:  Vent 70/12.  On 3 pressors.  Not responsive.  WBC down 17.  Na down 148     ROS: Unobtainable due to clinical Condition  PMH As above  Meds reviewed    Trach in situ connected to Vent   Chest:  B/L air entry.   No wheezing.  B/L CT   Heart: S1 S 2 regular  Abd: Soft No guarding  Extrem: No cynosis or edema  Neuro: Not responsive     Labs/radiology reviewed    IMP:    73 y/o male with DVT tx from [facility] to  on 4/1--COVID-19 Positive--ARDS--Likely Viral PNA--Acute type 1 resp failure on full vent support--CT placed while in PACU  Proteus PNA--completed 6 days of CTX  Septic shock on high dose pressors  Severe met alkalosis  TM Encephalopathy related to virus   Hypernatremia   B/L DVT on LMWH  Anemia of chronic disease     Critically ill.  High risk for acute decompensation and deterioration including death.  Given advance comorbidities and overall life support requirement on vent and pressors for shock, Resuscitation would be futile and should not be performed as it will not change the overall outcome and pt will unfortunately succumb to COVID-19 infection despite best efforts and medical Rx.  D/W ICU team     Plan:    Full vent support--LPV strategy to maintain plateau pressures < 30--High PEEP/low TV--Permissive hypercapnea and acidemia.  Decrease FIO2 and PEEP as tolerated.  Not candidate for weaning  Actively titrate pressors to MAP > 60--added Norepi gtt cap at 2   No further PRBC today   Observe off sedation  Conservative fluid management   Cont Statin  TF as per protocol--added 500 q6 and increase to 60/hr FW  HOB > 30  TF as per protocol  Evans  Strict I+Os  DVT prophy--AC with LMWH    COVID-19 specific considerations and therapeutic options based on the available and rapidly changing literature and recommendations     Case reviewed and d/w multi discipline rounds     CC time excluding procedure:  45 cc mins

## 2020-05-10 NOTE — CHART NOTE - NSCHARTNOTEFT_GEN_A_CORE
[facility] # 42  ICU # 42         Vent # 35/ 12  R axilla A-line # 11  [facility] CVL # 14    HPI:    75 y/o male with DVT tx from [facility] to  on 4/1--COVID-19 Positive--ARDS--Likely Viral PNA--Acute type 1 resp failure on full vent support--CT placed while in PACU    5/4:  Vent 80/12.  No sedation--not responsive.  On phenyl and vaso gtts.  WBC up 25.  Na up 156.  Doing very poorly   5/5:  Vent 70/12.  On very high dose pressors.  not responsive off sedation.  possible erroneous CBC.  WBC steady 25.  Na up 158  5/6:  Vent 70/12.  On 3 pressors.  Not responsive. WBC down 18. Na up 159  5/7:  Vent 70/12. On 3 pressors.  s/p 1u PC on 5/6.  Na down 152 after 1L D5W gtt.  WBC up 22.  Doing poorly  5/8: Vent 70/12.  On 3 pressors.  Not awake.  Na down 151.  Hb 5.6  WBC steady 22.   Doing very poorly  5/9:  Vent 70/12.  On 3 pressors.  Not responsive.  WBC down 17.  Na down 148   5/10:  Vent 100/12.  On 3 pressors.  Developing digital necrosis.  WBC down 12.  Not responsive on no sedation.  Na down to 145    ROS: Unobtainable due to clinical Condition  PMH As above  Meds reviewed    Trach in situ connected to Vent   Chest:  B/L air entry.   No wheezing.  B/L CT   Heart: S1 S 2 regular  Abd: Soft No guarding  Extreme: Digital necrosis   Neuro: Not responsive     Labs/radiology reviewed    IMP:    75 y/o male with DVT tx from [facility] to  on 4/1--COVID-19 Positive--ARDS--Likely Viral PNA--Acute type 1 resp failure on full vent support--CT placed while in PACU  Proteus PNA--completed 6 days of CTX  Septic shock on high dose pressors  Severe met alkalosis  TM Encephalopathy related to virus   Hypernatremia   B/L DVT on LMWH  Anemia of chronic disease     Critically ill.  High risk for acute decompensation and deterioration including death.  Given advance comorbidities and overall life support requirement on vent and pressors for shock, Resuscitation would be futile and should not be performed as it will not change the overall outcome and pt will unfortunately succumb to COVID-19 infection despite best efforts and medical Rx.  D/W ICU team     Plan:    Full vent support--LPV strategy to maintain plateau pressures < 30--High PEEP/low TV--Permissive hypercapnea and acidemia.  Decrease FIO2 and PEEP as tolerated.  Not candidate for weaning  Actively titrate pressors to MAP > 60--added Norepi gtt cap at 2   Hb 6.2 wo acute bleeding--will not tx PRBC as it will not change mortality   Observe off sedation  Conservative fluid management   Cont Statin  TF as per protocol--added 500 q6 and increased to 60/hr FW  HOB > 30  TF as per protocol  [place]  Strict I+Os  DVT prophy--AC with LMWH    COVID-19 specific considerations and therapeutic options based on the available and rapidly changing literature and recommendations     Case reviewed and d/w multi discipline rounds     CC time excluding procedure:  45 cc mins

## 2020-05-10 NOTE — CHART NOTE - NSCHARTNOTEFT_GEN_A_CORE
Spoke with Son Ignacio via phone-- [phone].  All concerns addressed including but not limited to diagnosis, treatment plan and overall prognosis.  Development of digital necrosis reviewed with him.  He needs to speak with pt's sister to convince her to proceed with comfort care

## 2020-05-11 NOTE — PROGRESS NOTE ADULT - SUBJECTIVE AND OBJECTIVE BOX
Subjective:  Pt seen, remains on 3 pressors, on vent. Anemic w H and H 6/20.1. On 100% FIO2 and PEEP 10. Last CXR 5/6 no PTX.    Vital Signs:  Vital Signs Last 24 Hrs  T(C): 37.1 (05-11-20 @ 12:00), Max: 37.3 (05-11-20 @ 04:00)  T(F): 98.8 (05-11-20 @ 12:00), Max: 99.1 (05-11-20 @ 04:00)  HR: 105 (05-11-20 @ 15:24) (94 - 110)  BP: --  RR: 34 (05-11-20 @ 15:00) (15 - 38)  SpO2: 98% (05-11-20 @ 15:24) (96% - 100%) on (O2)    Telemetry/Alarms:    Relevant labs, radiology and Medications reviewed                        6.0    8.55  )-----------( 189      ( 11 May 2020 06:30 )             20.1     05-11    144  |  107  |  27<H>  ----------------------------<  146<H>  2.6<LL>   |  35<H>  |  <0.15<L>    Ca    7.0<L>      11 May 2020 06:30  Phos  1.5     05-11  Mg     1.7     05-11        MEDICATIONS  (STANDING):  aspirin  chewable 81 milliGRAM(s) Oral daily  atorvastatin 20 milliGRAM(s) Oral at bedtime  baclofen 10 milliGRAM(s) Oral daily  chlorhexidine 0.12% Liquid 15 milliLiter(s) Oral Mucosa two times a day  chlorhexidine 4% Liquid 1 Application(s) Topical <User Schedule>  collagenase Ointment 1 Application(s) Topical two times a day  enoxaparin Injectable 70 milliGRAM(s) SubCutaneous every 12 hours  famotidine Injectable 20 milliGRAM(s) IV Push two times a day  levothyroxine 75 MICROGram(s) Oral daily  midodrine 15 milliGRAM(s) Oral every 8 hours  norepinephrine Infusion 0.05 MICROgram(s)/kG/Min (3.31 mL/Hr) IV Continuous <Continuous>  phenylephrine    Infusion 3 MICROgram(s)/kG/Min (39.7 mL/Hr) IV Continuous <Continuous>  vasopressin Infusion 0.04 Unit(s)/Min (2.4 mL/Hr) IV Continuous <Continuous>    MEDICATIONS  (PRN):  acetaminophen   Tablet .. 650 milliGRAM(s) Oral every 6 hours PRN Temp greater or equal to 38C (100.4F), Mild Pain (1 - 3)  fentaNYL    Injectable 50 MICROGram(s) IV Push every 4 hours PRN Agtation  polyethylene glycol 3350 17 Gram(s) Oral daily PRN Constipation  senna 2 Tablet(s) Oral at bedtime PRN Constipation      Physical exam    Neuro sedated on vent  Card tachy  Pulm coarse b/l  Abd soft  Ext edema, LLE cyanotic toes    Tubes: Left CT 30cc, Right CT 0CC both to -40 suction    I&O's Summary    10 May 2020 07:01  -  11 May 2020 07:00  --------------------------------------------------------  IN: 7322 mL / OUT: 1910 mL / NET: 5412 mL        Assessment  74y Male  w/ PAST MEDICAL & SURGICAL HISTORY:  BPH (benign prostatic hyperplasia)  No pertinent past medical history  No significant past surgical history  admitted with complaints of Patient is a 74y old  Male who presents with a chief complaint of Transfer [place] - Covid-19 PNA (08 May 2020 21:40)  .  73yo M with PMH of BPH presented to  on 4/1/2020, transfer from [facility] with Positive Covid-19, originally admitted with complaints of shortness of breath x 6 days. Patient intubated at  on 3/29. Course complicated by right pneumothorax 4/14 requiring b/l CT placement. On T lovenox for DVT. Thoracic surgery called for persistent right pneumothorax. Second R CT placed 4/20 with reexpansion of lung. Old R CT removed 4/22. 4/28 s/p trach.  5/6 receiving 1 U PRBC for HCT 19. CXR 5/6 with no PTX. 5/7 H and H low s/p transfusion 5/8, on 3 pressors    maintain both chest tubes to -40 suction  Change chest tube dressings QOD and prn soiling  record output per shift  cont chest tubes while pt intubated  transfusion of blood product as per ICU    Discussed with Cardiothoracic Team at AM rounds.

## 2020-05-11 NOTE — PROGRESS NOTE ADULT - SUBJECTIVE AND OBJECTIVE BOX
75 y/o M admitted to [facility] 3/29 - COVID-19 POSITIVE  intubated 3/29 due to acute hypoxic respiratory failure  Patient found to have bilateral DVTs at [place], started on Heparin gtt.   Transferred to  on 4/1    PMHx of BPH     4/28: s/p uneventful tracheostomy placement.  Remains on pressors to support MAP.    5/11: events of last 7 days discussed with Dr Salazar who has been caring for the patient.  pt has been doing very poorly last 10+ days; remains unresponsive OFF sedation  remains on mutiple vasopressors    38/450/60 +10  7.28/102/64  P:F 170  PPlat 44    (+) Vasopressin; Phenylephrine    (+) Evans  (+) TF's    OFF sedation > 48hrs    B/L CT's (4/14; 4/20)  CVL 4/26 #7    Hosp # 36  Vent day # 36    Trach placed 4/28    Allergies    No Known Allergies    ICU Vital Signs Last 24 Hrs  T(C): 37.3 (11 May 2020 18:00), Max: 37.3 (11 May 2020 04:00)  T(F): 99.2 (11 May 2020 18:00), Max: 99.2 (11 May 2020 18:00)  HR: 124 (11 May 2020 18:00) (94 - 124)  ABP: 92/57 (11 May 2020 18:00) (89/48 - 111/57)  ABP(mean): 72 (11 May 2020 18:00) (63 - 80)  RR: 32 (11 May 2020 18:00) (15 - 38)  SpO2: 95% (11 May 2020 18:00) (95% - 100%)      Mode: AC/ CMV (Assist Control/ Continuous Mandatory Ventilation)  RR (machine): 38  TV (machine): 450  FiO2: 80  PEEP: 10  ITime: 1  MAP: 25  PIP: 46      I&O's Summary    10 May 2020 07:01  -  11 May 2020 07:00  --------------------------------------------------------  IN: 7322 mL / OUT: 1910 mL / NET: 5412 mL                              6.0    8.55  )-----------( 189      ( 11 May 2020 06:30 )             20.1       05-11    144  |  107  |  27<H>  ----------------------------<  146<H>  2.6<LL>   |  35<H>  |  <0.15<L>    Ca    7.0<L>      11 May 2020 06:30  Phos  1.5     05-11  Mg     1.7     05-11    ABG - ( 11 May 2020 06:27 )  pH, Arterial: 7.28  pH, Blood: x     /  pCO2: 70    /  pO2: 119   / HCO3: 32    / Base Excess: 5.4   /  SaO2: 98          MEDICATIONS  (STANDING):  aspirin  chewable 81 milliGRAM(s) Oral daily  atorvastatin 20 milliGRAM(s) Oral at bedtime  baclofen 10 milliGRAM(s) Oral daily  chlorhexidine 0.12% Liquid 15 milliLiter(s) Oral Mucosa two times a day  chlorhexidine 4% Liquid 1 Application(s) Topical <User Schedule>  collagenase Ointment 1 Application(s) Topical two times a day  enoxaparin Injectable 70 milliGRAM(s) SubCutaneous every 12 hours  famotidine Injectable 20 milliGRAM(s) IV Push two times a day  levothyroxine 75 MICROGram(s) Oral daily  midodrine 15 milliGRAM(s) Oral every 8 hours  norepinephrine Infusion 0.05 MICROgram(s)/kG/Min (3.31 mL/Hr) IV Continuous <Continuous>  phenylephrine    Infusion 3 MICROgram(s)/kG/Min (39.7 mL/Hr) IV Continuous <Continuous>  vasopressin Infusion 0.04 Unit(s)/Min (2.4 mL/Hr) IV Continuous <Continuous>    MEDICATIONS  (PRN):  acetaminophen   Tablet .. 650 milliGRAM(s) Oral every 6 hours PRN Temp greater or equal to 38C (100.4F), Mild Pain (1 - 3)  fentaNYL    Injectable 50 MICROGram(s) IV Push every 4 hours PRN Agtation  polyethylene glycol 3350 17 Gram(s) Oral daily PRN Constipation  senna 2 Tablet(s) Oral at bedtime PRN Constipation          DVT Prophylaxis:    Advanced Directives:  Discussed with:    Visit Information:          minutes of Critical Care time spent providing medical care for patient's acute illness/conditions that impairs at least one vital organ system and/or poses a high risk of imminent or life threatening deterioration in the patient's condition.  It includes time spent reviewing labs, radiology, discussing goals of care with patient and/or family, and discussing the case with a multidisciplinary team in an effort to prevent further life threatening deterioration or end organ damage.  This time is independent of any procedures performed.    ** Time is exclusive of billed procedures and/or teaching and/or routine family updates. 73 y/o M admitted to [facility] 3/29 - COVID-19 POSITIVE  intubated 3/29 due to acute hypoxic respiratory failure  Patient found to have bilateral DVTs at [place], started on Heparin gtt.   Transferred to  on 4/1    PMHx of BPH     4/28: s/p uneventful tracheostomy placement.  Remains on pressors to support MAP.    5/11: events of last 7 days discussed with Dr Salazar who has been caring for the patient.  pt has been doing very poorly last 10+ days; remains unresponsive OFF sedation  remains on multiple (3) vasopressors    38/450/100 +10  7.28/70/119  P:F 119    Fio2 titrated to 80%        (+) Vasopressin; Phenylephrine; Levophed    (+) Evans  (+) TF's    OFF sedation > 48hrs    B/L CT's (4/14; 4/20)  CVL 4/26 #7    Hosp # 44  Vent day #44    Trach placed 4/28    Allergies    No Known Allergies    ICU Vital Signs Last 24 Hrs  T(C): 37.3 (11 May 2020 18:00), Max: 37.3 (11 May 2020 04:00)  T(F): 99.2 (11 May 2020 18:00), Max: 99.2 (11 May 2020 18:00)  HR: 124 (11 May 2020 18:00) (94 - 124)  ABP: 92/57 (11 May 2020 18:00) (89/48 - 111/57)  ABP(mean): 72 (11 May 2020 18:00) (63 - 80)  RR: 32 (11 May 2020 18:00) (15 - 38)  SpO2: 95% (11 May 2020 18:00) (95% - 100%)      Mode: AC/ CMV (Assist Control/ Continuous Mandatory Ventilation)  RR (machine): 38  TV (machine): 450  FiO2: 80  PEEP: 10  ITime: 1  MAP: 25  PIP: 46      I&O's Summary    10 May 2020 07:01  -  11 May 2020 07:00  --------------------------------------------------------  IN: 7322 mL / OUT: 1910 mL / NET: 5412 mL                              6.0    8.55  )-----------( 189      ( 11 May 2020 06:30 )             20.1       05-11    144  |  107  |  27<H>  ----------------------------<  146<H>  2.6<LL>   |  35<H>  |  <0.15<L>    Ca    7.0<L>      11 May 2020 06:30  Phos  1.5     05-11  Mg     1.7     05-11    ABG - ( 11 May 2020 06:27 )  pH, Arterial: 7.28  pH, Blood: x     /  pCO2: 70    /  pO2: 119   / HCO3: 32    / Base Excess: 5.4   /  SaO2: 98          MEDICATIONS  (STANDING):  aspirin  chewable 81 milliGRAM(s) Oral daily  atorvastatin 20 milliGRAM(s) Oral at bedtime  baclofen 10 milliGRAM(s) Oral daily  chlorhexidine 0.12% Liquid 15 milliLiter(s) Oral Mucosa two times a day  chlorhexidine 4% Liquid 1 Application(s) Topical <User Schedule>  collagenase Ointment 1 Application(s) Topical two times a day  enoxaparin Injectable 70 milliGRAM(s) SubCutaneous every 12 hours  famotidine Injectable 20 milliGRAM(s) IV Push two times a day  levothyroxine 75 MICROGram(s) Oral daily  midodrine 15 milliGRAM(s) Oral every 8 hours  norepinephrine Infusion 0.05 MICROgram(s)/kG/Min (3.31 mL/Hr) IV Continuous <Continuous>  phenylephrine    Infusion 3 MICROgram(s)/kG/Min (39.7 mL/Hr) IV Continuous <Continuous>  vasopressin Infusion 0.04 Unit(s)/Min (2.4 mL/Hr) IV Continuous <Continuous>    MEDICATIONS  (PRN):  acetaminophen   Tablet .. 650 milliGRAM(s) Oral every 6 hours PRN Temp greater or equal to 38C (100.4F), Mild Pain (1 - 3)  fentaNYL    Injectable 50 MICROGram(s) IV Push every 4 hours PRN Agtation  polyethylene glycol 3350 17 Gram(s) Oral daily PRN Constipation  senna 2 Tablet(s) Oral at bedtime PRN Constipation          DVT Prophylaxis:  Lovenox 70mg q12h    Visit Information: 45 minutes of Critical Care time spent providing medical care for patient's acute illness/conditions that impairs at least one vital organ system and/or poses a high risk of imminent or life threatening deterioration in the patient's condition.  It includes time spent reviewing labs, radiology, discussing goals of care with patient and/or family, and discussing the case with a multidisciplinary team in an effort to prevent further life threatening deterioration or end organ damage.  This time is independent of any procedures performed.    ** Time is exclusive of billed procedures and/or teaching and/or routine family updates.

## 2020-05-11 NOTE — PROGRESS NOTE ADULT - ASSESSMENT
75 yo m  transferred from [facility] on 4/1 after being admitted at [facility] from (3/29 to 4/1)  COVID-19 POSITIVE     ARDS due to COVID-19 infection  Acute hypoxic respiratory failure  COVID-19 viral pneumonia  Septic shock due to COVID-19 infection  DVT   Bacterial MRSA/strep pneumo PNA,   B/L pneumothoraces  s/p Trach on 4/28  toxic metabolic encephalopathy       PMHx BPH & hypothyroidism    acute deterioration with worsening hypoxia and shock requiring vasopressors  Anemia - No overt source of bleeding   Remains unresponsive  Multi-pressor shock  persistent respiratory failure requiring high Fio2    Pt remains at high risk for deterioration, morbidity and mortality.    Plan at this time is for continued care in ICU  PPlat significantly elevated - will continue to attempt adjustments to optimize oxygenation and PPlat as possible  pt has been on vent 44 days; lung compliance has deteriorated likely due to either progressing of ARDS or oxygen toxicity.  Keep OFF sedation; Monitor neurologic status  Continue Phenylephrine infusion; vasopressin infusion; Levophed infusion  VAP prevention bundle  trach care  CV: vasopressor therapy, actively titrating levophed for MAP >65.  Weaning as tolerated.   RESP:  Low VT high PEEP strategy as possible - Minimize FiO2 as possible per ARDSnet guidelines - Goal PPlat <30 but given poor compliance of lungs unable to achieve despite multiple attempts to adjust vent - has been vented >4 weeks   GI: TF's  ENDO: Hypothyroidism on synthroid    ID: Completed abx/antiviral therapy - completed course of Rocephin to treat Proteus in sputum;   Full AC for treatment of DVT as appropriate; monitor for s/s of bleeding given recent trach.  Son Ignacio and pts wife Ruby aware of poor prognosis - we spoke again today 5/11 and discussed above/questions answered.  At this time they are aware that given his multi-pressor shock and high Fio2 requirement, NO resuscitation efforts will be undertaken should he pass despite maximal medical interventions already being provided.  Additionally, no role for transfusion at this time.  Will not change prognosis nor outcome.    Supportive care

## 2020-05-12 NOTE — CHART NOTE - NSCHARTNOTEFT_GEN_A_CORE
Clinical Nutrition BRIEF NOTE    *pt s/p trach (4/28).   remains critically ill with fever and unresponsive off sedation. ARDS due to COVID-19 infection. Acute hypoxic respiratory failure 2/2 COVID-19 viral pneumonia. Septic shock due to COVID-19 infection. Bacterial MRSA/strep pneumo PNA. B/L pneumothoraces. toxic metabolic encephalopathy.  *pt maxed on 3 pressors.      *labs reviewed; 05-11 Na144 mmol/L Glu 146 mg/dL<H> K+ 2.6 mmol/L<LL> Cr  <0.15 mg/dL<L> BUN 27 mg/dL<H> Phos 1.5 mg/dL<L> Alb n/a   PAB n/a     *hypokalemia, hypophosphatemia.  borderline low magnesium.    *urine output: 2100mL.  Rectal tube output 250mL.  abd firm.    *(+4) generalized, Lt/Rt arm/hand/foot/leg, perineum, and scrotum edema.  violeta score of 8; stage 3 PU on sacrum. stage 2 PU on mid back, Lt/Rt ear, and Rt cheek.    *per flowsheet, pt has received an average of 1091mL/day of Glucerna 1.5 over the past 3 days.  Which provided ~1716Kcal and 112g protein.  Which met ~100% of estimated calorie needs and 92% of estimated protein needs.  However, based on patients current hemodynamic instability, rec'd d/c TF and make pt NPO      RECOMMENDATIONS:  1) change TF to Nepro @ 40mL/hr with 5pkts Prosource TF  2) add MVI with minerals daily and vitamin C 500mg BID to optimize wound healing  3) monitor hydration status; consider hourly free water flushes of 55mL q1hr (=1100mL free water)  4) monitor TF tolerance; keep back of bed > 35 degrees  5) weekly wt checks to track/trend changes    ESTIMATED NUTR NEEDS:  1675-2010Kcal (25-30Kcal/Kg based on IBW: 67Kg)  121-134g protein (1.8-2g/Kg of IBW: 67Kg)  1675-2010mL fluid (25-30mL/Kg IBW)    Admit wt: 76.1Kg  Wt Hx:  72.7Kg (5/3)  73.9Kg (5/2)[BMI 26]  70.5Kg (4/28) (BMI 24)  IBW: 67Kg  Ht: 67". Clinical Nutrition BRIEF NOTE    *pt s/p trach (4/28).   remains critically ill with fever and unresponsive off sedation. ARDS due to COVID-19 infection. Acute hypoxic respiratory failure 2/2 COVID-19 viral pneumonia. Septic shock due to COVID-19 infection. Bacterial MRSA/strep pneumo PNA. B/L pneumothoraces. toxic metabolic encephalopathy.  *pt maxed on 3 pressors.      *labs reviewed; 05-11 Na144 mmol/L Glu 146 mg/dL<H> K+ 2.6 mmol/L<LL> Cr  <0.15 mg/dL<L> BUN 27 mg/dL<H> Phos 1.5 mg/dL<L> Alb n/a   PAB n/a     *hypokalemia, hypophosphatemia.  borderline low magnesium.    *urine output: 2100mL.  Rectal tube output 250mL.  abd firm.    *(+4) generalized, Lt/Rt arm/hand/foot/leg, perineum, and scrotum edema.  violeta score of 8; stage 3 PU on sacrum. stage 2 PU on mid back, Lt/Rt ear, and Rt cheek.    *per flowsheet, pt has received an average of 1091mL/day of Glucerna 1.5 over the past 3 days.  Which provided ~1716Kcal and 112g protein.  Which met ~100% of estimated calorie needs and 92% of estimated protein needs.  However, based on patients current hemodynamic instability, rec'd d/c TF and make pt NPO.  Pt currently with free water flushes of  60mL q1hr and 500mL q6hrs; consider changing to IVF.      RECOMMENDATIONS:  1) d/c TF and make pt NPO  2) monitor hydration status; consider changing free water flushes to IVF  3) monitor hemodynamic stability; if becomes stable, re-consult for new nutr rec's  4) obtain new wt when feasible      ESTIMATED NUTR NEEDS:  1675-2010Kcal (25-30Kcal/Kg based on IBW: 67Kg)  121-134g protein (1.8-2g/Kg of IBW: 67Kg)  1675-2010mL fluid (25-30mL/Kg IBW)    Admit wt: 76.1Kg  Wt Hx:  72.7Kg (5/3)  73.9Kg (5/2)[BMI 26]  70.5Kg (4/28) (BMI 24)  IBW: 67Kg  Ht: 67".

## 2020-05-12 NOTE — PROGRESS NOTE ADULT - SUBJECTIVE AND OBJECTIVE BOX
75 y/o M admitted to [facility] 3/29 - COVID-19 POSITIVE  intubated 3/29 due to acute hypoxic respiratory failure  Patient found to have bilateral DVTs at [place], started on Heparin gtt.   Transferred to  on 4/1    PMHx of BPH     4/28: s/p uneventful tracheostomy placement.  Remains on pressors to support MAP.    5/11: events of last 7 days discussed with Dr Salazar who has been caring for the patient.  pt has been doing very poorly last 10+ days; remains unresponsive OFF sedation  remains on multiple (3) vasopressors    5/12: remians in multi-pressor shock - remains unresponsive   - has made no improvement and is currently at maximum doses of phenylephrine and vasopressin with escalating levophed requirement.    38/450/100 +10        (+) Vasopressin; Phenylephrine; Levophed    (+) Evans  (+) TF's    B/L CT's (4/14; 4/20)  CVL 4/26 #16    Hosp # 45  Vent day #45    Trach placed 4/28    Allergies    No Known Allergies      ICU Vital Signs Last 24 Hrs  T(C): 37.7 (12 May 2020 16:00), Max: 37.9 (11 May 2020 22:00)  T(F): 99.9 (12 May 2020 16:00), Max: 100.2 (11 May 2020 22:00)  HR: 132 (12 May 2020 16:00) (121 - 139)  ABP: 100/54 (12 May 2020 16:00) (85/49 - 112/68)  ABP(mean): 75 (12 May 2020 16:00) (65 - 87)  RR: 36 (12 May 2020 16:00) (32 - 39)  SpO2: 99% (12 May 2020 16:00) (90% - 99%)      Mode: AC/ CMV (Assist Control/ Continuous Mandatory Ventilation)  RR (machine): 38  TV (machine): 450  FiO2: 100  PEEP: 10  ITime: 1  MAP: 25  PIP: 46      I&O's Summary    11 May 2020 07:01  -  12 May 2020 07:00  --------------------------------------------------------  IN: 3947 mL / OUT: 2360 mL / NET: 1587 mL    12 May 2020 07:01  -  12 May 2020 17:08  --------------------------------------------------------  IN: 500 mL / OUT: 0 mL / NET: 500 mL              MEDICATIONS  (STANDING):  aspirin  chewable 81 milliGRAM(s) Oral daily  atorvastatin 20 milliGRAM(s) Oral at bedtime  baclofen 10 milliGRAM(s) Oral daily  chlorhexidine 0.12% Liquid 15 milliLiter(s) Oral Mucosa two times a day  chlorhexidine 4% Liquid 1 Application(s) Topical <User Schedule>  collagenase Ointment 1 Application(s) Topical two times a day  enoxaparin Injectable 70 milliGRAM(s) SubCutaneous every 12 hours  famotidine Injectable 20 milliGRAM(s) IV Push two times a day  levothyroxine 75 MICROGram(s) Oral daily  midodrine 15 milliGRAM(s) Oral every 8 hours  norepinephrine Infusion 0.05 MICROgram(s)/kG/Min (3.31 mL/Hr) IV Continuous <Continuous>  phenylephrine    Infusion 3 MICROgram(s)/kG/Min (39.7 mL/Hr) IV Continuous <Continuous>  vasopressin Infusion 0.04 Unit(s)/Min (2.4 mL/Hr) IV Continuous <Continuous>    MEDICATIONS  (PRN):  acetaminophen   Tablet .. 650 milliGRAM(s) Oral every 6 hours PRN Temp greater or equal to 38C (100.4F), Mild Pain (1 - 3)  fentaNYL    Injectable 50 MICROGram(s) IV Push every 4 hours PRN Agtation  polyethylene glycol 3350 17 Gram(s) Oral daily PRN Constipation  senna 2 Tablet(s) Oral at bedtime PRN Constipation      DVT Prophylaxis:  Lovenox 70mg q12h    Visit Information: 45 minutes of Critical Care time spent providing medical care for patient's acute illness/conditions that impairs at least one vital organ system and/or poses a high risk of imminent or life threatening deterioration in the patient's condition.  It includes time spent reviewing labs, radiology, discussing goals of care with patient and/or family, and discussing the case with a multidisciplinary team in an effort to prevent further life threatening deterioration or end organ damage.  This time is independent of any procedures performed.    ** Time is exclusive of billed procedures and/or teaching and/or routine family updates.

## 2020-05-13 NOTE — PROGRESS NOTE ADULT - SUBJECTIVE AND OBJECTIVE BOX
73 y/o M admitted to [facility] 3/29 - COVID-19 POSITIVE  intubated 3/29 due to acute hypoxic respiratory failure  Patient found to have bilateral DVTs at [place], started on Heparin gtt.   Transferred to  on 4/1    PMHx of BPH     4/28: s/p uneventful tracheostomy placement.  Remains on pressors to support MAP.    5/11: events of last 7 days discussed with Dr Salazar who has been caring for the patient.  pt has been doing very poorly last 10+ days; remains unresponsive OFF sedation  remains on multiple (3) vasopressors    5/12: remains in multi-pressor shock - remains unresponsive   - has made no improvement and is currently at maximum doses of phenylephrine and vasopressin with escalating levophed requirement.      38/450/100 +10    5/13: remains in multi-pressor shock - remains unresponsive   - has made no improvement and is currently at maximum doses of phenylephrine and vasopressin & levophed.      38/450/100 +10    (+) Vasopressin; Phenylephrine; Levophed    (+) Evans  (+) TF's    B/L CT's (4/14; 4/20)  CVL 4/26 #16    Hosp # 47  Vent day #47    Trach placed 4/28    Allergies    No Known Allergies        Mode: AC/ CMV (Assist Control/ Continuous Mandatory Ventilation)  RR (machine): 38  TV (machine): 450  FiO2: 70  PEEP: 10  MAP: 22  PIP: 43          MEDICATIONS  (STANDING):  artificial  tears Solution 1 Drop(s) Both EYES three times a day  aspirin  chewable 81 milliGRAM(s) Oral daily  atorvastatin 20 milliGRAM(s) Oral at bedtime  baclofen 10 milliGRAM(s) Oral daily  chlorhexidine 0.12% Liquid 15 milliLiter(s) Oral Mucosa two times a day  chlorhexidine 4% Liquid 1 Application(s) Topical <User Schedule>  collagenase Ointment 1 Application(s) Topical two times a day  enoxaparin Injectable 70 milliGRAM(s) SubCutaneous every 12 hours  famotidine Injectable 20 milliGRAM(s) IV Push two times a day  HYDROmorphone  Injectable 2 milliGRAM(s) IV Push every 6 hours  levothyroxine 75 MICROGram(s) Oral daily  midodrine 15 milliGRAM(s) Oral every 8 hours  norepinephrine Infusion 0.05 MICROgram(s)/kG/Min (3.31 mL/Hr) IV Continuous <Continuous>  phenylephrine    Infusion 3 MICROgram(s)/kG/Min (39.7 mL/Hr) IV Continuous <Continuous>  vasopressin Infusion 0.04 Unit(s)/Min (2.4 mL/Hr) IV Continuous <Continuous>    MEDICATIONS  (PRN):  acetaminophen   Tablet .. 650 milliGRAM(s) Oral every 6 hours PRN Temp greater or equal to 38C (100.4F), Mild Pain (1 - 3)  fentaNYL    Injectable 50 MICROGram(s) IV Push every 4 hours PRN Agtation  polyethylene glycol 3350 17 Gram(s) Oral daily PRN Constipation  senna 2 Tablet(s) Oral at bedtime PRN Constipation      DVT Prophylaxis:  Lovenox 70mg q12h    Visit Information: 45 minutes of Critical Care time spent providing medical care for patient's acute illness/conditions that impairs at least one vital organ system and/or poses a high risk of imminent or life threatening deterioration in the patient's condition.  It includes time spent reviewing labs, radiology, discussing goals of care with patient and/or family, and discussing the case with a multidisciplinary team in an effort to prevent further life threatening deterioration or end organ damage.  This time is independent of any procedures performed.    ** Time is exclusive of billed procedures and/or teaching and/or routine family updates.      Assessment/Plan:  73 yo m  transferred from [facility] on 4/1 after being admitted at [facility] from (3/29 to 4/1)  COVID-19 POSITIVE     ARDS due to COVID-19 infection  Acute hypoxic respiratory failure  COVID-19 viral pneumonia  Septic shock due to COVID-19 infection  DVT   Bacterial MRSA/strep pneumo PNA,   B/L pneumothoraces  s/p Trach on 4/28  toxic metabolic encephalopathy       PMHx BPH & hypothyroidism    acute deterioration with worsening hypoxia and shock requiring vasopressors  Anemia - No overt source of bleeding   Remains unresponsive  Multi-pressor shock  persistent respiratory failure requiring high Fio2    Pt remains at high risk for deterioration, morbidity and mortality.    Plan at this time is for continued care in ICU  PPlat significantly elevated - will continue to attempt adjustments to optimize oxygenation and PPlat as possible  pt has been on vent 44 days; lung compliance has deteriorated likely due to either progressing of ARDS or oxygen toxicity.  Keep OFF sedation; Monitor neurologic status  Continue Phenylephrine infusion; vasopressin infusion; Levophed infusion   - CAP levophed at current 0.3 mcg/kg/min dose; already max dosed on vasopressin and pheynylephrine  VAP prevention bundle  trach care  RESP:  Low VT high PEEP strategy as possible - Minimize FiO2 as possible per ARDSnet guidelines - Goal PPlat <30 but given poor compliance of lungs unable to achieve despite multiple attempts to adjust vent - has been vented >4 weeks   GI: TF's  ENDO: Hypothyroidism on synthroid    ID: Completed abx/antiviral therapy - completed course of Rocephin to treat Proteus in sputum;   Full AC for treatment of DVT as appropriate; monitor for s/s of bleeding given recent trach.  Son Ignacio and pts wife Ruby aware of poor prognosis - we spoke again 5/11 and discussed above/questions answered.  At this time they are aware that given his multi-pressor shock and high Fio2 requirement, NO resuscitation efforts will be undertaken should he pass despite maximal medical interventions already being provided.  Additionally, no role for transfusion at this time.  Will not change prognosis nor outcome.  Will also STOP doing regular labs as are not contributing to improvement in condition or a change in outcome.    Supportive care

## 2020-05-14 NOTE — PROGRESS NOTE ADULT - SUBJECTIVE AND OBJECTIVE BOX
75 y/o M admitted to [facility] 3/29 - COVID-19 POSITIVE  intubated 3/29 due to acute hypoxic respiratory failure  Patient found to have bilateral DVTs at [place], started on Heparin gtt.   Transferred to  on 4/1    PMHx of BPH     4/28: s/p uneventful tracheostomy placement.  Remains on pressors to support MAP.    5/11: events of last 7 days discussed with Dr Salazar who has been caring for the patient.  pt has been doing very poorly last 10+ days; remains unresponsive OFF sedation  remains on multiple (3) vasopressors    5/12: remains in multi-pressor shock - remains unresponsive   - has made no improvement and is currently at maximum doses of phenylephrine and vasopressin with escalating levophed requirement.      38/450/100 +10    5/13: remains in multi-pressor shock - remains unresponsive   - has made no improvement and is currently at maximum doses of phenylephrine and vasopressin & levophed.      38/450/100 +10    5/14: persistent multi-pressor shock - unresponsive   - has made no improvement and is currently at maximum doses of phenylephrine and vasopressin with escalating levophed requirement.      38/450/100 +10    (+) Vasopressin; Phenylephrine; Levophed    (+) Evans  (+) TF's    B/L CT's (4/14; 4/20)  CVL 4/26 #16    Hosp # 47  Vent day #47    Trach placed 4/28    Allergies    No Known Allergies        ICU Vital Signs Last 24 Hrs  T(C): 36.1 (14 May 2020 09:00), Max: 37.1 (13 May 2020 16:00)  T(F): 97 (14 May 2020 09:00), Max: 98.8 (13 May 2020 16:00)  HR: 113 (14 May 2020 11:00) (100 - 129)  ABP: 86/45 (14 May 2020 11:00) (80/41 - 105/52)  ABP(mean): 63 (14 May 2020 11:00) (56 - 75)  RR: 34 (14 May 2020 11:00) (27 - 39)  SpO2: 97% (14 May 2020 11:00) (67% - 98%)      Mode: AC/ CMV (Assist Control/ Continuous Mandatory Ventilation)  RR (machine): 38  TV (machine): 450  FiO2: 70  PEEP: 10  MAP: 22  PIP: 43      I&O's Summary    13 May 2020 07:01  -  14 May 2020 07:00  --------------------------------------------------------  IN: 5554.2 mL / OUT: 3550 mL / NET: 2004.2 mL    14 May 2020 07:01  -  14 May 2020 12:16  --------------------------------------------------------  IN: 616.8 mL / OUT: 0 mL / NET: 616.8 mL          MEDICATIONS  (STANDING):  artificial  tears Solution 1 Drop(s) Both EYES three times a day  aspirin  chewable 81 milliGRAM(s) Oral daily  atorvastatin 20 milliGRAM(s) Oral at bedtime  baclofen 10 milliGRAM(s) Oral daily  chlorhexidine 0.12% Liquid 15 milliLiter(s) Oral Mucosa two times a day  chlorhexidine 4% Liquid 1 Application(s) Topical <User Schedule>  collagenase Ointment 1 Application(s) Topical two times a day  enoxaparin Injectable 70 milliGRAM(s) SubCutaneous every 12 hours  famotidine Injectable 20 milliGRAM(s) IV Push two times a day  HYDROmorphone  Injectable 2 milliGRAM(s) IV Push every 6 hours  levothyroxine 75 MICROGram(s) Oral daily  midodrine 15 milliGRAM(s) Oral every 8 hours  norepinephrine Infusion 0.05 MICROgram(s)/kG/Min (3.31 mL/Hr) IV Continuous <Continuous>  phenylephrine    Infusion 3 MICROgram(s)/kG/Min (39.7 mL/Hr) IV Continuous <Continuous>  vasopressin Infusion 0.04 Unit(s)/Min (2.4 mL/Hr) IV Continuous <Continuous>    MEDICATIONS  (PRN):  acetaminophen   Tablet .. 650 milliGRAM(s) Oral every 6 hours PRN Temp greater or equal to 38C (100.4F), Mild Pain (1 - 3)  fentaNYL    Injectable 50 MICROGram(s) IV Push every 4 hours PRN Agtation  polyethylene glycol 3350 17 Gram(s) Oral daily PRN Constipation  senna 2 Tablet(s) Oral at bedtime PRN Constipation      DVT Prophylaxis:  Lovenox 70mg q12h    Visit Information: 45 minutes of Critical Care time spent providing medical care for patient's acute illness/conditions that impairs at least one vital organ system and/or poses a high risk of imminent or life threatening deterioration in the patient's condition.  It includes time spent reviewing labs, radiology, discussing goals of care with patient and/or family, and discussing the case with a multidisciplinary team in an effort to prevent further life threatening deterioration or end organ damage.  This time is independent of any procedures performed.    ** Time is exclusive of billed procedures and/or teaching and/or routine family updates.

## 2020-05-14 NOTE — PROGRESS NOTE ADULT - REASON FOR ADMISSION
Transfer [place] - Covid-19 PNA
Transfer [place] - Covid-19 PNA
Transfer [place] - Covid-19 PNA
Transfer [place] - Covid-19 PNA
Transfer [place] - Covid-19 PNA
Transfer [place] - Covid-19 PNA
Transfer [place] - Covid-19 PNA
Transfer [place] - Covid-19 PNA
Transfer [place] - Covid-19 PNA
Transfer [place] - Covid-19 PNA
Transfer [place] - Covid-19 PNA
Transfer [place] - Covid-19 PNA
Transfer [place] - Covid-19 PNA
Transfer [place] - Covid-19 PNA
Transfer [place] - Covid-19 PNA
Transfer [place] - Covid-19 PNA
Transfer [place] - Covid-19 PNA
Transfer [place] - Covid-19 PNA
Transfer [place] - Covid-19 PNA
Transfer [place] - Covid-19 PNA
Transfer [place] - Covid-19 PNA
Transfer [place] - Covid-19 PNA
Transfer [place] - Covid-19 PNA
Transfer [place] - Covid-19 PNA
Transfer [place] - Covid-19 PNA
Transfer [place] - Covid-19 PNA
Transfer [place] - Covid-19 PNA
Transfer [place] - Covid-19 PNA
Transfer [place] - Covid-19 PNA
Transfer [place] - Covid-19 PNA
Transfer [place] - Covid-19 PNA
Transfer [place] - Covid-19 PNA
Transfer [place] - Covid-19 PNA
Transfer [place] - Covid-19 PNA
Transfer [place] - Covid-19 PNA
Transfer [place] - Covid-19 PNA
Transfer [place] - Covid-19 PNA
Transfer [place] - Covid-19 PNA
Transfer [place] - Covid-19 PNA
Transfer [place] - Covid-19 PNA
Transfer [place] - Covid-19 PNA
Transfer [place] - Covid-19 PNA
Transfer [place] - Covid-19 PNA
Transfer [place] - Covid-19 PNA
Transfer [place] - Covid-19 PNA
Transfer [place] - Covid-19 PNA
Transfer [place] - Covid-19 PNA
Transfer [place] - Covid-19 PNA
Transfer [place] - Covid-19 PNA
Transfer [place] - Covid-19 PNA
Transfer [place] - Covid-19 PNA
Transfer [place] - Covid-19 PNA
Transfer [place] - Covid-19 PNA
Transfer [place] - Covid-19 PNA
Transfer [place] - Covid-19 PNA
Transfer [place] - Covid-19 PNA
Transfer [place] - Covid-19 PNA
Transfer [place] - Covid-19 PNA
Transfer [place] - Covid-19 PNA
Transfer [place] - Covid-19 PNA
Transfer [place] - Covid-19 PNA
Transfer [place] - Covid-19 PNA
Transfer [place] - Covid-19 PNA
Transfer [place] - Covid-19 PNA
Transfer [place] - Covid-19 PNA
Transfer [place] - Covid-19 PNA
Transfer [place] - Covid-19 PNA
Transfer [place] - Covid-19 PNA
Transfer [place] - Covid-19 PNA
Transfer [place] - Covid-19 PNA
Transfer [place] - Covid-19 PNA
Transfer [place] - Covid-19 PNA
Transfer [place] - Covid-19 PNA
Transfer [place] - Covid-19 PNA
Transfer [place] - Covid-19 PNA
Transfer [place] - Covid-19 PNA

## 2020-05-14 NOTE — PROGRESS NOTE ADULT - ASSESSMENT
73 yo m  transferred from [facility] on 4/1 after being admitted at [facility] from (3/29 to 4/1)  COVID-19 POSITIVE     ARDS due to COVID-19 infection  Acute hypoxic respiratory failure  COVID-19 viral pneumonia  Septic shock due to COVID-19 infection  DVT   Bacterial MRSA/strep pneumo PNA,   B/L pneumothoraces  s/p Trach on 4/28  toxic metabolic encephalopathy       PMHx BPH & hypothyroidism    acute deterioration with worsening hypoxia and shock requiring vasopressors  Anemia - No overt source of bleeding   Remains unresponsive  Multi-pressor shock  persistent respiratory failure requiring high Fio2    Pt remains at high risk for deterioration, morbidity and mortality.    Plan at this time is for continued care in ICU  PPlat significantly elevated - will continue to attempt adjustments to optimize oxygenation and PPlat as possible  pt has been on vent 44 days; lung compliance has deteriorated likely due to either progressing of ARDS or oxygen toxicity.  Keep OFF sedation; Monitor neurologic status  Continue Phenylephrine infusion; vasopressin infusion; Levophed infusion   - CAP levophed at current 0.3 mcg/kg/min dose; already max dosed on vasopressin and pheynylephrine  VAP prevention bundle  trach care  RESP:  Low VT high PEEP strategy as possible - Minimize FiO2 as possible per ARDSnet guidelines - Goal PPlat <30 but given poor compliance of lungs unable to achieve despite multiple attempts to adjust vent - has been vented >4 weeks   GI: TF's  ENDO: Hypothyroidism on synthroid    ID: Completed abx/antiviral therapy - completed course of Rocephin to treat Proteus in sputum;   Full AC for treatment of DVT as appropriate; monitor for s/s of bleeding given recent trach.  Son Ignacio and pts wife Ruby aware of poor prognosis - we spoke again 5/11 and discussed above/questions answered.  At this time they are aware that given his multi-pressor shock and high Fio2 requirement, NO resuscitation efforts will be undertaken should he pass despite maximal medical interventions already being provided.  Additionally, no role for transfusion at this time.  Will not change prognosis nor outcome.  Will also STOP doing regular labs as are not contributing to improvement in condition or a change in outcome.    Supportive care

## 2020-05-15 NOTE — DISCHARGE NOTE FOR THE EXPIRED PATIENT - HOSPITAL COURSE
75 y/o male transferred from [facility] o 3/29 w/ acute hypoxic respiratory failure, ARDS due to COVID-19 viral pneumonia. Found to have B/L DVTs. Transferred to  on 4/1. S/p tracheostomy on 4/28. Complicated by shock state, increasing vasopressor requirements over course of weeks. Ultimately in triple pressor shock w/ high vent settings and no mental status. Pt went into PEA arrest tonight at 2:19, CPR was not performed given medical futility as he was already requiring maximal medical interventions for prolonged period of time. Performing CPR would not change his prognosis or outcome. Family was aware of this. He passed at 2:19. Son Ignacio was called and updated.

## 2020-05-18 DIAGNOSIS — B95.62 METHICILLIN RESISTANT STAPHYLOCOCCUS AUREUS INFECTION AS THE CAUSE OF DISEASES CLASSIFIED ELSEWHERE: ICD-10-CM

## 2020-05-18 DIAGNOSIS — E87.6 HYPOKALEMIA: ICD-10-CM

## 2020-05-18 DIAGNOSIS — Z79.82 LONG TERM (CURRENT) USE OF ASPIRIN: ICD-10-CM

## 2020-05-18 DIAGNOSIS — I82.403 ACUTE EMBOLISM AND THROMBOSIS OF UNSPECIFIED DEEP VEINS OF LOWER EXTREMITY, BILATERAL: ICD-10-CM

## 2020-05-18 DIAGNOSIS — G92 TOXIC ENCEPHALOPATHY: ICD-10-CM

## 2020-05-18 DIAGNOSIS — A41.9 SEPSIS, UNSPECIFIED ORGANISM: ICD-10-CM

## 2020-05-18 DIAGNOSIS — U07.1 COVID-19: ICD-10-CM

## 2020-05-18 DIAGNOSIS — N13.8 OTHER OBSTRUCTIVE AND REFLUX UROPATHY: ICD-10-CM

## 2020-05-18 DIAGNOSIS — J93.83 OTHER PNEUMOTHORAX: ICD-10-CM

## 2020-05-18 DIAGNOSIS — E87.2 ACIDOSIS: ICD-10-CM

## 2020-05-18 DIAGNOSIS — I95.9 HYPOTENSION, UNSPECIFIED: ICD-10-CM

## 2020-05-18 DIAGNOSIS — R65.21 SEVERE SEPSIS WITH SEPTIC SHOCK: ICD-10-CM

## 2020-05-18 DIAGNOSIS — J98.2 INTERSTITIAL EMPHYSEMA: ICD-10-CM

## 2020-05-18 DIAGNOSIS — J80 ACUTE RESPIRATORY DISTRESS SYNDROME: ICD-10-CM

## 2020-05-18 DIAGNOSIS — J15.4 PNEUMONIA DUE TO OTHER STREPTOCOCCI: ICD-10-CM

## 2020-05-18 DIAGNOSIS — D63.8 ANEMIA IN OTHER CHRONIC DISEASES CLASSIFIED ELSEWHERE: ICD-10-CM

## 2020-05-18 DIAGNOSIS — E87.0 HYPEROSMOLALITY AND HYPERNATREMIA: ICD-10-CM

## 2020-05-18 DIAGNOSIS — E03.9 HYPOTHYROIDISM, UNSPECIFIED: ICD-10-CM

## 2020-05-18 DIAGNOSIS — N40.1 BENIGN PROSTATIC HYPERPLASIA WITH LOWER URINARY TRACT SYMPTOMS: ICD-10-CM

## 2020-05-18 DIAGNOSIS — J12.89 OTHER VIRAL PNEUMONIA: ICD-10-CM

## 2020-09-23 NOTE — PROGRESS NOTE ADULT - SUBJECTIVE AND OBJECTIVE BOX
Date of service: 04-15-20 @ 14:30    Patient lying in bed still on ventilatory support; still with fevers      ROS unable to obtain secondary to patient medical condition     MEDICATIONS  (STANDING):  aspirin  chewable 81 milliGRAM(s) Oral daily  atorvastatin 20 milliGRAM(s) Oral at bedtime  baclofen 10 milliGRAM(s) Oral daily  chlorhexidine 0.12% Liquid 15 milliLiter(s) Oral Mucosa every 12 hours  chlorhexidine 4% Liquid 1 Application(s) Topical <User Schedule>  collagenase Ointment 1 Application(s) Topical two times a day  dexMEDEtomidine Infusion 0.4 MICROgram(s)/kG/Hr (7.61 mL/Hr) IV Continuous <Continuous>  enoxaparin Injectable 80 milliGRAM(s) SubCutaneous every 12 hours  famotidine Injectable 20 milliGRAM(s) IV Push two times a day  levothyroxine 75 MICROGram(s) Oral daily  norepinephrine Infusion 0.05 MICROgram(s)/kG/Min (7.13 mL/Hr) IV Continuous <Continuous>  polyethylene glycol 3350 17 Gram(s) Oral daily  potassium acid phosphate/sodium acid phosphate tablet (K-PHOS No. 2) 1 Tablet(s) Oral four times a day with meals  propofol Infusion 15 MICROgram(s)/kG/Min (6.85 mL/Hr) IV Continuous <Continuous>  trimethoprim/polymyxin Solution 1 Drop(s) Right EYE four times a day    MEDICATIONS  (PRN):  acetaminophen  Suppository .. 650 milliGRAM(s) Rectal every 6 hours PRN Temp greater or equal to 38C (100.4F)  LORazepam   Injectable 2 milliGRAM(s) IV Push every 4 hours PRN Agitation  senna 2 Tablet(s) Oral at bedtime PRN Constipation      Vital Signs Last 24 Hrs  T(C): 37.3 (15 Apr 2020 12:00), Max: 38.3 (15 Apr 2020 07:00)  T(F): 99.2 (15 Apr 2020 12:00), Max: 100.9 (15 Apr 2020 07:00)  HR: 69 (15 Apr 2020 14:00) (69 - 122)  BP: 136/76 (15 Apr 2020 14:00) (99/68 - 136/76)  BP(mean): 78 (14 Apr 2020 19:38) (78 - 78)  RR: 30 (15 Apr 2020 14:00) (28 - 34)  SpO2: 97% (15 Apr 2020 14:00) (93% - 99%)    Mode: AC/ CMV (Assist Control/ Continuous Mandatory Ventilation), RR (machine): 30, TV (machine): 500, FiO2: 50, PEEP: 10, PIP: 34    Physical Exam:          Constitutional: frail looking  HEENT: NC/AT, orally intubated  Neck: supple; thyroid not palpable  Back: no tenderness  Respiratory: respiratory effort normal; scattered coarse breath sounds, bilateral chest tubes  Cardiovascular: S1S2 regular, no murmurs  Abdomen: soft, not tender, not distended, positive BS; no liver or spleen organomegaly  Genitourinary: no suprapubic tenderness  Musculoskeletal: no muscle tenderness, no joint swelling or tenderness  Neurological/ Psychiatric: orally intubated   Skin: no rashes; no palpable lesions    Labs: all available labs reviewed              Labs:                         Labs:                        9.2    12.88 )-----------( 409      ( 15 Apr 2020 04:35 )             29.0     04-15    143  |  106  |  20  ----------------------------<  145<H>  4.2   |  36<H>  |  0.36<L>    Ca    7.2<L>      15 Apr 2020 04:35  Phos  2.0     04-15  Mg     2.1     04-15    TPro  6.2  /  Alb  1.0<L>  /  TBili  0.5  /  DBili  x   /  AST  68<H>  /  ALT  34  /  AlkPhos  172<H>  04-15           Cultures:       C-Reactive Protein, Serum: 17.29 mg/dL (04-13-20 @ 05:00)  Ferritin, Serum: 896 ng/mL (04-13-20 @ 05:00)  Ferritin, Serum: 595 ng/mL (04-07-20 @ 09:04)  C-Reactive Protein, Serum: 25.59 mg/dL (04-07-20 @ 09:04)  D-Dimer Assay, Quantitative: 3608 ng/mL DDU (04-06-20 @ 12:30)  Ferritin, Serum: 682 ng/mL (04-06-20 @ 06:00)  C-Reactive Protein, Serum: 18.10 mg/dL (04-06-20 @ 06:00)          COVID-19 PCR . (03.21.20 @ 22:44)    COVID-19 PCR: Detected: LDT - Laboratory Developed Test All “detected” results on this new test  are considered presumptively positive results, are clinically actionable,  and specimens will be forwarded to [facility] for confirmation testing.  Another report (corrected report) will only be issued if discordant  results occur.  This test has been validated by [facility] to be accurate;  though it has not been FDA cleared/approved by the usual pathway.  As with all laboratory tests, results should be correlated with clinical  findings.    < from: Xray Chest 1 View- PORTABLE-Routine (04.08.20 @ 07:18) >    EXAM:  XR CHEST PORTABLE ROUTINE 1V                            PROCEDURE DATE:  04/08/2020          INTERPRETATION:  CHEST AP PORTABLE:    History: Shortness of Breath.     Date and time of exam: 4/8/2020 6:43 AM.    Technique: A single AP view of the chest was obtained.    Comparison exam: 4/6/2020 4:38 PM.    Findings:  Diffuse bilateral pulmonary infiltrates, right worse than left. Removal of left IJ central line since the prior exam..    Impression:  Removal of left IJ central line, otherwise stable.    < end of copied text >        Radiology: all available radiological tests reviewed    Advanced directives addressed: full resuscitation 22.3

## 2022-08-16 NOTE — ED PROVIDER NOTE - CONSTITUTIONAL [+], MLM
CHILLS/FEVER 5-Fu Counseling: 5-Fluorouracil Counseling:  I discussed with the patient the risks of 5-fluorouracil including but not limited to erythema, scaling, itching, weeping, crusting, and pain.

## 2025-01-03 NOTE — PROGRESS NOTE ADULT - ASSESSMENT
73 yo m  transferred from [facility] on 4/1 after being admitted at [facility] from (3/29 to 4/1)  COVID-19 POSITIVE     ARDS due to COVID-19 infection  Acute hypoxic respiratory failure  COVID-19 viral pneumonia  Septic shock due to COVID-19 infection  DVT   Bacterial MRSA/strep pneumo PNA,   B/L pneumothoraces  s/p Trach on 4/28  toxic metabolic encephalopathy       PMHx BPH & hypothyroidism    acute deterioration with worsening hypoxia and shock requiring vasopressors  Anemia - No overt source of bleeding   Remains unresponsive  Multi-pressor shock  persistent respiratory failure requiring high Fio2    Pt remains at high risk for deterioration, morbidity and mortality.    Plan at this time is for continued care in ICU  PPlat significantly elevated - will continue to attempt adjustments to optimize oxygenation and PPlat as possible  pt has been on vent 44 days; lung compliance has deteriorated likely due to either progressing of ARDS or oxygen toxicity.  Keep OFF sedation; Monitor neurologic status  Continue Phenylephrine infusion; vasopressin infusion; Levophed infusion   - CAP levophed at current 0.3 mcg/kg/min dose; already max dosed on vasopressin and pheynylephrine  VAP prevention bundle  trach care  RESP:  Low VT high PEEP strategy as possible - Minimize FiO2 as possible per ARDSnet guidelines - Goal PPlat <30 but given poor compliance of lungs unable to achieve despite multiple attempts to adjust vent - has been vented >4 weeks   GI: TF's  ENDO: Hypothyroidism on synthroid    ID: Completed abx/antiviral therapy - completed course of Rocephin to treat Proteus in sputum;   Full AC for treatment of DVT as appropriate; monitor for s/s of bleeding given recent trach.  Son Ignacio and pts wife Ruby aware of poor prognosis - we spoke again 5/11 and discussed above/questions answered.  At this time they are aware that given his multi-pressor shock and high Fio2 requirement, NO resuscitation efforts will be undertaken should he pass despite maximal medical interventions already being provided.  Additionally, no role for transfusion at this time.  Will not change prognosis nor outcome.  Will also STOP doing regular labs as are not contributing to improvement in condition or a change in outcome.    Supportive care Detail Level: Zone Sunscreen Recommendations: Sunblock with zinc encouraged. Detail Level: Detailed